# Patient Record
Sex: FEMALE | Race: BLACK OR AFRICAN AMERICAN | Employment: FULL TIME | URBAN - METROPOLITAN AREA
[De-identification: names, ages, dates, MRNs, and addresses within clinical notes are randomized per-mention and may not be internally consistent; named-entity substitution may affect disease eponyms.]

---

## 2017-01-23 PROBLEM — M47.26 OSTEOARTHRITIS OF SPINE WITH RADICULOPATHY, LUMBAR REGION: Status: ACTIVE | Noted: 2017-01-23

## 2017-01-23 PROBLEM — M51.16 INTERVERTEBRAL DISC DISORDERS WITH RADICULOPATHY, LUMBAR REGION: Status: ACTIVE | Noted: 2017-01-23

## 2017-05-10 PROBLEM — M51.26 DISPLACEMENT OF LUMBAR INTERVERTEBRAL DISC: Status: ACTIVE | Noted: 2017-05-10

## 2017-07-06 PROBLEM — M54.12 CERVICAL RADICULOPATHY: Status: ACTIVE | Noted: 2017-07-06

## 2017-07-06 PROBLEM — E11.42 DIABETIC PERIPHERAL NEUROPATHY (HCC): Status: ACTIVE | Noted: 2017-07-06

## 2017-07-06 PROBLEM — M50.20 CERVICAL DISC DISPLACEMENT: Status: ACTIVE | Noted: 2017-07-06

## 2017-07-06 PROBLEM — E66.09 NON MORBID OBESITY DUE TO EXCESS CALORIES: Status: ACTIVE | Noted: 2017-07-06

## 2017-09-28 PROBLEM — M50.20 CERVICAL DISC DISPLACEMENT: Status: ACTIVE | Noted: 2017-09-28

## 2017-09-28 PROBLEM — G62.9 PERIPHERAL NERVE DYSFUNCTION: Status: ACTIVE | Noted: 2017-09-28

## 2017-09-28 PROBLEM — R73.03 PREDIABETES: Status: ACTIVE | Noted: 2017-09-28

## 2018-03-30 ENCOUNTER — HOSPITAL ENCOUNTER (OUTPATIENT)
Age: 48
Discharge: HOME | End: 2018-04-01
Payer: COMMERCIAL

## 2018-03-30 PROCEDURE — 88305 TISSUE EXAM BY PATHOLOGIST: CPT

## 2018-03-30 PROCEDURE — 87081 CULTURE SCREEN ONLY: CPT

## 2018-03-31 LAB — CLOTEST: NORMAL

## 2018-04-10 ENCOUNTER — HOSPITAL ENCOUNTER (OUTPATIENT)
Age: 48
Discharge: HOME | End: 2018-04-12
Payer: COMMERCIAL

## 2018-04-10 LAB
AMPHETAMINE SCREEN, URINE: NOT DETECTED
BARBITURATE SCREEN URINE: NOT DETECTED
BENZODIAZEPINE SCREEN, URINE: NOT DETECTED
CANNABINOID SCREEN URINE: NOT DETECTED
COCAINE METABOLITE SCREEN URINE: NOT DETECTED
METHADONE SCREEN, URINE: NOT DETECTED
OPIATE SCREEN URINE: NOT DETECTED
PHENCYCLIDINE SCREEN URINE: NOT DETECTED
PROPOXYPHENE SCREEN: NOT DETECTED

## 2018-04-10 PROCEDURE — G0480 DRUG TEST DEF 1-7 CLASSES: HCPCS

## 2018-04-10 PROCEDURE — 80307 DRUG TEST PRSMV CHEM ANLYZR: CPT

## 2018-04-16 LAB
O-DESMETHYLTRAMADOL,UR: NEGATIVE
TRAMADOL, URINE: 100 NG/ML

## 2018-04-25 ENCOUNTER — OFFICE VISIT (OUTPATIENT)
Dept: FAMILY MEDICINE CLINIC | Age: 48
End: 2018-04-25
Payer: COMMERCIAL

## 2018-04-25 VITALS
TEMPERATURE: 98.2 F | RESPIRATION RATE: 16 BRPM | HEART RATE: 60 BPM | WEIGHT: 218.8 LBS | DIASTOLIC BLOOD PRESSURE: 74 MMHG | BODY MASS INDEX: 36.46 KG/M2 | OXYGEN SATURATION: 98 % | HEIGHT: 65 IN | SYSTOLIC BLOOD PRESSURE: 116 MMHG

## 2018-04-25 DIAGNOSIS — R10.30 LOWER ABDOMINAL PAIN: Primary | ICD-10-CM

## 2018-04-25 LAB
BILIRUBIN, POC: NORMAL
BLOOD URINE, POC: NORMAL
CLARITY, POC: CLEAR
COLOR, POC: NORMAL
CONTROL: NORMAL
GLUCOSE URINE, POC: NORMAL
KETONES, POC: NORMAL
LEUKOCYTE EST, POC: NORMAL
NITRITE, POC: NORMAL
PH, POC: 5.5
PREGNANCY TEST URINE, POC: NEGATIVE
PROTEIN, POC: NORMAL
SPECIFIC GRAVITY, POC: 1.02
UROBILINOGEN, POC: 0.2

## 2018-04-25 PROCEDURE — G8417 CALC BMI ABV UP PARAM F/U: HCPCS | Performed by: FAMILY MEDICINE

## 2018-04-25 PROCEDURE — G8427 DOCREV CUR MEDS BY ELIG CLIN: HCPCS | Performed by: FAMILY MEDICINE

## 2018-04-25 PROCEDURE — 81025 URINE PREGNANCY TEST: CPT | Performed by: FAMILY MEDICINE

## 2018-04-25 PROCEDURE — 99214 OFFICE O/P EST MOD 30 MIN: CPT | Performed by: FAMILY MEDICINE

## 2018-04-25 PROCEDURE — 1036F TOBACCO NON-USER: CPT | Performed by: FAMILY MEDICINE

## 2018-04-25 PROCEDURE — 81003 URINALYSIS AUTO W/O SCOPE: CPT | Performed by: FAMILY MEDICINE

## 2018-04-30 PROBLEM — G44.209 MUSCLE CONTRACTION HEADACHE: Status: ACTIVE | Noted: 2018-04-30

## 2018-05-08 ENCOUNTER — APPOINTMENT (OUTPATIENT)
Dept: GENERAL RADIOLOGY | Age: 48
End: 2018-05-08
Payer: COMMERCIAL

## 2018-05-08 ENCOUNTER — HOSPITAL ENCOUNTER (EMERGENCY)
Age: 48
Discharge: HOME | End: 2018-05-08
Attending: EMERGENCY MEDICINE
Payer: COMMERCIAL

## 2018-05-08 VITALS
RESPIRATION RATE: 15 BRPM | HEART RATE: 62 BPM | WEIGHT: 208 LBS | HEIGHT: 65 IN | DIASTOLIC BLOOD PRESSURE: 73 MMHG | BODY MASS INDEX: 34.66 KG/M2 | TEMPERATURE: 98.8 F | OXYGEN SATURATION: 100 % | SYSTOLIC BLOOD PRESSURE: 125 MMHG

## 2018-05-08 DIAGNOSIS — J02.9 VIRAL PHARYNGITIS: ICD-10-CM

## 2018-05-08 DIAGNOSIS — R09.82 POST-NASAL DRIP: Primary | ICD-10-CM

## 2018-05-08 LAB
ALBUMIN SERPL-MCNC: 3.9 G/DL (ref 3.5–5.2)
ALP BLD-CCNC: 105 U/L (ref 35–104)
ALT SERPL-CCNC: 14 U/L (ref 0–32)
ANION GAP SERPL CALCULATED.3IONS-SCNC: 12 MMOL/L (ref 7–16)
AST SERPL-CCNC: 18 U/L (ref 0–31)
BASOPHILS ABSOLUTE: 0.01 E9/L (ref 0–0.2)
BASOPHILS RELATIVE PERCENT: 0.2 % (ref 0–2)
BILIRUB SERPL-MCNC: <0.2 MG/DL (ref 0–1.2)
BILIRUBIN DIRECT: <0.2 MG/DL (ref 0–0.3)
BILIRUBIN, INDIRECT: ABNORMAL MG/DL (ref 0–1)
BUN BLDV-MCNC: 5 MG/DL (ref 6–20)
CALCIUM SERPL-MCNC: 8.8 MG/DL (ref 8.6–10.2)
CHLORIDE BLD-SCNC: 102 MMOL/L (ref 98–107)
CO2: 26 MMOL/L (ref 22–29)
CREAT SERPL-MCNC: 0.9 MG/DL (ref 0.5–1)
D DIMER: 263 NG/ML DDU
EOSINOPHILS ABSOLUTE: 0.15 E9/L (ref 0.05–0.5)
EOSINOPHILS RELATIVE PERCENT: 2.3 % (ref 0–6)
GFR AFRICAN AMERICAN: >60
GFR NON-AFRICAN AMERICAN: >60 ML/MIN/1.73
GLUCOSE BLD-MCNC: 88 MG/DL (ref 74–109)
HCT VFR BLD CALC: 39 % (ref 34–48)
HEMOGLOBIN: 12.6 G/DL (ref 11.5–15.5)
IMMATURE GRANULOCYTES #: 0.03 E9/L
IMMATURE GRANULOCYTES %: 0.5 % (ref 0–5)
LYMPHOCYTES ABSOLUTE: 1.93 E9/L (ref 1.5–4)
LYMPHOCYTES RELATIVE PERCENT: 29.2 % (ref 20–42)
MCH RBC QN AUTO: 28.3 PG (ref 26–35)
MCHC RBC AUTO-ENTMCNC: 32.3 % (ref 32–34.5)
MCV RBC AUTO: 87.6 FL (ref 80–99.9)
MONOCYTES ABSOLUTE: 0.56 E9/L (ref 0.1–0.95)
MONOCYTES RELATIVE PERCENT: 8.5 % (ref 2–12)
NEUTROPHILS ABSOLUTE: 3.93 E9/L (ref 1.8–7.3)
NEUTROPHILS RELATIVE PERCENT: 59.3 % (ref 43–80)
PDW BLD-RTO: 13.8 FL (ref 11.5–15)
PLATELET # BLD: 239 E9/L (ref 130–450)
PMV BLD AUTO: 10.4 FL (ref 7–12)
POTASSIUM SERPL-SCNC: 3.9 MMOL/L (ref 3.5–5)
PRO-BNP: 249 PG/ML (ref 0–125)
RBC # BLD: 4.45 E12/L (ref 3.5–5.5)
SODIUM BLD-SCNC: 140 MMOL/L (ref 132–146)
STREP GRP A PCR: NEGATIVE
TOTAL PROTEIN: 7.1 G/DL (ref 6.4–8.3)
TSH SERPL DL<=0.05 MIU/L-ACNC: 2.51 UIU/ML (ref 0.27–4.2)
WBC # BLD: 6.6 E9/L (ref 4.5–11.5)

## 2018-05-08 PROCEDURE — 87880 STREP A ASSAY W/OPTIC: CPT

## 2018-05-08 PROCEDURE — 99283 EMERGENCY DEPT VISIT LOW MDM: CPT

## 2018-05-08 PROCEDURE — 85025 COMPLETE CBC W/AUTO DIFF WBC: CPT

## 2018-05-08 PROCEDURE — 80048 BASIC METABOLIC PNL TOTAL CA: CPT

## 2018-05-08 PROCEDURE — 80076 HEPATIC FUNCTION PANEL: CPT

## 2018-05-08 PROCEDURE — 83880 ASSAY OF NATRIURETIC PEPTIDE: CPT

## 2018-05-08 PROCEDURE — 71045 X-RAY EXAM CHEST 1 VIEW: CPT

## 2018-05-08 PROCEDURE — 84443 ASSAY THYROID STIM HORMONE: CPT

## 2018-05-08 PROCEDURE — 85378 FIBRIN DEGRADE SEMIQUANT: CPT

## 2018-05-08 RX ORDER — FLUTICASONE PROPIONATE 50 MCG
1 SPRAY, SUSPENSION (ML) NASAL DAILY
Qty: 1 BOTTLE | Refills: 0 | Status: SHIPPED | OUTPATIENT
Start: 2018-05-08 | End: 2018-06-12 | Stop reason: SDUPTHER

## 2018-05-08 RX ORDER — FEXOFENADINE HYDROCHLORIDE 60 MG/1
60 TABLET, FILM COATED ORAL DAILY
Qty: 30 TABLET | Refills: 0 | Status: SHIPPED | OUTPATIENT
Start: 2018-05-08 | End: 2018-12-19

## 2018-05-08 RX ORDER — METHYLPREDNISOLONE 4 MG/1
TABLET ORAL
Qty: 1 KIT | Refills: 0 | Status: SHIPPED | OUTPATIENT
Start: 2018-05-08 | End: 2018-05-14

## 2018-05-09 ASSESSMENT — ENCOUNTER SYMPTOMS
WHEEZING: 0
BACK PAIN: 0
ABDOMINAL DISTENTION: 0
DIARRHEA: 0
VOMITING: 0
COUGH: 0
NAUSEA: 0
EYE REDNESS: 0
SHORTNESS OF BREATH: 0
EYE DISCHARGE: 0
SINUS PRESSURE: 0
EYE PAIN: 0
SORE THROAT: 0

## 2018-05-20 ENCOUNTER — HOSPITAL ENCOUNTER (OUTPATIENT)
Dept: GENERAL RADIOLOGY | Age: 48
Discharge: HOME | End: 2018-05-22
Payer: COMMERCIAL

## 2018-05-20 ENCOUNTER — HOSPITAL ENCOUNTER (OUTPATIENT)
Age: 48
Discharge: HOME | End: 2018-05-22
Payer: COMMERCIAL

## 2018-05-20 DIAGNOSIS — R10.30 LOWER ABDOMINAL PAIN: ICD-10-CM

## 2018-05-20 PROCEDURE — 74018 RADEX ABDOMEN 1 VIEW: CPT

## 2018-06-12 ENCOUNTER — OFFICE VISIT (OUTPATIENT)
Dept: FAMILY MEDICINE CLINIC | Age: 48
End: 2018-06-12
Payer: COMMERCIAL

## 2018-06-12 VITALS
RESPIRATION RATE: 16 BRPM | OXYGEN SATURATION: 98 % | HEART RATE: 52 BPM | WEIGHT: 211.8 LBS | SYSTOLIC BLOOD PRESSURE: 110 MMHG | TEMPERATURE: 98.8 F | DIASTOLIC BLOOD PRESSURE: 74 MMHG | BODY MASS INDEX: 35.29 KG/M2 | HEIGHT: 65 IN

## 2018-06-12 DIAGNOSIS — M51.16 INTERVERTEBRAL DISC DISORDERS WITH RADICULOPATHY, LUMBAR REGION: ICD-10-CM

## 2018-06-12 DIAGNOSIS — R10.30 LOWER ABDOMINAL PAIN: Primary | ICD-10-CM

## 2018-06-12 PROCEDURE — 1036F TOBACCO NON-USER: CPT | Performed by: FAMILY MEDICINE

## 2018-06-12 PROCEDURE — G8427 DOCREV CUR MEDS BY ELIG CLIN: HCPCS | Performed by: FAMILY MEDICINE

## 2018-06-12 PROCEDURE — 99213 OFFICE O/P EST LOW 20 MIN: CPT | Performed by: FAMILY MEDICINE

## 2018-06-12 PROCEDURE — G8417 CALC BMI ABV UP PARAM F/U: HCPCS | Performed by: FAMILY MEDICINE

## 2018-06-12 RX ORDER — CEFUROXIME AXETIL 250 MG/1
TABLET ORAL
COMMUNITY
Start: 2018-06-01 | End: 2019-10-03 | Stop reason: SDUPTHER

## 2018-06-12 RX ORDER — FLUTICASONE PROPIONATE 50 MCG
1 SPRAY, SUSPENSION (ML) NASAL DAILY
Qty: 1 BOTTLE | Refills: 3 | Status: SHIPPED | OUTPATIENT
Start: 2018-06-12 | End: 2019-05-01 | Stop reason: SDUPTHER

## 2018-06-12 RX ORDER — ONDANSETRON 4 MG/1
4 TABLET, FILM COATED ORAL EVERY 8 HOURS PRN
COMMUNITY
End: 2018-07-30 | Stop reason: SDUPTHER

## 2018-06-12 RX ORDER — ONDANSETRON 4 MG/1
4 TABLET, FILM COATED ORAL EVERY 8 HOURS PRN
Qty: 90 TABLET | Refills: 1 | Status: CANCELLED | OUTPATIENT
Start: 2018-06-12

## 2018-06-21 ENCOUNTER — TELEPHONE (OUTPATIENT)
Dept: ADMINISTRATIVE | Age: 48
End: 2018-06-21

## 2018-06-28 ENCOUNTER — HOSPITAL ENCOUNTER (OUTPATIENT)
Age: 48
End: 2018-06-28
Payer: COMMERCIAL

## 2018-06-28 ENCOUNTER — HOSPITAL ENCOUNTER (OUTPATIENT)
Age: 48
Discharge: HOME | End: 2018-06-28
Payer: COMMERCIAL

## 2018-06-28 LAB
ALBUMIN SERPL-MCNC: 4 G/DL (ref 3.5–5.2)
ALP BLD-CCNC: 110 U/L (ref 35–104)
ALT SERPL-CCNC: 19 U/L (ref 0–32)
AMYLASE: 56 U/L (ref 20–100)
ANION GAP SERPL CALCULATED.3IONS-SCNC: 12 MMOL/L (ref 7–16)
AST SERPL-CCNC: 20 U/L (ref 0–31)
BASOPHILS ABSOLUTE: 0.02 E9/L (ref 0–0.2)
BASOPHILS RELATIVE PERCENT: 0.3 % (ref 0–2)
BILIRUB SERPL-MCNC: <0.2 MG/DL (ref 0–1.2)
BUN BLDV-MCNC: 10 MG/DL (ref 6–20)
C-REACTIVE PROTEIN: 0.9 MG/DL (ref 0–0.4)
CALCIUM SERPL-MCNC: 9.2 MG/DL (ref 8.6–10.2)
CHLORIDE BLD-SCNC: 101 MMOL/L (ref 98–107)
CO2: 26 MMOL/L (ref 22–29)
CREAT SERPL-MCNC: 0.9 MG/DL (ref 0.5–1)
EOSINOPHILS ABSOLUTE: 0.09 E9/L (ref 0.05–0.5)
EOSINOPHILS RELATIVE PERCENT: 1.4 % (ref 0–6)
GFR AFRICAN AMERICAN: >60
GFR NON-AFRICAN AMERICAN: >60 ML/MIN/1.73
GLUCOSE BLD-MCNC: 95 MG/DL (ref 74–109)
HBA1C MFR BLD: 6.3 % (ref 4–5.6)
HCT VFR BLD CALC: 39.4 % (ref 34–48)
HEMOGLOBIN: 12.5 G/DL (ref 11.5–15.5)
IMMATURE GRANULOCYTES #: 0.06 E9/L
IMMATURE GRANULOCYTES %: 0.9 % (ref 0–5)
LIPASE: 17 U/L (ref 13–60)
LYMPHOCYTES ABSOLUTE: 1.95 E9/L (ref 1.5–4)
LYMPHOCYTES RELATIVE PERCENT: 30 % (ref 20–42)
MCH RBC QN AUTO: 27.4 PG (ref 26–35)
MCHC RBC AUTO-ENTMCNC: 31.7 % (ref 32–34.5)
MCV RBC AUTO: 86.4 FL (ref 80–99.9)
MONOCYTES ABSOLUTE: 0.51 E9/L (ref 0.1–0.95)
MONOCYTES RELATIVE PERCENT: 7.9 % (ref 2–12)
NEUTROPHILS ABSOLUTE: 3.86 E9/L (ref 1.8–7.3)
NEUTROPHILS RELATIVE PERCENT: 59.5 % (ref 43–80)
PDW BLD-RTO: 13.9 FL (ref 11.5–15)
PLATELET # BLD: 216 E9/L (ref 130–450)
PMV BLD AUTO: 10.3 FL (ref 7–12)
POTASSIUM SERPL-SCNC: 4.1 MMOL/L (ref 3.5–5)
RBC # BLD: 4.56 E12/L (ref 3.5–5.5)
SEDIMENTATION RATE, ERYTHROCYTE: 26 MM/HR (ref 0–20)
SODIUM BLD-SCNC: 139 MMOL/L (ref 132–146)
T3 FREE: 2.9 PG/ML (ref 2–4.4)
T3 UPTAKE PERCENT: 35.3 % (ref 22.5–37)
T4 FREE: 1.47 NG/DL (ref 0.93–1.7)
T4 TOTAL: 7.8 MCG/DL (ref 4.5–11.7)
TESTOSTERONE TOTAL: 12.7 NG/DL
TOTAL PROTEIN: 7 G/DL (ref 6.4–8.3)
TSH SERPL DL<=0.05 MIU/L-ACNC: 2.92 UIU/ML (ref 0.27–4.2)
VITAMIN D 25-HYDROXY: 29 NG/ML (ref 30–100)
WBC # BLD: 6.5 E9/L (ref 4.5–11.5)

## 2018-06-28 PROCEDURE — 83036 HEMOGLOBIN GLYCOSYLATED A1C: CPT

## 2018-06-28 PROCEDURE — 84481 FREE ASSAY (FT-3): CPT

## 2018-06-28 PROCEDURE — 84436 ASSAY OF TOTAL THYROXINE: CPT

## 2018-06-28 PROCEDURE — 84439 ASSAY OF FREE THYROXINE: CPT

## 2018-06-28 PROCEDURE — 84443 ASSAY THYROID STIM HORMONE: CPT

## 2018-06-28 PROCEDURE — 82150 ASSAY OF AMYLASE: CPT

## 2018-06-28 PROCEDURE — 83690 ASSAY OF LIPASE: CPT

## 2018-06-28 PROCEDURE — 82306 VITAMIN D 25 HYDROXY: CPT

## 2018-06-28 PROCEDURE — 83525 ASSAY OF INSULIN: CPT

## 2018-06-28 PROCEDURE — 86140 C-REACTIVE PROTEIN: CPT

## 2018-06-28 PROCEDURE — 36415 COLL VENOUS BLD VENIPUNCTURE: CPT

## 2018-06-28 PROCEDURE — 84403 ASSAY OF TOTAL TESTOSTERONE: CPT

## 2018-06-28 PROCEDURE — 80053 COMPREHEN METABOLIC PANEL: CPT

## 2018-06-28 PROCEDURE — 84479 ASSAY OF THYROID (T3 OR T4): CPT

## 2018-06-28 PROCEDURE — 85651 RBC SED RATE NONAUTOMATED: CPT

## 2018-06-28 PROCEDURE — 85025 COMPLETE CBC W/AUTO DIFF WBC: CPT

## 2018-06-30 LAB — INSULIN: 16 UIU/ML (ref 3–19)

## 2018-07-30 ENCOUNTER — OFFICE VISIT (OUTPATIENT)
Dept: FAMILY MEDICINE CLINIC | Age: 48
End: 2018-07-30
Payer: COMMERCIAL

## 2018-07-30 VITALS
BODY MASS INDEX: 36.37 KG/M2 | TEMPERATURE: 98.5 F | HEIGHT: 64 IN | DIASTOLIC BLOOD PRESSURE: 80 MMHG | SYSTOLIC BLOOD PRESSURE: 120 MMHG | OXYGEN SATURATION: 96 % | HEART RATE: 71 BPM | RESPIRATION RATE: 18 BRPM | WEIGHT: 213 LBS

## 2018-07-30 DIAGNOSIS — Z01.818 PREOP TESTING: ICD-10-CM

## 2018-07-30 DIAGNOSIS — J45.40 MODERATE PERSISTENT ASTHMA WITHOUT COMPLICATION: Primary | ICD-10-CM

## 2018-07-30 DIAGNOSIS — G43.101 MIGRAINE WITH AURA AND WITH STATUS MIGRAINOSUS, NOT INTRACTABLE: ICD-10-CM

## 2018-07-30 DIAGNOSIS — I10 ESSENTIAL HYPERTENSION: ICD-10-CM

## 2018-07-30 DIAGNOSIS — T75.3XXA MOTION SICKNESS, INITIAL ENCOUNTER: ICD-10-CM

## 2018-07-30 PROCEDURE — 99214 OFFICE O/P EST MOD 30 MIN: CPT | Performed by: FAMILY MEDICINE

## 2018-07-30 PROCEDURE — 1036F TOBACCO NON-USER: CPT | Performed by: FAMILY MEDICINE

## 2018-07-30 PROCEDURE — G8427 DOCREV CUR MEDS BY ELIG CLIN: HCPCS | Performed by: FAMILY MEDICINE

## 2018-07-30 PROCEDURE — 93000 ELECTROCARDIOGRAM COMPLETE: CPT | Performed by: FAMILY MEDICINE

## 2018-07-30 PROCEDURE — G8417 CALC BMI ABV UP PARAM F/U: HCPCS | Performed by: FAMILY MEDICINE

## 2018-07-30 RX ORDER — ONDANSETRON 4 MG/1
4 TABLET, FILM COATED ORAL EVERY 8 HOURS PRN
Qty: 30 TABLET | Refills: 2 | Status: SHIPPED | OUTPATIENT
Start: 2018-07-30 | End: 2019-05-01 | Stop reason: SDUPTHER

## 2018-07-30 RX ORDER — SCOLOPAMINE TRANSDERMAL SYSTEM 1 MG/1
1 PATCH, EXTENDED RELEASE TRANSDERMAL
Qty: 5 PATCH | Refills: 0 | Status: SHIPPED | OUTPATIENT
Start: 2018-07-30 | End: 2018-12-19

## 2018-07-30 RX ORDER — MECLIZINE HYDROCHLORIDE 25 MG/1
25 TABLET ORAL 3 TIMES DAILY PRN
Qty: 30 TABLET | Refills: 1 | Status: SHIPPED | OUTPATIENT
Start: 2018-07-30 | End: 2018-08-09

## 2018-07-30 RX ORDER — AZITHROMYCIN 250 MG/1
TABLET, FILM COATED ORAL
Qty: 1 PACKET | Refills: 0 | Status: SHIPPED | OUTPATIENT
Start: 2018-07-30 | End: 2018-11-19 | Stop reason: CLARIF

## 2018-07-30 RX ORDER — AMITRIPTYLINE HYDROCHLORIDE 25 MG/1
25 TABLET, FILM COATED ORAL NIGHTLY
Qty: 30 TABLET | Refills: 3 | Status: SHIPPED | OUTPATIENT
Start: 2018-07-30 | End: 2018-12-19

## 2018-07-30 NOTE — PROGRESS NOTES
Abdomen: Soft, non-tender, non-distended, positive BS in all 4 quadrants    Extremities:Dorsalis pedis pulses palpated bilaterally, no clubbing, cyanosis, edema or erythema     SKIN: no lesions, jaundice, petechiae, pallor, cyanosis, ecchymosis    NEURO: gross motor exam normal by observation, gait normal    Mental status - alert, oriented to person, place, and time, normal mood, behavior, speech, dress, motor activity, and thought processes      Assessment/Plan  Peng Go was seen today for pre-op exam and sinusitis. Diagnoses and all orders for this visit:    Moderate persistent asthma without complication  -  Stable, continue medications as prescribed. Essential hypertension  -     EKG 12 Lead    Migraine with aura and with status migrainosus, not intractable  -     amitriptyline (ELAVIL) 25 MG tablet; Take 1 tablet by mouth nightly    Preop testing  -     EKG 12 Lead- similar to prior  - NEGATIVE stress 2015. - Low risk and medically optimized for surgical procedure. Motion sickness, initial encounter  -     ondansetron (ZOFRAN) 4 MG tablet; Take 1 tablet by mouth every 8 hours as needed for Nausea or Vomiting  -     scopolamine (TRANSDERM-SCOP, 1.5 MG,) transdermal patch; Place 1 patch onto the skin every 72 hours  -     meclizine (ANTIVERT) 25 MG tablet; Take 1 tablet by mouth 3 times daily as needed for Dizziness    Other orders  -     azithromycin (ZITHROMAX Z-VIKKI) 250 MG tablet; 2 tab po day 1 then 1 tab po day 2-5        I did spend more than 25 minutes in direct patient care discussing and treating above diagnoses. Return in about 6 weeks (around 9/10/2018). Samra Mcpherson, DO    Call or go to ED immediately if symptoms worsen or persist.    Educational materials and/or home exercises printed for patient's review and were included in patient instructions on his/her After Visit Summary and given to patient at the end of visit.        Counseled regarding above diagnosis, including

## 2018-08-03 ENCOUNTER — TELEPHONE (OUTPATIENT)
Dept: FAMILY MEDICINE CLINIC | Age: 48
End: 2018-08-03

## 2018-08-03 NOTE — TELEPHONE ENCOUNTER
Pt called and is on an abx and forgot to ask you for a rx for Diflucan. Pt said she always gets vaginal yeast infections after taking the abx. Pt said she has the start of a one,  She has some vaginal discharge and itchiness. Pt uses [facility]. Is this ok ? [facility] ph# [phone].

## 2018-08-06 RX ORDER — FLUCONAZOLE 150 MG/1
150 TABLET ORAL ONCE
Qty: 1 TABLET | Refills: 0 | Status: SHIPPED | OUTPATIENT
Start: 2018-08-06 | End: 2018-08-06

## 2018-08-30 ENCOUNTER — HOSPITAL ENCOUNTER (OUTPATIENT)
Age: 48
Discharge: HOME | End: 2018-09-01
Payer: COMMERCIAL

## 2018-08-30 PROCEDURE — 88304 TISSUE EXAM BY PATHOLOGIST: CPT

## 2018-08-30 PROCEDURE — 88311 DECALCIFY TISSUE: CPT

## 2018-09-04 ENCOUNTER — HOSPITAL ENCOUNTER (OUTPATIENT)
Age: 48
Discharge: HOME | End: 2018-09-04
Payer: COMMERCIAL

## 2018-09-04 LAB
BASOPHILS ABSOLUTE: 0.02 E9/L (ref 0–0.2)
BASOPHILS RELATIVE PERCENT: 0.2 % (ref 0–2)
EOSINOPHILS ABSOLUTE: 0.04 E9/L (ref 0.05–0.5)
EOSINOPHILS RELATIVE PERCENT: 0.5 % (ref 0–6)
HCT VFR BLD CALC: 40.8 % (ref 34–48)
HEMOGLOBIN: 12.9 G/DL (ref 11.5–15.5)
IMMATURE GRANULOCYTES #: 0.09 E9/L
IMMATURE GRANULOCYTES %: 1 % (ref 0–5)
LYMPHOCYTES ABSOLUTE: 2.27 E9/L (ref 1.5–4)
LYMPHOCYTES RELATIVE PERCENT: 25.9 % (ref 20–42)
MCH RBC QN AUTO: 27.8 PG (ref 26–35)
MCHC RBC AUTO-ENTMCNC: 31.6 % (ref 32–34.5)
MCV RBC AUTO: 87.9 FL (ref 80–99.9)
MONOCYTES ABSOLUTE: 0.71 E9/L (ref 0.1–0.95)
MONOCYTES RELATIVE PERCENT: 8.1 % (ref 2–12)
NEUTROPHILS ABSOLUTE: 5.63 E9/L (ref 1.8–7.3)
NEUTROPHILS RELATIVE PERCENT: 64.3 % (ref 43–80)
PDW BLD-RTO: 14.2 FL (ref 11.5–15)
PLATELET # BLD: 254 E9/L (ref 130–450)
PMV BLD AUTO: 9.9 FL (ref 7–12)
RBC # BLD: 4.64 E12/L (ref 3.5–5.5)
WBC # BLD: 8.8 E9/L (ref 4.5–11.5)

## 2018-09-04 PROCEDURE — 85025 COMPLETE CBC W/AUTO DIFF WBC: CPT

## 2018-09-04 PROCEDURE — 36415 COLL VENOUS BLD VENIPUNCTURE: CPT

## 2018-09-24 ENCOUNTER — OFFICE VISIT (OUTPATIENT)
Dept: FAMILY MEDICINE CLINIC | Age: 48
End: 2018-09-24
Payer: COMMERCIAL

## 2018-09-24 VITALS
HEART RATE: 84 BPM | DIASTOLIC BLOOD PRESSURE: 60 MMHG | BODY MASS INDEX: 34.16 KG/M2 | RESPIRATION RATE: 18 BRPM | TEMPERATURE: 98.1 F | WEIGHT: 205 LBS | SYSTOLIC BLOOD PRESSURE: 118 MMHG | OXYGEN SATURATION: 100 % | HEIGHT: 65 IN

## 2018-09-24 DIAGNOSIS — G43.101 MIGRAINE WITH AURA AND WITH STATUS MIGRAINOSUS, NOT INTRACTABLE: ICD-10-CM

## 2018-09-24 DIAGNOSIS — Q07.00 ARNOLD-CHIARI MALFORMATION (HCC): Primary | ICD-10-CM

## 2018-09-24 DIAGNOSIS — M54.12 CERVICAL RADICULOPATHY: ICD-10-CM

## 2018-09-24 PROCEDURE — G8417 CALC BMI ABV UP PARAM F/U: HCPCS | Performed by: FAMILY MEDICINE

## 2018-09-24 PROCEDURE — 1036F TOBACCO NON-USER: CPT | Performed by: FAMILY MEDICINE

## 2018-09-24 PROCEDURE — 99213 OFFICE O/P EST LOW 20 MIN: CPT | Performed by: FAMILY MEDICINE

## 2018-09-24 PROCEDURE — G8427 DOCREV CUR MEDS BY ELIG CLIN: HCPCS | Performed by: FAMILY MEDICINE

## 2018-09-24 RX ORDER — SUMATRIPTAN 100 MG/1
100 TABLET, FILM COATED ORAL 2 TIMES DAILY
Qty: 9 TABLET | Refills: 5 | Status: SHIPPED | OUTPATIENT
Start: 2018-09-24 | End: 2019-01-29 | Stop reason: SDUPTHER

## 2018-09-24 ASSESSMENT — ENCOUNTER SYMPTOMS
TROUBLE SWALLOWING: 0
SORE THROAT: 0
ABDOMINAL PAIN: 0
SHORTNESS OF BREATH: 0
CHEST TIGHTNESS: 0
SINUS PRESSURE: 0
NAUSEA: 0
BACK PAIN: 0
COUGH: 0
WHEEZING: 0
DIARRHEA: 0
CONSTIPATION: 0
PHOTOPHOBIA: 0
VOMITING: 0
BLOOD IN STOOL: 0
VOICE CHANGE: 0

## 2018-09-24 NOTE — PROGRESS NOTES
9/26/2018    Chief Complaint   Patient presents with    Neck Pain     needs a referral for new neurologist doesnt know why she has an appointment but wants RX refills also        HPI    Holly Marino is a 50 y.o. patient that presents today for:    Migraine Headaches:  Davon Puentes is a 50 y.o. female who complains of a history of migraine headache for many year(s). This is a/an chronic problem. She has a well established history of recurrent migraines. This is  similar to headaches in the past. These typically last for hours to days. Established with Neuro, would like to see another as hers is retiring. Current Outpatient Prescriptions   Medication Sig Dispense Refill    SUMAtriptan (IMITREX) 100 MG tablet Take 1 tablet by mouth 2 times daily Two times daily prn 9 tablet 5    acetaminophen-codeine (TYLENOL #4) 300-60 MG per tablet TK 1 T PO Q 4 H PRN FOR PAIN  5    dexamethasone (DECADRON) 0.75 MG tablet Take 1 tablet by mouth continuous for 15 days TID FOR 5 DAYS, BID FOR 5 DAYS ,DAILY FOR 5 DAYS 30 tablet 0    Misc. Devices (CANE) MISC 1 Units by Does not apply route as needed (USE AS NEEDED FOR AMBULATION) PLEASE PROVIDE AMBULATORY CANE WITH QUAD [facility] [place].   DX  CDD AND CR 1 each 0    fluconazole (DIFLUCAN) 150 MG tablet Take 1 tablet by mouth daily 1 tablet 1    Hytfst-WyQqn-PyYeu-Meth-FA-DHA (PRENATE MINI) 18-0.6-0.4-350 MG CAPS Take by mouth daily      Famotidine (PEPCID PO) Take by mouth nightly      ondansetron (ZOFRAN) 4 MG tablet Take 1 tablet by mouth every 8 hours as needed for Nausea or Vomiting 30 tablet 2    amitriptyline (ELAVIL) 25 MG tablet Take 1 tablet by mouth nightly 30 tablet 3    azithromycin (ZITHROMAX Z-VIKKI) 250 MG tablet 2 tab po day 1 then 1 tab po day 2-5 1 packet 0    scopolamine (TRANSDERM-SCOP, 1.5 MG,) transdermal patch Place 1 patch onto the skin every 72 hours 5 patch 0    SUMAtriptan Succinate 6 MG/0.5ML SOAJ       fluticasone (FLONASE) 50 Report received from Jimena Duong RN. Patient care assumed-bedside reporting completed. MCG/ACT nasal spray 1 spray by Nasal route daily 1 Bottle 3    fexofenadine (ALLEGRA ALLERGY) 60 MG tablet Take 1 tablet by mouth daily 30 tablet 0    Elastic Bandages & Supports (LUMBAR BACK BRACE/SUPPORT PAD) MISC 1 each by Does not apply route daily as needed (USE) WITH METAL STAYS    USE AS NEEDED FOR BACK PAIN 1 each 0    Elastic Bandages & Supports (LUMBAR BACK BRACE/SUPPORT PAD) MISC 1 each by Does not apply route daily as needed (as directed) With metal stays   Use as needed for back pain 1 each 0    propranolol (INDERAL LA) 120 MG extended release capsule Take 1 capsule by mouth daily 30 capsule 3    tiotropium (SPIRIVA RESPIMAT) 1.25 MCG/ACT AERS inhaler Inhale 2 puffs into the lungs daily      spironolactone (ALDACTONE) 25 MG tablet Take 1 tablet by mouth daily 30 tablet 5    VENTOLIN  (90 Base) MCG/ACT inhaler Inhale 2 puffs into the lungs every 4 hours as needed       NEXIUM 24HR 20 MG delayed release capsule Take 20 mg by mouth 2 times daily       diclofenac (CATAFLAM) 50 MG tablet Take 1 tablet by mouth 3 times daily as needed for Pain (mild headache) 90 tablet 5    hydrocortisone (ANUSOL-HC) 2.5 % rectal cream Place rectally 2 times daily. 28.35 g 2    montelukast (SINGULAIR) 10 MG tablet Take 10 mg by mouth nightly       aspirin 81 MG tablet Take 81 mg by mouth daily      SUMAtriptan (IMITREX) 6 MG/0.5ML SOLN injection Inject 0.5 mLs into the skin once as needed for Migraine 2 mL 5    gabapentin (NEURONTIN) 300 MG capsule Take 1 capsule by mouth 4 times daily for 30 days. . 120 capsule 3    rizatriptan (MAXALT) 10 MG tablet Take 1 tablet by mouth once as needed for Migraine May repeat in 2 hours if needed 30 tablet 5     Current Facility-Administered Medications   Medication Dose Route Frequency Provider Last Rate Last Dose    cyanocobalamin injection 1,000 mcg  1,000 mcg Intramuscular Once Brittany Glaser MD        cyanocobalamin injection 1,000 mcg  1,000 mcg prn  -     [facility] - Luane Merlin, MD    Cervical radiculopathy  -     [facility] - Luane Merlin, MD          Return if symptoms worsen or fail to improve. Samra Mcpherson, DO    Call or go to ED immediately if symptoms worsen or persist.    Educational materials and/or home exercises printed for patient's review and were included in patient instructions on his/her After Visit Summary and given to patient at the end of visit. Counseled regarding above diagnosis, including possible risks and complications,  especially if left uncontrolled. Counseled regarding the possible side effects, risks, benefits and alternatives to treatment; patient and/or guardian verbalizes understanding, agrees, feels comfortable with and wishes to proceed with above treatment plan. Advised patient to call with any new medication issues, and read all Rx info from pharmacy to assure aware of all possible risks and side effects of medication before taking. Reviewed age and gender appropriate health screening exams and vaccinations. Advised patient regarding importance of keeping up with recommended health maintenance and to schedule as soon as possible if overdue, as this is important in assessing for undiagnosed pathology, especially cancer, as well as protecting against potentially harmful/life threatening disease. Patient and/or guardian verbalizes understanding and agrees with above counseling, assessment and plan. All questions answered.

## 2018-10-17 RX ORDER — RIZATRIPTAN BENZOATE 10 MG/1
10 TABLET ORAL
Qty: 30 TABLET | Refills: 5 | Status: SHIPPED | OUTPATIENT
Start: 2018-10-17 | End: 2019-01-29 | Stop reason: SDUPTHER

## 2018-11-09 ENCOUNTER — OFFICE VISIT (OUTPATIENT)
Dept: NEUROLOGY | Age: 48
End: 2018-11-09
Payer: COMMERCIAL

## 2018-11-09 VITALS
DIASTOLIC BLOOD PRESSURE: 80 MMHG | HEART RATE: 70 BPM | OXYGEN SATURATION: 99 % | BODY MASS INDEX: 33.49 KG/M2 | HEIGHT: 65 IN | WEIGHT: 201 LBS | RESPIRATION RATE: 12 BRPM | SYSTOLIC BLOOD PRESSURE: 120 MMHG

## 2018-11-09 DIAGNOSIS — M50.30 DDD (DEGENERATIVE DISC DISEASE), CERVICAL: Chronic | ICD-10-CM

## 2018-11-09 DIAGNOSIS — G44.229 CHRONIC TENSION-TYPE HEADACHE, NOT INTRACTABLE: Chronic | ICD-10-CM

## 2018-11-09 PROCEDURE — 99201 PR OFFICE OUTPATIENT NEW 10 MINUTES: CPT | Performed by: PSYCHIATRY & NEUROLOGY

## 2018-11-09 ASSESSMENT — ENCOUNTER SYMPTOMS
RESPIRATORY NEGATIVE: 1
GASTROINTESTINAL NEGATIVE: 1
EYES NEGATIVE: 1
BACK PAIN: 1

## 2018-11-09 NOTE — LETTER
Patient: Flor Steven    Date: 11/9/2018  Re: Neurology appointment, 8 a.m. To Whom It May Concern:    Onel Nicole presented today for a  appointment  at [facility] Please excuse her from work.     Sincerely,      Dr. Néstor Thomas

## 2018-11-19 ENCOUNTER — OFFICE VISIT (OUTPATIENT)
Dept: FAMILY MEDICINE CLINIC | Age: 48
End: 2018-11-19
Payer: COMMERCIAL

## 2018-11-19 VITALS
HEIGHT: 65 IN | SYSTOLIC BLOOD PRESSURE: 122 MMHG | HEART RATE: 70 BPM | RESPIRATION RATE: 18 BRPM | BODY MASS INDEX: 32.99 KG/M2 | OXYGEN SATURATION: 98 % | WEIGHT: 198 LBS | TEMPERATURE: 98.7 F | DIASTOLIC BLOOD PRESSURE: 70 MMHG

## 2018-11-19 DIAGNOSIS — J01.10 ACUTE FRONTAL SINUSITIS, RECURRENCE NOT SPECIFIED: Primary | ICD-10-CM

## 2018-11-19 PROCEDURE — G8417 CALC BMI ABV UP PARAM F/U: HCPCS | Performed by: FAMILY MEDICINE

## 2018-11-19 PROCEDURE — 1036F TOBACCO NON-USER: CPT | Performed by: FAMILY MEDICINE

## 2018-11-19 PROCEDURE — G8427 DOCREV CUR MEDS BY ELIG CLIN: HCPCS | Performed by: FAMILY MEDICINE

## 2018-11-19 PROCEDURE — G8484 FLU IMMUNIZE NO ADMIN: HCPCS | Performed by: FAMILY MEDICINE

## 2018-11-19 PROCEDURE — 99213 OFFICE O/P EST LOW 20 MIN: CPT | Performed by: FAMILY MEDICINE

## 2018-11-19 RX ORDER — AZITHROMYCIN 250 MG/1
TABLET, FILM COATED ORAL
Qty: 1 PACKET | Refills: 0 | Status: SHIPPED | OUTPATIENT
Start: 2018-11-19 | End: 2018-12-19

## 2018-11-19 ASSESSMENT — PATIENT HEALTH QUESTIONNAIRE - PHQ9
SUM OF ALL RESPONSES TO PHQ9 QUESTIONS 1 & 2: 0
2. FEELING DOWN, DEPRESSED OR HOPELESS: 0
SUM OF ALL RESPONSES TO PHQ QUESTIONS 1-9: 0
SUM OF ALL RESPONSES TO PHQ QUESTIONS 1-9: 0
1. LITTLE INTEREST OR PLEASURE IN DOING THINGS: 0

## 2018-11-19 NOTE — PROGRESS NOTES
Summary and given to patient at the end of visit. Counseled regarding above diagnosis, including possible risks and complications,  especially if left uncontrolled. Counseled regarding the possible side effects, risks, benefits and alternatives to treatment; patient and/or guardian verbalizes understanding, agrees, feels comfortable with and wishes to proceed with above treatment plan. Advised patient to call with any new medication issues, and read all Rx info from pharmacy to assure aware of all possible risks and side effects of medication before taking. Reviewed age and gender appropriate health screening exams and vaccinations. Advised patient regarding importance of keeping up with recommended health maintenance and to schedule as soon as possible if overdue, as this is important in assessing for undiagnosed pathology, especially cancer, as well as protecting against potentially harmful/life threatening disease. Patient and/or guardian verbalizes understanding and agrees with above counseling, assessment and plan. All questions answered.

## 2018-11-21 ENCOUNTER — TELEPHONE (OUTPATIENT)
Dept: FAMILY MEDICINE CLINIC | Age: 48
End: 2018-11-21

## 2018-11-21 RX ORDER — FLUCONAZOLE 150 MG/1
150 TABLET ORAL ONCE
Qty: 1 TABLET | Refills: 1 | Status: SHIPPED | OUTPATIENT
Start: 2018-11-21 | End: 2018-11-21

## 2018-11-22 ENCOUNTER — APPOINTMENT (OUTPATIENT)
Dept: GENERAL RADIOLOGY | Age: 48
End: 2018-11-22
Payer: COMMERCIAL

## 2018-11-22 ENCOUNTER — APPOINTMENT (OUTPATIENT)
Dept: CT IMAGING | Age: 48
End: 2018-11-22
Payer: COMMERCIAL

## 2018-11-22 ENCOUNTER — HOSPITAL ENCOUNTER (EMERGENCY)
Age: 48
Discharge: HOME | End: 2018-11-22
Attending: EMERGENCY MEDICINE
Payer: COMMERCIAL

## 2018-11-22 VITALS
OXYGEN SATURATION: 100 % | DIASTOLIC BLOOD PRESSURE: 93 MMHG | HEIGHT: 65 IN | HEART RATE: 100 BPM | SYSTOLIC BLOOD PRESSURE: 153 MMHG | WEIGHT: 194 LBS | TEMPERATURE: 98.3 F | BODY MASS INDEX: 32.32 KG/M2 | RESPIRATION RATE: 16 BRPM

## 2018-11-22 DIAGNOSIS — R07.89 ATYPICAL CHEST PAIN: Primary | ICD-10-CM

## 2018-11-22 DIAGNOSIS — K44.9 HIATAL HERNIA: ICD-10-CM

## 2018-11-22 LAB
ALBUMIN SERPL-MCNC: 4.5 G/DL (ref 3.5–5.2)
ALP BLD-CCNC: 106 U/L (ref 35–104)
ALT SERPL-CCNC: 18 U/L (ref 0–32)
ANION GAP SERPL CALCULATED.3IONS-SCNC: 12 MMOL/L (ref 7–16)
AST SERPL-CCNC: 21 U/L (ref 0–31)
BASOPHILS ABSOLUTE: 0.02 E9/L (ref 0–0.2)
BASOPHILS RELATIVE PERCENT: 0.2 % (ref 0–2)
BILIRUB SERPL-MCNC: <0.2 MG/DL (ref 0–1.2)
BUN BLDV-MCNC: 10 MG/DL (ref 6–20)
CALCIUM SERPL-MCNC: 9.3 MG/DL (ref 8.6–10.2)
CHLORIDE BLD-SCNC: 99 MMOL/L (ref 98–107)
CHP ED QC CHECK: YES
CO2: 28 MMOL/L (ref 22–29)
CREAT SERPL-MCNC: 0.9 MG/DL (ref 0.5–1)
EKG ATRIAL RATE: 64 BPM
EKG P AXIS: 55 DEGREES
EKG P-R INTERVAL: 134 MS
EKG Q-T INTERVAL: 392 MS
EKG QRS DURATION: 76 MS
EKG QTC CALCULATION (BAZETT): 404 MS
EKG R AXIS: -14 DEGREES
EKG T AXIS: -20 DEGREES
EKG VENTRICULAR RATE: 64 BPM
EOSINOPHILS ABSOLUTE: 0.06 E9/L (ref 0.05–0.5)
EOSINOPHILS RELATIVE PERCENT: 0.7 % (ref 0–6)
GFR AFRICAN AMERICAN: >60
GFR NON-AFRICAN AMERICAN: >60 ML/MIN/1.73
GLUCOSE BLD-MCNC: 103 MG/DL (ref 74–99)
HCT VFR BLD CALC: 40.5 % (ref 34–48)
HEMOGLOBIN: 12.9 G/DL (ref 11.5–15.5)
IMMATURE GRANULOCYTES #: 0.04 E9/L
IMMATURE GRANULOCYTES %: 0.5 % (ref 0–5)
LYMPHOCYTES ABSOLUTE: 2.39 E9/L (ref 1.5–4)
LYMPHOCYTES RELATIVE PERCENT: 29 % (ref 20–42)
MCH RBC QN AUTO: 28.5 PG (ref 26–35)
MCHC RBC AUTO-ENTMCNC: 31.9 % (ref 32–34.5)
MCV RBC AUTO: 89.4 FL (ref 80–99.9)
MONOCYTES ABSOLUTE: 0.55 E9/L (ref 0.1–0.95)
MONOCYTES RELATIVE PERCENT: 6.7 % (ref 2–12)
NEUTROPHILS ABSOLUTE: 5.17 E9/L (ref 1.8–7.3)
NEUTROPHILS RELATIVE PERCENT: 62.9 % (ref 43–80)
PDW BLD-RTO: 13.4 FL (ref 11.5–15)
PLATELET # BLD: 240 E9/L (ref 130–450)
PMV BLD AUTO: 10.5 FL (ref 7–12)
POTASSIUM SERPL-SCNC: 3.9 MMOL/L (ref 3.5–5)
PREGNANCY TEST URINE, POC: NEGATIVE
RBC # BLD: 4.53 E12/L (ref 3.5–5.5)
SODIUM BLD-SCNC: 139 MMOL/L (ref 132–146)
TOTAL PROTEIN: 7.7 G/DL (ref 6.4–8.3)
TROPONIN: <0.01 NG/ML (ref 0–0.03)
WBC # BLD: 8.2 E9/L (ref 4.5–11.5)

## 2018-11-22 PROCEDURE — 6370000000 HC RX 637 (ALT 250 FOR IP): Performed by: PHYSICIAN ASSISTANT

## 2018-11-22 PROCEDURE — 71046 X-RAY EXAM CHEST 2 VIEWS: CPT

## 2018-11-22 PROCEDURE — 93005 ELECTROCARDIOGRAM TRACING: CPT | Performed by: PHYSICIAN ASSISTANT

## 2018-11-22 PROCEDURE — 85025 COMPLETE CBC W/AUTO DIFF WBC: CPT

## 2018-11-22 PROCEDURE — 2580000003 HC RX 258: Performed by: PHYSICIAN ASSISTANT

## 2018-11-22 PROCEDURE — 99285 EMERGENCY DEPT VISIT HI MDM: CPT

## 2018-11-22 PROCEDURE — 80053 COMPREHEN METABOLIC PANEL: CPT

## 2018-11-22 PROCEDURE — 71275 CT ANGIOGRAPHY CHEST: CPT

## 2018-11-22 PROCEDURE — 6360000004 HC RX CONTRAST MEDICATION: Performed by: RADIOLOGY

## 2018-11-22 PROCEDURE — 84484 ASSAY OF TROPONIN QUANT: CPT

## 2018-11-22 RX ORDER — 0.9 % SODIUM CHLORIDE 0.9 %
1000 INTRAVENOUS SOLUTION INTRAVENOUS ONCE
Status: COMPLETED | OUTPATIENT
Start: 2018-11-22 | End: 2018-11-22

## 2018-11-22 RX ORDER — NAPROXEN 500 MG/1
500 TABLET ORAL 2 TIMES DAILY
Qty: 14 TABLET | Refills: 0 | Status: SHIPPED | OUTPATIENT
Start: 2018-11-22 | End: 2019-02-02

## 2018-11-22 RX ORDER — ASPIRIN 81 MG/1
243 TABLET, CHEWABLE ORAL ONCE
Status: COMPLETED | OUTPATIENT
Start: 2018-11-22 | End: 2018-11-22

## 2018-11-22 RX ADMIN — IOPAMIDOL 80 ML: 755 INJECTION, SOLUTION INTRAVENOUS at 13:58

## 2018-11-22 RX ADMIN — SODIUM CHLORIDE 1000 ML: 9 INJECTION, SOLUTION INTRAVENOUS at 12:58

## 2018-11-22 RX ADMIN — ASPIRIN 81 MG 243 MG: 81 TABLET ORAL at 12:51

## 2018-11-22 ASSESSMENT — PAIN SCALES - GENERAL
PAINLEVEL_OUTOF10: 6
PAINLEVEL_OUTOF10: 6

## 2018-11-22 ASSESSMENT — PAIN DESCRIPTION - PAIN TYPE: TYPE: ACUTE PAIN

## 2018-11-22 ASSESSMENT — PAIN DESCRIPTION - DESCRIPTORS: DESCRIPTORS: ACHING

## 2018-11-22 ASSESSMENT — PAIN DESCRIPTION - LOCATION: LOCATION: HEAD

## 2018-11-22 ASSESSMENT — PAIN SCALES - WONG BAKER: WONGBAKER_NUMERICALRESPONSE: 2

## 2018-11-22 NOTE — ED PROVIDER NOTES
American >60     Calcium 9.3 8.6 - 10.2 mg/dL    Total Protein 7.7 6.4 - 8.3 g/dL    Alb 4.5 3.5 - 5.2 g/dL    Total Bilirubin <0.2 0.0 - 1.2 mg/dL    Alkaline Phosphatase 106 (H) 35 - 104 U/L    ALT 18 0 - 32 U/L    AST 21 0 - 31 U/L   POC Pregnancy Urine Qual   Result Value Ref Range    Preg Test, Ur Negative     QC OK? Yes    EKG 12 Lead   Result Value Ref Range    Ventricular Rate 64 BPM    Atrial Rate 64 BPM    P-R Interval 134 ms    QRS Duration 76 ms    Q-T Interval 392 ms    QTc Calculation (Bazett) 404 ms    P Axis 55 degrees    R Axis -14 degrees    T Axis -20 degrees       Imaging: All Radiology results interpreted by Radiologist unless otherwise noted. CTA PULMONARY W CONTRAST   Final Result   1. No evidence to suggest pulmonary arterial emboli. 2. No acute cardiopulmonary pathology identified. 3. Small hiatal hernia. XR CHEST STANDARD (2 VW)   Final Result   No airspace opacities or pleural effusion. There is minimal   atelectasis in the left base. EKG #1:  Interpreted by emergency department physician unless otherwise noted. Time:  1248    Rate: 64  Rhythm: Sinus. Interpretation: sinus arrhythmia; non-specific T wave abnormality unchanged from previous. ED Course / Medical Decision Making     Medications   0.9 % sodium chloride bolus (0 mLs Intravenous Stopped 11/22/18 1443)   aspirin chewable tablet 243 mg (243 mg Oral Given 11/22/18 1251)   iopamidol (ISOVUE-370) 76 % injection 80 mL (80 mLs Intravenous Given 11/22/18 1358)     Consultations:             None    Procedures:   none    MDM:  Patient resents to the ER with chest pain radiating to the back. Has history of an upper extremity DVT. Had recent surgery in August. Will complete cardiac workup as well as CT with contrast due to history of an upper extremity DVT. Negative CT scan for PE. Has hiatal hernia. DDX of costochondritis vs acid reflux causing sxs. Pt on prilosec and protonix. Follows with Marylin Null. Wants Naprosyn to take as needed for costochondritis. Understands this can worsen stomach sxs but she states she will only take as needed. Will follow-up with doctors. Pt states she can have Aleve. Pt is in no acute distress, non-toxic, and appropriate for outpt management. Dr. Sharon Dover evaluated patient as well as performed their own history/physical examination and agrees with assessment and plan. Pt educated on need to return to ER if new or worsening symptoms. Pt told to follow-up with PCP. All questions answered. Pt agreeable to the plan. At this time the patient is without objective evidence of an acute process requiring hospitalization or inpatient management. They have remained hemodynamically stable throughout their entire ED visit and are stable for discharge with outpatient follow-up. The plan has been discussed in detail and they are aware of the specific conditions for emergent return, as well as the importance of follow-up. Counseling:   I have spoken with the patient and discussed todays results, in addition to providing specific details for the plan of care and counseling regarding the diagnosis and prognosis and are agreeable with the plan. Assessment      1. Atypical chest pain    2. Hiatal hernia      This patient's ED course included: a personal history and physicial examination, re-evaluation prior to disposition, multiple bedside re-evaluations, cardiac monitoring, continuous pulse oximetry and complex medical decision making and emergency management  This patient has remained hemodynamically stable and improved during their ED course. Plan   Discharge to home. Patient condition is good.     New Medications     Discharge Medication List as of 11/22/2018  2:28 PM      START taking these medications    Details   naproxen (NAPROSYN) 500 MG tablet Take 1 tablet by mouth 2 times daily for 7 days, Disp-14 tablet, R-0Print           Electronically signed

## 2018-11-22 NOTE — ED NOTES
Presents the ED with chest pain. Started two weeks ago. Now having sinus pain also.        Ethan Tariq RN  11/22/18 2228

## 2018-11-22 NOTE — ED NOTES
Maricel Arnett is a 50 y.o. female who presented to the ED on 11/22/18 complaining of   Chief Complaint   Patient presents with    Cough     Chest congestion             Adria Kline RN  11/22/18 3025

## 2018-12-19 ENCOUNTER — TELEPHONE (OUTPATIENT)
Dept: FAMILY MEDICINE CLINIC | Age: 48
End: 2018-12-19

## 2018-12-19 ENCOUNTER — OFFICE VISIT (OUTPATIENT)
Dept: FAMILY MEDICINE CLINIC | Age: 48
End: 2018-12-19
Payer: COMMERCIAL

## 2018-12-19 VITALS
WEIGHT: 190.8 LBS | OXYGEN SATURATION: 98 % | TEMPERATURE: 97.1 F | DIASTOLIC BLOOD PRESSURE: 70 MMHG | HEART RATE: 68 BPM | BODY MASS INDEX: 31.75 KG/M2 | RESPIRATION RATE: 16 BRPM | SYSTOLIC BLOOD PRESSURE: 120 MMHG

## 2018-12-19 DIAGNOSIS — Q07.00 ARNOLD-CHIARI MALFORMATION (HCC): ICD-10-CM

## 2018-12-19 DIAGNOSIS — Z01.818 PREOPERATIVE CLEARANCE: Primary | ICD-10-CM

## 2018-12-19 PROCEDURE — 1036F TOBACCO NON-USER: CPT | Performed by: PHYSICIAN ASSISTANT

## 2018-12-19 PROCEDURE — G8417 CALC BMI ABV UP PARAM F/U: HCPCS | Performed by: PHYSICIAN ASSISTANT

## 2018-12-19 PROCEDURE — G8484 FLU IMMUNIZE NO ADMIN: HCPCS | Performed by: PHYSICIAN ASSISTANT

## 2018-12-19 PROCEDURE — G8427 DOCREV CUR MEDS BY ELIG CLIN: HCPCS | Performed by: PHYSICIAN ASSISTANT

## 2018-12-19 PROCEDURE — 99213 OFFICE O/P EST LOW 20 MIN: CPT | Performed by: PHYSICIAN ASSISTANT

## 2018-12-19 ASSESSMENT — ENCOUNTER SYMPTOMS
SHORTNESS OF BREATH: 0
NAUSEA: 0
COUGH: 0
DIARRHEA: 0
VOMITING: 0
ABDOMINAL PAIN: 0

## 2018-12-19 NOTE — PROGRESS NOTES
OFFICE PROGRESS NOTE    SUBJECTIVE:        Patient ID:   Melly Dey is a 50 y.o. female who presents for pre-op exam    Chief Complaint   Patient presents with   Reji Ortiz 12/20/18-Hardware removal     HPI:   HPI   Patient presents for preop exam. She is having foot surgery tomorrow to have screws removed. She was in the ER a few weeks ago and EKG was normal at that time. She also had a stress test in 2016 which was normal. Overall, doing well. Denies fever, chest pain, SOB, N/V/D, or recent illness. Patient reports history of chiari malformation. She has seen Dr. Vipin Hernandez and Dr. Sandra Barnes in the past. Patient was not happy with appointment with Dr. Sandra Barnes and she is interested in referral to another specialist. Patient states that she needs refills for Tylenol #4 and a muscle relaxer to help with neck pain and headaches due to chiari malformation. Patient is not interested in seeing pain management. Prior to Admission medications    Medication Sig Start Date End Date Taking? Authorizing Provider   SUMAtriptan (IMITREX) 100 MG tablet Take 1 tablet by mouth 2 times daily Two times daily prn 9/24/18  Yes Samra Joseph, DO   acetaminophen-codeine (TYLENOL #4) 300-60 MG per tablet TK 1 T PO Q 4 H PRN FOR PAIN 8/3/18  Yes Historical Provider, MD   Misc. Devices (CANE) MISC 1 Units by Does not apply route as needed (USE AS NEEDED FOR AMBULATION) PLEASE PROVIDE AMBULATORY CANE WITH QUAD [facility].   DX  CDD AND CR 9/14/18  Yes MD Kavya Raviat-FeCbn-FeAsp-Meth-FA-DHA (PRENATE MINI) 18-0.6-0.4-350 MG CAPS Take by mouth daily   Yes Historical Provider, MD   Famotidine (PEPCID PO) Take by mouth nightly   Yes Historical Provider, MD   ondansetron (ZOFRAN) 4 MG tablet Take 1 tablet by mouth every 8 hours as needed for Nausea or Vomiting 7/30/18  Yes Samra Joseph, DO   SUMAtriptan Succinate 6 MG/0.5ML SOAJ  6/1/18  Yes

## 2019-01-23 ENCOUNTER — TELEPHONE (OUTPATIENT)
Dept: ADMINISTRATIVE | Age: 49
End: 2019-01-23

## 2019-01-24 ENCOUNTER — TELEPHONE (OUTPATIENT)
Dept: FAMILY MEDICINE CLINIC | Age: 49
End: 2019-01-24

## 2019-01-24 DIAGNOSIS — M50.20 CERVICAL DISC DISPLACEMENT: ICD-10-CM

## 2019-01-24 DIAGNOSIS — M51.26 DISPLACEMENT OF LUMBAR INTERVERTEBRAL DISC: ICD-10-CM

## 2019-01-24 RX ORDER — TIZANIDINE 4 MG/1
4 TABLET ORAL 3 TIMES DAILY
Qty: 90 TABLET | Refills: 2 | Status: SHIPPED | OUTPATIENT
Start: 2019-01-24 | End: 2019-08-06

## 2019-01-24 RX ORDER — ACETAMINOPHEN AND CODEINE PHOSPHATE 60; 300 MG/1; MG/1
1 TABLET ORAL DAILY PRN
Qty: 30 TABLET | Refills: 0 | Status: SHIPPED | OUTPATIENT
Start: 2019-01-24 | End: 2019-02-25 | Stop reason: SDUPTHER

## 2019-01-29 ENCOUNTER — HOSPITAL ENCOUNTER (OUTPATIENT)
Age: 49
Discharge: HOME | End: 2019-01-31
Payer: COMMERCIAL

## 2019-01-29 ENCOUNTER — OFFICE VISIT (OUTPATIENT)
Dept: FAMILY MEDICINE CLINIC | Age: 49
End: 2019-01-29
Payer: COMMERCIAL

## 2019-01-29 VITALS
HEART RATE: 66 BPM | SYSTOLIC BLOOD PRESSURE: 138 MMHG | OXYGEN SATURATION: 99 % | TEMPERATURE: 99.2 F | WEIGHT: 196.8 LBS | BODY MASS INDEX: 32.75 KG/M2 | DIASTOLIC BLOOD PRESSURE: 80 MMHG | RESPIRATION RATE: 16 BRPM

## 2019-01-29 DIAGNOSIS — Z79.891 ENCOUNTER FOR LONG-TERM OPIATE ANALGESIC USE: ICD-10-CM

## 2019-01-29 DIAGNOSIS — G43.101 MIGRAINE WITH AURA AND WITH STATUS MIGRAINOSUS, NOT INTRACTABLE: Primary | ICD-10-CM

## 2019-01-29 DIAGNOSIS — G43.101 MIGRAINE WITH AURA AND WITH STATUS MIGRAINOSUS, NOT INTRACTABLE: ICD-10-CM

## 2019-01-29 DIAGNOSIS — Z76.0 MEDICATION REFILL: ICD-10-CM

## 2019-01-29 DIAGNOSIS — R10.9 FLANK PAIN: ICD-10-CM

## 2019-01-29 DIAGNOSIS — J06.9 VIRAL URI: ICD-10-CM

## 2019-01-29 DIAGNOSIS — E04.1 THYROID NODULE: ICD-10-CM

## 2019-01-29 LAB
ALBUMIN SERPL-MCNC: 4.4 G/DL (ref 3.5–5.2)
ALP BLD-CCNC: 97 U/L (ref 35–104)
ALT SERPL-CCNC: 19 U/L (ref 0–32)
AMPHETAMINE SCREEN, URINE: NOT DETECTED
ANION GAP SERPL CALCULATED.3IONS-SCNC: 13 MMOL/L (ref 7–16)
AST SERPL-CCNC: 20 U/L (ref 0–31)
BARBITURATE SCREEN URINE: NOT DETECTED
BENZODIAZEPINE SCREEN, URINE: NOT DETECTED
BILIRUB SERPL-MCNC: <0.2 MG/DL (ref 0–1.2)
BILIRUBIN, POC: NEGATIVE
BLOOD URINE, POC: NEGATIVE
BUN BLDV-MCNC: 13 MG/DL (ref 6–20)
CALCIUM SERPL-MCNC: 9.2 MG/DL (ref 8.6–10.2)
CANNABINOID SCREEN URINE: NOT DETECTED
CHLORIDE BLD-SCNC: 102 MMOL/L (ref 98–107)
CLARITY, POC: CLEAR
CO2: 26 MMOL/L (ref 22–29)
COCAINE METABOLITE SCREEN URINE: NOT DETECTED
COLOR, POC: YELLOW
CREAT SERPL-MCNC: 0.9 MG/DL (ref 0.5–1)
GFR AFRICAN AMERICAN: >60
GFR NON-AFRICAN AMERICAN: >60 ML/MIN/1.73
GLUCOSE BLD-MCNC: 89 MG/DL (ref 74–99)
GLUCOSE URINE, POC: NEGATIVE
HCT VFR BLD CALC: 39.3 % (ref 34–48)
HEMOGLOBIN: 12.6 G/DL (ref 11.5–15.5)
KETONES, POC: NEGATIVE
LEUKOCYTE EST, POC: NEGATIVE
MCH RBC QN AUTO: 27.9 PG (ref 26–35)
MCHC RBC AUTO-ENTMCNC: 32.1 % (ref 32–34.5)
MCV RBC AUTO: 86.9 FL (ref 80–99.9)
METHADONE SCREEN, URINE: NOT DETECTED
NITRITE, POC: NEGATIVE
OPIATE SCREEN URINE: NOT DETECTED
PDW BLD-RTO: 13.7 FL (ref 11.5–15)
PH, POC: 5.5
PHENCYCLIDINE SCREEN URINE: NOT DETECTED
PLATELET # BLD: 232 E9/L (ref 130–450)
PMV BLD AUTO: 10.6 FL (ref 7–12)
POTASSIUM SERPL-SCNC: 4 MMOL/L (ref 3.5–5)
PROPOXYPHENE SCREEN: NOT DETECTED
PROTEIN, POC: NEGATIVE
RBC # BLD: 4.52 E12/L (ref 3.5–5.5)
SODIUM BLD-SCNC: 141 MMOL/L (ref 132–146)
SPECIFIC GRAVITY, POC: 1.02
TOTAL PROTEIN: 7.2 G/DL (ref 6.4–8.3)
TSH SERPL DL<=0.05 MIU/L-ACNC: 2.46 UIU/ML (ref 0.27–4.2)
UROBILINOGEN, POC: 0.2
WBC # BLD: 6 E9/L (ref 4.5–11.5)

## 2019-01-29 PROCEDURE — 99214 OFFICE O/P EST MOD 30 MIN: CPT | Performed by: FAMILY MEDICINE

## 2019-01-29 PROCEDURE — 85027 COMPLETE CBC AUTOMATED: CPT

## 2019-01-29 PROCEDURE — 36415 COLL VENOUS BLD VENIPUNCTURE: CPT | Performed by: FAMILY MEDICINE

## 2019-01-29 PROCEDURE — 80307 DRUG TEST PRSMV CHEM ANLYZR: CPT

## 2019-01-29 PROCEDURE — 81002 URINALYSIS NONAUTO W/O SCOPE: CPT | Performed by: FAMILY MEDICINE

## 2019-01-29 PROCEDURE — 80053 COMPREHEN METABOLIC PANEL: CPT

## 2019-01-29 PROCEDURE — G8484 FLU IMMUNIZE NO ADMIN: HCPCS | Performed by: FAMILY MEDICINE

## 2019-01-29 PROCEDURE — 1036F TOBACCO NON-USER: CPT | Performed by: FAMILY MEDICINE

## 2019-01-29 PROCEDURE — G8427 DOCREV CUR MEDS BY ELIG CLIN: HCPCS | Performed by: FAMILY MEDICINE

## 2019-01-29 PROCEDURE — 84443 ASSAY THYROID STIM HORMONE: CPT

## 2019-01-29 PROCEDURE — G8417 CALC BMI ABV UP PARAM F/U: HCPCS | Performed by: FAMILY MEDICINE

## 2019-01-29 RX ORDER — SPIRONOLACTONE 25 MG/1
25 TABLET ORAL DAILY
Qty: 90 TABLET | Refills: 1 | Status: SHIPPED | OUTPATIENT
Start: 2019-01-29 | End: 2019-08-06 | Stop reason: SDUPTHER

## 2019-01-29 RX ORDER — RIZATRIPTAN BENZOATE 10 MG/1
10 TABLET ORAL
Qty: 10 TABLET | Refills: 5 | Status: SHIPPED | OUTPATIENT
Start: 2019-01-29 | End: 2019-03-11 | Stop reason: CLARIF

## 2019-01-29 RX ORDER — SUMATRIPTAN 100 MG/1
100 TABLET, FILM COATED ORAL 2 TIMES DAILY
Qty: 18 TABLET | Refills: 5 | Status: SHIPPED | OUTPATIENT
Start: 2019-01-29 | End: 2019-11-04 | Stop reason: SDUPTHER

## 2019-02-02 ENCOUNTER — APPOINTMENT (OUTPATIENT)
Dept: CT IMAGING | Age: 49
End: 2019-02-02
Payer: COMMERCIAL

## 2019-02-02 ENCOUNTER — HOSPITAL ENCOUNTER (EMERGENCY)
Age: 49
Discharge: HOME | End: 2019-02-02
Payer: COMMERCIAL

## 2019-02-02 ENCOUNTER — HOSPITAL ENCOUNTER (OUTPATIENT)
Dept: ULTRASOUND IMAGING | Age: 49
Discharge: HOME | End: 2019-02-04
Payer: COMMERCIAL

## 2019-02-02 VITALS
WEIGHT: 187 LBS | BODY MASS INDEX: 31.16 KG/M2 | RESPIRATION RATE: 14 BRPM | OXYGEN SATURATION: 99 % | SYSTOLIC BLOOD PRESSURE: 140 MMHG | HEIGHT: 65 IN | TEMPERATURE: 98.9 F | DIASTOLIC BLOOD PRESSURE: 86 MMHG | HEART RATE: 74 BPM

## 2019-02-02 DIAGNOSIS — S16.1XXA STRAIN OF NECK MUSCLE, INITIAL ENCOUNTER: ICD-10-CM

## 2019-02-02 DIAGNOSIS — S20.212A CONTUSION OF RIB ON LEFT SIDE, INITIAL ENCOUNTER: Primary | ICD-10-CM

## 2019-02-02 DIAGNOSIS — S30.1XXA CONTUSION OF ABDOMINAL WALL, INITIAL ENCOUNTER: ICD-10-CM

## 2019-02-02 DIAGNOSIS — E04.1 THYROID NODULE: ICD-10-CM

## 2019-02-02 LAB
ANION GAP SERPL CALCULATED.3IONS-SCNC: 12 MMOL/L (ref 7–16)
BASOPHILS ABSOLUTE: 0.03 E9/L (ref 0–0.2)
BASOPHILS RELATIVE PERCENT: 0.4 % (ref 0–2)
BUN BLDV-MCNC: 9 MG/DL (ref 6–20)
CALCIUM SERPL-MCNC: 9.5 MG/DL (ref 8.6–10.2)
CHLORIDE BLD-SCNC: 104 MMOL/L (ref 98–107)
CO2: 25 MMOL/L (ref 22–29)
CREAT SERPL-MCNC: 0.9 MG/DL (ref 0.5–1)
EOSINOPHILS ABSOLUTE: 0.06 E9/L (ref 0.05–0.5)
EOSINOPHILS RELATIVE PERCENT: 0.9 % (ref 0–6)
GFR AFRICAN AMERICAN: >60
GFR NON-AFRICAN AMERICAN: >60 ML/MIN/1.73
GLUCOSE BLD-MCNC: 104 MG/DL (ref 74–99)
HCG QUALITATIVE: NEGATIVE
HCT VFR BLD CALC: 41.3 % (ref 34–48)
HEMOGLOBIN: 13.1 G/DL (ref 11.5–15.5)
IMMATURE GRANULOCYTES #: 0.05 E9/L
IMMATURE GRANULOCYTES %: 0.7 % (ref 0–5)
LYMPHOCYTES ABSOLUTE: 1.89 E9/L (ref 1.5–4)
LYMPHOCYTES RELATIVE PERCENT: 27.8 % (ref 20–42)
MCH RBC QN AUTO: 28.5 PG (ref 26–35)
MCHC RBC AUTO-ENTMCNC: 31.7 % (ref 32–34.5)
MCV RBC AUTO: 90 FL (ref 80–99.9)
MONOCYTES ABSOLUTE: 0.49 E9/L (ref 0.1–0.95)
MONOCYTES RELATIVE PERCENT: 7.2 % (ref 2–12)
NEUTROPHILS ABSOLUTE: 4.28 E9/L (ref 1.8–7.3)
NEUTROPHILS RELATIVE PERCENT: 63 % (ref 43–80)
PDW BLD-RTO: 14.2 FL (ref 11.5–15)
PLATELET # BLD: 252 E9/L (ref 130–450)
PMV BLD AUTO: 10.2 FL (ref 7–12)
POTASSIUM SERPL-SCNC: 4 MMOL/L (ref 3.5–5)
RBC # BLD: 4.59 E12/L (ref 3.5–5.5)
SODIUM BLD-SCNC: 141 MMOL/L (ref 132–146)
WBC # BLD: 6.8 E9/L (ref 4.5–11.5)

## 2019-02-02 PROCEDURE — 85025 COMPLETE CBC W/AUTO DIFF WBC: CPT

## 2019-02-02 PROCEDURE — 76536 US EXAM OF HEAD AND NECK: CPT

## 2019-02-02 PROCEDURE — 84703 CHORIONIC GONADOTROPIN ASSAY: CPT

## 2019-02-02 PROCEDURE — 6360000004 HC RX CONTRAST MEDICATION: Performed by: RADIOLOGY

## 2019-02-02 PROCEDURE — 71260 CT THORAX DX C+: CPT

## 2019-02-02 PROCEDURE — 99284 EMERGENCY DEPT VISIT MOD MDM: CPT

## 2019-02-02 PROCEDURE — 72125 CT NECK SPINE W/O DYE: CPT

## 2019-02-02 PROCEDURE — 74177 CT ABD & PELVIS W/CONTRAST: CPT

## 2019-02-02 PROCEDURE — 80048 BASIC METABOLIC PNL TOTAL CA: CPT

## 2019-02-02 RX ADMIN — IOPAMIDOL 110 ML: 755 INJECTION, SOLUTION INTRAVENOUS at 12:07

## 2019-02-02 ASSESSMENT — PAIN DESCRIPTION - DESCRIPTORS: DESCRIPTORS: ACHING

## 2019-02-02 ASSESSMENT — PAIN DESCRIPTION - LOCATION: LOCATION: RIB CAGE

## 2019-02-02 ASSESSMENT — PAIN DESCRIPTION - ORIENTATION: ORIENTATION: LEFT

## 2019-02-02 ASSESSMENT — PAIN DESCRIPTION - FREQUENCY: FREQUENCY: CONTINUOUS

## 2019-02-02 ASSESSMENT — PAIN SCALES - GENERAL: PAINLEVEL_OUTOF10: 8

## 2019-02-02 ASSESSMENT — PAIN DESCRIPTION - PAIN TYPE: TYPE: ACUTE PAIN

## 2019-02-05 ENCOUNTER — OFFICE VISIT (OUTPATIENT)
Dept: FAMILY MEDICINE CLINIC | Age: 49
End: 2019-02-05
Payer: COMMERCIAL

## 2019-02-05 VITALS
HEART RATE: 84 BPM | BODY MASS INDEX: 32.32 KG/M2 | WEIGHT: 194.2 LBS | OXYGEN SATURATION: 97 % | TEMPERATURE: 98.6 F | DIASTOLIC BLOOD PRESSURE: 78 MMHG | RESPIRATION RATE: 16 BRPM | SYSTOLIC BLOOD PRESSURE: 130 MMHG

## 2019-02-05 DIAGNOSIS — M50.30 DDD (DEGENERATIVE DISC DISEASE), CERVICAL: Chronic | ICD-10-CM

## 2019-02-05 DIAGNOSIS — Q07.00 ARNOLD-CHIARI MALFORMATION (HCC): ICD-10-CM

## 2019-02-05 DIAGNOSIS — M51.26 DISPLACEMENT OF LUMBAR INTERVERTEBRAL DISC: Primary | ICD-10-CM

## 2019-02-05 PROCEDURE — 1036F TOBACCO NON-USER: CPT | Performed by: FAMILY MEDICINE

## 2019-02-05 PROCEDURE — G8427 DOCREV CUR MEDS BY ELIG CLIN: HCPCS | Performed by: FAMILY MEDICINE

## 2019-02-05 PROCEDURE — 99213 OFFICE O/P EST LOW 20 MIN: CPT | Performed by: FAMILY MEDICINE

## 2019-02-05 PROCEDURE — G8484 FLU IMMUNIZE NO ADMIN: HCPCS | Performed by: FAMILY MEDICINE

## 2019-02-05 PROCEDURE — G8417 CALC BMI ABV UP PARAM F/U: HCPCS | Performed by: FAMILY MEDICINE

## 2019-02-25 ENCOUNTER — OFFICE VISIT (OUTPATIENT)
Dept: FAMILY MEDICINE CLINIC | Age: 49
End: 2019-02-25
Payer: COMMERCIAL

## 2019-02-25 VITALS
BODY MASS INDEX: 33.99 KG/M2 | WEIGHT: 204 LBS | OXYGEN SATURATION: 97 % | SYSTOLIC BLOOD PRESSURE: 130 MMHG | HEIGHT: 65 IN | HEART RATE: 90 BPM | DIASTOLIC BLOOD PRESSURE: 82 MMHG | TEMPERATURE: 98.8 F | RESPIRATION RATE: 18 BRPM

## 2019-02-25 DIAGNOSIS — R59.0 CERVICAL LYMPHADENOPATHY: Primary | ICD-10-CM

## 2019-02-25 DIAGNOSIS — M50.20 CERVICAL DISC DISPLACEMENT: ICD-10-CM

## 2019-02-25 DIAGNOSIS — M51.26 DISPLACEMENT OF LUMBAR INTERVERTEBRAL DISC: ICD-10-CM

## 2019-02-25 PROCEDURE — G8484 FLU IMMUNIZE NO ADMIN: HCPCS | Performed by: FAMILY MEDICINE

## 2019-02-25 PROCEDURE — G8427 DOCREV CUR MEDS BY ELIG CLIN: HCPCS | Performed by: FAMILY MEDICINE

## 2019-02-25 PROCEDURE — 1036F TOBACCO NON-USER: CPT | Performed by: FAMILY MEDICINE

## 2019-02-25 PROCEDURE — 99214 OFFICE O/P EST MOD 30 MIN: CPT | Performed by: FAMILY MEDICINE

## 2019-02-25 PROCEDURE — G8417 CALC BMI ABV UP PARAM F/U: HCPCS | Performed by: FAMILY MEDICINE

## 2019-02-25 RX ORDER — FAMOTIDINE 40 MG/1
40 TABLET, FILM COATED ORAL NIGHTLY
COMMUNITY
Start: 2019-02-18

## 2019-02-25 RX ORDER — ACETAMINOPHEN AND CODEINE PHOSPHATE 60; 300 MG/1; MG/1
1 TABLET ORAL EVERY 6 HOURS PRN
Qty: 12 TABLET | Refills: 0 | Status: SHIPPED | OUTPATIENT
Start: 2019-02-25 | End: 2019-03-11 | Stop reason: SDUPTHER

## 2019-02-25 RX ORDER — PANTOPRAZOLE SODIUM 40 MG/1
40 TABLET, DELAYED RELEASE ORAL DAILY
COMMUNITY
Start: 2019-02-17

## 2019-02-25 RX ORDER — RIZATRIPTAN BENZOATE 10 MG/1
TABLET ORAL
COMMUNITY
Start: 2019-02-17 | End: 2019-11-11 | Stop reason: SDUPTHER

## 2019-02-25 ASSESSMENT — ENCOUNTER SYMPTOMS
BLOOD IN STOOL: 0
VOICE CHANGE: 0
WHEEZING: 0
VOMITING: 0
SINUS PRESSURE: 0
CHEST TIGHTNESS: 0
COUGH: 0
CONSTIPATION: 0
SHORTNESS OF BREATH: 0
PHOTOPHOBIA: 0
DIARRHEA: 0
BACK PAIN: 0
TROUBLE SWALLOWING: 0
ABDOMINAL PAIN: 0
NAUSEA: 0
SORE THROAT: 0

## 2019-02-25 ASSESSMENT — PATIENT HEALTH QUESTIONNAIRE - PHQ9
2. FEELING DOWN, DEPRESSED OR HOPELESS: 0
1. LITTLE INTEREST OR PLEASURE IN DOING THINGS: 0
SUM OF ALL RESPONSES TO PHQ QUESTIONS 1-9: 0
SUM OF ALL RESPONSES TO PHQ QUESTIONS 1-9: 0
SUM OF ALL RESPONSES TO PHQ9 QUESTIONS 1 & 2: 0

## 2019-03-11 ENCOUNTER — OFFICE VISIT (OUTPATIENT)
Dept: FAMILY MEDICINE CLINIC | Age: 49
End: 2019-03-11
Payer: COMMERCIAL

## 2019-03-11 VITALS
DIASTOLIC BLOOD PRESSURE: 78 MMHG | HEIGHT: 65 IN | TEMPERATURE: 98.3 F | SYSTOLIC BLOOD PRESSURE: 130 MMHG | BODY MASS INDEX: 33.82 KG/M2 | WEIGHT: 203 LBS | HEART RATE: 73 BPM | OXYGEN SATURATION: 100 % | RESPIRATION RATE: 18 BRPM

## 2019-03-11 DIAGNOSIS — R07.9 CHEST PAIN, UNSPECIFIED TYPE: Primary | ICD-10-CM

## 2019-03-11 DIAGNOSIS — M51.26 DISPLACEMENT OF LUMBAR INTERVERTEBRAL DISC: ICD-10-CM

## 2019-03-11 DIAGNOSIS — J45.41 MODERATE PERSISTENT ASTHMA WITH EXACERBATION: ICD-10-CM

## 2019-03-11 DIAGNOSIS — M50.20 CERVICAL DISC DISPLACEMENT: ICD-10-CM

## 2019-03-11 DIAGNOSIS — J45.40 MODERATE PERSISTENT ASTHMA WITHOUT COMPLICATION: ICD-10-CM

## 2019-03-11 DIAGNOSIS — Q07.00 ARNOLD-CHIARI MALFORMATION (HCC): ICD-10-CM

## 2019-03-11 PROCEDURE — 1036F TOBACCO NON-USER: CPT | Performed by: FAMILY MEDICINE

## 2019-03-11 PROCEDURE — G8484 FLU IMMUNIZE NO ADMIN: HCPCS | Performed by: FAMILY MEDICINE

## 2019-03-11 PROCEDURE — 99214 OFFICE O/P EST MOD 30 MIN: CPT | Performed by: FAMILY MEDICINE

## 2019-03-11 PROCEDURE — G8417 CALC BMI ABV UP PARAM F/U: HCPCS | Performed by: FAMILY MEDICINE

## 2019-03-11 PROCEDURE — 93000 ELECTROCARDIOGRAM COMPLETE: CPT | Performed by: FAMILY MEDICINE

## 2019-03-11 PROCEDURE — G8427 DOCREV CUR MEDS BY ELIG CLIN: HCPCS | Performed by: FAMILY MEDICINE

## 2019-03-11 RX ORDER — ACETAMINOPHEN AND CODEINE PHOSPHATE 60; 300 MG/1; MG/1
1 TABLET ORAL EVERY 6 HOURS PRN
Qty: 12 TABLET | Refills: 0 | Status: SHIPPED | OUTPATIENT
Start: 2019-03-11 | End: 2019-03-14

## 2019-03-11 RX ORDER — PREDNISONE 20 MG/1
40 TABLET ORAL DAILY
Qty: 10 TABLET | Refills: 0 | Status: SHIPPED | OUTPATIENT
Start: 2019-03-11 | End: 2019-03-28

## 2019-03-11 RX ORDER — TRIAMCINOLONE ACETONIDE 40 MG/ML
40 INJECTION, SUSPENSION INTRA-ARTICULAR; INTRAMUSCULAR ONCE
Status: COMPLETED | OUTPATIENT
Start: 2019-03-11 | End: 2019-03-11

## 2019-03-11 RX ADMIN — TRIAMCINOLONE ACETONIDE 40 MG: 40 INJECTION, SUSPENSION INTRA-ARTICULAR; INTRAMUSCULAR at 12:05

## 2019-03-15 ENCOUNTER — HOSPITAL ENCOUNTER (OUTPATIENT)
Dept: GENERAL RADIOLOGY | Age: 49
Discharge: HOME | End: 2019-03-17
Payer: COMMERCIAL

## 2019-03-15 ENCOUNTER — HOSPITAL ENCOUNTER (OUTPATIENT)
Age: 49
Discharge: HOME | End: 2019-03-15
Payer: COMMERCIAL

## 2019-03-15 DIAGNOSIS — R05.9 COUGH: ICD-10-CM

## 2019-03-15 PROCEDURE — 70220 X-RAY EXAM OF SINUSES: CPT

## 2019-03-28 ENCOUNTER — OFFICE VISIT (OUTPATIENT)
Dept: FAMILY MEDICINE CLINIC | Age: 49
End: 2019-03-28
Payer: COMMERCIAL

## 2019-03-28 VITALS
BODY MASS INDEX: 32.76 KG/M2 | RESPIRATION RATE: 16 BRPM | TEMPERATURE: 99.7 F | OXYGEN SATURATION: 97 % | HEART RATE: 92 BPM | HEIGHT: 65 IN | WEIGHT: 196.6 LBS | SYSTOLIC BLOOD PRESSURE: 118 MMHG | DIASTOLIC BLOOD PRESSURE: 80 MMHG

## 2019-03-28 DIAGNOSIS — J45.41 MODERATE PERSISTENT ASTHMA WITH EXACERBATION: Primary | ICD-10-CM

## 2019-03-28 PROCEDURE — 1036F TOBACCO NON-USER: CPT | Performed by: FAMILY MEDICINE

## 2019-03-28 PROCEDURE — G8417 CALC BMI ABV UP PARAM F/U: HCPCS | Performed by: FAMILY MEDICINE

## 2019-03-28 PROCEDURE — G8484 FLU IMMUNIZE NO ADMIN: HCPCS | Performed by: FAMILY MEDICINE

## 2019-03-28 PROCEDURE — G8427 DOCREV CUR MEDS BY ELIG CLIN: HCPCS | Performed by: FAMILY MEDICINE

## 2019-03-28 PROCEDURE — 96372 THER/PROPH/DIAG INJ SC/IM: CPT | Performed by: FAMILY MEDICINE

## 2019-03-28 PROCEDURE — 99213 OFFICE O/P EST LOW 20 MIN: CPT | Performed by: FAMILY MEDICINE

## 2019-03-28 RX ORDER — TRIAMCINOLONE ACETONIDE 40 MG/ML
40 INJECTION, SUSPENSION INTRA-ARTICULAR; INTRAMUSCULAR ONCE
Status: COMPLETED | OUTPATIENT
Start: 2019-03-28 | End: 2019-03-28

## 2019-03-28 RX ORDER — FLUTICASONE FUROATE AND VILANTEROL 100; 25 UG/1; UG/1
1 POWDER RESPIRATORY (INHALATION) DAILY
COMMUNITY
End: 2021-08-20

## 2019-03-28 RX ORDER — PREDNISONE 10 MG/1
TABLET ORAL
Qty: 35 TABLET | Refills: 0 | Status: SHIPPED | OUTPATIENT
Start: 2019-03-28 | End: 2019-05-06

## 2019-03-28 RX ORDER — AZITHROMYCIN 250 MG/1
TABLET, FILM COATED ORAL
Qty: 1 PACKET | Refills: 0 | Status: SHIPPED | OUTPATIENT
Start: 2019-03-28 | End: 2019-05-01

## 2019-03-28 RX ORDER — ESOMEPRAZOLE MAGNESIUM 40 MG/1
40 CAPSULE, DELAYED RELEASE ORAL
COMMUNITY
End: 2022-09-06 | Stop reason: SDUPTHER

## 2019-03-28 RX ADMIN — TRIAMCINOLONE ACETONIDE 40 MG: 40 INJECTION, SUSPENSION INTRA-ARTICULAR; INTRAMUSCULAR at 14:56

## 2019-03-28 NOTE — PROGRESS NOTES
4/16/2019    Chief Complaint   Patient presents with    Cough     Pt still not feeling better, still coughing & has congestion -- ?? bronchitis? ? HPI    Nestor Lopez is a 50 y.o. patient that presents today with cold symptoms. Upper Respiratory Infection:  Patient complains of symptoms of a URI. Symptoms include congestion, cough and wheeze. Onset of symptoms was many days ago, unchanged since that time. She is drinking plenty of fluids. Treatment to date: albuterol inhaler with minimal ROS. Sick contacts include kids  . Patient's past medical, surgical, social and/or family history reviewed, updated in chart, and are non-contributory (unless otherwise stated). Medications and allergies also reviewed and updated in chart.      ROS  Review of Systems - General ROS: negative for - chills, fatigue, fever, night sweats, sleep disturbance, weight gain or weight loss  Psychological ROS: negative for - anxiety, behavioral disorder, depression, hallucinations, irritability, memory difficulties, mood swings, sleep disturbances or suicidal ideation  ENT ROS: negative for - epistaxis, headaches, hearing change, nasal congestion, nasal discharge, nasal polyps, sinus pain, tinnitus, vertigo or visual changes  Hematological and Lymphatic ROS: negative for - bleeding problems, blood clots, fatigue or swollen lymph nodes  Respiratory ROS: negative for - cough, orthopnea, shortness of breath, sputum changes, tachypnea or wheezing  Cardiovascular ROS: negative for - chest pain, dyspnea on exertion, irregular heartbeat, loss of consciousness, palpitations, paroxysmal nocturnal dyspnea or rapid heart rate  Gastrointestinal ROS: negative for - abdominal pain, blood in stools, change in bowel habits, constipation, diarrhea, gas/bloating, heartburn or nausea/vomiting  Musculoskeletal ROS: negative for - joint pain, joint stiffness, joint swelling or muscle, back pain, bowel or bladder to ED immediately if symptoms worsen or persist.    Educational materials and/or home exercises printed for patient's review and were included in patient instructions on his/her After Visit Summary and given to patient at the end of visit. Counseled regarding above diagnosis, including possible risks and complications,  especially if left uncontrolled. Counseled regarding the possible side effects, risks, benefits and alternatives to treatment; patient and/or guardian verbalizes understanding, agrees, feels comfortable with and wishes to proceed with above treatment plan. Advised patient to call with any new medication issues, and read all Rx info from pharmacy to assure aware of all possible risks and side effects of medication before taking. Reviewed age and gender appropriate health screening exams and vaccinations. Advised patient regarding importance of keeping up with recommended health maintenance and to schedule as soon as possible if overdue, as this is important in assessing for undiagnosed pathology, especially cancer, as well as protecting against potentially harmful/life threatening disease. Patient and/or guardian verbalizes understanding and agrees with above counseling, assessment and plan. All questions answered.

## 2019-03-29 ENCOUNTER — HOSPITAL ENCOUNTER (OUTPATIENT)
Dept: ULTRASOUND IMAGING | Age: 49
Discharge: HOME | End: 2019-03-31
Payer: COMMERCIAL

## 2019-03-29 DIAGNOSIS — R59.1 LYMPHADENOPATHY: ICD-10-CM

## 2019-03-29 PROCEDURE — 88184 FLOWCYTOMETRY/ TC 1 MARKER: CPT

## 2019-03-29 PROCEDURE — 88185 FLOWCYTOMETRY/TC ADD-ON: CPT

## 2019-03-29 PROCEDURE — 88173 CYTOPATH EVAL FNA REPORT: CPT

## 2019-03-29 PROCEDURE — 88305 TISSUE EXAM BY PATHOLOGIST: CPT

## 2019-03-29 PROCEDURE — 88182 CELL MARKER STUDY: CPT

## 2019-03-29 PROCEDURE — 10005 FNA BX W/US GDN 1ST LES: CPT

## 2019-03-29 NOTE — H&P
Swelling       Past Medical History:   Diagnosis Date    Asthma     DR Hirsch Curling    Back injury     Chronic back pain     PAIN RADIATES TO NECK AND LEGS    Chronic tension-type headache, not intractable 2018    DDD (degenerative disc disease), cervical 2018    DVT (deep venous thrombosis) ([facility])     Right Arm    GERD (gastroesophageal reflux disease)     Herniated disc, cervical     also lumbar spine    Hx of screening mammography 3/2015 & 10/2015    BIRADS 2 BENIGN    HX OTHER MEDICAL     PINCHED NERVE BACK OF NECK    Hypertension     Migraine     Migraine     DR Clyda Libman MVP (mitral valve prolapse)     Numbness and tingling     LEGS AND FEET    Obesity     Sinus infection     Thyroid disease     no med    Torn ACL     RT KNEE    Ulcer     Vitamin B 12 deficiency     NON ANEMIC       Past Surgical History:   Procedure Laterality Date    BREAST LUMPECTOMY Right 2013    BREAST SURGERY Right 2013    re-excision, rt lumpectomyscar evacuation of hematoma.     CARDIOVASCULAR STRESS TEST  2015    NORMAL     SECTION      COLONOSCOPY      12 UMER HEMORRHOIDS, MINIMAL DIVERTICULA    COLONOSCOPY  2012    DR PEREZ GERD & GASTRITIS    DILATION AND CURETTAGE OF UTERUS      ECHO COMPL W DOP COLOR FLOW  2015         ECHOCARDIOGRAM COMPLETE 2D W DOPPLER W COLOR  10/24/2012         HARDWARE REMOVAL Right 2018    Foot    KNEE ARTHROSCOPY      KAREEM DR KRAMER    KNEE SURGERY      MAMMO IMPLANT DIGITAL DIAG BI      3/24/15 BIRADS CAT 2 BENIGN, 10/15/15 BIRADS CAT 2    OTHER SURGICAL HISTORY      TILT TABLE TEST - DR Mccormack Appl  NO ORTHOSTASIS    TYMPANOSTOMY TUBE PLACEMENT      UPPER GASTROINTESTINAL ENDOSCOPY      WITH CLOSED BIOPSY DR OWUSU 12 GERD, GASTRITIS    UPPER GASTROINTESTINAL ENDOSCOPY  2012    DR Leo Denson GERD & GASTRITIS    WISDOM TOOTH EXTRACTION         Family History   Problem Relation Age of Onset    High Blood Pressure Mother     Heart Disease Father     Seizures Father     Coronary Art Dis Father     Cancer Father         STOMACH    Cancer Sister 43        breast    Deep Vein Thrombosis Sister         IN THE SETTING OF BREAST CA    Hypertension Maternal Grandmother        Social History     Socioeconomic History    Marital status: Single     Spouse name: Not on file    Number of children: Not on file    Years of education: Not on file    Highest education level: Not on file   Occupational History    Occupation: attendance   Social Needs    Financial resource strain: Not on file    Food insecurity:     Worry: Not on file     Inability: Not on file    Transportation needs:     Medical: Not on file     Non-medical: Not on file   Tobacco Use    Smoking status: Never Smoker    Smokeless tobacco: Never Used   Substance and Sexual Activity    Alcohol use: No    Drug use: No    Sexual activity: Not on file   Lifestyle    Physical activity:     Days per week: Not on file     Minutes per session: Not on file    Stress: Not on file   Relationships    Social connections:     Talks on phone: Not on file     Gets together: Not on file     Attends Jew service: Not on file     Active member of club or organization: Not on file     Attends meetings of clubs or organizations: Not on file     Relationship status: Not on file    Intimate partner violence:     Fear of current or ex partner: Not on file     Emotionally abused: Not on file     Physically abused: Not on file     Forced sexual activity: Not on file   Other Topics Concern    Not on file   Social History Narrative    Not on file       ROS: Non-contributory other than as noted above    PHYSICAL EXAM:      Heent: Alert and orientated.     Heart:  Rapid regular rhythm    Lungs:  demonstrate no contraindications to proceed      Abdomen:  normal      DATA:  CBC:   Lab Results   Component Value Date    WBC 6.8 02/02/2019    RBC 4.59 02/02/2019    RBC 4.43 02/02/2017    HGB 13.1 02/02/2019    HCT 41.3 02/02/2019    MCV 90.0 02/02/2019    MCH 28.5 02/02/2019    MCHC 31.7 02/02/2019    RDW 14.2 02/02/2019     02/02/2019    MPV 10.2 02/02/2019     CBC with Differential:    Lab Results   Component Value Date    WBC 6.8 02/02/2019    RBC 4.59 02/02/2019    RBC 4.43 02/02/2017    HGB 13.1 02/02/2019    HCT 41.3 02/02/2019     02/02/2019    MCV 90.0 02/02/2019    MCH 28.5 02/02/2019    MCHC 31.7 02/02/2019    RDW 14.2 02/02/2019    SEGSPCT 66 03/20/2013    LYMPHOPCT 27.8 02/02/2019    LYMPHOPCT 32.4 02/02/2017    MONOPCT 7.2 02/02/2019    EOSPCT 1.5 02/02/2017    BASOPCT 0.4 02/02/2019    MONOSABS 0.49 02/02/2019    LYMPHSABS 1.89 02/02/2019    EOSABS 0.06 02/02/2019    BASOSABS 0.03 02/02/2019     Platelets:    Lab Results   Component Value Date     02/02/2019     BUN/Creatinine:    Lab Results   Component Value Date    BUN 9 02/02/2019    CREATININE 0.9 02/02/2019       ASSESSMENT AND PLAN:  1. Left neck bx  2. Procedure options, risks and benefits reviewed with patient. Patient expresses understanding.     Electronically signed by Ana Becerra MD on 3/29/2019 at 3:31 PM

## 2019-03-29 NOTE — BRIEF OP NOTE
Brief Postoperative Note    Felipa Vargas  YOB: 1970  [id number]    Pre-operative Diagnosis: mass    Post-operative Diagnosis: Same    Procedure: biopsy    Estimated Blood Loss: < 10 cc    Surgeon: Brock MANZANARES     Complications: none    Specimens obtained: Yes, biopsy of mass    Findings: biopsy of a mass      Cuong English II   3/29/2019 3:32 PM

## 2019-04-02 LAB
Lab: NORMAL
REPORT: NORMAL
THIS TEST SENT TO: NORMAL

## 2019-04-11 ENCOUNTER — TELEPHONE (OUTPATIENT)
Dept: FAMILY MEDICINE CLINIC | Age: 49
End: 2019-04-11

## 2019-04-11 RX ORDER — FLUCONAZOLE 150 MG/1
150 TABLET ORAL ONCE
Qty: 2 TABLET | Refills: 0 | Status: SHIPPED | OUTPATIENT
Start: 2019-04-11 | End: 2019-04-11

## 2019-04-23 ENCOUNTER — HOSPITAL ENCOUNTER (OUTPATIENT)
Age: 49
Discharge: HOME | End: 2019-04-25

## 2019-04-23 PROCEDURE — 87081 CULTURE SCREEN ONLY: CPT

## 2019-04-23 PROCEDURE — 88305 TISSUE EXAM BY PATHOLOGIST: CPT

## 2019-04-24 LAB — CLOTEST: NORMAL

## 2019-05-01 ENCOUNTER — OFFICE VISIT (OUTPATIENT)
Dept: FAMILY MEDICINE CLINIC | Age: 49
End: 2019-05-01
Payer: COMMERCIAL

## 2019-05-01 VITALS
DIASTOLIC BLOOD PRESSURE: 70 MMHG | OXYGEN SATURATION: 98 % | WEIGHT: 194.8 LBS | SYSTOLIC BLOOD PRESSURE: 116 MMHG | HEIGHT: 66 IN | RESPIRATION RATE: 16 BRPM | HEART RATE: 60 BPM | TEMPERATURE: 97.6 F | BODY MASS INDEX: 31.31 KG/M2

## 2019-05-01 DIAGNOSIS — J30.89 SEASONAL ALLERGIC RHINITIS DUE TO OTHER ALLERGIC TRIGGER: Primary | ICD-10-CM

## 2019-05-01 DIAGNOSIS — T75.3XXA MOTION SICKNESS, INITIAL ENCOUNTER: ICD-10-CM

## 2019-05-01 DIAGNOSIS — E11.42 DIABETIC PERIPHERAL NEUROPATHY (HCC): ICD-10-CM

## 2019-05-01 PROCEDURE — G8417 CALC BMI ABV UP PARAM F/U: HCPCS | Performed by: FAMILY MEDICINE

## 2019-05-01 PROCEDURE — G8427 DOCREV CUR MEDS BY ELIG CLIN: HCPCS | Performed by: FAMILY MEDICINE

## 2019-05-01 PROCEDURE — 3046F HEMOGLOBIN A1C LEVEL >9.0%: CPT | Performed by: FAMILY MEDICINE

## 2019-05-01 PROCEDURE — 1036F TOBACCO NON-USER: CPT | Performed by: FAMILY MEDICINE

## 2019-05-01 PROCEDURE — 2022F DILAT RTA XM EVC RTNOPTHY: CPT | Performed by: FAMILY MEDICINE

## 2019-05-01 PROCEDURE — 99214 OFFICE O/P EST MOD 30 MIN: CPT | Performed by: FAMILY MEDICINE

## 2019-05-01 RX ORDER — FEXOFENADINE HCL 180 MG/1
180 TABLET ORAL DAILY
Qty: 30 TABLET | Refills: 5 | Status: SHIPPED | OUTPATIENT
Start: 2019-05-01 | End: 2019-05-20

## 2019-05-01 RX ORDER — ONDANSETRON 4 MG/1
4 TABLET, FILM COATED ORAL EVERY 8 HOURS PRN
Qty: 30 TABLET | Refills: 2 | Status: SHIPPED | OUTPATIENT
Start: 2019-05-01 | End: 2019-08-06 | Stop reason: SDUPTHER

## 2019-05-01 RX ORDER — FLUTICASONE PROPIONATE 50 MCG
1 SPRAY, SUSPENSION (ML) NASAL DAILY
Qty: 1 BOTTLE | Refills: 5 | Status: SHIPPED | OUTPATIENT
Start: 2019-05-01 | End: 2020-01-29 | Stop reason: SDUPTHER

## 2019-05-01 NOTE — LETTER
[place] [address]  Phone: [phone]  Fax: [phone]    Cindy Mederos, DO        May 1, 2019     Patient: Mayra Galan   YOB: 1970   Date of Visit: 5/1/2019       To Whom it May Concern:    Kaitlynn Menezes was seen in my clinic on 5/1/2019. If you have any questions or concerns, please don't hesitate to call.     Sincerely,         Samra Mcpherson, DO

## 2019-05-01 NOTE — PROGRESS NOTES
5/1/2019    Chief Complaint   Patient presents with    Sinus Problem     Pt here for medication check--sinus issues, head congestion, drainage, went to Urgent Care & prescribed antibiotic, which didn't help--- was diagnosed with tonsilitis     Asthma    Medication Refill       AJAY Bustillo is a 50 y.o. patient that presents today with cold symptoms. Allergic Rhinitis: [place] is here for evaluation of possible allergic rhinitis. Patient's symptoms include clear rhinorrhea, headaches, nasal congestion, postnasal drip, pressure sensation in ears and sinus pressure. These symptoms are perennial with seasonal exacerbation. Current triggers include exposure to weather changes. The patient has been suffering from these symptoms for approximately many years. The patient has tried over the counter medications, prescription antihistamines and prescription nasal sprays with adequate relief of symptoms. Immunotherapy has never been tried. The patient has never had nasal polyps. The patient has a history of asthma. . The patient has not had sinus surgery in the past. The patient has no history of eczema. Does see Dr. Gualberto De La Cruz as needed. Peripheral neuropathy is stable and chronic. No changes. Patient's past medical, surgical, social and/or family history reviewed, updated in chart, and are non-contributory (unless otherwise stated). Medications and allergies also reviewed and updated in chart.      ROS  Review of Systems - General ROS: negative for - chills, fatigue, fever, night sweats, sleep disturbance, weight gain or weight loss  Psychological ROS: negative for - anxiety, behavioral disorder, depression, hallucinations, irritability, memory difficulties, mood swings, sleep disturbances or suicidal ideation  ENT ROS: negative for - epistaxis, headaches, hearing change, nasal congestion, nasal discharge, nasal polyps, sinus pain, tinnitus, vertigo or visual changes  Hematological and Lymphatic ROS: negative for - bleeding problems, blood clots, fatigue or swollen lymph nodes  Respiratory ROS: negative for - cough, orthopnea, shortness of breath, sputum changes, tachypnea or wheezing  Cardiovascular ROS: negative for - chest pain, dyspnea on exertion, irregular heartbeat, loss of consciousness, palpitations, paroxysmal nocturnal dyspnea or rapid heart rate  Gastrointestinal ROS: negative for - abdominal pain, blood in stools, change in bowel habits, constipation, diarrhea, gas/bloating, heartburn or nausea/vomiting  Musculoskeletal ROS: negative for - joint pain, joint stiffness, joint swelling or muscle, back pain, bowel or bladder incontinence  Neurological ROS: negative for - behavioral changes, confusion, dizziness, headaches, memory loss, numbness/tingling, seizures or speech problems, weakness  Dermatological ROS: negative for - dry skin, mole changes, nail changes, pruritus, rash or skin lesion changes    Physical Exam  /70   Pulse 60   Temp 97.6 °F (36.4 °C)   Resp 16   Ht 5' 5.5\" (1.664 m)   Wt 194 lb 12.8 oz (88.4 kg)   SpO2 98%   BMI 31.92 kg/m²     Wt Readings from Last 3 Encounters:   05/01/19 194 lb 12.8 oz (88.4 kg)   03/28/19 196 lb 9.6 oz (89.2 kg)   03/11/19 203 lb (92.1 kg)     BP Readings from Last 3 Encounters:   05/01/19 116/70   03/28/19 118/80   03/11/19 130/78         General appearance: alert, well appearing, and in no distress, oriented to person, place, and time and normal appearing weight. HEENT:  HEAD: Atraumatic, normocephalic  EYES: PERRL  EOM intact  EARS: bilateral TM's and external ear canals normal  NOSE: nasal mucosa, septum, turbinates normal bilaterally  MOUTH: mucous membranes moist and normal tonsils  NECK: supple, full range of motion, no mass, normal lymphadenopathy, no thyromegaly    CVS exam: normal rate, regular rhythm, normal S1, S2, no murmurs, rubs, clicks or gallops. Radial pulses 2+ bilateral.  PT/DP pulse 2+ bilat.   No C/C/E    Chest: clear to auscultation, no wheezes, rales or rhonchi, symmetric air entry. SKIN: no lesions, jaundice, petechiae, pallor, cyanosis, ecchymosis      Assessment/Plan  Maci was seen today for sinus problem, asthma and medication refill. Diagnoses and all orders for this visit:    Seasonal allergic rhinitis due to other allergic trigger  -     CHANGE: fexofenadine (ALLEGRA) 180 MG tablet; Take 1 tablet by mouth daily  -     fluticasone (FLONASE) 50 MCG/ACT nasal spray; 1 spray by Nasal route daily    Motion sickness, initial encounter  -     ondansetron (ZOFRAN) 4 MG tablet; Take 1 tablet by mouth every 8 hours as needed for Nausea or Vomiting    Diabetic peripheral neuropathy (HCC)  -  Stable, continue medications as prescribed. Return if symptoms worsen or fail to improve. Samra Mcpherson, DO    Call or go to ED immediately if symptoms worsen or persist.    Educational materials and/or home exercises printed for patient's review and were included in patient instructions on his/her After Visit Summary and given to patient at the end of visit. Counseled regarding above diagnosis, including possible risks and complications,  especially if left uncontrolled. Counseled regarding the possible side effects, risks, benefits and alternatives to treatment; patient and/or guardian verbalizes understanding, agrees, feels comfortable with and wishes to proceed with above treatment plan. Advised patient to call with any new medication issues, and read all Rx info from pharmacy to assure aware of all possible risks and side effects of medication before taking. Reviewed age and gender appropriate health screening exams and vaccinations.   Advised patient regarding importance of keeping up with recommended health maintenance and to schedule as soon as possible if overdue, as this is important in assessing for undiagnosed pathology, especially cancer, as well as protecting against potentially harmful/life threatening disease. Patient and/or guardian verbalizes understanding and agrees with above counseling, assessment and plan. All questions answered.

## 2019-05-06 ENCOUNTER — OFFICE VISIT (OUTPATIENT)
Dept: FAMILY MEDICINE CLINIC | Age: 49
End: 2019-05-06
Payer: COMMERCIAL

## 2019-05-06 VITALS
OXYGEN SATURATION: 97 % | RESPIRATION RATE: 16 BRPM | HEART RATE: 85 BPM | BODY MASS INDEX: 32.55 KG/M2 | DIASTOLIC BLOOD PRESSURE: 62 MMHG | SYSTOLIC BLOOD PRESSURE: 112 MMHG | WEIGHT: 198.6 LBS | TEMPERATURE: 97.4 F

## 2019-05-06 DIAGNOSIS — J45.901 ASTHMA WITH ACUTE EXACERBATION, UNSPECIFIED ASTHMA SEVERITY, UNSPECIFIED WHETHER PERSISTENT: Primary | ICD-10-CM

## 2019-05-06 PROCEDURE — G8427 DOCREV CUR MEDS BY ELIG CLIN: HCPCS | Performed by: PHYSICIAN ASSISTANT

## 2019-05-06 PROCEDURE — 1036F TOBACCO NON-USER: CPT | Performed by: PHYSICIAN ASSISTANT

## 2019-05-06 PROCEDURE — G8417 CALC BMI ABV UP PARAM F/U: HCPCS | Performed by: PHYSICIAN ASSISTANT

## 2019-05-06 PROCEDURE — 99213 OFFICE O/P EST LOW 20 MIN: CPT | Performed by: PHYSICIAN ASSISTANT

## 2019-05-06 RX ORDER — TRIAMCINOLONE ACETONIDE 40 MG/ML
40 INJECTION, SUSPENSION INTRA-ARTICULAR; INTRAMUSCULAR ONCE
Status: COMPLETED | OUTPATIENT
Start: 2019-05-06 | End: 2019-05-06

## 2019-05-06 RX ADMIN — TRIAMCINOLONE ACETONIDE 40 MG: 40 INJECTION, SUSPENSION INTRA-ARTICULAR; INTRAMUSCULAR at 11:00

## 2019-05-06 NOTE — PROGRESS NOTES
External Canals: TM's normal bilaterally without perforation. Canals without erythema or drainage. Nose:   There is no obvious septal defect. Mouth:  Moist bucca mucosa and normal tongue. Throat: Mild posterior pharyngeal erythema without exudates or lesions. Neck:  Supple. There is no obvious adenopathy or neck tenderness. Lungs:   Breath sounds: Normal chest expansion and breath sounds noted throughout. Faint, scattered wheezes throughout. No rales or rhonchi noted. Heart:  Regular rate and rhythm, normal heart sounds, without pathological murmurs, ectopy, gallops, or rubs. Abdomen:  Soft, nontender, good bowel sounds. No firm or pulsatile mass. Skin:  Normal turgor. Warm, dry, without visible rash. Neurological:  Oriented. Motor functions intact. Lab / Imaging Results   (All laboratory and radiology results have been personally reviewed by myself)  Labs:  No results found for this visit on 05/06/19. Imaging: All Radiology results interpreted by Radiologist unless otherwise noted. No orders to display     Assessment / Plan     Impression(s):  1. Asthma with acute exacerbation, unspecified asthma severity, unspecified whether persistent      Disposition:  Disposition: Discharge to home    Kenalog injection provided today and tolerated well. Discussed the importance of picking up allegra and flonase. Advised to start nebulizer up to 4 times per day as needed for wheezing/SOB. Pt advised to f/u with PCP in 7-10 days for recheck. Discussed red flag symptoms. To call or to ER if changes or worse. Advised to take all medications as directed.      Administrations This Visit     triamcinolone acetonide (KENALOG-40) injection 40 mg     Admin Date  05/06/2019 Action  Given Dose  40 mg Route  Intramuscular Administered By  Ann Avendano MA

## 2019-05-20 ENCOUNTER — OFFICE VISIT (OUTPATIENT)
Dept: FAMILY MEDICINE CLINIC | Age: 49
End: 2019-05-20
Payer: COMMERCIAL

## 2019-05-20 VITALS
SYSTOLIC BLOOD PRESSURE: 120 MMHG | TEMPERATURE: 98.9 F | BODY MASS INDEX: 31.5 KG/M2 | RESPIRATION RATE: 16 BRPM | DIASTOLIC BLOOD PRESSURE: 80 MMHG | OXYGEN SATURATION: 99 % | HEIGHT: 66 IN | HEART RATE: 76 BPM | WEIGHT: 196 LBS

## 2019-05-20 DIAGNOSIS — R73.09 ELEVATED GLUCOSE: ICD-10-CM

## 2019-05-20 DIAGNOSIS — J45.41 MODERATE PERSISTENT ASTHMA WITH EXACERBATION: Primary | ICD-10-CM

## 2019-05-20 PROCEDURE — G8417 CALC BMI ABV UP PARAM F/U: HCPCS | Performed by: FAMILY MEDICINE

## 2019-05-20 PROCEDURE — 99213 OFFICE O/P EST LOW 20 MIN: CPT | Performed by: FAMILY MEDICINE

## 2019-05-20 PROCEDURE — G8427 DOCREV CUR MEDS BY ELIG CLIN: HCPCS | Performed by: FAMILY MEDICINE

## 2019-05-20 PROCEDURE — 1036F TOBACCO NON-USER: CPT | Performed by: FAMILY MEDICINE

## 2019-05-20 RX ORDER — PREDNISONE 10 MG/1
TABLET ORAL
Qty: 30 TABLET | Refills: 0 | Status: SHIPPED | OUTPATIENT
Start: 2019-05-20 | End: 2019-08-06

## 2019-05-20 NOTE — PROGRESS NOTES
5/20/2019    Chief Complaint   Patient presents with    Asthma     Pt here for [facility] follow-up for asthma     Cough       HPI    Vicky Simmonds is a 50 y.o. patient that presents today with cold symptoms. Upper Respiratory Infection:  Patient complains of symptoms of a URI. Symptoms include congestion and cough. Onset of symptoms was many days ago, gradually worsening since that time. She is drinking plenty of fluids. Treatment to date: oral decongestant with minimal ROS. Sick contacts include none. [facility] and got steroids and zpack. Clarence Pina gives her a headache. Loratadine is not working. Is taking Singulair qhs. Using Breo and Spiriva as indicated, also using albuterol neb q 4 hours. Patient's past medical, surgical, social and/or family history reviewed, updated in chart, and are non-contributory (unless otherwise stated). Medications and allergies also reviewed and updated in chart.      ROS Unless otherwise specified  Review of Systems - General ROS: negative for - chills, fatigue, fever, night sweats, sleep disturbance, weight gain or weight loss  Psychological ROS: negative for - anxiety, behavioral disorder, depression, hallucinations, irritability, memory difficulties, mood swings, sleep disturbances or suicidal ideation  ENT ROS: negative for - epistaxis, headaches, hearing change, nasal congestion, nasal discharge, nasal polyps, sinus pain, tinnitus, vertigo or visual changes  Hematological and Lymphatic ROS: negative for - bleeding problems, blood clots, fatigue or swollen lymph nodes  Respiratory ROS: negative for - cough, orthopnea, shortness of breath, sputum changes, tachypnea or wheezing  Cardiovascular ROS: negative for - chest pain, dyspnea on exertion, irregular heartbeat, loss of consciousness, palpitations, paroxysmal nocturnal dyspnea or rapid heart rate  Gastrointestinal ROS: negative for - abdominal pain, blood in stools, change in bowel habits, constipation, diarrhea, gas/bloating, heartburn or nausea/vomiting  Musculoskeletal ROS: negative for - joint pain, joint stiffness, joint swelling or muscle, back pain, bowel or bladder incontinence  Neurological ROS: negative for - behavioral changes, confusion, dizziness, headaches, memory loss, numbness/tingling, seizures or speech problems, weakness  Dermatological ROS: negative for - dry skin, mole changes, nail changes, pruritus, rash or skin lesion changes    Physical Exam  /80   Pulse 76   Temp 98.9 °F (37.2 °C)   Resp 16   Ht 5' 5.5\" (1.664 m)   Wt 196 lb (88.9 kg)   SpO2 99%   BMI 32.12 kg/m²     Wt Readings from Last 3 Encounters:   05/20/19 196 lb (88.9 kg)   05/06/19 198 lb 9.6 oz (90.1 kg)   05/01/19 194 lb 12.8 oz (88.4 kg)     BP Readings from Last 3 Encounters:   05/20/19 120/80   05/06/19 112/62   05/01/19 116/70         General appearance: alert, well appearing, and in no distress, oriented to person, place, and time and normal appearing weight. HEENT:  HEAD: Atraumatic, normocephalic  EYES: PERRL  EOM intact  EARS: bilateral TM's and external ear canals normal  NOSE: nasal mucosa, septum, turbinates normal bilaterally  MOUTH: mucous membranes moist and normal tonsils  NECK: supple, full range of motion, no mass, normal lymphadenopathy, no thyromegaly    CVS exam: normal rate, regular rhythm, normal S1, S2, no murmurs, rubs, clicks or gallops. Radial pulses 2+ bilateral.  PT/DP pulse 2+ bilat. No C/C/E    Chest: clear to auscultation, no wheezes, rales or rhonchi, symmetric air entry. SKIN: no lesions, jaundice, petechiae, pallor, cyanosis, ecchymosis        Assessment/Plan  Maci was seen today for asthma and cough. Diagnoses and all orders for this visit:    Moderate persistent asthma with exacerbation  -     predniSONE (DELTASONE) 10 MG tablet; Take 3 tabs po qd for 5 days, then 2 tabs po qd for 5 days then 1 tab po qd for 5 days then stop.     Elevated glucose  - POCT glycosylated hemoglobin (Hb A1C)          Return in about 1 week (around 5/27/2019), or if symptoms worsen or fail to improve. Samra Mcpherson, DO    Call or go to ED immediately if symptoms worsen or persist.    Educational materials and/or home exercises printed for patient's review and were included in patient instructions on his/her After Visit Summary and given to patient at the end of visit. Counseled regarding above diagnosis, including possible risks and complications,  especially if left uncontrolled. Counseled regarding the possible side effects, risks, benefits and alternatives to treatment; patient and/or guardian verbalizes understanding, agrees, feels comfortable with and wishes to proceed with above treatment plan. Advised patient to call with any new medication issues, and read all Rx info from pharmacy to assure aware of all possible risks and side effects of medication before taking. Reviewed age and gender appropriate health screening exams and vaccinations. Advised patient regarding importance of keeping up with recommended health maintenance and to schedule as soon as possible if overdue, as this is important in assessing for undiagnosed pathology, especially cancer, as well as protecting against potentially harmful/life threatening disease. Patient and/or guardian verbalizes understanding and agrees with above counseling, assessment and plan. All questions answered.

## 2019-05-29 ENCOUNTER — OFFICE VISIT (OUTPATIENT)
Dept: FAMILY MEDICINE CLINIC | Age: 49
End: 2019-05-29
Payer: COMMERCIAL

## 2019-05-29 VITALS
OXYGEN SATURATION: 98 % | BODY MASS INDEX: 31.69 KG/M2 | RESPIRATION RATE: 16 BRPM | DIASTOLIC BLOOD PRESSURE: 80 MMHG | HEART RATE: 70 BPM | WEIGHT: 197.2 LBS | SYSTOLIC BLOOD PRESSURE: 124 MMHG | TEMPERATURE: 98.2 F | HEIGHT: 66 IN

## 2019-05-29 DIAGNOSIS — J45.41 MODERATE PERSISTENT ASTHMA WITH EXACERBATION: Primary | ICD-10-CM

## 2019-05-29 PROCEDURE — G8427 DOCREV CUR MEDS BY ELIG CLIN: HCPCS | Performed by: PHYSICIAN ASSISTANT

## 2019-05-29 PROCEDURE — G8417 CALC BMI ABV UP PARAM F/U: HCPCS | Performed by: PHYSICIAN ASSISTANT

## 2019-05-29 PROCEDURE — 1036F TOBACCO NON-USER: CPT | Performed by: PHYSICIAN ASSISTANT

## 2019-05-29 PROCEDURE — 99213 OFFICE O/P EST LOW 20 MIN: CPT | Performed by: PHYSICIAN ASSISTANT

## 2019-05-29 ASSESSMENT — ENCOUNTER SYMPTOMS
ABDOMINAL PAIN: 0
WHEEZING: 0
CHEST TIGHTNESS: 0
SHORTNESS OF BREATH: 0
VOMITING: 0
DIARRHEA: 0
NAUSEA: 0
COUGH: 0

## 2019-05-29 NOTE — PROGRESS NOTES
OFFICE PROGRESS NOTE      SUBJECTIVE:        Patient ID:   Mayra Galan is a 50 y.o. female who presents for recheck on asthma    Chief Complaint   Patient presents with    Asthma     Pt here for follow-up for asthma -- states she is feeling much better     HPI:   HPI   Patient presents for 1 week recheck on asthma. She was given prednisone taper which she did not start. She states that SOB and cough has improved significantly. She states that she plans to hold onto the taper and if it worsens over the next few days then she will start it. She has continued with her prescribed respiratory regimen. Denies fever, chills, CP, recent SOB, cough, congestion, neck pain, abdominal pain, or N/V/D. Prior to Admission medications    Medication Sig Start Date End Date Taking? Authorizing Provider   predniSONE (DELTASONE) 10 MG tablet Take 3 tabs po qd for 5 days, then 2 tabs po qd for 5 days then 1 tab po qd for 5 days then stop.  5/20/19  Yes Samra Joseph, DO   ondansetron (ZOFRAN) 4 MG tablet Take 1 tablet by mouth every 8 hours as needed for Nausea or Vomiting 5/1/19  Yes Samra Joseph, DO   fluticasone (FLONASE) 50 MCG/ACT nasal spray 1 spray by Nasal route daily 5/1/19  Yes Samra Joseph, DO   Nuhisb-SoKym-FhDei-Meth-FA-DHA (PRENATE MINI) 18-0.6-0.4-350 MG CAPS Take 1 capsule by mouth daily 4/22/19  Yes Lupe Masters MD   esomeprazole (NEXIUM) 40 MG delayed release capsule Take 40 mg by mouth every morning (before breakfast)   Yes Historical Provider, MD   fluticasone-vilanterol (BREO ELLIPTA) 100-25 MCG/INH AEPB inhaler Inhale 1 puff into the lungs daily   Yes Historical Provider, MD   famotidine (PEPCID) 40 MG tablet Take 40 mg by mouth nightly  2/18/19  Yes Historical Provider, MD   pantoprazole (PROTONIX) 40 MG tablet Take 40 mg by mouth daily  2/17/19  Yes Historical Provider, MD   rizatriptan (MAXALT) 10 MG tablet  2/17/19  Yes Historical club or organization: None     Attends meetings of clubs or organizations: None     Relationship status: None    Intimate partner violence:     Fear of current or ex partner: None     Emotionally abused: None     Physically abused: None     Forced sexual activity: None   Other Topics Concern    None   Social History Narrative    None       I have reviewed Maci's allergies, medications, problem list, medical, social and family history and have updated as needed in the electronic medical record    Review Of Systems:    Review of Systems   Constitutional: Negative for chills and fever. HENT: Negative for ear pain. Eyes: Negative for visual disturbance. Respiratory: Negative for cough (improving), chest tightness, shortness of breath (resolved) and wheezing. Cardiovascular: Negative for chest pain, palpitations and leg swelling. Gastrointestinal: Negative for abdominal pain, diarrhea, nausea and vomiting. Genitourinary: Negative for dysuria and frequency. Skin: Negative for rash. OBJECTIVE:     VS:  Wt Readings from Last 3 Encounters:   05/29/19 197 lb 3.2 oz (89.4 kg)   05/20/19 196 lb (88.9 kg)   05/06/19 198 lb 9.6 oz (90.1 kg)                       Vitals:    05/29/19 1034   BP: 124/80   Pulse: 70   Resp: 16   Temp: 98.2 °F (36.8 °C)   SpO2: 98%      Physical Exam   Constitutional: She is oriented to person, place, and time. She appears well-developed and well-nourished. HENT:   Head: Normocephalic. Neck: Neck supple. No thyromegaly present. Cardiovascular: Normal rate, regular rhythm, normal heart sounds and intact distal pulses. Exam reveals no gallop and no friction rub. No murmur heard. Pulmonary/Chest: Effort normal and breath sounds normal. No respiratory distress. She has no wheezes. She has no rales. Musculoskeletal: She exhibits no edema. Neurological: She is alert and oriented to person, place, and time. Skin: Skin is warm and dry. No rash noted.       ASSESSMENT/PLAN Tomy Betancourt was seen today for asthma. Diagnoses and all orders for this visit:    Moderate persistent asthma with exacerbation          -    Improved without prednisone taper. To call if symptoms restart or worsen. I have reviewed my findings and recommendations with Maci Thomas.     Fredrick Renee PA-C

## 2019-06-11 ENCOUNTER — TELEPHONE (OUTPATIENT)
Dept: FAMILY MEDICINE CLINIC | Age: 49
End: 2019-06-11

## 2019-06-11 RX ORDER — MECLIZINE HYDROCHLORIDE 25 MG/1
25 TABLET ORAL 3 TIMES DAILY PRN
Qty: 30 TABLET | Refills: 1 | Status: SHIPPED | OUTPATIENT
Start: 2019-06-11 | End: 2020-01-21 | Stop reason: SDUPTHER

## 2019-08-06 ENCOUNTER — TELEPHONE (OUTPATIENT)
Dept: FAMILY MEDICINE CLINIC | Age: 49
End: 2019-08-06

## 2019-08-06 ENCOUNTER — OFFICE VISIT (OUTPATIENT)
Dept: FAMILY MEDICINE CLINIC | Age: 49
End: 2019-08-06
Payer: COMMERCIAL

## 2019-08-06 VITALS
OXYGEN SATURATION: 98 % | WEIGHT: 198.8 LBS | DIASTOLIC BLOOD PRESSURE: 78 MMHG | BODY MASS INDEX: 33.12 KG/M2 | TEMPERATURE: 98.7 F | HEART RATE: 78 BPM | SYSTOLIC BLOOD PRESSURE: 136 MMHG | HEIGHT: 65 IN

## 2019-08-06 DIAGNOSIS — M51.26 DISPLACEMENT OF LUMBAR INTERVERTEBRAL DISC: Primary | ICD-10-CM

## 2019-08-06 DIAGNOSIS — M79.601 RIGHT ARM PAIN: ICD-10-CM

## 2019-08-06 DIAGNOSIS — M50.20 CERVICAL DISC DISPLACEMENT: ICD-10-CM

## 2019-08-06 DIAGNOSIS — Z76.0 MEDICATION REFILL: ICD-10-CM

## 2019-08-06 DIAGNOSIS — M50.30 DDD (DEGENERATIVE DISC DISEASE), CERVICAL: ICD-10-CM

## 2019-08-06 DIAGNOSIS — M54.12 CERVICAL RADICULOPATHY: ICD-10-CM

## 2019-08-06 DIAGNOSIS — T75.3XXA MOTION SICKNESS, INITIAL ENCOUNTER: ICD-10-CM

## 2019-08-06 DIAGNOSIS — J30.1 NON-SEASONAL ALLERGIC RHINITIS DUE TO POLLEN: Primary | ICD-10-CM

## 2019-08-06 PROCEDURE — G8417 CALC BMI ABV UP PARAM F/U: HCPCS | Performed by: FAMILY MEDICINE

## 2019-08-06 PROCEDURE — G8427 DOCREV CUR MEDS BY ELIG CLIN: HCPCS | Performed by: FAMILY MEDICINE

## 2019-08-06 PROCEDURE — 1036F TOBACCO NON-USER: CPT | Performed by: FAMILY MEDICINE

## 2019-08-06 PROCEDURE — 99214 OFFICE O/P EST MOD 30 MIN: CPT | Performed by: FAMILY MEDICINE

## 2019-08-06 RX ORDER — ONDANSETRON 4 MG/1
4 TABLET, FILM COATED ORAL EVERY 8 HOURS PRN
Qty: 30 TABLET | Refills: 1 | Status: SHIPPED
Start: 2019-08-06 | End: 2020-07-10 | Stop reason: SDUPTHER

## 2019-08-06 RX ORDER — LORATADINE 10 MG/1
10 TABLET ORAL DAILY
Qty: 30 TABLET | Refills: 3 | Status: SHIPPED | OUTPATIENT
Start: 2019-08-06 | End: 2020-01-06 | Stop reason: ALTCHOICE

## 2019-08-06 RX ORDER — DOCUSATE SODIUM 100 MG/1
100 CAPSULE, LIQUID FILLED ORAL NIGHTLY
COMMUNITY
Start: 2019-07-12 | End: 2021-08-20

## 2019-08-07 RX ORDER — SPIRONOLACTONE 25 MG/1
25 TABLET ORAL DAILY
Qty: 30 TABLET | Refills: 5 | Status: SHIPPED | OUTPATIENT
Start: 2019-08-07 | End: 2020-01-29 | Stop reason: SDUPTHER

## 2019-08-08 ENCOUNTER — TELEPHONE (OUTPATIENT)
Dept: FAMILY MEDICINE CLINIC | Age: 49
End: 2019-08-08

## 2019-08-10 ENCOUNTER — HOSPITAL ENCOUNTER (OUTPATIENT)
Dept: ULTRASOUND IMAGING | Age: 49
Discharge: HOME | End: 2019-08-12
Payer: COMMERCIAL

## 2019-08-10 DIAGNOSIS — M79.601 RIGHT ARM PAIN: ICD-10-CM

## 2019-08-10 PROCEDURE — 93971 EXTREMITY STUDY: CPT

## 2019-08-19 ENCOUNTER — TELEPHONE (OUTPATIENT)
Dept: FAMILY MEDICINE CLINIC | Age: 49
End: 2019-08-19

## 2019-10-01 ENCOUNTER — TELEPHONE (OUTPATIENT)
Dept: FAMILY MEDICINE CLINIC | Age: 49
End: 2019-10-01

## 2019-10-02 ENCOUNTER — HOSPITAL ENCOUNTER (EMERGENCY)
Age: 49
Discharge: HOME | End: 2019-10-02
Attending: EMERGENCY MEDICINE
Payer: COMMERCIAL

## 2019-10-02 ENCOUNTER — APPOINTMENT (OUTPATIENT)
Dept: GENERAL RADIOLOGY | Age: 49
End: 2019-10-02
Payer: COMMERCIAL

## 2019-10-02 VITALS
DIASTOLIC BLOOD PRESSURE: 73 MMHG | HEART RATE: 78 BPM | BODY MASS INDEX: 32.99 KG/M2 | WEIGHT: 198 LBS | SYSTOLIC BLOOD PRESSURE: 118 MMHG | OXYGEN SATURATION: 100 % | HEIGHT: 65 IN | TEMPERATURE: 98.4 F | RESPIRATION RATE: 16 BRPM

## 2019-10-02 DIAGNOSIS — J45.901 ASTHMA WITH ACUTE EXACERBATION, UNSPECIFIED ASTHMA SEVERITY, UNSPECIFIED WHETHER PERSISTENT: ICD-10-CM

## 2019-10-02 DIAGNOSIS — R06.00 DYSPNEA, UNSPECIFIED TYPE: Primary | ICD-10-CM

## 2019-10-02 LAB
ALBUMIN SERPL-MCNC: 4 G/DL (ref 3.5–5.2)
ALP BLD-CCNC: 110 U/L (ref 35–104)
ALT SERPL-CCNC: 29 U/L (ref 0–32)
ANION GAP SERPL CALCULATED.3IONS-SCNC: 10 MMOL/L (ref 7–16)
AST SERPL-CCNC: 34 U/L (ref 0–31)
BASOPHILS ABSOLUTE: 0.04 E9/L (ref 0–0.2)
BASOPHILS RELATIVE PERCENT: 0.6 % (ref 0–2)
BILIRUB SERPL-MCNC: <0.2 MG/DL (ref 0–1.2)
BUN BLDV-MCNC: 9 MG/DL (ref 6–20)
CALCIUM SERPL-MCNC: 9 MG/DL (ref 8.6–10.2)
CHLORIDE BLD-SCNC: 106 MMOL/L (ref 98–107)
CO2: 26 MMOL/L (ref 22–29)
CREAT SERPL-MCNC: 0.9 MG/DL (ref 0.5–1)
EKG ATRIAL RATE: 72 BPM
EKG P AXIS: 41 DEGREES
EKG P-R INTERVAL: 142 MS
EKG Q-T INTERVAL: 390 MS
EKG QRS DURATION: 76 MS
EKG QTC CALCULATION (BAZETT): 427 MS
EKG R AXIS: -20 DEGREES
EKG T AXIS: -7 DEGREES
EKG VENTRICULAR RATE: 72 BPM
EOSINOPHILS ABSOLUTE: 0.16 E9/L (ref 0.05–0.5)
EOSINOPHILS RELATIVE PERCENT: 2.5 % (ref 0–6)
GFR AFRICAN AMERICAN: >60
GFR NON-AFRICAN AMERICAN: >60 ML/MIN/1.73
GLUCOSE BLD-MCNC: 97 MG/DL (ref 74–99)
HCG, URINE, POC: NEGATIVE
HCT VFR BLD CALC: 39 % (ref 34–48)
HEMOGLOBIN: 12.5 G/DL (ref 11.5–15.5)
IMMATURE GRANULOCYTES #: 0.13 E9/L
IMMATURE GRANULOCYTES %: 2.1 % (ref 0–5)
LYMPHOCYTES ABSOLUTE: 2.12 E9/L (ref 1.5–4)
LYMPHOCYTES RELATIVE PERCENT: 33.8 % (ref 20–42)
Lab: NORMAL
MCH RBC QN AUTO: 28.2 PG (ref 26–35)
MCHC RBC AUTO-ENTMCNC: 32.1 % (ref 32–34.5)
MCV RBC AUTO: 88 FL (ref 80–99.9)
MONOCYTES ABSOLUTE: 0.49 E9/L (ref 0.1–0.95)
MONOCYTES RELATIVE PERCENT: 7.8 % (ref 2–12)
NEGATIVE QC PASS/FAIL: NORMAL
NEUTROPHILS ABSOLUTE: 3.34 E9/L (ref 1.8–7.3)
NEUTROPHILS RELATIVE PERCENT: 53.2 % (ref 43–80)
PDW BLD-RTO: 13.9 FL (ref 11.5–15)
PLATELET # BLD: 213 E9/L (ref 130–450)
PMV BLD AUTO: 9.8 FL (ref 7–12)
POSITIVE QC PASS/FAIL: NORMAL
POTASSIUM SERPL-SCNC: 4.9 MMOL/L (ref 3.5–5)
PRO-BNP: 215 PG/ML (ref 0–125)
RBC # BLD: 4.43 E12/L (ref 3.5–5.5)
SODIUM BLD-SCNC: 142 MMOL/L (ref 132–146)
TOTAL PROTEIN: 7.1 G/DL (ref 6.4–8.3)
TROPONIN: <0.01 NG/ML (ref 0–0.03)
WBC # BLD: 6.3 E9/L (ref 4.5–11.5)

## 2019-10-02 PROCEDURE — 6370000000 HC RX 637 (ALT 250 FOR IP): Performed by: EMERGENCY MEDICINE

## 2019-10-02 PROCEDURE — 99285 EMERGENCY DEPT VISIT HI MDM: CPT

## 2019-10-02 PROCEDURE — 83880 ASSAY OF NATRIURETIC PEPTIDE: CPT

## 2019-10-02 PROCEDURE — 85025 COMPLETE CBC W/AUTO DIFF WBC: CPT

## 2019-10-02 PROCEDURE — 93010 ELECTROCARDIOGRAM REPORT: CPT | Performed by: INTERNAL MEDICINE

## 2019-10-02 PROCEDURE — 94664 DEMO&/EVAL PT USE INHALER: CPT

## 2019-10-02 PROCEDURE — 96374 THER/PROPH/DIAG INJ IV PUSH: CPT

## 2019-10-02 PROCEDURE — 93005 ELECTROCARDIOGRAM TRACING: CPT | Performed by: EMERGENCY MEDICINE

## 2019-10-02 PROCEDURE — 71045 X-RAY EXAM CHEST 1 VIEW: CPT

## 2019-10-02 PROCEDURE — 36415 COLL VENOUS BLD VENIPUNCTURE: CPT

## 2019-10-02 PROCEDURE — 84484 ASSAY OF TROPONIN QUANT: CPT

## 2019-10-02 PROCEDURE — 6360000002 HC RX W HCPCS: Performed by: EMERGENCY MEDICINE

## 2019-10-02 PROCEDURE — 80053 COMPREHEN METABOLIC PANEL: CPT

## 2019-10-02 RX ORDER — IPRATROPIUM BROMIDE AND ALBUTEROL SULFATE 2.5; .5 MG/3ML; MG/3ML
1 SOLUTION RESPIRATORY (INHALATION) ONCE
Status: COMPLETED | OUTPATIENT
Start: 2019-10-02 | End: 2019-10-02

## 2019-10-02 RX ORDER — ALBUTEROL SULFATE 90 UG/1
2 AEROSOL, METERED RESPIRATORY (INHALATION) EVERY 6 HOURS PRN
Qty: 1 INHALER | Refills: 0 | Status: SHIPPED | OUTPATIENT
Start: 2019-10-02 | End: 2022-09-06 | Stop reason: SDUPTHER

## 2019-10-02 RX ORDER — METHYLPREDNISOLONE SODIUM SUCCINATE 125 MG/2ML
125 INJECTION, POWDER, LYOPHILIZED, FOR SOLUTION INTRAMUSCULAR; INTRAVENOUS ONCE
Status: COMPLETED | OUTPATIENT
Start: 2019-10-02 | End: 2019-10-02

## 2019-10-02 RX ORDER — PREDNISONE 50 MG/1
TABLET ORAL
Qty: 5 TABLET | Refills: 0 | Status: SHIPPED | OUTPATIENT
Start: 2019-10-02 | End: 2019-10-03

## 2019-10-02 RX ADMIN — METHYLPREDNISOLONE SODIUM SUCCINATE 125 MG: 125 INJECTION, POWDER, FOR SOLUTION INTRAMUSCULAR; INTRAVENOUS at 11:02

## 2019-10-02 RX ADMIN — IPRATROPIUM BROMIDE AND ALBUTEROL SULFATE 1 AMPULE: .5; 3 SOLUTION RESPIRATORY (INHALATION) at 11:00

## 2019-10-02 ASSESSMENT — ENCOUNTER SYMPTOMS
BACK PAIN: 0
ABDOMINAL PAIN: 0
CONSTIPATION: 0
COUGH: 1
DIARRHEA: 0
SHORTNESS OF BREATH: 1

## 2019-10-03 ENCOUNTER — OFFICE VISIT (OUTPATIENT)
Dept: FAMILY MEDICINE CLINIC | Age: 49
End: 2019-10-03
Payer: COMMERCIAL

## 2019-10-03 VITALS
SYSTOLIC BLOOD PRESSURE: 122 MMHG | BODY MASS INDEX: 35.62 KG/M2 | TEMPERATURE: 98.5 F | OXYGEN SATURATION: 96 % | DIASTOLIC BLOOD PRESSURE: 80 MMHG | WEIGHT: 213.8 LBS | HEART RATE: 86 BPM | HEIGHT: 65 IN

## 2019-10-03 DIAGNOSIS — R53.83 FATIGUE, UNSPECIFIED TYPE: Primary | ICD-10-CM

## 2019-10-03 DIAGNOSIS — R60.1 GENERALIZED EDEMA: ICD-10-CM

## 2019-10-03 PROCEDURE — 1036F TOBACCO NON-USER: CPT | Performed by: FAMILY MEDICINE

## 2019-10-03 PROCEDURE — G8417 CALC BMI ABV UP PARAM F/U: HCPCS | Performed by: FAMILY MEDICINE

## 2019-10-03 PROCEDURE — G8484 FLU IMMUNIZE NO ADMIN: HCPCS | Performed by: FAMILY MEDICINE

## 2019-10-03 PROCEDURE — 99214 OFFICE O/P EST MOD 30 MIN: CPT | Performed by: FAMILY MEDICINE

## 2019-10-03 PROCEDURE — G8427 DOCREV CUR MEDS BY ELIG CLIN: HCPCS | Performed by: FAMILY MEDICINE

## 2019-10-03 RX ORDER — NALOXONE HYDROCHLORIDE 4 MG/.1ML
SPRAY NASAL
Refills: 1 | COMMUNITY
Start: 2019-09-19

## 2019-10-03 RX ORDER — ACETAMINOPHEN AND CODEINE PHOSPHATE 300; 60 MG/1; MG/1
TABLET ORAL
Refills: 0 | COMMUNITY
Start: 2019-09-24

## 2019-10-03 RX ORDER — FUROSEMIDE 20 MG/1
20 TABLET ORAL DAILY
Qty: 30 TABLET | Refills: 0 | Status: SHIPPED | OUTPATIENT
Start: 2019-10-03 | End: 2019-10-23 | Stop reason: SDUPTHER

## 2019-10-03 RX ORDER — ALUMINUM ZIRCONIUM OCTACHLOROHYDREX GLY 16 G/100G
GEL TOPICAL
COMMUNITY
Start: 2019-09-06 | End: 2020-02-20 | Stop reason: ALTCHOICE

## 2019-10-03 RX ORDER — BACLOFEN 10 MG/1
TABLET ORAL
Refills: 0 | COMMUNITY
Start: 2019-09-17 | End: 2020-05-27

## 2019-10-04 ENCOUNTER — HOSPITAL ENCOUNTER (OUTPATIENT)
Age: 49
Discharge: HOME | End: 2019-10-04
Payer: COMMERCIAL

## 2019-10-04 DIAGNOSIS — R53.83 FATIGUE, UNSPECIFIED TYPE: ICD-10-CM

## 2019-10-04 DIAGNOSIS — R60.1 GENERALIZED EDEMA: ICD-10-CM

## 2019-10-04 LAB
FERRITIN: 35 NG/ML
IRON SATURATION: 16 % (ref 15–50)
IRON: 54 MCG/DL (ref 37–145)
RHEUMATOID FACTOR: 13 IU/ML (ref 0–13)
SEDIMENTATION RATE, ERYTHROCYTE: 20 MM/HR (ref 0–20)
TOTAL IRON BINDING CAPACITY: 331 MCG/DL (ref 250–450)
TSH SERPL DL<=0.05 MIU/L-ACNC: 3.55 UIU/ML (ref 0.27–4.2)

## 2019-10-04 PROCEDURE — 36415 COLL VENOUS BLD VENIPUNCTURE: CPT

## 2019-10-04 PROCEDURE — 85613 RUSSELL VIPER VENOM DILUTED: CPT

## 2019-10-04 PROCEDURE — 83540 ASSAY OF IRON: CPT

## 2019-10-04 PROCEDURE — 83550 IRON BINDING TEST: CPT

## 2019-10-04 PROCEDURE — 84443 ASSAY THYROID STIM HORMONE: CPT

## 2019-10-04 PROCEDURE — 86431 RHEUMATOID FACTOR QUANT: CPT

## 2019-10-04 PROCEDURE — 86038 ANTINUCLEAR ANTIBODIES: CPT

## 2019-10-04 PROCEDURE — 82728 ASSAY OF FERRITIN: CPT

## 2019-10-04 PROCEDURE — 85651 RBC SED RATE NONAUTOMATED: CPT

## 2019-10-06 LAB — LUPUS ANTICOAG DVVT: NORMAL

## 2019-10-07 LAB — ANTI-NUCLEAR ANTIBODY (ANA): NEGATIVE

## 2019-10-09 ENCOUNTER — OFFICE VISIT (OUTPATIENT)
Dept: FAMILY MEDICINE CLINIC | Age: 49
End: 2019-10-09
Payer: COMMERCIAL

## 2019-10-09 VITALS
TEMPERATURE: 98.6 F | WEIGHT: 200.6 LBS | DIASTOLIC BLOOD PRESSURE: 62 MMHG | RESPIRATION RATE: 16 BRPM | BODY MASS INDEX: 33.38 KG/M2 | SYSTOLIC BLOOD PRESSURE: 110 MMHG | OXYGEN SATURATION: 99 % | HEART RATE: 67 BPM

## 2019-10-09 DIAGNOSIS — R53.83 FATIGUE, UNSPECIFIED TYPE: ICD-10-CM

## 2019-10-09 DIAGNOSIS — R60.1 GENERALIZED EDEMA: Primary | ICD-10-CM

## 2019-10-09 PROCEDURE — G8417 CALC BMI ABV UP PARAM F/U: HCPCS | Performed by: FAMILY MEDICINE

## 2019-10-09 PROCEDURE — 1036F TOBACCO NON-USER: CPT | Performed by: FAMILY MEDICINE

## 2019-10-09 PROCEDURE — 99213 OFFICE O/P EST LOW 20 MIN: CPT | Performed by: FAMILY MEDICINE

## 2019-10-09 PROCEDURE — G8484 FLU IMMUNIZE NO ADMIN: HCPCS | Performed by: FAMILY MEDICINE

## 2019-10-09 PROCEDURE — G8427 DOCREV CUR MEDS BY ELIG CLIN: HCPCS | Performed by: FAMILY MEDICINE

## 2019-10-22 ENCOUNTER — PATIENT MESSAGE (OUTPATIENT)
Dept: FAMILY MEDICINE CLINIC | Age: 49
End: 2019-10-22

## 2019-10-23 RX ORDER — FUROSEMIDE 20 MG/1
20 TABLET ORAL DAILY
Qty: 30 TABLET | Refills: 0 | Status: SHIPPED | OUTPATIENT
Start: 2019-10-23 | End: 2019-11-04 | Stop reason: SDUPTHER

## 2019-11-04 ENCOUNTER — OFFICE VISIT (OUTPATIENT)
Dept: FAMILY MEDICINE CLINIC | Age: 49
End: 2019-11-04
Payer: COMMERCIAL

## 2019-11-04 VITALS
DIASTOLIC BLOOD PRESSURE: 60 MMHG | TEMPERATURE: 97.5 F | HEART RATE: 76 BPM | OXYGEN SATURATION: 99 % | SYSTOLIC BLOOD PRESSURE: 100 MMHG | WEIGHT: 206 LBS | HEIGHT: 65 IN | BODY MASS INDEX: 34.32 KG/M2 | RESPIRATION RATE: 18 BRPM

## 2019-11-04 DIAGNOSIS — G43.101 MIGRAINE WITH AURA AND WITH STATUS MIGRAINOSUS, NOT INTRACTABLE: Primary | ICD-10-CM

## 2019-11-04 DIAGNOSIS — E04.1 THYROID NODULE: ICD-10-CM

## 2019-11-04 DIAGNOSIS — R60.9 PERIPHERAL EDEMA: ICD-10-CM

## 2019-11-04 DIAGNOSIS — M51.16 INTERVERTEBRAL DISC DISORDERS WITH RADICULOPATHY, LUMBAR REGION: ICD-10-CM

## 2019-11-04 PROCEDURE — 99214 OFFICE O/P EST MOD 30 MIN: CPT | Performed by: FAMILY MEDICINE

## 2019-11-04 PROCEDURE — G8427 DOCREV CUR MEDS BY ELIG CLIN: HCPCS | Performed by: FAMILY MEDICINE

## 2019-11-04 PROCEDURE — G8484 FLU IMMUNIZE NO ADMIN: HCPCS | Performed by: FAMILY MEDICINE

## 2019-11-04 PROCEDURE — G8417 CALC BMI ABV UP PARAM F/U: HCPCS | Performed by: FAMILY MEDICINE

## 2019-11-04 PROCEDURE — 1036F TOBACCO NON-USER: CPT | Performed by: FAMILY MEDICINE

## 2019-11-04 RX ORDER — SUMATRIPTAN 100 MG/1
100 TABLET, FILM COATED ORAL 2 TIMES DAILY
Qty: 12 TABLET | Refills: 5 | Status: SHIPPED | OUTPATIENT
Start: 2019-11-04 | End: 2020-01-29 | Stop reason: SDUPTHER

## 2019-11-04 RX ORDER — FUROSEMIDE 20 MG/1
20 TABLET ORAL DAILY PRN
Qty: 30 TABLET | Refills: 2 | Status: SHIPPED
Start: 2019-11-04 | End: 2021-08-20

## 2019-11-04 RX ORDER — SUMATRIPTAN 6 MG/.5ML
6 INJECTION, SOLUTION SUBCUTANEOUS
Qty: 2 ML | Refills: 5 | Status: CANCELLED | OUTPATIENT
Start: 2019-11-04 | End: 2019-11-04

## 2019-11-08 ENCOUNTER — HOSPITAL ENCOUNTER (OUTPATIENT)
Dept: ULTRASOUND IMAGING | Age: 49
Discharge: HOME | End: 2019-11-10
Payer: COMMERCIAL

## 2019-11-08 DIAGNOSIS — E04.1 THYROID NODULE: ICD-10-CM

## 2019-11-08 PROCEDURE — 76536 US EXAM OF HEAD AND NECK: CPT

## 2019-11-12 RX ORDER — RIZATRIPTAN BENZOATE 10 MG/1
10 TABLET ORAL DAILY PRN
Qty: 9 TABLET | Refills: 5 | Status: SHIPPED | OUTPATIENT
Start: 2019-11-12 | End: 2020-01-29 | Stop reason: SDUPTHER

## 2019-11-22 ENCOUNTER — HOSPITAL ENCOUNTER (OUTPATIENT)
Age: 49
Discharge: HOME | End: 2019-11-22
Payer: COMMERCIAL

## 2019-11-22 LAB
ALBUMIN SERPL-MCNC: 4.7 G/DL (ref 3.5–5.2)
ALP BLD-CCNC: 132 U/L (ref 35–104)
ALT SERPL-CCNC: 29 U/L (ref 0–32)
ANION GAP SERPL CALCULATED.3IONS-SCNC: 14 MMOL/L (ref 7–16)
AST SERPL-CCNC: 24 U/L (ref 0–31)
BILIRUB SERPL-MCNC: <0.2 MG/DL (ref 0–1.2)
BUN BLDV-MCNC: 13 MG/DL (ref 6–20)
CALCIUM SERPL-MCNC: 9.8 MG/DL (ref 8.6–10.2)
CHLORIDE BLD-SCNC: 101 MMOL/L (ref 98–107)
CHOLESTEROL, FASTING: 249 MG/DL (ref 0–199)
CO2: 27 MMOL/L (ref 22–29)
CREAT SERPL-MCNC: 1 MG/DL (ref 0.5–1)
GFR AFRICAN AMERICAN: >60
GFR NON-AFRICAN AMERICAN: >60 ML/MIN/1.73
GLUCOSE BLD-MCNC: 106 MG/DL (ref 74–99)
HDLC SERPL-MCNC: 69 MG/DL
LDL CHOLESTEROL CALCULATED: 166 MG/DL (ref 0–99)
POTASSIUM SERPL-SCNC: 4.4 MMOL/L (ref 3.5–5)
SODIUM BLD-SCNC: 142 MMOL/L (ref 132–146)
TOTAL PROTEIN: 7.7 G/DL (ref 6.4–8.3)
TRIGLYCERIDE, FASTING: 72 MG/DL (ref 0–149)
VLDLC SERPL CALC-MCNC: 14 MG/DL

## 2019-11-22 PROCEDURE — 80053 COMPREHEN METABOLIC PANEL: CPT

## 2019-11-22 PROCEDURE — 80061 LIPID PANEL: CPT

## 2019-11-22 PROCEDURE — 86255 FLUORESCENT ANTIBODY SCREEN: CPT

## 2019-11-22 PROCEDURE — 82784 ASSAY IGA/IGD/IGG/IGM EACH: CPT

## 2019-11-22 PROCEDURE — 36415 COLL VENOUS BLD VENIPUNCTURE: CPT

## 2019-11-22 PROCEDURE — 82787 IGG 1 2 3 OR 4 EACH: CPT

## 2019-11-22 PROCEDURE — 86709 HEPATITIS A IGM ANTIBODY: CPT

## 2019-11-22 PROCEDURE — 82103 ALPHA-1-ANTITRYPSIN TOTAL: CPT

## 2019-11-22 PROCEDURE — 86706 HEP B SURFACE ANTIBODY: CPT

## 2019-11-22 PROCEDURE — 86803 HEPATITIS C AB TEST: CPT

## 2019-11-22 PROCEDURE — 86038 ANTINUCLEAR ANTIBODIES: CPT

## 2019-11-23 LAB — IGM: 110 MG/DL (ref 40–230)

## 2019-11-24 LAB — ALPHA-1 ANTITRYPSIN: 140 MG/DL (ref 90–200)

## 2019-11-25 LAB
ANTI-MITOCHONDRIAL AB, IFA: NEGATIVE
ANTI-NUCLEAR ANTIBODY (ANA): NEGATIVE
HAV IGM SER IA-ACNC: NORMAL
HBV SURFACE AB TITR SER: NORMAL {TITER}
HEPATITIS C ANTIBODY INTERPRETATION: NORMAL
IGG 1: 622 MG/DL (ref 240–1118)
IGG 2: 358 MG/DL (ref 124–549)
IGG 3: 71 MG/DL (ref 21–134)
IGG 4: 55 MG/DL (ref 1–123)
SMOOTH MUSCLE ANTIBODY: NEGATIVE

## 2019-12-02 ENCOUNTER — OFFICE VISIT (OUTPATIENT)
Dept: FAMILY MEDICINE CLINIC | Age: 49
End: 2019-12-02
Payer: COMMERCIAL

## 2019-12-02 VITALS
HEART RATE: 79 BPM | BODY MASS INDEX: 34.82 KG/M2 | SYSTOLIC BLOOD PRESSURE: 136 MMHG | DIASTOLIC BLOOD PRESSURE: 80 MMHG | TEMPERATURE: 97.6 F | HEIGHT: 65 IN | OXYGEN SATURATION: 98 % | RESPIRATION RATE: 18 BRPM | WEIGHT: 209 LBS

## 2019-12-02 DIAGNOSIS — G43.101 MIGRAINE WITH AURA AND WITH STATUS MIGRAINOSUS, NOT INTRACTABLE: ICD-10-CM

## 2019-12-02 DIAGNOSIS — E04.1 THYROID NODULE: Primary | ICD-10-CM

## 2019-12-02 DIAGNOSIS — J01.10 ACUTE NON-RECURRENT FRONTAL SINUSITIS: ICD-10-CM

## 2019-12-02 PROCEDURE — G8417 CALC BMI ABV UP PARAM F/U: HCPCS | Performed by: FAMILY MEDICINE

## 2019-12-02 PROCEDURE — 1036F TOBACCO NON-USER: CPT | Performed by: FAMILY MEDICINE

## 2019-12-02 PROCEDURE — 99214 OFFICE O/P EST MOD 30 MIN: CPT | Performed by: FAMILY MEDICINE

## 2019-12-02 PROCEDURE — G8427 DOCREV CUR MEDS BY ELIG CLIN: HCPCS | Performed by: FAMILY MEDICINE

## 2019-12-02 PROCEDURE — G8484 FLU IMMUNIZE NO ADMIN: HCPCS | Performed by: FAMILY MEDICINE

## 2019-12-02 RX ORDER — AZITHROMYCIN 250 MG/1
250 TABLET, FILM COATED ORAL SEE ADMIN INSTRUCTIONS
Qty: 6 TABLET | Refills: 0 | Status: SHIPPED | OUTPATIENT
Start: 2019-12-02 | End: 2019-12-07

## 2019-12-20 ENCOUNTER — OFFICE VISIT (OUTPATIENT)
Dept: FAMILY MEDICINE CLINIC | Age: 49
End: 2019-12-20
Payer: COMMERCIAL

## 2019-12-20 VITALS
OXYGEN SATURATION: 98 % | SYSTOLIC BLOOD PRESSURE: 112 MMHG | TEMPERATURE: 97.7 F | BODY MASS INDEX: 35.51 KG/M2 | DIASTOLIC BLOOD PRESSURE: 64 MMHG | RESPIRATION RATE: 16 BRPM | WEIGHT: 213.4 LBS | HEART RATE: 98 BPM

## 2019-12-20 DIAGNOSIS — J40 SINOBRONCHITIS: Primary | ICD-10-CM

## 2019-12-20 DIAGNOSIS — J32.9 SINOBRONCHITIS: Primary | ICD-10-CM

## 2019-12-20 LAB — S PYO AG THROAT QL: NORMAL

## 2019-12-20 PROCEDURE — G8417 CALC BMI ABV UP PARAM F/U: HCPCS | Performed by: PHYSICIAN ASSISTANT

## 2019-12-20 PROCEDURE — 87880 STREP A ASSAY W/OPTIC: CPT | Performed by: PHYSICIAN ASSISTANT

## 2019-12-20 PROCEDURE — G8427 DOCREV CUR MEDS BY ELIG CLIN: HCPCS | Performed by: PHYSICIAN ASSISTANT

## 2019-12-20 PROCEDURE — G8484 FLU IMMUNIZE NO ADMIN: HCPCS | Performed by: PHYSICIAN ASSISTANT

## 2019-12-20 PROCEDURE — 1036F TOBACCO NON-USER: CPT | Performed by: PHYSICIAN ASSISTANT

## 2019-12-20 PROCEDURE — 99213 OFFICE O/P EST LOW 20 MIN: CPT | Performed by: PHYSICIAN ASSISTANT

## 2019-12-20 RX ORDER — FLUCONAZOLE 150 MG/1
150 TABLET ORAL ONCE
Qty: 2 TABLET | Refills: 0 | Status: SHIPPED | OUTPATIENT
Start: 2019-12-20 | End: 2019-12-20

## 2019-12-20 RX ORDER — BROMPHENIRAMINE MALEATE, PSEUDOEPHEDRINE HYDROCHLORIDE, AND DEXTROMETHORPHAN HYDROBROMIDE 2; 30; 10 MG/5ML; MG/5ML; MG/5ML
5 SYRUP ORAL 4 TIMES DAILY PRN
Qty: 240 ML | Refills: 0 | Status: SHIPPED | OUTPATIENT
Start: 2019-12-20 | End: 2020-01-06 | Stop reason: SINTOL

## 2020-01-06 ENCOUNTER — OFFICE VISIT (OUTPATIENT)
Dept: FAMILY MEDICINE CLINIC | Age: 50
End: 2020-01-06
Payer: COMMERCIAL

## 2020-01-06 VITALS
OXYGEN SATURATION: 98 % | DIASTOLIC BLOOD PRESSURE: 82 MMHG | HEART RATE: 79 BPM | SYSTOLIC BLOOD PRESSURE: 130 MMHG | RESPIRATION RATE: 18 BRPM | BODY MASS INDEX: 34.49 KG/M2 | HEIGHT: 65 IN | TEMPERATURE: 98.6 F | WEIGHT: 207 LBS

## 2020-01-06 PROCEDURE — 1036F TOBACCO NON-USER: CPT | Performed by: FAMILY MEDICINE

## 2020-01-06 PROCEDURE — G8427 DOCREV CUR MEDS BY ELIG CLIN: HCPCS | Performed by: FAMILY MEDICINE

## 2020-01-06 PROCEDURE — 99214 OFFICE O/P EST MOD 30 MIN: CPT | Performed by: FAMILY MEDICINE

## 2020-01-06 PROCEDURE — G8417 CALC BMI ABV UP PARAM F/U: HCPCS | Performed by: FAMILY MEDICINE

## 2020-01-06 PROCEDURE — G8484 FLU IMMUNIZE NO ADMIN: HCPCS | Performed by: FAMILY MEDICINE

## 2020-01-06 PROCEDURE — 93793 ANTICOAG MGMT PT WARFARIN: CPT | Performed by: FAMILY MEDICINE

## 2020-01-06 RX ORDER — CETIRIZINE HYDROCHLORIDE 10 MG/1
10 TABLET ORAL DAILY
Qty: 30 TABLET | Refills: 5 | Status: SHIPPED | OUTPATIENT
Start: 2020-01-06 | End: 2020-01-29

## 2020-01-06 ASSESSMENT — PATIENT HEALTH QUESTIONNAIRE - PHQ9
SUM OF ALL RESPONSES TO PHQ QUESTIONS 1-9: 0
1. LITTLE INTEREST OR PLEASURE IN DOING THINGS: 0
2. FEELING DOWN, DEPRESSED OR HOPELESS: 0
SUM OF ALL RESPONSES TO PHQ QUESTIONS 1-9: 0
SUM OF ALL RESPONSES TO PHQ9 QUESTIONS 1 & 2: 0

## 2020-01-06 NOTE — PROGRESS NOTES
1/6/2020    Chief Complaint   Patient presents with    Knee Pain     wants referred for right knee thinks its ACL     Sinusitis       AJAY Panda is a 52 y.o. patient that presents today for:    Knee Pain: Patient presents with knee pain involving the  right knee. Was to have surgery for R ACL and meniscal tear. Only had meniscus repaired. Onset of the symptoms was several weeks ago. Inciting event: none known. Current symptoms include giving out and swelling. Pain is aggravated by any weight bearing. Patient has had prior knee problems. Evaluation to date: none. Treatment to date: brace which is effective. Has not seen Orthopedics recently. Patient's past medical, surgical, social and/or family history reviewed, updated in chart, and are non-contributory (unless otherwise stated). Medications and allergies also reviewed and updated in chart.      ROS Unless otherwise specified  Review of Systems - General ROS: negative for - chills, fatigue, fever, night sweats, sleep disturbance, weight gain or weight loss  Psychological ROS: negative for - anxiety, behavioral disorder, depression, hallucinations, irritability, memory difficulties, mood swings, sleep disturbances or suicidal ideation  ENT ROS: negative for - epistaxis, headaches, hearing change, nasal congestion, nasal discharge, nasal polyps, sinus pain, tinnitus, vertigo or visual changes  Hematological and Lymphatic ROS: negative for - bleeding problems, blood clots, fatigue or swollen lymph nodes  Respiratory ROS: negative for - cough, orthopnea, shortness of breath, sputum changes, tachypnea or wheezing  Cardiovascular ROS: negative for - chest pain, dyspnea on exertion, irregular heartbeat, loss of consciousness, palpitations, paroxysmal nocturnal dyspnea or rapid heart rate  Gastrointestinal ROS: negative for - abdominal pain, blood in stools, change in bowel habits, constipation, diarrhea, gas/bloating, heartburn or nausea/vomiting  Musculoskeletal ROS: negative for - joint pain, joint stiffness, joint swelling or muscle, back pain, bowel or bladder incontinence  Neurological ROS: negative for - behavioral changes, confusion, dizziness, headaches, memory loss, numbness/tingling, seizures or speech problems, weakness  Dermatological ROS: negative for - dry skin, mole changes, nail changes, pruritus, rash or skin lesion changes    Physical Exam  Temp Readings from Last 3 Encounters:   01/06/20 98.6 °F (37 °C)   12/20/19 97.7 °F (36.5 °C)   12/02/19 97.6 °F (36.4 °C)     Wt Readings from Last 3 Encounters:   01/06/20 207 lb (93.9 kg)   12/20/19 213 lb 6.4 oz (96.8 kg)   12/02/19 209 lb (94.8 kg)     BP Readings from Last 3 Encounters:   01/06/20 130/82   12/20/19 112/64   12/02/19 136/80     Pulse Readings from Last 3 Encounters:   01/06/20 79   12/20/19 98   12/02/19 79       General appearance: alert, well appearing, and in no distress, oriented to person, place, and time and normal appearing weight. CVS exam: normal rate, regular rhythm, normal S1, S2, no murmurs, rubs, clicks or gallops. Radial pulses 2+ bilateral.  PT/DP pulse 2+ bilat. No C/C/E    Chest: clear to auscultation, no wheezes, rales or rhonchi, symmetric air entry. Abdomen: Soft, non-tender, non-distended, positive BS in all 4 quadrants    Extremities:Dorsalis pedis pulses palpated bilaterally, no clubbing, cyanosis, edema or erythema,     Right Knee: There is no obvious deformity on visual exam. There is no effusion. There is no echymosis. Passive range of motion is normal. There is no laxity with varus/valgus stress at 0 and 30 degrees of flexion. There is mild tenderness to palpation along both the medial and lateral joint line, medial > lateral.  Lachman's is negative. Nyla's is negative for pain. Anterior drawer is negative. Posterior drawer is negative.     SKIN: no lesions, jaundice, petechiae, pallor, cyanosis, ecchymosis    NEURO: gross

## 2020-01-08 ENCOUNTER — HOSPITAL ENCOUNTER (OUTPATIENT)
Age: 50
Discharge: HOME | End: 2020-01-10
Payer: COMMERCIAL

## 2020-01-08 PROCEDURE — 87081 CULTURE SCREEN ONLY: CPT

## 2020-01-08 PROCEDURE — 88305 TISSUE EXAM BY PATHOLOGIST: CPT

## 2020-01-09 ENCOUNTER — HOSPITAL ENCOUNTER (OUTPATIENT)
Age: 50
Discharge: HOME | End: 2020-01-11
Payer: COMMERCIAL

## 2020-01-09 ENCOUNTER — HOSPITAL ENCOUNTER (OUTPATIENT)
Dept: GENERAL RADIOLOGY | Age: 50
Discharge: HOME | End: 2020-01-11
Payer: COMMERCIAL

## 2020-01-09 LAB — CLOTEST: NORMAL

## 2020-01-09 PROCEDURE — 73562 X-RAY EXAM OF KNEE 3: CPT

## 2020-01-21 RX ORDER — MECLIZINE HYDROCHLORIDE 25 MG/1
25 TABLET ORAL 3 TIMES DAILY PRN
Qty: 30 TABLET | Refills: 1 | Status: SHIPPED | OUTPATIENT
Start: 2020-01-21 | End: 2020-01-31

## 2020-01-23 ENCOUNTER — OFFICE VISIT (OUTPATIENT)
Dept: FAMILY MEDICINE CLINIC | Age: 50
End: 2020-01-23
Payer: COMMERCIAL

## 2020-01-23 VITALS
HEART RATE: 88 BPM | SYSTOLIC BLOOD PRESSURE: 128 MMHG | TEMPERATURE: 98.4 F | OXYGEN SATURATION: 98 % | DIASTOLIC BLOOD PRESSURE: 80 MMHG | RESPIRATION RATE: 18 BRPM

## 2020-01-23 PROCEDURE — 99214 OFFICE O/P EST MOD 30 MIN: CPT | Performed by: FAMILY MEDICINE

## 2020-01-23 PROCEDURE — G8417 CALC BMI ABV UP PARAM F/U: HCPCS | Performed by: FAMILY MEDICINE

## 2020-01-23 PROCEDURE — G8427 DOCREV CUR MEDS BY ELIG CLIN: HCPCS | Performed by: FAMILY MEDICINE

## 2020-01-23 PROCEDURE — 1036F TOBACCO NON-USER: CPT | Performed by: FAMILY MEDICINE

## 2020-01-23 PROCEDURE — G8484 FLU IMMUNIZE NO ADMIN: HCPCS | Performed by: FAMILY MEDICINE

## 2020-01-23 RX ORDER — DOXYCYCLINE HYCLATE 100 MG
100 TABLET ORAL 2 TIMES DAILY
Qty: 20 TABLET | Refills: 0 | Status: SHIPPED
Start: 2020-01-23 | End: 2020-02-20 | Stop reason: ALTCHOICE

## 2020-01-23 NOTE — PROGRESS NOTES
1/27/2020    Chief Complaint   Patient presents with    Sinusitis     feels off like dizzy       HPI    Manjula Ponce is a 52 y.o. patient that presents today with cold symptoms. Upper Respiratory Infection:  Patient complains of possible sinusitis. Symptoms include bilateral ear pain and congestion. Onset of symptoms was many days ago, unchanged since that time. She is drinking plenty of fluids. Treatment to date: oral decongestant, antibiotic: zpack with minimal ROS. Sick contacts include none. Patient's past medical, surgical, social and/or family history reviewed, updated in chart, and are non-contributory (unless otherwise stated). Medications and allergies also reviewed and updated in chart.      ROS Unless otherwise specified  Review of Systems - General ROS: negative for - chills, fatigue, fever, night sweats, sleep disturbance, weight gain or weight loss  Psychological ROS: negative for - anxiety, behavioral disorder, depression, hallucinations, irritability, memory difficulties, mood swings, sleep disturbances or suicidal ideation  ENT ROS: negative for - epistaxis, headaches, hearing change, nasal congestion, nasal discharge, nasal polyps, sinus pain, tinnitus, vertigo or visual changes  Hematological and Lymphatic ROS: negative for - bleeding problems, blood clots, fatigue or swollen lymph nodes  Respiratory ROS: negative for - cough, orthopnea, shortness of breath, sputum changes, tachypnea or wheezing  Cardiovascular ROS: negative for - chest pain, dyspnea on exertion, irregular heartbeat, loss of consciousness, palpitations, paroxysmal nocturnal dyspnea or rapid heart rate  Gastrointestinal ROS: negative for - abdominal pain, blood in stools, change in bowel habits, constipation, diarrhea, gas/bloating, heartburn or nausea/vomiting  Musculoskeletal ROS: negative for - joint pain, joint stiffness, joint swelling or muscle, back pain, bowel or bladder incontinence  Neurological

## 2020-01-23 NOTE — PATIENT INSTRUCTIONS
Patient Education        Dizziness: Care Instructions  Your Care Instructions  Dizziness is the feeling of unsteadiness or fuzziness in your head. It is different than having vertigo, which is a feeling that the room is spinning or that you are moving or falling. It is also different from lightheadedness, which is the feeling that you are about to faint. It can be hard to know what causes dizziness. Some people feel dizzy when they have migraine headaches. Sometimes bouts of flu can make you feel dizzy. Some medical conditions, such as heart problems or high blood pressure, can make you feel dizzy. Many medicines can cause dizziness, including medicines for high blood pressure, pain, or anxiety. If a medicine causes your symptoms, your doctor may recommend that you stop or change the medicine. If it is a problem with your heart, you may need medicine to help your heart work better. If there is no clear reason for your symptoms, your doctor may suggest watching and waiting for a while to see if the dizziness goes away on its own. Follow-up care is a key part of your treatment and safety. Be sure to make and go to all appointments, and call your doctor if you are having problems. It's also a good idea to know your test results and keep a list of the medicines you take. How can you care for yourself at home? · If your doctor recommends or prescribes medicine, take it exactly as directed. Call your doctor if you think you are having a problem with your medicine. · Do not drive while you feel dizzy. · Try to prevent falls. Steps you can take include:  ? Using nonskid mats, adding grab bars near the tub, and using night-lights. ? Clearing your home so that walkways are free of anything you might trip on.  ? Letting family and friends know that you have been feeling dizzy. This will help them know how to help you. When should you call for help? Call 911 anytime you think you may need emergency care.  For example, call if:    · You passed out (lost consciousness).     · You have dizziness along with symptoms of a heart attack. These may include:  ? Chest pain or pressure, or a strange feeling in the chest.  ? Sweating. ? Shortness of breath. ? Nausea or vomiting. ? Pain, pressure, or a strange feeling in the back, neck, jaw, or upper belly or in one or both shoulders or arms. ? Lightheadedness or sudden weakness. ? A fast or irregular heartbeat.     · You have symptoms of a stroke. These may include:  ? Sudden numbness, tingling, weakness, or loss of movement in your face, arm, or leg, especially on only one side of your body. ? Sudden vision changes. ? Sudden trouble speaking. ? Sudden confusion or trouble understanding simple statements. ? Sudden problems with walking or balance. ? A sudden, severe headache that is different from past headaches.    Call your doctor now or seek immediate medical care if:    · You feel dizzy and have a fever, headache, or ringing in your ears.     · You have new or increased nausea and vomiting.     · Your dizziness does not go away or comes back.    Watch closely for changes in your health, and be sure to contact your doctor if:    · You do not get better as expected. Where can you learn more? Go to [email or web address] org and sign in to your [employer] account. Enter O061 in the [facility] box to learn more about \"Dizziness: Care Instructions. \"     If you do not have an account, please click on the \"Sign Up Now\" link. Current as of: June 26, 2019  Content Version: 12.3  © 7284-7274 [employer]wise, [employer]orporated. Care instructions adapted under license by [facility] ([facility]). If you have questions about a medical condition or this instruction, always ask your healthcare professional. [address] any warranty or liability for your use of this information.

## 2020-01-29 ENCOUNTER — OFFICE VISIT (OUTPATIENT)
Dept: ORTHOPEDIC SURGERY | Age: 50
End: 2020-01-29
Payer: COMMERCIAL

## 2020-01-29 VITALS
TEMPERATURE: 98.4 F | BODY MASS INDEX: 31.49 KG/M2 | DIASTOLIC BLOOD PRESSURE: 79 MMHG | OXYGEN SATURATION: 98 % | SYSTOLIC BLOOD PRESSURE: 118 MMHG | WEIGHT: 189 LBS | HEIGHT: 65 IN | HEART RATE: 76 BPM

## 2020-01-29 PROCEDURE — 1036F TOBACCO NON-USER: CPT | Performed by: ORTHOPAEDIC SURGERY

## 2020-01-29 PROCEDURE — G8484 FLU IMMUNIZE NO ADMIN: HCPCS | Performed by: ORTHOPAEDIC SURGERY

## 2020-01-29 PROCEDURE — 99203 OFFICE O/P NEW LOW 30 MIN: CPT | Performed by: ORTHOPAEDIC SURGERY

## 2020-01-29 PROCEDURE — G8417 CALC BMI ABV UP PARAM F/U: HCPCS | Performed by: ORTHOPAEDIC SURGERY

## 2020-01-29 PROCEDURE — G8427 DOCREV CUR MEDS BY ELIG CLIN: HCPCS | Performed by: ORTHOPAEDIC SURGERY

## 2020-01-29 RX ORDER — DEXLANSOPRAZOLE 60 MG/1
CAPSULE, DELAYED RELEASE ORAL
COMMUNITY
Start: 2020-01-10 | End: 2021-08-20 | Stop reason: ALTCHOICE

## 2020-01-29 NOTE — PROGRESS NOTES
New Knee Patient     Referring Provider:   Adelita Panda [address], [address] [place] [address]    CHIEF COMPLAINT:   Chief Complaint   Patient presents with    Knee Pain     Bilateral knee pain R > L x 4 months. Pain is medial & posterior aspect of right knee, has popping left knee. Denies any recent twisting of knee. History of right knee scope 1991 and 2008. Xrays [facility].          HPI:    Patricia Bedoya is a 52y.o. year old female seen today for evaluation of right knee pain. She states she has a long history of pain in the knee. She had a knee scope back in the 90s, unsure what was done. Then in 2008 she had a second knee scope by Dr. Jacquelyn Ortiz in [facility] and was told that she had an ACL tear as well as meniscus tear, the meniscus tear was treated with meniscectomy, ACL was left alone. She states she has had feelings of instability ever since that surgery. She has had no recent treatment. She does currently work.       PAST MEDICAL HISTORY  Past Medical History:   Diagnosis Date    Asthma     DR Sly Pike    Back injury     Chronic back pain     PAIN RADIATES TO NECK AND LEGS    Chronic tension-type headache, not intractable 11/9/2018    DDD (degenerative disc disease), cervical 11/9/2018    D[facility]deep venous thrombosis) [facility]     Right Arm    GERD (gastroesophageal reflux disease)     Herniated disc, cervical     also lumbar spine    Hx of screening mammography 3/2015 & 10/2015    BIRADS 2 BENIGN    HX OTHER MEDICAL     PINCHED NERVE BACK OF NECK    Hypertension     Migraine     Migraine     DR Luz Maria Domínguez MVP (mitral valve prolapse)     Numbness and tingling     LEGS AND FEET    Obesity     Sinus infection     Thyroid disease     no med    Torn ACL     RT KNEE    Ulcer     Vitamin B 12 deficiency     NON ANEMIC       PAST SURGICAL HISTORY  Past Surgical History:   Procedure Laterality Date    BREAST LUMPECTOMY Right 6/21/2013    BREAST SURGERY Right 7/9/2013 re-excision, rt lumpectomyscar evacuation of hematoma.  CARDIOVASCULAR STRESS TEST  2015    NORMAL     SECTION      COLONOSCOPY      12 YOSSEF HEMORRHOIDS, MINIMAL DIVERTICULA    COLONOSCOPY  2012    DR PEREZ GERD & GASTRITIS    DILATION AND CURETTAGE OF UTERUS      ECHO COMPL W DOP COLOR FLOW  2015         ECHOCARDIOGRAM COMPLETE 2D W DOPPLER W COLOR  10/24/2012         HARDWARE REMOVAL Right 2018    Foot    KNEE ARTHROSCOPY Right     Right knee arthroscopy. Dr. Rain Mason ARTHROSCOPY Right     Right knee meniscal repair. Dr. Don Grubbs.       MAMMO IMPLANT DIGITAL DIAG BI      3/24/15 BIRADS CAT 2 BENIGN, 10/15/15 BIRADS CAT 2    OTHER SURGICAL HISTORY      TILT TABLE TEST - DR Billy Amos  NO ORTHOSTASIS    TYMPANOSTOMY TUBE PLACEMENT      UPPER GASTROINTESTINAL ENDOSCOPY      WITH CLOSED BIOPSY DR Yarbrough Presume 12 GERD, GASTRITIS    UPPER GASTROINTESTINAL ENDOSCOPY  2012    DR Fred Knox GERD & GASTRITIS    WISDOM TOOTH EXTRACTION           FAMILY HISTORY   Family History   Problem Relation Age of Onset    High Blood Pressure Mother     Heart Disease Father     Seizures Father     Coronary Art Dis Father     Cancer Father         STOMACH    Cancer Sister 43        breast    Deep Vein Thrombosis Sister         IN THE SETTING OF BREAST CA    Hypertension Maternal Grandmother        SOCIAL HISTORY  Social History     Socioeconomic History    Marital status: Single     Spouse name: Not on file    Number of children: Not on file    Years of education: Not on file    Highest education level: Not on file   Occupational History    Occupation: attendance   Social Needs    Financial resource strain: Not on file    Food insecurity:     Worry: Not on file     Inability: Not on file    Transportation needs:     Medical: Not on file     Non-medical: Not on file   Tobacco Use    Smoking status: Never Smoker    Smokeless tobacco: Never Used   Substance (ALDACTONE) 25 MG tablet, Take 1 tablet by mouth daily, Disp: 30 tablet, Rfl: 5    docusate sodium (COLACE) 100 MG capsule, Take 100 mg by mouth nightly , Disp: , Rfl:     ondansetron (ZOFRAN) 4 MG tablet, Take 1 tablet by mouth every 8 hours as needed for Nausea or Vomiting, Disp: 30 tablet, Rfl: 1    fluticasone (FLONASE) 50 MCG/ACT nasal spray, 1 spray by Nasal route daily, Disp: 1 Bottle, Rfl: 5    fluticasone-vilanterol (BREO ELLIPTA) 100-25 MCG/INH AEPB inhaler, Inhale 1 puff into the lungs daily, Disp: , Rfl:     famotidine (PEPCID) 40 MG tablet, Take 40 mg by mouth nightly , Disp: , Rfl:     tiotropium (SPIRIVA RESPIMAT) 1.25 MCG/ACT AERS inhaler, Inhale 2 puffs into the lungs daily, Disp: , Rfl:     montelukast (SINGULAIR) 10 MG tablet, Take 10 mg by mouth nightly , Disp: , Rfl:     aspirin 81 MG tablet, Take 81 mg by mouth daily, Disp: , Rfl:     furosemide (LASIX) 20 MG tablet, Take 1 tablet by mouth daily as needed (edema) (Patient not taking: Reported on 1/29/2020), Disp: 30 tablet, Rfl: 2    baclofen (LIORESAL) 10 MG tablet, TK 1 T PO BID PRN, Disp: , Rfl: 0    NARCAN 4 MG/0.1ML LIQD nasal spray, , Disp: , Rfl: 1    Probiotic Product (ACIDOPHILUS PROBIOTIC) CAPS capsule, , Disp: , Rfl:     albuterol sulfate HFA (PROAIR HFA) 108 (90 Base) MCG/ACT inhaler, Inhale 2 puffs into the lungs every 6 hours as needed for Wheezing (Patient not taking: Reported on 1/29/2020), Disp: 1 Inhaler, Rfl: 0    esomeprazole (NEXIUM) 40 MG delayed release capsule, Take 40 mg by mouth every morning (before breakfast), Disp: , Rfl:     pantoprazole (PROTONIX) 40 MG tablet, Take 40 mg by mouth daily , Disp: , Rfl:     SUMAtriptan (IMITREX) 6 MG/0.5ML SOLN injection, Inject 0.5 mLs into the skin once as needed for Migraine, Disp: 2 mL, Rfl: 5    ALLERGIES  Allergies   Allergen Reactions    Ceftin [Cefuroxime Axetil] Nausea Only    Pcn [Penicillins]      ?  reaction    Vicodin [Hydrocodone-Acetaminophen] Shortness Of Breath    Ibuprofen Swelling    Other      TAPE AND BANDAIDS SKIN IRRITATION    Percocet [Oxycodone-Acetaminophen]     Sudafed [Pseudoephedrine Hcl] Swelling       Controlled Substances Monitoring:          REVIEW OF SYSTEMS:     Constitutional:  Negative for weight loss, fevers, chills, fatigue  Cardiovascular: Negative for chest pain, palpitations  Pulmonary: Negative for shortness of breath, labored breathing, cough  GI: negative for abdominal pain, nausea, vomitting   MSK: per HPI  Skin: negative for rash, open wounds    All other systems reviewed and are negative         PHYSICAL EXAM     Vitals:    01/29/20 1412   BP: 118/79   Pulse: 76   Temp: 98.4 °F (36.9 °C)   TempSrc: Oral   SpO2: 98%   Weight: 189 lb (85.7 kg)   Height: 5' 5\" (1.651 m)       Height: 5' 5\" (1.651 m)  Weight: [unfilled]  BMI:  Body mass index is 31.45 kg/m². General: The patient is alert and oriented x 3, appears to be stated age and in no distress. HEENT: head is normocephalic, atraumatic. EOMI. Neck: supple, trachea midline, no thyromegaly   Cardiovascular: peripheral pulses palpable. Normal Capillary refill   Respiratory: breathing unlabored, chest expansion symmetric   Skin: no rash, no open wounds, no erythema  Psych: normal affect; mood stable  Neurologic: gait normal, sensation grossly intact in extremities  MSK:        Lower Extremity:   Ipsilateral hip exam shows normal range of motion without pain with impingement testing. On exam of the knee, there is grade 2 a Lockman with soft endpoint. Pivot shift is equivocal.  The knee is stable to varus and valgus stress at 0 and 30 degrees. Range of motion is full. Nyla's is negative.   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 Her knee is not grossly unstable today. She has a soft endpoint with Lachman but overall laxity appears to be minimal.  She has had no recent treatment. We discussed trial of physical therapy. She is agreeable. She would like to go to [facility]. She was given a prescription. If she is not improving over the next 4 to 6 weeks she will call us and we will obtain an MRI of her right knee.   She is in agreement with this plan        Lowell Nieves MD  Orthopaedic Surgery   1/29/20  4:32 PM

## 2020-01-30 ENCOUNTER — OFFICE VISIT (OUTPATIENT)
Dept: ENDOCRINOLOGY | Age: 50
End: 2020-01-30
Payer: COMMERCIAL

## 2020-01-30 VITALS
HEART RATE: 78 BPM | BODY MASS INDEX: 34.79 KG/M2 | OXYGEN SATURATION: 98 % | SYSTOLIC BLOOD PRESSURE: 136 MMHG | WEIGHT: 208.8 LBS | HEIGHT: 65 IN | DIASTOLIC BLOOD PRESSURE: 82 MMHG

## 2020-01-30 PROCEDURE — 99205 OFFICE O/P NEW HI 60 MIN: CPT | Performed by: INTERNAL MEDICINE

## 2020-01-30 PROCEDURE — 1036F TOBACCO NON-USER: CPT | Performed by: INTERNAL MEDICINE

## 2020-01-30 PROCEDURE — G8417 CALC BMI ABV UP PARAM F/U: HCPCS | Performed by: INTERNAL MEDICINE

## 2020-01-30 PROCEDURE — G8484 FLU IMMUNIZE NO ADMIN: HCPCS | Performed by: INTERNAL MEDICINE

## 2020-01-30 PROCEDURE — G8427 DOCREV CUR MEDS BY ELIG CLIN: HCPCS | Performed by: INTERNAL MEDICINE

## 2020-01-30 RX ORDER — SPIRONOLACTONE 25 MG/1
25 TABLET ORAL DAILY
Qty: 30 TABLET | Refills: 5 | Status: SHIPPED
Start: 2020-01-30 | End: 2020-10-19 | Stop reason: SDUPTHER

## 2020-01-30 RX ORDER — SUMATRIPTAN 100 MG/1
100 TABLET, FILM COATED ORAL 2 TIMES DAILY
Qty: 12 TABLET | Refills: 5 | Status: SHIPPED
Start: 2020-01-30 | End: 2020-02-14 | Stop reason: SDUPTHER

## 2020-01-30 RX ORDER — RIZATRIPTAN BENZOATE 10 MG/1
10 TABLET ORAL DAILY PRN
Qty: 9 TABLET | Refills: 5 | Status: SHIPPED
Start: 2020-01-30 | End: 2020-02-14 | Stop reason: SDUPTHER

## 2020-01-30 RX ORDER — FLUTICASONE PROPIONATE 50 MCG
1 SPRAY, SUSPENSION (ML) NASAL DAILY
Qty: 1 BOTTLE | Refills: 5 | Status: SHIPPED
Start: 2020-01-30 | End: 2020-02-14 | Stop reason: SDUPTHER

## 2020-01-30 NOTE — LETTER
[address] [place] [address] Department of Endocrinology Diabetes and Metabolism   [address] [place] [address]   Phone: [phone]  Fax: [phone]      Provider: Saeed Johnson MD  Primary Care Physician: Vaughn García DO   Referring Provider: Mirlande Montgomery DO    Patient: Alma Rosen  YOB: 1970  Date of Visit: 1/30/2020      Dear Dr. Vaughn García DO   I had the pleasure of seeing your patient Alma Rosen today at endocrine clinic for consultation visit and I enclosed a copy of the office visit completed today. Thank you very much for asking us to participate in the care of this very pleasant patient. Please don't hesitate to call if there are any further questions or concerns. Sincerely   Saeed Johnson MD  Endocrinologist, [address] [address]   Phone: [phone]  Fax: [phone]      [address] [place] [address] Department of Endocrinology Diabetes and Metabolism   [address] [place] [address]   Phone: [phone]  Fax: [phone]    Date of Service: 1/30/2020    Primary Care Physician: Vaughn García DO  Referring physician: Mirlande Montgomery DO  Provider: Saeed Johnson MD            Reason for the visit:  Multinodular goiter     History of Present Illness: The history is provided by the patient. No  was used. Accuracy of the patient data is excellent. Alma Rosen is a very pleasant 52 y.o. female seen today for evaluation and management of multinodular goiter     The patient states that she has a history of thyroid disorder as a teenager, and was placed on medication for this by her pediatrician however, it was stopped for no known reason. Patient states that her family physicians have been uncertain as to why no medications have been given for her thyroid disorder.   She had previously seen Dr. Geo Ann for  BREAST SURGERY Right 2013    re-excision, rt lumpectomyscar evacuation of hematoma.  CARDIOVASCULAR STRESS TEST  2015    NORMAL     SECTION      COLONOSCOPY      12 YOSSEF HEMORRHOIDS, MINIMAL DIVERTICULA    COLONOSCOPY  2012    DR PEREZ GERD & GASTRITIS    DILATION AND CURETTAGE OF UTERUS      ECHO COMPL W DOP COLOR FLOW  2015         ECHOCARDIOGRAM COMPLETE 2D W DOPPLER W COLOR  10/24/2012         HARDWARE REMOVAL Right 2018    Foot    KNEE ARTHROSCOPY Right     Right knee arthroscopy. Dr. Sulma Herrmann ARTHROSCOPY Right     Right knee meniscal repair. Dr. Odin Tran.  MAMMO IMPLANT DIGITAL DIAG BI      3/24/15 BIRADS CAT 2 BENIGN, 10/15/15 BIRADS CAT 2    OTHER SURGICAL HISTORY      TILT TABLE TEST - DR Samara Gonzalez  NO ORTHOSTASIS    TYMPANOSTOMY TUBE PLACEMENT      UPPER GASTROINTESTINAL ENDOSCOPY      WITH CLOSED BIOPSY DR Jazzy Hicks 12 GERD, GASTRITIS    UPPER GASTROINTESTINAL ENDOSCOPY  2012    DR Mindy Silva GERD & GASTRITIS    WISDOM TOOTH EXTRACTION       SOCIAL HISTORY   Tobacco:   reports that she has never smoked. She has never used smokeless tobacco.  Alcohol:   reports no history of alcohol use. Drugs:   reports no history of drug use. FAMILY HISTORY   Family History   Problem Relation Age of Onset    High Blood Pressure Mother     Heart Disease Father     Seizures Father     Coronary Art Dis Father     Cancer Father         STOMACH    Cancer Sister 43        breast    Deep Vein Thrombosis Sister         IN THE SETTING OF BREAST CA    Hypertension Maternal Grandmother      ALLERGIES AND DRUG REACTIONS   Allergies   Allergen Reactions    Ceftin [Cefuroxime Axetil] Nausea Only    Pcn [Penicillins]      ?  reaction    Vicodin [Hydrocodone-Acetaminophen] Shortness Of Breath    Ibuprofen Swelling    Other      TAPE AND BANDAIDS SKIN IRRITATION    Percocet [Oxycodone-Acetaminophen]  Sudafed [Pseudoephedrine Hcl] Swelling       CURRENT MEDICATIONS   Current Outpatient Medications   Medication Sig Dispense Refill    DEXILANT 60 MG CPDR delayed release capsule TK 1 C PO 30 MINUTES BEFORE BREAKFAST      Khrqxl-LtAyf-QpPov-Meth-FA-DHA (PRENATE MINI) 18-0.6-0.4-350 MG CAPS Take 1 capsule by mouth daily 30 capsule 7    doxycycline hyclate (VIBRA-TABS) 100 MG tablet Take 1 tablet by mouth 2 times daily 20 tablet 0    meclizine (ANTIVERT) 25 MG tablet Take 1 tablet by mouth 3 times daily as needed for Dizziness 30 tablet 1    rizatriptan (MAXALT) 10 MG tablet Take 1 tablet by mouth daily as needed for Migraine 9 tablet 5    SUMAtriptan (IMITREX) 100 MG tablet Take 1 tablet by mouth 2 times daily Two times daily prn 12 tablet 5    furosemide (LASIX) 20 MG tablet Take 1 tablet by mouth daily as needed (edema) 30 tablet 2    acetaminophen-codeine (TYLENOL #4) 300-60 MG per tablet TK 1 T PO ONCE A DAY PRN FOR PAIN  0    baclofen (LIORESAL) 10 MG tablet TK 1 T PO BID PRN  0    NARCAN 4 MG/0.1ML LIQD nasal spray   1    Probiotic Product (ACIDOPHILUS PROBIOTIC) CAPS capsule       spironolactone (ALDACTONE) 25 MG tablet Take 1 tablet by mouth daily 30 tablet 5    docusate sodium (COLACE) 100 MG capsule Take 100 mg by mouth nightly       ondansetron (ZOFRAN) 4 MG tablet Take 1 tablet by mouth every 8 hours as needed for Nausea or Vomiting 30 tablet 1    fluticasone (FLONASE) 50 MCG/ACT nasal spray 1 spray by Nasal route daily 1 Bottle 5    fluticasone-vilanterol (BREO ELLIPTA) 100-25 MCG/INH AEPB inhaler Inhale 1 puff into the lungs daily      famotidine (PEPCID) 40 MG tablet Take 40 mg by mouth nightly       tiotropium (SPIRIVA RESPIMAT) 1.25 MCG/ACT AERS inhaler Inhale 2 puffs into the lungs daily      montelukast (SINGULAIR) 10 MG tablet Take 10 mg by mouth nightly       aspirin 81 MG tablet Take 81 mg by mouth daily 12/02/19 209 lb (94.8 kg)   11/04/19 206 lb (93.4 kg)       Physical examination:  General: awake alert, oriented x3, no abnormal position or movements. HEENT: normocephalic non traumatic  Neck: supple, no LN enlargement, enlarged right thyroid, no thyroid tenderness, no JVD. Pulm: Clear equal air entry no added sounds, no wheezing or rhonchi    CVS: S1 + S2, no murmur, no heave. Dorsalis pedis pulse palpable   Abd: soft lax, no tenderness, no organomegaly, audible bowel sounds. Skin: warm, no lesions, no rash.  No Palmar erythema  Musculoskeletal: No back tenderness, no kyphosis/scoliosis     Neuro: CN intact, sensation normal , muscle power normal. No tremors   Psych: normal mood, and affect    Review of Laboratory Data:  I personally reviewed the following labs:   Lab Results   Component Value Date/Time    WBC 6.3 10/02/2019 09:10 AM    RBC 4.43 10/02/2019 09:10 AM    RBC 4.43 02/02/2017 08:20 AM    HGB 12.5 10/02/2019 09:10 AM    HCT 39.0 10/02/2019 09:10 AM    MCV 88.0 10/02/2019 09:10 AM    MCH 28.2 10/02/2019 09:10 AM    MCHC 32.1 10/02/2019 09:10 AM    RDW 13.9 10/02/2019 09:10 AM     10/02/2019 09:10 AM    MPV 9.8 10/02/2019 09:10 AM      Lab Results   Component Value Date/Time     11/22/2019 09:01 AM    K 4.4 11/22/2019 09:01 AM    CO2 27 11/22/2019 09:01 AM    BUN 13 11/22/2019 09:01 AM    CREATININE 1.0 11/22/2019 09:01 AM    CALCIUM 9.8 11/22/2019 09:01 AM    LABGLOM >60 11/22/2019 09:01 AM    GFRAA >60 11/22/2019 09:01 AM      Lab Results   Component Value Date/Time    TSH 3.550 10/04/2019 04:43 PM    T4FREE 1.47 06/28/2018 12:17 PM    T1MYAAK 7.8 06/28/2018 12:17 PM    FT3 2.9 06/28/2018 12:17 PM    FT3 2.8 07/10/2016 11:22 AM    FT3 3.1 04/02/2016 09:55 AM    FT3 3.0 10/24/2015 10:26 AM     Lab Results   Component Value Date    LABA1C 6.3 06/28/2018    GLUCOSE 106 11/22/2019    GLUCOSE 96 03/25/2012    LABCREA 33 04/09/2018     Lab Results   Component Value Date -------------------------------------------------------------  Electronically signed by Marcel Rueda MD

## 2020-01-30 NOTE — PROGRESS NOTES
brittle fingernails, brittle hair, depressed mood. PAST MEDICAL HISTORY   Past Medical History:   Diagnosis Date    Asthma     DR Florence Dillon    Back injury     Chronic back pain     PAIN RADIATES TO NECK AND LEGS    Chronic tension-type headache, not intractable 2018    DDD (degenerative disc disease), cervical 2018    DVT (deep venous thrombosis) (HCC)     Right Arm    GERD (gastroesophageal reflux disease)     Herniated disc, cervical     also lumbar spine    Hx of screening mammography 3/2015 & 10/2015    BIRADS 2 BENIGN    HX OTHER MEDICAL     PINCHED NERVE BACK OF NECK    Hypertension     Migraine     Migraine     DR Elif Perry MVP (mitral valve prolapse)     Numbness and tingling     LEGS AND FEET    Obesity     Sinus infection     Thyroid disease     no med    Torn ACL     RT KNEE    Ulcer     Vitamin B 12 deficiency     NON ANEMIC     PAST SURGICAL HISTORY   Past Surgical History:   Procedure Laterality Date    BREAST LUMPECTOMY Right 2013    BREAST SURGERY Right 2013    re-excision, rt lumpectomyscar evacuation of hematoma.  CARDIOVASCULAR STRESS TEST  2015    NORMAL     SECTION      COLONOSCOPY      12 YOSSEF HEMORRHOIDS, MINIMAL DIVERTICULA    COLONOSCOPY  2012    DR PEREZ GERD & GASTRITIS    DILATION AND CURETTAGE OF UTERUS      ECHO COMPL W DOP COLOR FLOW  2015         ECHOCARDIOGRAM COMPLETE 2D W DOPPLER W COLOR  10/24/2012         HARDWARE REMOVAL Right 2018    Foot    KNEE ARTHROSCOPY Right     Right knee arthroscopy. Dr. Jacoby Borrego ARTHROSCOPY Right     Right knee meniscal repair. Dr. Suad Monsalve.       MAMMO IMPLANT DIGITAL DIAG BI      3/24/15 BIRADS CAT 2 BENIGN, 10/15/15 BIRADS CAT 2    OTHER SURGICAL HISTORY      TILT TABLE TEST - DR Louann Lombard  NO ORTHOSTASIS    TYMPANOSTOMY TUBE PLACEMENT      UPPER GASTROINTESTINAL ENDOSCOPY      WITH CLOSED BIOPSY DR Tomas Macario 12 GERD, GASTRITIS    UPPER TK 1 T PO BID PRN  0    NARCAN 4 MG/0.1ML LIQD nasal spray   1    Probiotic Product (ACIDOPHILUS PROBIOTIC) CAPS capsule       spironolactone (ALDACTONE) 25 MG tablet Take 1 tablet by mouth daily 30 tablet 5    docusate sodium (COLACE) 100 MG capsule Take 100 mg by mouth nightly       ondansetron (ZOFRAN) 4 MG tablet Take 1 tablet by mouth every 8 hours as needed for Nausea or Vomiting 30 tablet 1    fluticasone (FLONASE) 50 MCG/ACT nasal spray 1 spray by Nasal route daily 1 Bottle 5    fluticasone-vilanterol (BREO ELLIPTA) 100-25 MCG/INH AEPB inhaler Inhale 1 puff into the lungs daily      famotidine (PEPCID) 40 MG tablet Take 40 mg by mouth nightly       tiotropium (SPIRIVA RESPIMAT) 1.25 MCG/ACT AERS inhaler Inhale 2 puffs into the lungs daily      montelukast (SINGULAIR) 10 MG tablet Take 10 mg by mouth nightly       aspirin 81 MG tablet Take 81 mg by mouth daily      albuterol sulfate HFA (PROAIR HFA) 108 (90 Base) MCG/ACT inhaler Inhale 2 puffs into the lungs every 6 hours as needed for Wheezing (Patient not taking: Reported on 1/29/2020) 1 Inhaler 0    esomeprazole (NEXIUM) 40 MG delayed release capsule Take 40 mg by mouth every morning (before breakfast)      pantoprazole (PROTONIX) 40 MG tablet Take 40 mg by mouth daily       SUMAtriptan (IMITREX) 6 MG/0.5ML SOLN injection Inject 0.5 mLs into the skin once as needed for Migraine 2 mL 5     Current Facility-Administered Medications   Medication Dose Route Frequency Provider Last Rate Last Dose    cyanocobalamin injection 1,000 mcg  1,000 mcg Intramuscular Once Radha Yoo MD        cyanocobalamin injection 1,000 mcg  1,000 mcg Intramuscular Once Radha Yoo MD           Review of Systems  Constitutional: No fever, no chills, no diaphoresis, no generalized weakness complains of fatigue. Curtis VALLEJO: No blurred vision, No sore throat, no ear pain, no hair loss  Neck: denied any neck swelling, but complains of difficulty 09:10 AM    RDW 13.9 10/02/2019 09:10 AM     10/02/2019 09:10 AM    MPV 9.8 10/02/2019 09:10 AM      Lab Results   Component Value Date/Time     11/22/2019 09:01 AM    K 4.4 11/22/2019 09:01 AM    CO2 27 11/22/2019 09:01 AM    BUN 13 11/22/2019 09:01 AM    CREATININE 1.0 11/22/2019 09:01 AM    CALCIUM 9.8 11/22/2019 09:01 AM    LABGLOM >60 11/22/2019 09:01 AM    GFRAA >60 11/22/2019 09:01 AM      Lab Results   Component Value Date/Time    TSH 3.550 10/04/2019 04:43 PM    T4FREE 1.47 06/28/2018 12:17 PM    W0SGWNC 7.8 06/28/2018 12:17 PM    FT3 2.9 06/28/2018 12:17 PM    FT3 2.8 07/10/2016 11:22 AM    FT3 3.1 04/02/2016 09:55 AM    FT3 3.0 10/24/2015 10:26 AM     Lab Results   Component Value Date    LABA1C 6.3 06/28/2018    GLUCOSE 106 11/22/2019    GLUCOSE 96 03/25/2012    LABCREA 33 04/09/2018     Lab Results   Component Value Date    TRIG 119 02/22/2018    HDL 69 11/22/2019    LDLCALC 166 11/22/2019    CHOL 206 02/22/2018     Lab Results   Component Value Date    VITD25 29 06/28/2018    VITD25 32 02/22/2018       Medical Records/Labs/Images Review:   I personally reviewed and summarized previous records   All labs and imaging were reviewed independently    [address] [place] [address], a 52 y.o.-old female seen in for the following issues     Multinodular goiter   · Prevalence of thyroid nodule on thyroid ultrasound is 50% and 95 % of these nodules are benign. · Given nodule size and lack of ultrasound suspicious features which making the nodule less likely to be malignant, I will continue following with periodic US, with the next ultrasound taking place  next fall in several months. If this ultrasound remains unchanged from previous, frequency will be spaced out to at least 2 to 3 years.   · Labs will be completed, including TSH, free T4, thyroid antibody,     H/o hyperprolactinemia   · Will check prolactin level    Prediabetes   · Encourage wt loss and discussed lifestyle changes   · Recheck A1c     Chronic fatigue   · Ordered TFT, vitamin D and vitamin B12     Return in about 9 months (around 10/30/2020) for MNG, Hyperprolactinemia . The above issues were reviewed with the patient who understood and agreed with the plan. Thank you for allowing us to participate in the care of this patient. Please do not hesitate to contact us with any additional questions. Diagnosis Orders   1. Impaired fasting blood sugar  Hemoglobin A1C   2. Hyperprolactinemia (HCC)  Prolactin   3. Multinodular goiter  TSH without Reflex    T4, Free    US Thyroid   4. Thyroiditis  TSH without Reflex    T4, Free    Thyroid AB   5. Vitamin D deficiency  Vitamin D 25 Hydroxy   6.  B12 deficiency  Vitamin B12     Sari Parker MD  Endocrinologist, [address] [address]   Phone: [phone]  Fax: [phone]  -------------------------------------------------------------  Electronically signed by Marcel Rueda MD

## 2020-02-20 ENCOUNTER — OFFICE VISIT (OUTPATIENT)
Dept: NEUROLOGY | Age: 50
End: 2020-02-20
Payer: COMMERCIAL

## 2020-02-20 VITALS
RESPIRATION RATE: 16 BRPM | HEART RATE: 63 BPM | BODY MASS INDEX: 34.49 KG/M2 | OXYGEN SATURATION: 96 % | SYSTOLIC BLOOD PRESSURE: 127 MMHG | TEMPERATURE: 99.3 F | DIASTOLIC BLOOD PRESSURE: 76 MMHG | HEIGHT: 65 IN | WEIGHT: 207 LBS

## 2020-02-20 PROCEDURE — G8484 FLU IMMUNIZE NO ADMIN: HCPCS | Performed by: PSYCHIATRY & NEUROLOGY

## 2020-02-20 PROCEDURE — G8427 DOCREV CUR MEDS BY ELIG CLIN: HCPCS | Performed by: PSYCHIATRY & NEUROLOGY

## 2020-02-20 PROCEDURE — 99244 OFF/OP CNSLTJ NEW/EST MOD 40: CPT | Performed by: PSYCHIATRY & NEUROLOGY

## 2020-02-20 PROCEDURE — G8417 CALC BMI ABV UP PARAM F/U: HCPCS | Performed by: PSYCHIATRY & NEUROLOGY

## 2020-02-20 RX ORDER — RIZATRIPTAN BENZOATE 10 MG/1
10 TABLET ORAL DAILY PRN
Qty: 9 TABLET | Refills: 5 | Status: SHIPPED
Start: 2020-02-20 | End: 2021-09-22 | Stop reason: SDUPTHER

## 2020-02-20 ASSESSMENT — ENCOUNTER SYMPTOMS
PHOTOPHOBIA: 0
VOMITING: 0
SHORTNESS OF BREATH: 0
NAUSEA: 0
TROUBLE SWALLOWING: 0

## 2020-02-20 NOTE — PROGRESS NOTES
NEUROLOGY NEW PATIENT NOTE     Date: 2/20/2020  Name: Jade Cortés  MRN: [id number]  Patient's PCP: Nader Morales DO     Dear, Dr. Anna Mcpherson DO    REASON FOR VISIT/CHIEF COMPLAINT: Headaches     HISTORY OF PRESENT ILLNESS: Jade Cortés is a 52 y.o. -American female is coming in for evaluation of headaches  Headache     Onset: At the age of 10   Duration: All day long   Frequency : Daily   Time of occurrence: Any time   Severity on a scale of 1-10: She is a constant headache which is a 5/10 and intermittent worsening to 10/10. Headache Location: Bilateral forehead, behind the eyes, radiating in the back of her neck, at times starts in the bilateral occipital region and neck    Change sides: No    Character:sharp, shooting, stabbing and throbbing    Activities that worsens headache: movement    Reliving factors: nothing    Headache Triggers or Provoking Factors:   Heat    Headache disability during or after an attack:   confined to bed    Associated symptoms:  nausea, vomiting, photophobia and sonophobia    Aura (Visual/Sensory):  None    Premonitory Symptoms:  none       Prior Headache Treatments:    Preventatives:   She has tried generic topiramate: Which caused her to have, nausea, fatigue, cognitive difficulties so she stopped taking it.   Propranolol: Asthma exacerbation  Abortives:   Rizatriptan: Partial help  Imitrex: No help  Tylenol, ibuprofen, Aleve: No help    Total number of migraine headache days in a month: 27  Last MRI brain: 2016 no acute abnormality, Chiari 1  Malformation    I have personally reviewed her medical records    PAST MEDICAL HISTORY:   Past Medical History:   Diagnosis Date    Asthma     DR Meenakshi Jones    Back injury     Chronic back pain     PAIN RADIATES TO NECK AND LEGS    Chronic tension-type headache, not intractable 11/9/2018    DDD (degenerative disc disease), cervical 11/9/2018    DVT (deep venous thrombosis) (HCC)     Right Arm    GERD THE SETTING OF BREAST CA    Hypertension Maternal Grandmother      SOCIAL HISTORY:   Social History     Socioeconomic History    Marital status: Single     Spouse name: None    Number of children: None    Years of education: None    Highest education level: None   Occupational History    Occupation: attendance   Social Needs    Financial resource strain: None    Food insecurity:     Worry: None     Inability: None    Transportation needs:     Medical: None     Non-medical: None   Tobacco Use    Smoking status: Never Smoker    Smokeless tobacco: Never Used   Substance and Sexual Activity    Alcohol use: No    Drug use: No    Sexual activity: None   Lifestyle    Physical activity:     Days per week: None     Minutes per session: None    Stress: None   Relationships    Social connections:     Talks on phone: None     Gets together: None     Attends Christianity service: None     Active member of club or organization: None     Attends meetings of clubs or organizations: None     Relationship status: None    Intimate partner violence:     Fear of current or ex partner: None     Emotionally abused: None     Physically abused: None     Forced sexual activity: None   Other Topics Concern    None   Social History Narrative    None     Allergy:   Allergies   Allergen Reactions    Ceftin [Cefuroxime Axetil] Nausea Only    Pcn [Penicillins]      ?  reaction    Vicodin [Hydrocodone-Acetaminophen] Shortness Of Breath    Ibuprofen Swelling    Other      TAPE AND BANDAIDS SKIN IRRITATION    Percocet [Oxycodone-Acetaminophen]     Sudafed [Pseudoephedrine Hcl] Swelling     MEDS:   Current Outpatient Medications:     topiramate ER (TROKENDI XR) 200 MG CP24, Take 1 tablet by mouth daily, Disp: 30 capsule, Rfl: 1    rizatriptan (MAXALT) 10 MG tablet, Take 1 tablet by mouth daily as needed for Migraine, Disp: 9 tablet, Rfl: 5    topiramate ER (TROKENDI XR) 25 MG CP24, Lot number: [id number], expiration Disp: , Rfl:     DEXILANT 60 MG CPDR delayed release capsule, TK 1 C PO 30 MINUTES BEFORE BREAKFAST, Disp: , Rfl:     albuterol sulfate HFA (PROAIR HFA) 108 (90 Base) MCG/ACT inhaler, Inhale 2 puffs into the lungs every 6 hours as needed for Wheezing (Patient not taking: Reported on 1/29/2020), Disp: 1 Inhaler, Rfl: 0    REVIEW OF SYSTEMS  Review of Systems   Constitutional: Negative for appetite change, fatigue and unexpected weight change. HENT: Negative for drooling, hearing loss, tinnitus and trouble swallowing. Eyes: Negative for photophobia and visual disturbance. Respiratory: Negative for shortness of breath. Cardiovascular: Negative for palpitations. Gastrointestinal: Negative for nausea and vomiting. Endocrine: Negative for polyuria. Genitourinary: Negative for flank pain. Musculoskeletal: Negative for neck pain and neck stiffness. Skin: Negative for rash. Allergic/Immunologic: Negative for food allergies. Neurological: Positive for headaches. Negative for dizziness, tremors, seizures, syncope, speech difficulty, weakness, light-headedness and numbness. Hematological: Negative for adenopathy. Psychiatric/Behavioral: Negative for agitation, behavioral problems and sleep disturbance. PHYSICAL EXAM:   /76   Pulse 63   Temp 99.3 °F (37.4 °C) (Oral)   Resp 16   Ht 5' 5\" (1.651 m)   Wt 207 lb (93.9 kg)   LMP 10/01/2018 (Approximate)   SpO2 96%   BMI 34.45 kg/m²   GENERAL APPEARANCE: Alert, well-developed, well-nourished female in no acute distress. HEENT: Normocephalic and atraumatic. PERRL. Oropharynx unremarkable. PULM: Normal respiratory effort. No accessory muscle use. CV: RRR. ABDOMEN: Soft, nontender. EXTREMITIES: No obvious signs of vascular compromise. Pulses present. No cyanosis, clubbing or edema. SKIN: Clear; no rashes, lesions or skin breaks in exposed areas.       NEURO:     Neurological examination     MENTAL STATUS: Patient awake and BUN   Date Value Ref Range Status   11/22/2019 13 6 - 20 mg/dL Final   10/02/2019 9 6 - 20 mg/dL Final   02/02/2019 9 6 - 20 mg/dL Final     CREATININE   Date Value Ref Range Status   11/22/2019 1.0 0.5 - 1.0 mg/dL Final   10/02/2019 0.9 0.5 - 1.0 mg/dL Final   02/02/2019 0.9 0.5 - 1.0 mg/dL Final     GFR Non-   Date Value Ref Range Status   11/22/2019 >60 >=60 mL/min/1.73 Final     Comment:     Chronic Kidney Disease: less than 60 ml/min/1.73 sq.m. Kidney Failure: less than 15 ml/min/1.73 sq.m. Results valid for patients 18 years and older. 10/02/2019 >60 >=60 mL/min/1.73 Final     Comment:     Chronic Kidney Disease: less than 60 ml/min/1.73 sq.m. Kidney Failure: less than 15 ml/min/1.73 sq.m. Results valid for patients 18 years and older. 02/02/2019 >60 >=60 mL/min/1.73 Final     Comment:     Chronic Kidney Disease: less than 60 ml/min/1.73 sq.m. Kidney Failure: less than 15 ml/min/1.73 sq.m. Results valid for patients 18 years and older.        Calcium   Date Value Ref Range Status   11/22/2019 9.8 8.6 - 10.2 mg/dL Final   10/02/2019 9.0 8.6 - 10.2 mg/dL Final   02/02/2019 9.5 8.6 - 10.2 mg/dL Final     Magnesium   Date Value Ref Range Status   10/22/2017 2.0 1.6 - 2.6 mg/dL Final     WBC   Date Value Ref Range Status   10/02/2019 6.3 4.5 - 11.5 E9/L Final   02/02/2019 6.8 4.5 - 11.5 E9/L Final   01/29/2019 6.0 4.5 - 11.5 E9/L Final     Hemoglobin   Date Value Ref Range Status   10/02/2019 12.5 11.5 - 15.5 g/dL Final   02/02/2019 13.1 11.5 - 15.5 g/dL Final   01/29/2019 12.6 11.5 - 15.5 g/dL Final     Hematocrit   Date Value Ref Range Status   10/02/2019 39.0 34.0 - 48.0 % Final   02/02/2019 41.3 34.0 - 48.0 % Final   01/29/2019 39.3 34.0 - 48.0 % Final     Platelets   Date Value Ref Range Status   10/02/2019 213 130 - 450 E9/L Final   02/02/2019 252 130 - 450 E9/L Final   01/29/2019 232 130 - 450 E9/L Final     Neutrophils %   Date Value Ref Range Status 10/02/2019 53.2 43.0 - 80.0 % Final   02/02/2019 63.0 43.0 - 80.0 % Final   11/22/2018 62.9 43.0 - 80.0 % Final     Monocytes %   Date Value Ref Range Status   10/02/2019 7.8 2.0 - 12.0 % Final   02/02/2019 7.2 2.0 - 12.0 % Final   11/22/2018 6.7 2.0 - 12.0 % Final     Eosinophils %   Date Value Ref Range Status   02/02/2017 1.5 1 - 4 % Final     Total Protein   Date Value Ref Range Status   11/22/2019 7.7 6.4 - 8.3 g/dL Final   10/02/2019 7.1 6.4 - 8.3 g/dL Final   01/29/2019 7.2 6.4 - 8.3 g/dL Final     Total Bilirubin   Date Value Ref Range Status   11/22/2019 <0.2 0.0 - 1.2 mg/dL Final   10/02/2019 <0.2 0.0 - 1.2 mg/dL Final   01/29/2019 <0.2 0.0 - 1.2 mg/dL Final     Alkaline Phosphatase   Date Value Ref Range Status   11/22/2019 132 (H) 35 - 104 U/L Final   10/02/2019 110 (H) 35 - 104 U/L Final   01/29/2019 97 35 - 104 U/L Final     ALT   Date Value Ref Range Status   11/22/2019 29 0 - 32 U/L Final   10/02/2019 29 0 - 32 U/L Final   01/29/2019 19 0 - 32 U/L Final     AST   Date Value Ref Range Status   11/22/2019 24 0 - 31 U/L Final   10/02/2019 34 (H) 0 - 31 U/L Final     Comment:     Specimen is slightly Hemolyzed. Result may be artificially increased. 01/29/2019 20 0 - 31 U/L Final     Lab Results   Component Value Date    INR 1.1 10/22/2017     Lab Results   Component Value Date    TRIG 119 02/22/2018    HDL 69 11/22/2019     No components found for: HGBA1C  No results found for: PROTEINCSF, GLUCCSF, WBCCSF    Controlled Substance Monitoring:    Acute and Chronic Pain Monitoring:   RX Monitoring 9/14/2018   Attestation The Prescription Monitoring Report for this patient was reviewed today. Periodic Controlled Substance Monitoring -   Chronic Pain > 80 MEDD -       MEDICAL DECISION MAKING  ASSESSMENT/PLAN    Rosas Masters was seen today for headache and back pain.     Diagnoses and all orders for this visit:    Worsening headaches    Intractable chronic migraine without aura and with status migrainosus    Chronic daily headache    Chiari I malformation (HCC)    Cervicalgia    Myofascial pain      -     MRI Brain WO Contrast; Future  -     topiramate ER (TROKENDI XR) 200 MG CP24; Take 1 tablet by mouth daily  -     rizatriptan (MAXALT) 10 MG tablet; Take 1 tablet by mouth daily as needed for Migraine    · Check MRI brain to look for any acute abnormality as a cause of her worsening headaches. · Last MRI brain was in 2016, since then her headache has been getting worse. · Start on Trokendi brand for migraine prophylaxis. Start 25 mg daily to target dose of 200 mg daily. She will be provided with samples. · Continue on rizatriptan  · Will benefit from pericranial nerve block and trigger point injections for her head and neck pain in the future. Return in about 4 weeks (around 3/19/2020). Today, I personally spent a great amount of time directly face-to-face time with the patient, of which greater than 50% was spent in patient education, counseling,about etiology management and diagnosis of etiology management diagnosis of migraines, chronic daily headaches, worsening headaches. Side effects of medications including Topamax, rizatriptan were discussed in detail with the patient, verbalizes understanding and agrees to it. And coordination of care as described above. Patient's current medication list, allergies, problem list and results of all previously ordered testing and scans were reviewed at today's visit.       Heri Johnsno MD    [facility]  [address] [place] [address]

## 2020-02-28 ENCOUNTER — HOSPITAL ENCOUNTER (OUTPATIENT)
Dept: MRI IMAGING | Age: 50
Discharge: HOME | End: 2020-03-01
Payer: COMMERCIAL

## 2020-02-28 ENCOUNTER — HOSPITAL ENCOUNTER (OUTPATIENT)
Age: 50
Discharge: HOME | End: 2020-02-28
Payer: COMMERCIAL

## 2020-02-28 LAB
HBA1C MFR BLD: 6.1 % (ref 4–5.6)
PROLACTIN: 17.93 NG/ML
T4 FREE: 1.16 NG/DL (ref 0.93–1.7)
TSH SERPL DL<=0.05 MIU/L-ACNC: 2.12 UIU/ML (ref 0.27–4.2)
VITAMIN B-12: 573 PG/ML (ref 211–946)
VITAMIN D 25-HYDROXY: 32 NG/ML (ref 30–100)

## 2020-02-28 PROCEDURE — 86800 THYROGLOBULIN ANTIBODY: CPT

## 2020-02-28 PROCEDURE — 82607 VITAMIN B-12: CPT

## 2020-02-28 PROCEDURE — 86376 MICROSOMAL ANTIBODY EACH: CPT

## 2020-02-28 PROCEDURE — 84146 ASSAY OF PROLACTIN: CPT

## 2020-02-28 PROCEDURE — 36415 COLL VENOUS BLD VENIPUNCTURE: CPT

## 2020-02-28 PROCEDURE — 83036 HEMOGLOBIN GLYCOSYLATED A1C: CPT

## 2020-02-28 PROCEDURE — 84439 ASSAY OF FREE THYROXINE: CPT

## 2020-02-28 PROCEDURE — 70551 MRI BRAIN STEM W/O DYE: CPT

## 2020-02-28 PROCEDURE — 84443 ASSAY THYROID STIM HORMONE: CPT

## 2020-02-28 PROCEDURE — 82306 VITAMIN D 25 HYDROXY: CPT

## 2020-03-01 ENCOUNTER — TELEPHONE (OUTPATIENT)
Dept: ENDOCRINOLOGY | Age: 50
End: 2020-03-01

## 2020-03-01 LAB
THYROGLOBULIN AB: <0.9 IU/ML (ref 0–4)
THYROID PEROXIDASE (TPO) ABS: 42.7 IU/ML (ref 0–9)

## 2020-03-01 NOTE — TELEPHONE ENCOUNTER
Notify pt,  I have reviewed your recent results    · Prolactin level was very good (excellent!)   · Thyroid hormones are Normal but antibodies are slightly elevated. This mean that you are at higher risk to developed hypothyroidism in the future and we need to continue monitoring your level. Repeat level before next visit in October   · A1c level was in prediabetic range.  I recommend having diabetes class to learn more on consistent Carb diet

## 2020-03-02 ENCOUNTER — HOSPITAL ENCOUNTER (EMERGENCY)
Age: 50
Discharge: HOME | End: 2020-03-02
Payer: COMMERCIAL

## 2020-03-02 ENCOUNTER — APPOINTMENT (OUTPATIENT)
Dept: CT IMAGING | Age: 50
End: 2020-03-02
Payer: COMMERCIAL

## 2020-03-02 VITALS
DIASTOLIC BLOOD PRESSURE: 68 MMHG | SYSTOLIC BLOOD PRESSURE: 122 MMHG | BODY MASS INDEX: 30.99 KG/M2 | HEART RATE: 68 BPM | RESPIRATION RATE: 16 BRPM | OXYGEN SATURATION: 99 % | WEIGHT: 186 LBS | TEMPERATURE: 98.2 F | HEIGHT: 65 IN

## 2020-03-02 PROCEDURE — 72125 CT NECK SPINE W/O DYE: CPT

## 2020-03-02 PROCEDURE — 72131 CT LUMBAR SPINE W/O DYE: CPT

## 2020-03-02 PROCEDURE — 99284 EMERGENCY DEPT VISIT MOD MDM: CPT

## 2020-03-02 RX ORDER — CYCLOBENZAPRINE HCL 10 MG
10 TABLET ORAL NIGHTLY
Qty: 10 TABLET | Refills: 0 | Status: SHIPPED | OUTPATIENT
Start: 2020-03-02 | End: 2020-03-12

## 2020-03-02 ASSESSMENT — PAIN DESCRIPTION - DESCRIPTORS: DESCRIPTORS: ACHING

## 2020-03-02 ASSESSMENT — PAIN DESCRIPTION - PAIN TYPE: TYPE: ACUTE PAIN

## 2020-03-02 ASSESSMENT — PAIN DESCRIPTION - LOCATION: LOCATION: NECK;BACK

## 2020-03-02 ASSESSMENT — PAIN SCALES - GENERAL: PAINLEVEL_OUTOF10: 8

## 2020-03-02 ASSESSMENT — PAIN DESCRIPTION - ORIENTATION: ORIENTATION: LOWER

## 2020-03-02 ASSESSMENT — PAIN DESCRIPTION - FREQUENCY: FREQUENCY: CONTINUOUS

## 2020-03-02 ASSESSMENT — PAIN - FUNCTIONAL ASSESSMENT: PAIN_FUNCTIONAL_ASSESSMENT: ACTIVITIES ARE NOT PREVENTED
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
## 2020-03-02 NOTE — ED NOTES
Radiology Procedure Waiver   Name: Amanda Murillo  : 1970  MRN: [id number]    Date:  3/2/20    Time: 11:54 AM    Benefits of immediately proceeding with Radiology exam(s) without pre-testing outweigh the risks or are not indicated as specified below and therefore the following is/are being waived:    [x] Pregnancy test   [] Patients LMP on-time and regular.   [] Patient had Tubal Ligation or has other Contraception Device. [x] Patient  is Menopausal or Premenarcheal.    [] Patient had Full or Partial Hysterectomy. [] Protocol for Iodine allergy    [] MRI Questionnaire     [] BUN/Creatinine   [] Patient age w/no hx of renal dysfunction. [] Patient on Dialysis. [] Recent Normal Labs.   Electronically signed by Vale Bello PA-C on 3/2/20 at 11:54 AM             Vale Bello PA-C  [phone]

## 2020-03-10 ENCOUNTER — TELEPHONE (OUTPATIENT)
Dept: NEUROLOGY | Age: 50
End: 2020-03-10

## 2020-03-30 LAB

## 2020-04-02 ENCOUNTER — TELEPHONE (OUTPATIENT)
Dept: NEUROLOGY | Age: 50
End: 2020-04-02

## 2020-04-02 NOTE — TELEPHONE ENCOUNTER
Pt stated she was given samples of (Trokendi XR) which she have been out of for the past 2wks. Pt would like to know if she can be given more samples of (Trokendi XR) taking 200mg daily.       Forwarding for advisement

## 2020-04-10 ENCOUNTER — TELEPHONE (OUTPATIENT)
Dept: FAMILY MEDICINE CLINIC | Age: 50
End: 2020-04-10

## 2020-05-27 ENCOUNTER — OFFICE VISIT (OUTPATIENT)
Dept: NEUROLOGY | Age: 50
End: 2020-05-27

## 2020-05-27 VITALS
HEART RATE: 73 BPM | RESPIRATION RATE: 16 BRPM | OXYGEN SATURATION: 96 % | BODY MASS INDEX: 32.32 KG/M2 | DIASTOLIC BLOOD PRESSURE: 78 MMHG | HEIGHT: 65 IN | SYSTOLIC BLOOD PRESSURE: 108 MMHG | TEMPERATURE: 99.1 F | WEIGHT: 194 LBS

## 2020-05-27 PROCEDURE — 99215 OFFICE O/P EST HI 40 MIN: CPT | Performed by: PSYCHIATRY & NEUROLOGY

## 2020-05-27 RX ORDER — CARISOPRODOL 350 MG/1
350 TABLET ORAL 3 TIMES DAILY PRN
Qty: 30 TABLET | Refills: 0 | Status: SHIPPED | OUTPATIENT
Start: 2020-05-27 | End: 2020-06-06

## 2020-05-27 RX ORDER — AMITRIPTYLINE HYDROCHLORIDE 25 MG/1
25 TABLET, FILM COATED ORAL NIGHTLY
Qty: 30 TABLET | Refills: 0 | Status: SHIPPED
Start: 2020-05-27 | End: 2020-08-31

## 2020-05-27 RX ORDER — PREDNISONE 50 MG/1
50 TABLET ORAL DAILY
Qty: 5 TABLET | Refills: 0 | Status: SHIPPED | OUTPATIENT
Start: 2020-05-27 | End: 2020-06-01

## 2020-05-27 ASSESSMENT — ENCOUNTER SYMPTOMS
VOMITING: 0
NAUSEA: 0
PHOTOPHOBIA: 0
TROUBLE SWALLOWING: 0
SHORTNESS OF BREATH: 0
BACK PAIN: 1

## 2020-05-27 NOTE — PROGRESS NOTES
fluticasone-vilanterol (BREO ELLIPTA) 100-25 MCG/INH AEPB inhaler, Inhale 1 puff into the lungs daily, Disp: , Rfl:     tiotropium (SPIRIVA RESPIMAT) 1.25 MCG/ACT AERS inhaler, Inhale 2 puffs into the lungs daily, Disp: , Rfl:     montelukast (SINGULAIR) 10 MG tablet, Take 10 mg by mouth nightly , Disp: , Rfl:     aspirin 81 MG tablet, Take 81 mg by mouth daily, Disp: , Rfl:     topiramate ER (TROKENDI XR) 200 MG CP24, Take 1 tablet by mouth daily, Disp: 30 capsule, Rfl: 1    fluticasone (FLONASE) 50 MCG/ACT nasal spray, 1 spray by Nasal route daily (Patient not taking: Reported on 5/27/2020), Disp: 1 Bottle, Rfl: 5    spironolactone (ALDACTONE) 25 MG tablet, Take 1 tablet by mouth daily (Patient not taking: Reported on 5/27/2020), Disp: 30 tablet, Rfl: 5    DEXILANT 60 MG CPDR delayed release capsule, TK 1 C PO 30 MINUTES BEFORE BREAKFAST, Disp: , Rfl:     acetaminophen-codeine (TYLENOL #4) 300-60 MG per tablet, TK 1 T PO ONCE A DAY PRN FOR PAIN, Disp: , Rfl: 0    albuterol sulfate HFA (PROAIR HFA) 108 (90 Base) MCG/ACT inhaler, Inhale 2 puffs into the lungs every 6 hours as needed for Wheezing, Disp: 1 Inhaler, Rfl: 0    esomeprazole (NEXIUM) 40 MG delayed release capsule, Take 40 mg by mouth every morning (before breakfast), Disp: , Rfl:     famotidine (PEPCID) 40 MG tablet, Take 40 mg by mouth nightly , Disp: , Rfl:     pantoprazole (PROTONIX) 40 MG tablet, Take 40 mg by mouth daily , Disp: , Rfl:     REVIEW OF SYSTEMS  Review of Systems   Constitutional: Negative for appetite change, fatigue and unexpected weight change. HENT: Negative for drooling, hearing loss, tinnitus and trouble swallowing. Eyes: Negative for photophobia and visual disturbance. Respiratory: Negative for shortness of breath. Cardiovascular: Negative for palpitations. Gastrointestinal: Negative for nausea and vomiting. Endocrine: Negative for polyuria. Genitourinary: Negative for flank pain.    Musculoskeletal: ABNORMAL MOVEMENTS/TREMORS: No     REFLEXES: DTRs 2+; normal and symmetric throughout. Plantar response downgoing. SENSATION: Sensation grossly intact to fine touch, pain/temperature, vibration and position. COORDINATION: Finger-to-nose and heel to shin normal for age and symmetric. Finger tapping and alternating movements normal.    STATION: Negative Romberg. GAIT:  Normal heel, toe and tandem; no ataxia. DIAGNOSTIC TESTS:     I have personally reviewed the most recent lab results:    Sodium   Date Value Ref Range Status   11/22/2019 142 132 - 146 mmol/L Final   10/02/2019 142 132 - 146 mmol/L Final   02/02/2019 141 132 - 146 mmol/L Final     Potassium   Date Value Ref Range Status   11/22/2019 4.4 3.5 - 5.0 mmol/L Final   10/02/2019 4.9 3.5 - 5.0 mmol/L Final   02/02/2019 4.0 3.5 - 5.0 mmol/L Final     Chloride   Date Value Ref Range Status   11/22/2019 101 98 - 107 mmol/L Final   10/02/2019 106 98 - 107 mmol/L Final   02/02/2019 104 98 - 107 mmol/L Final     CO2   Date Value Ref Range Status   11/22/2019 27 22 - 29 mmol/L Final   10/02/2019 26 22 - 29 mmol/L Final   02/02/2019 25 22 - 29 mmol/L Final     BUN   Date Value Ref Range Status   11/22/2019 13 6 - 20 mg/dL Final   10/02/2019 9 6 - 20 mg/dL Final   02/02/2019 9 6 - 20 mg/dL Final     CREATININE   Date Value Ref Range Status   11/22/2019 1.0 0.5 - 1.0 mg/dL Final   10/02/2019 0.9 0.5 - 1.0 mg/dL Final   02/02/2019 0.9 0.5 - 1.0 mg/dL Final     GFR Non-   Date Value Ref Range Status   11/22/2019 >60 >=60 mL/min/1.73 Final     Comment:     Chronic Kidney Disease: less than 60 ml/min/1.73 sq.m. Kidney Failure: less than 15 ml/min/1.73 sq.m. Results valid for patients 18 years and older. 10/02/2019 >60 >=60 mL/min/1.73 Final     Comment:     Chronic Kidney Disease: less than 60 ml/min/1.73 sq.m. Kidney Failure: less than 15 ml/min/1.73 sq.m. Results valid for patients 18 years and older.      02/02/2019 >60 >=60 mL/min/1.73 Final     Comment:     Chronic Kidney Disease: less than 60 ml/min/1.73 sq.m. Kidney Failure: less than 15 ml/min/1.73 sq.m. Results valid for patients 18 years and older.        Calcium   Date Value Ref Range Status   11/22/2019 9.8 8.6 - 10.2 mg/dL Final   10/02/2019 9.0 8.6 - 10.2 mg/dL Final   02/02/2019 9.5 8.6 - 10.2 mg/dL Final     Magnesium   Date Value Ref Range Status   10/22/2017 2.0 1.6 - 2.6 mg/dL Final     WBC   Date Value Ref Range Status   10/02/2019 6.3 4.5 - 11.5 E9/L Final   02/02/2019 6.8 4.5 - 11.5 E9/L Final   01/29/2019 6.0 4.5 - 11.5 E9/L Final     Hemoglobin   Date Value Ref Range Status   10/02/2019 12.5 11.5 - 15.5 g/dL Final   02/02/2019 13.1 11.5 - 15.5 g/dL Final   01/29/2019 12.6 11.5 - 15.5 g/dL Final     Hematocrit   Date Value Ref Range Status   10/02/2019 39.0 34.0 - 48.0 % Final   02/02/2019 41.3 34.0 - 48.0 % Final   01/29/2019 39.3 34.0 - 48.0 % Final     Platelets   Date Value Ref Range Status   10/02/2019 213 130 - 450 E9/L Final   02/02/2019 252 130 - 450 E9/L Final   01/29/2019 232 130 - 450 E9/L Final     Neutrophils %   Date Value Ref Range Status   10/02/2019 53.2 43.0 - 80.0 % Final   02/02/2019 63.0 43.0 - 80.0 % Final   11/22/2018 62.9 43.0 - 80.0 % Final     Monocytes %   Date Value Ref Range Status   10/02/2019 7.8 2.0 - 12.0 % Final   02/02/2019 7.2 2.0 - 12.0 % Final   11/22/2018 6.7 2.0 - 12.0 % Final     Eosinophils %   Date Value Ref Range Status   02/02/2017 1.5 1 - 4 % Final     Total Protein   Date Value Ref Range Status   11/22/2019 7.7 6.4 - 8.3 g/dL Final   10/02/2019 7.1 6.4 - 8.3 g/dL Final   01/29/2019 7.2 6.4 - 8.3 g/dL Final     Total Bilirubin   Date Value Ref Range Status   11/22/2019 <0.2 0.0 - 1.2 mg/dL Final   10/02/2019 <0.2 0.0 - 1.2 mg/dL Final   01/29/2019 <0.2 0.0 - 1.2 mg/dL Final     Alkaline Phosphatase   Date Value Ref Range Status   11/22/2019 132 (H) 35 - 104 U/L Final   10/02/2019 110 (H) 35 - 104 U/L Final   01/29/2019 97 35 - 104 U/L Final     ALT   Date Value Ref Range Status   11/22/2019 29 0 - 32 U/L Final   10/02/2019 29 0 - 32 U/L Final   01/29/2019 19 0 - 32 U/L Final     AST   Date Value Ref Range Status   11/22/2019 24 0 - 31 U/L Final   10/02/2019 34 (H) 0 - 31 U/L Final     Comment:     Specimen is slightly Hemolyzed. Result may be artificially increased. 01/29/2019 20 0 - 31 U/L Final     Lab Results   Component Value Date    INR 1.1 10/22/2017     Lab Results   Component Value Date    TRIG 119 02/22/2018    HDL 69 11/22/2019     No components found for: HGBA1C  No results found for: PROTEINCSF, GLUCCSF, WBCCSF    Controlled Substance Monitoring:    Acute and Chronic Pain Monitoring:   RX Monitoring 9/14/2018   Attestation The Prescription Monitoring Report for this patient was reviewed today. Periodic Controlled Substance Monitoring -   Chronic Pain > 80 MEDD -       MEDICAL DECISION MAKING  ASSESSMENT/PLAN    Latha Cheatham was seen today for headache and back pain. Diagnoses and all orders for this visit:      Intractable chronic migraine without aura and with status migrainosus    Chiari I malformation (HCC)    Cervicalgia    Lumbar spinal stenosis    Low back pain    -     amitriptyline (ELAVIL) 25 MG tablet; Take 1 tablet by mouth nightly  -     predniSONE (DELTASONE) 50 MG tablet; Take 1 tablet by mouth daily for 5 days  -     carisoprodol (SOMA) 350 MG tablet; Take 1 tablet by mouth 3 times daily as needed for Muscle spasms for up to 10 days. -     Amb External Referral To Physical Therapy  -     MRI Lumbar Spine WO Contrast; Future  -     EMG; Future    · For migraine continue on Trokendi 200 mg daily for preventative treatment  · For abortive treatment: Continue rizatriptan daily. · Add amitriptyline 25 mg at night. · Check MRI lumbar spine to look for worsening of the lumbar spinal stenosis. · Check EMG bilateral lower extremities for active denervation.   · Start on prednisone 50 mg

## 2020-05-29 ENCOUNTER — TELEPHONE (OUTPATIENT)
Dept: NEUROLOGY | Age: 50
End: 2020-05-29

## 2020-06-09 ENCOUNTER — OFFICE VISIT (OUTPATIENT)
Dept: NEUROLOGY | Age: 50
End: 2020-06-09
Payer: COMMERCIAL

## 2020-06-09 VITALS
RESPIRATION RATE: 16 BRPM | HEIGHT: 65 IN | BODY MASS INDEX: 31.32 KG/M2 | TEMPERATURE: 99 F | WEIGHT: 188 LBS | DIASTOLIC BLOOD PRESSURE: 81 MMHG | SYSTOLIC BLOOD PRESSURE: 124 MMHG | OXYGEN SATURATION: 98 % | HEART RATE: 82 BPM

## 2020-06-09 PROCEDURE — 95911 NRV CNDJ TEST 9-10 STUDIES: CPT | Performed by: PSYCHIATRY & NEUROLOGY

## 2020-06-09 PROCEDURE — 95886 MUSC TEST DONE W/N TEST COMP: CPT | Performed by: PSYCHIATRY & NEUROLOGY

## 2020-06-09 RX ORDER — TOPIRAMATE 200 MG/1
1 CAPSULE, EXTENDED RELEASE ORAL DAILY
Qty: 30 CAPSULE | Refills: 1 | Status: SHIPPED
Start: 2020-06-09 | End: 2020-08-19 | Stop reason: SDUPTHER

## 2020-06-09 NOTE — PROGRESS NOTES
voluntary MUAPs, recruitment pattern is consistent with effort. No abnormal spontaneous activity seen. Diagnostic Interpretation: This study was normal.     Electrodiagnosis: The electrical finding of the study reveals no evidence of an underlying radiculopathy, myopathy or a large fiber peripheral neuropathy. Previous Study: NA    Follow up EMG is recommended if if symptoms continue to get worse in 3 months. Technologist: Marlen Castaneda  Physician: Minnie Bernheim, MD    Nerve conduction studies and electromyography were performed according to our laboratory policies and procedures which can be provided upon request. All abnormal values are identified in the table.  Laboratory normal values can also be provided upon request.       Cc: MD Santana Deal DO

## 2020-06-11 ENCOUNTER — TELEPHONE (OUTPATIENT)
Dept: NEUROLOGY | Age: 50
End: 2020-06-11

## 2020-06-15 ENCOUNTER — HOSPITAL ENCOUNTER (OUTPATIENT)
Dept: MRI IMAGING | Age: 50
Discharge: HOME | End: 2020-06-17
Payer: COMMERCIAL

## 2020-06-15 PROCEDURE — 72148 MRI LUMBAR SPINE W/O DYE: CPT

## 2020-06-16 ENCOUNTER — TELEPHONE (OUTPATIENT)
Dept: NEUROLOGY | Age: 50
End: 2020-06-16

## 2020-07-10 RX ORDER — ONDANSETRON 4 MG/1
4 TABLET, FILM COATED ORAL EVERY 8 HOURS PRN
Qty: 30 TABLET | Refills: 1 | Status: SHIPPED
Start: 2020-07-10 | End: 2020-10-19 | Stop reason: SDUPTHER

## 2020-07-28 ENCOUNTER — TELEPHONE (OUTPATIENT)
Dept: ADMINISTRATIVE | Age: 50
End: 2020-07-28

## 2020-08-04 ENCOUNTER — HOSPITAL ENCOUNTER (OUTPATIENT)
Age: 50
Discharge: HOME | End: 2020-08-06

## 2020-08-04 PROCEDURE — 88305 TISSUE EXAM BY PATHOLOGIST: CPT

## 2020-08-20 RX ORDER — TOPIRAMATE 200 MG/1
1 CAPSULE, EXTENDED RELEASE ORAL DAILY
Qty: 30 CAPSULE | Refills: 1 | Status: SHIPPED
Start: 2020-08-20 | End: 2020-08-21 | Stop reason: SDUPTHER

## 2020-08-21 RX ORDER — TOPIRAMATE 200 MG/1
1 CAPSULE, EXTENDED RELEASE ORAL DAILY
Qty: 90 CAPSULE | Refills: 0 | Status: SHIPPED
Start: 2020-08-21 | End: 2020-11-30 | Stop reason: SDUPTHER

## 2020-08-31 ENCOUNTER — OFFICE VISIT (OUTPATIENT)
Dept: NEUROLOGY | Age: 50
End: 2020-08-31
Payer: COMMERCIAL

## 2020-08-31 VITALS
TEMPERATURE: 97.4 F | RESPIRATION RATE: 16 BRPM | HEIGHT: 65 IN | HEART RATE: 77 BPM | DIASTOLIC BLOOD PRESSURE: 72 MMHG | WEIGHT: 191 LBS | SYSTOLIC BLOOD PRESSURE: 113 MMHG | OXYGEN SATURATION: 98 % | BODY MASS INDEX: 31.82 KG/M2

## 2020-08-31 PROCEDURE — G8427 DOCREV CUR MEDS BY ELIG CLIN: HCPCS | Performed by: PSYCHIATRY & NEUROLOGY

## 2020-08-31 PROCEDURE — 99215 OFFICE O/P EST HI 40 MIN: CPT | Performed by: PSYCHIATRY & NEUROLOGY

## 2020-08-31 PROCEDURE — 3017F COLORECTAL CA SCREEN DOC REV: CPT | Performed by: PSYCHIATRY & NEUROLOGY

## 2020-08-31 PROCEDURE — G8417 CALC BMI ABV UP PARAM F/U: HCPCS | Performed by: PSYCHIATRY & NEUROLOGY

## 2020-08-31 PROCEDURE — 1036F TOBACCO NON-USER: CPT | Performed by: PSYCHIATRY & NEUROLOGY

## 2020-08-31 RX ORDER — GALCANEZUMAB 120 MG/ML
INJECTION, SOLUTION SUBCUTANEOUS
Qty: 2 PEN | Refills: 0 | Status: SHIPPED
Start: 2020-08-31 | End: 2020-09-21

## 2020-08-31 RX ORDER — RIMEGEPANT SULFATE 75 MG/75MG
75 TABLET, ORALLY DISINTEGRATING ORAL PRN
Qty: 8 TABLET | Refills: 1 | Status: SHIPPED
Start: 2020-08-31 | End: 2020-12-04 | Stop reason: SDUPTHER

## 2020-08-31 RX ORDER — BUDESONIDE AND FORMOTEROL FUMARATE DIHYDRATE 160; 4.5 UG/1; UG/1
AEROSOL RESPIRATORY (INHALATION)
COMMUNITY
Start: 2020-08-20 | End: 2021-08-20

## 2020-08-31 ASSESSMENT — ENCOUNTER SYMPTOMS
TROUBLE SWALLOWING: 0
PHOTOPHOBIA: 0
VOMITING: 0
NAUSEA: 0
BACK PAIN: 1
SHORTNESS OF BREATH: 0

## 2020-08-31 NOTE — PROGRESS NOTES
NEUROLOGY FOLLOW UP NOTE     Date: 8/31/2020  Name: Luc Coleman  MRN: [id number]  Patient's PCP: Rose Vera DO     Dear, Dr. Raj Mcpherson DO    REASON FOR VISIT/CHIEF COMPLAINT: Headaches, low back pain     Interval history:     The patient is coming in for a follow-up visit for headaches. Reports he continues to have a headache, she has had about 30 headaches in last 1 month. Currently she has a 5/10 headache all the time with intermittent worsening to 10/10. Total number of headaches previous to 30 headaches a month. She recently had rhinitis which has worsened her headaches. She has tried amitriptyline which caused her to have jitteriness and is stopped currently. Currently she is on Trokendi 200 mg daily, no side effects  She is also taking rizatriptan for abortive treatment which is not helped her headaches. She had a MRI brain February 2020: No acute normality. Doing physical therapy, back pain is stable. She had an MRI of the lumbar spine many years ago which showed spondylosis and disc disease primarily at L4-L5 and L5-S1  Sleep is normal, appetite is good, mood is stable  No other aggravating relieving factors  No other associated symptoms    Disease course:     Luc Coleman is a 48 y.o. -American female is coming in for evaluation of headaches  Headache     Onset:  At the age of 10  Zan Olp Duration: All day long   Frequency : Daily   Time of occurrence: Any time   Severity on a scale of 1-10: She is a constant headache which is a 5/10 and intermittent worsening to 6/10    Headache Location: Bilateral forehead, behind the eyes, radiating in the back of her neck, at times starts in the bilateral occipital region and neck    Change sides: No    Character:sharp, shooting, stabbing and throbbing    Activities that worsens headache: movement    Reliving factors: nothing    Headache Triggers or Provoking Factors:   Heat    Headache disability during or after an attack: confined to bed    Associated symptoms:  nausea, vomiting, photophobia and sonophobia    Aura (Visual/Sensory):  None    Premonitory Symptoms:  none       Prior Headache Treatments:    Preventatives:   She has tried generic topiramate: Which caused her to have, nausea, fatigue, cognitive difficulties so she stopped taking it. Propranolol: Asthma exacerbation  Trokendi: Partial help  Amitriptaline: jittery    Abortives:   Rizatriptan: Partial help  Imitrex: No help  Tylenol, ibuprofen, Aleve: No help  Ibuprofen: partia helps    Total number of migraine headache days in a month: 30    Last MRI brain: 2016 no acute abnormality, Chiari 1  Malformation  MRI brain 2020: No acute abnormality  I have personally reviewed her medical records      MRI Lumbar spine: June 2020:  Degenerative spondylotic changes. L4-5 broad-based disc protrusion asymmetric to the right encroaching    on the right lateral recess and also encroaching on the right    intervertebral foramen crowding the exiting right L4 nerve root.           PAST MEDICAL HISTORY:   Past Medical History:   Diagnosis Date    Asthma     DR Greenberg Medico    Back injury     Chronic back pain     PAIN RADIATES TO NECK AND LEGS    Chronic tension-type headache, not intractable 11/9/2018    DDD (degenerative disc disease), cervical 11/9/2018    DVT (deep venous thrombosis) (HCC)     Right Arm    GERD (gastroesophageal reflux disease)     Herniated disc, cervical     also lumbar spine    Hx of screening mammography 3/2015 & 10/2015    BIRADS 2 BENIGN    HX OTHER MEDICAL     PINCHED NERVE BACK OF NECK    Hypertension     Migraine     Migraine     DR Goss Fairly MVP (mitral valve prolapse)     Numbness and tingling     LEGS AND FEET    Obesity     Sinus infection     Thyroid disease     no med    Torn ACL     RT KNEE    Ulcer     Vitamin B 12 deficiency     NON ANEMIC     PAST SURGICAL HISTORY:   Past Surgical History:   Procedure Laterality Date    BREAST LUMPECTOMY Right 2013    BREAST SURGERY Right 2013    re-excision, rt lumpectomyscar evacuation of hematoma.  CARDIOVASCULAR STRESS TEST  2015    NORMAL     SECTION      COLONOSCOPY      12 YOSSEF HEMORRHOIDS, MINIMAL DIVERTICULA    COLONOSCOPY  2012    DR PEREZ GERD & GASTRITIS    DILATION AND CURETTAGE OF UTERUS      ECHO COMPL W DOP COLOR FLOW  2015         ECHOCARDIOGRAM COMPLETE 2D W DOPPLER W COLOR  10/24/2012         HARDWARE REMOVAL Right 2018    Foot    KNEE ARTHROSCOPY Right     Right knee arthroscopy. Dr. Chloe Bojorquez ARTHROSCOPY Right     Right knee meniscal repair. Dr. Moo Bloom.       MAMMO IMPLANT DIGITAL DIAG BI      3/24/15 BIRADS CAT 2 BENIGN, 10/15/15 BIRADS CAT 2    OTHER SURGICAL HISTORY      TILT TABLE TEST - DR Meredith Wynn  NO ORTHOSTASIS    TYMPANOSTOMY TUBE PLACEMENT      UPPER GASTROINTESTINAL ENDOSCOPY      WITH CLOSED BIOPSY DR Faraz Stallworth 12 GERD, GASTRITIS    UPPER GASTROINTESTINAL ENDOSCOPY  2012    DR Wanda Massey GERD & GASTRITIS    WISDOM TOOTH EXTRACTION       FAMILY MEDICAL HISTORY:   Family History   Problem Relation Age of Onset    High Blood Pressure Mother     Heart Disease Father     Seizures Father     Coronary Art Dis Father     Cancer Father         STOMACH    Cancer Sister 43        breast    Deep Vein Thrombosis Sister         IN THE SETTING OF BREAST CA    Hypertension Maternal Grandmother      SOCIAL HISTORY:   Social History     Socioeconomic History    Marital status: Single     Spouse name: None    Number of children: None    Years of education: None    Highest education level: None   Occupational History    Occupation: attendance   Social Needs    Financial resource strain: None    Food insecurity     Worry: None     Inability: None    Transportation needs     Medical: None     Non-medical: None   Tobacco Use    Smoking status: Never Smoker    Smokeless tobacco: Never Used Substance and Sexual Activity    Alcohol use: Not Currently     Comment: rare     Drug use: No    Sexual activity: None   Lifestyle    Physical activity     Days per week: None     Minutes per session: None    Stress: None   Relationships    Social connections     Talks on phone: None     Gets together: None     Attends Mandaen service: None     Active member of club or organization: None     Attends meetings of clubs or organizations: None     Relationship status: None    Intimate partner violence     Fear of current or ex partner: None     Emotionally abused: None     Physically abused: None     Forced sexual activity: None   Other Topics Concern    None   Social History Narrative    None     Allergy:   Allergies   Allergen Reactions    Ceftin [Cefuroxime Axetil] Nausea Only    Pcn [Penicillins]      ?  reaction    Vicodin [Hydrocodone-Acetaminophen] Shortness Of Breath    Ibuprofen Swelling    Other      TAPE AND BANDAIDS SKIN IRRITATION    Percocet [Oxycodone-Acetaminophen]     Sudafed [Pseudoephedrine Hcl] Swelling     MEDS:   Current Outpatient Medications:     SYMBICORT 160-4.5 MCG/ACT AERO, , Disp: , Rfl:     Galcanezumab-gnlm (EMGALITY) 120 MG/ML SOAJ, Inject 240 mg once, Disp: 2 pen, Rfl: 0    Rimegepant Sulfate (NURTEC) 75 MG TBDP, Take 75 mg by mouth as needed (Migraine), Disp: 8 tablet, Rfl: 1    topiramate ER (TROKENDI XR) 200 MG CP24, Take 1 tablet by mouth daily, Disp: 90 capsule, Rfl: 0    ondansetron (ZOFRAN) 4 MG tablet, Take 1 tablet by mouth every 8 hours as needed for Nausea or Vomiting, Disp: 30 tablet, Rfl: 1    rizatriptan (MAXALT) 10 MG tablet, Take 1 tablet by mouth daily as needed for Migraine, Disp: 9 tablet, Rfl: 5    Tsrrim-BqTpe-YxSlt-Meth-FA-DHA (PRENATE MINI) 18-0.6-0.4-350 MG CAPS, Take 1 capsule by mouth daily, Disp: 30 capsule, Rfl: 4    furosemide (LASIX) 20 MG tablet, Take 1 tablet by mouth daily as needed (edema), Disp: 30 tablet, Rfl: 2   acetaminophen-codeine (TYLENOL #4) 300-60 MG per tablet, TK 1 T PO ONCE A DAY PRN FOR PAIN, Disp: , Rfl: 0    NARCAN 4 MG/0.1ML LIQD nasal spray, , Disp: , Rfl: 1    docusate sodium (COLACE) 100 MG capsule, Take 100 mg by mouth nightly , Disp: , Rfl:     pantoprazole (PROTONIX) 40 MG tablet, Take 40 mg by mouth daily , Disp: , Rfl:     tiotropium (SPIRIVA RESPIMAT) 1.25 MCG/ACT AERS inhaler, Inhale 2 puffs into the lungs daily, Disp: , Rfl:     montelukast (SINGULAIR) 10 MG tablet, Take 10 mg by mouth nightly , Disp: , Rfl:     aspirin 81 MG tablet, Take 81 mg by mouth daily, Disp: , Rfl:     fluticasone (FLONASE) 50 MCG/ACT nasal spray, 1 spray by Nasal route daily (Patient not taking: Reported on 5/27/2020), Disp: 1 Bottle, Rfl: 5    spironolactone (ALDACTONE) 25 MG tablet, Take 1 tablet by mouth daily (Patient not taking: Reported on 5/27/2020), Disp: 30 tablet, Rfl: 5    DEXILANT 60 MG CPDR delayed release capsule, TK 1 C PO 30 MINUTES BEFORE BREAKFAST, Disp: , Rfl:     albuterol sulfate HFA (PROAIR HFA) 108 (90 Base) MCG/ACT inhaler, Inhale 2 puffs into the lungs every 6 hours as needed for Wheezing, Disp: 1 Inhaler, Rfl: 0    esomeprazole (NEXIUM) 40 MG delayed release capsule, Take 40 mg by mouth every morning (before breakfast), Disp: , Rfl:     fluticasone-vilanterol (BREO ELLIPTA) 100-25 MCG/INH AEPB inhaler, Inhale 1 puff into the lungs daily, Disp: , Rfl:     famotidine (PEPCID) 40 MG tablet, Take 40 mg by mouth nightly , Disp: , Rfl:     REVIEW OF SYSTEMS  Review of Systems   Constitutional: Negative for appetite change, fatigue and unexpected weight change. HENT: Negative for drooling, hearing loss, tinnitus and trouble swallowing. Eyes: Negative for photophobia and visual disturbance. Respiratory: Negative for shortness of breath. Cardiovascular: Negative for palpitations. Gastrointestinal: Negative for nausea and vomiting. Endocrine: Negative for polyuria.    Genitourinary: Negative for flank pain. Musculoskeletal: Positive for back pain. Negative for neck pain and neck stiffness. Skin: Negative for rash. Allergic/Immunologic: Negative for food allergies. Neurological: Positive for headaches. Negative for dizziness, tremors, seizures, syncope, speech difficulty, weakness, light-headedness and numbness. Hematological: Negative for adenopathy. Psychiatric/Behavioral: Negative for agitation, behavioral problems and sleep disturbance. PHYSICAL EXAM:   /72   Pulse 77   Temp 97.4 °F (36.3 °C) (Temporal)   Resp 16   Ht 5' 5\" (1.651 m)   Wt 191 lb (86.6 kg)   LMP 10/01/2018 (Approximate)   SpO2 98%   BMI 31.78 kg/m²   GENERAL APPEARANCE: Alert, well-developed, well-nourished female in no acute distress. HEENT: Normocephalic and atraumatic. PERRL. Oropharynx unremarkable. PULM: Normal respiratory effort. No accessory muscle use. CV: RRR. ABDOMEN: Soft, nontender. EXTREMITIES: No obvious signs of vascular compromise. Pulses present. No cyanosis, clubbing or edema. SKIN: Clear; no rashes, lesions or skin breaks in exposed areas. NEURO:     Neurological examination     MENTAL STATUS: Patient awake and oriented to time, place, and person. Recent/remote memory normal. Attention span/concentration normal. Speech fluent. Good comprehension, naming, and repetition. Fund of knowledge appropriate for patient's level of education. Affect normal.    CRANIAL NERVES:  CN I: Not tested. CN II: Fundoscopic exam not performed. CN III, IV, VI: Pupils equal, round and reactive to light; extra ocular movements full and intact. CN V: Facial sensation normal.  CN VII: No facial asymmetry. CN VIII:  Hearing grossly normal bilaterally. No pathologic nystagmus or skew deviation. CN IX, X: Palate elevates symmetrically. CN XI: Shoulder shrug and chin rotation equal with intact strength. CN XII: Tongue protrusion midline. MOTOR: Normal bulk.  Tone normal and symmetric throughout. Strength 5/5 throughout. ABNORMAL MOVEMENTS/TREMORS: No     REFLEXES: DTRs 2+; normal and symmetric throughout. Plantar response downgoing. SENSATION: Sensation grossly intact to fine touch, pain/temperature, vibration and position. COORDINATION: Finger-to-nose and heel to shin normal for age and symmetric. Finger tapping and alternating movements normal.    STATION: Negative Romberg. GAIT:  Normal heel, toe and tandem; no ataxia. DIAGNOSTIC TESTS:     I have personally reviewed the most recent lab results:    Sodium   Date Value Ref Range Status   11/22/2019 142 132 - 146 mmol/L Final   10/02/2019 142 132 - 146 mmol/L Final   02/02/2019 141 132 - 146 mmol/L Final     Potassium   Date Value Ref Range Status   11/22/2019 4.4 3.5 - 5.0 mmol/L Final   10/02/2019 4.9 3.5 - 5.0 mmol/L Final   02/02/2019 4.0 3.5 - 5.0 mmol/L Final     Chloride   Date Value Ref Range Status   11/22/2019 101 98 - 107 mmol/L Final   10/02/2019 106 98 - 107 mmol/L Final   02/02/2019 104 98 - 107 mmol/L Final     CO2   Date Value Ref Range Status   11/22/2019 27 22 - 29 mmol/L Final   10/02/2019 26 22 - 29 mmol/L Final   02/02/2019 25 22 - 29 mmol/L Final     BUN   Date Value Ref Range Status   11/22/2019 13 6 - 20 mg/dL Final   10/02/2019 9 6 - 20 mg/dL Final   02/02/2019 9 6 - 20 mg/dL Final     CREATININE   Date Value Ref Range Status   11/22/2019 1.0 0.5 - 1.0 mg/dL Final   10/02/2019 0.9 0.5 - 1.0 mg/dL Final   02/02/2019 0.9 0.5 - 1.0 mg/dL Final     GFR Non-   Date Value Ref Range Status   11/22/2019 >60 >=60 mL/min/1.73 Final     Comment:     Chronic Kidney Disease: less than 60 ml/min/1.73 sq.m. Kidney Failure: less than 15 ml/min/1.73 sq.m. Results valid for patients 18 years and older. 10/02/2019 >60 >=60 mL/min/1.73 Final     Comment:     Chronic Kidney Disease: less than 60 ml/min/1.73 sq.m. Kidney Failure: less than 15 ml/min/1.73 sq.m.   Results valid for patients 18 years and older. 02/02/2019 >60 >=60 mL/min/1.73 Final     Comment:     Chronic Kidney Disease: less than 60 ml/min/1.73 sq.m. Kidney Failure: less than 15 ml/min/1.73 sq.m. Results valid for patients 18 years and older.        Calcium   Date Value Ref Range Status   11/22/2019 9.8 8.6 - 10.2 mg/dL Final   10/02/2019 9.0 8.6 - 10.2 mg/dL Final   02/02/2019 9.5 8.6 - 10.2 mg/dL Final     Magnesium   Date Value Ref Range Status   10/22/2017 2.0 1.6 - 2.6 mg/dL Final     WBC   Date Value Ref Range Status   10/02/2019 6.3 4.5 - 11.5 E9/L Final   02/02/2019 6.8 4.5 - 11.5 E9/L Final   01/29/2019 6.0 4.5 - 11.5 E9/L Final     Hemoglobin   Date Value Ref Range Status   10/02/2019 12.5 11.5 - 15.5 g/dL Final   02/02/2019 13.1 11.5 - 15.5 g/dL Final   01/29/2019 12.6 11.5 - 15.5 g/dL Final     Hematocrit   Date Value Ref Range Status   10/02/2019 39.0 34.0 - 48.0 % Final   02/02/2019 41.3 34.0 - 48.0 % Final   01/29/2019 39.3 34.0 - 48.0 % Final     Platelets   Date Value Ref Range Status   10/02/2019 213 130 - 450 E9/L Final   02/02/2019 252 130 - 450 E9/L Final   01/29/2019 232 130 - 450 E9/L Final     Neutrophils %   Date Value Ref Range Status   10/02/2019 53.2 43.0 - 80.0 % Final   02/02/2019 63.0 43.0 - 80.0 % Final   11/22/2018 62.9 43.0 - 80.0 % Final     Monocytes %   Date Value Ref Range Status   10/02/2019 7.8 2.0 - 12.0 % Final   02/02/2019 7.2 2.0 - 12.0 % Final   11/22/2018 6.7 2.0 - 12.0 % Final     Eosinophils %   Date Value Ref Range Status   02/02/2017 1.5 1 - 4 % Final     Total Protein   Date Value Ref Range Status   11/22/2019 7.7 6.4 - 8.3 g/dL Final   10/02/2019 7.1 6.4 - 8.3 g/dL Final   01/29/2019 7.2 6.4 - 8.3 g/dL Final     Total Bilirubin   Date Value Ref Range Status   11/22/2019 <0.2 0.0 - 1.2 mg/dL Final   10/02/2019 <0.2 0.0 - 1.2 mg/dL Final   01/29/2019 <0.2 0.0 - 1.2 mg/dL Final     Alkaline Phosphatase   Date Value Ref Range Status   11/22/2019 132 (H) 35 - 104 40 mins directly face-to-face time with the patient, of which greater than 50% was spent in patient education, counseling,about etiology management and diagnosis of etiology management diagnosis of migraines, chronic daily headaches, worsening headaches, lumbar spinal stenosis, low back pain. Side effects of medications including Trokendi, rizatriptan, Emgality, nurtec were discussed in detail with the patient, verbalizes understanding and agrees to it. And coordination of care as described above. Patient's current medication list, allergies, problem list and results of all previously ordered testing and scans were reviewed at today's visit.       MD SANTOS Chakraborty [facility] Neurology  [address] [place] [address]

## 2020-09-23 ENCOUNTER — HOSPITAL ENCOUNTER (OUTPATIENT)
Dept: GENERAL RADIOLOGY | Age: 50
Discharge: HOME | End: 2020-09-25
Payer: COMMERCIAL

## 2020-09-23 ENCOUNTER — HOSPITAL ENCOUNTER (OUTPATIENT)
Age: 50
Discharge: HOME | End: 2020-09-23
Payer: COMMERCIAL

## 2020-09-23 ENCOUNTER — HOSPITAL ENCOUNTER (OUTPATIENT)
Age: 50
Discharge: HOME | End: 2020-09-25
Payer: COMMERCIAL

## 2020-09-23 LAB
ALBUMIN SERPL-MCNC: 4.3 G/DL (ref 3.5–5.2)
ALP BLD-CCNC: 148 U/L (ref 35–104)
ALT SERPL-CCNC: 14 U/L (ref 0–32)
ANION GAP SERPL CALCULATED.3IONS-SCNC: 8 MMOL/L (ref 7–16)
AST SERPL-CCNC: 18 U/L (ref 0–31)
BASOPHILS ABSOLUTE: 0.02 E9/L (ref 0–0.2)
BASOPHILS RELATIVE PERCENT: 0.3 % (ref 0–2)
BILIRUB SERPL-MCNC: <0.2 MG/DL (ref 0–1.2)
BUN BLDV-MCNC: 11 MG/DL (ref 6–20)
C-REACTIVE PROTEIN: 0.9 MG/DL (ref 0–0.4)
CALCIUM SERPL-MCNC: 9.7 MG/DL (ref 8.6–10.2)
CHLORIDE BLD-SCNC: 106 MMOL/L (ref 98–107)
CO2: 27 MMOL/L (ref 22–29)
CREAT SERPL-MCNC: 1 MG/DL (ref 0.5–1)
EOSINOPHILS ABSOLUTE: 0.09 E9/L (ref 0.05–0.5)
EOSINOPHILS RELATIVE PERCENT: 1.5 % (ref 0–6)
GFR AFRICAN AMERICAN: >60
GFR NON-AFRICAN AMERICAN: >60 ML/MIN/1.73
GLUCOSE BLD-MCNC: 101 MG/DL (ref 74–99)
HCT VFR BLD CALC: 43.4 % (ref 34–48)
HEMOGLOBIN: 13.8 G/DL (ref 11.5–15.5)
IMMATURE GRANULOCYTES #: 0.04 E9/L
IMMATURE GRANULOCYTES %: 0.6 % (ref 0–5)
LYMPHOCYTES ABSOLUTE: 2.16 E9/L (ref 1.5–4)
LYMPHOCYTES RELATIVE PERCENT: 34.8 % (ref 20–42)
MCH RBC QN AUTO: 27.8 PG (ref 26–35)
MCHC RBC AUTO-ENTMCNC: 31.8 % (ref 32–34.5)
MCV RBC AUTO: 87.5 FL (ref 80–99.9)
MONOCYTES ABSOLUTE: 0.44 E9/L (ref 0.1–0.95)
MONOCYTES RELATIVE PERCENT: 7.1 % (ref 2–12)
NEUTROPHILS ABSOLUTE: 3.45 E9/L (ref 1.8–7.3)
NEUTROPHILS RELATIVE PERCENT: 55.7 % (ref 43–80)
PDW BLD-RTO: 13.8 FL (ref 11.5–15)
PLATELET # BLD: 235 E9/L (ref 130–450)
PMV BLD AUTO: 10.4 FL (ref 7–12)
POTASSIUM SERPL-SCNC: 4 MMOL/L (ref 3.5–5)
RBC # BLD: 4.96 E12/L (ref 3.5–5.5)
SEDIMENTATION RATE, ERYTHROCYTE: 23 MM/HR (ref 0–20)
SODIUM BLD-SCNC: 141 MMOL/L (ref 132–146)
TOTAL PROTEIN: 7.8 G/DL (ref 6.4–8.3)
WBC # BLD: 6.2 E9/L (ref 4.5–11.5)

## 2020-09-23 PROCEDURE — 85651 RBC SED RATE NONAUTOMATED: CPT

## 2020-09-23 PROCEDURE — 86140 C-REACTIVE PROTEIN: CPT

## 2020-09-23 PROCEDURE — 82105 ALPHA-FETOPROTEIN SERUM: CPT

## 2020-09-23 PROCEDURE — 80053 COMPREHEN METABOLIC PANEL: CPT

## 2020-09-23 PROCEDURE — 85025 COMPLETE CBC W/AUTO DIFF WBC: CPT

## 2020-09-23 PROCEDURE — 36415 COLL VENOUS BLD VENIPUNCTURE: CPT

## 2020-09-23 PROCEDURE — 70220 X-RAY EXAM OF SINUSES: CPT

## 2020-09-25 LAB — AFP-TUMOR MARKER: 8 NG/ML (ref 0–9)

## 2020-10-19 RX ORDER — SPIRONOLACTONE 25 MG/1
25 TABLET ORAL DAILY
Qty: 30 TABLET | Refills: 5 | Status: SHIPPED
Start: 2020-10-19 | End: 2021-08-19 | Stop reason: SDUPTHER

## 2020-10-19 RX ORDER — ONDANSETRON 4 MG/1
4 TABLET, FILM COATED ORAL EVERY 8 HOURS PRN
Qty: 30 TABLET | Refills: 1 | Status: SHIPPED | OUTPATIENT
Start: 2020-10-19 | End: 2021-05-11 | Stop reason: SDUPTHER

## 2020-10-19 NOTE — TELEPHONE ENCOUNTER
She cant come in for appt when offered tomorrow but she has  alot of mucous drainage for the past 2 weeks. She has not temp or cough, no other symptoms.

## 2020-10-29 ENCOUNTER — TELEPHONE (OUTPATIENT)
Dept: ENDOCRINOLOGY | Age: 50
End: 2020-10-29

## 2020-10-29 NOTE — TELEPHONE ENCOUNTER
Patient called regarding appointment today. She did not have her bloodwork or ultrasound done. She needs help with how to schedule these tests and if the scripts are still valid.

## 2020-11-03 NOTE — TELEPHONE ENCOUNTER
The orders were updated, and the pt was notified. I mailed her the [place] order with the number to reschedule.

## 2020-11-09 ENCOUNTER — OFFICE VISIT (OUTPATIENT)
Dept: NEUROLOGY | Age: 50
End: 2020-11-09
Payer: COMMERCIAL

## 2020-11-09 VITALS
HEART RATE: 95 BPM | BODY MASS INDEX: 30.66 KG/M2 | TEMPERATURE: 98.2 F | WEIGHT: 184 LBS | SYSTOLIC BLOOD PRESSURE: 128 MMHG | RESPIRATION RATE: 16 BRPM | HEIGHT: 65 IN | DIASTOLIC BLOOD PRESSURE: 76 MMHG | OXYGEN SATURATION: 100 %

## 2020-11-09 PROCEDURE — 1036F TOBACCO NON-USER: CPT | Performed by: PSYCHIATRY & NEUROLOGY

## 2020-11-09 PROCEDURE — G8417 CALC BMI ABV UP PARAM F/U: HCPCS | Performed by: PSYCHIATRY & NEUROLOGY

## 2020-11-09 PROCEDURE — 3017F COLORECTAL CA SCREEN DOC REV: CPT | Performed by: PSYCHIATRY & NEUROLOGY

## 2020-11-09 PROCEDURE — G8427 DOCREV CUR MEDS BY ELIG CLIN: HCPCS | Performed by: PSYCHIATRY & NEUROLOGY

## 2020-11-09 PROCEDURE — G8484 FLU IMMUNIZE NO ADMIN: HCPCS | Performed by: PSYCHIATRY & NEUROLOGY

## 2020-11-09 PROCEDURE — 99215 OFFICE O/P EST HI 40 MIN: CPT | Performed by: PSYCHIATRY & NEUROLOGY

## 2020-11-09 RX ORDER — EPTINEZUMAB-JJMR 100 MG/ML
100 INJECTION INTRAVENOUS
Qty: 1 ML | Refills: 0 | Status: SHIPPED
Start: 2020-11-09 | End: 2020-12-17

## 2020-11-09 ASSESSMENT — ENCOUNTER SYMPTOMS
BACK PAIN: 1
TROUBLE SWALLOWING: 0
NAUSEA: 0
SHORTNESS OF BREATH: 0
PHOTOPHOBIA: 0
VOMITING: 0

## 2020-11-09 NOTE — PROGRESS NOTES
NEUROLOGY FOLLOW UP NOTE     Date: 11/9/2020  Name: Nori Walker  MRN: [id number]  Patient's PCP: Amee Mcpherson DO     REASON FOR VISIT/CHIEF COMPLAINT: Headaches, low back pain     Interval history:     The patient is coming in for a follow-up visit for headaches. Reports he continues to have a headache, she has had about 20-25 migraine headaches in last 1 month. Currently she has a 5/10 headache all the time with intermittent worsening to 10/10. She is on Emgality, which has not helped her headaches. She has tried it for about 3 months. Currently she is on Trokendi 200 mg daily, no side effects however it is not helping her headaches. Treatment she is taking rizatriptan which partially helps her. She has tried [employer] which does not help her. She had a MRI brain February 2020: No acute normality. Sleep is normal, appetite is good, mood is stable  No other aggravating relieving factors  No other associated symptoms    Disease course:     Nori Walker is a 48 y.o. -American female is coming in for evaluation of headaches  Headache     Onset: At the age of 10  [facility] Duration: All day long   Frequency : Daily   Time of occurrence: Any time   Severity on a scale of 1-10: She is a constant headache which is a 5/10 and intermittent worsening to 6/10    Headache Location: Bilateral forehead, behind the eyes, radiating in the back of her neck, at times starts in the bilateral occipital region and neck    Change sides: No    Character:sharp, shooting, stabbing and throbbing    Activities that worsens headache: movement    Reliving factors: nothing    Headache Triggers or Provoking Factors:   Heat    Headache disability during or after an attack:   confined to bed    Associated symptoms:  nausea, vomiting, photophobia and sonophobia    Aura (Visual/Sensory):  None    Premonitory Symptoms:  none       Prior Headache Treatments:    Preventatives:     She has tried generic topiramate:  Which caused COLONOSCOPY      1/23/12 YOSSEF HEMORRHOIDS, MINIMAL DIVERTICULA    COLONOSCOPY  1/30/2012    DR PEREZ GERD & GASTRITIS    DILATION AND CURETTAGE OF UTERUS      ECHO COMPL W DOP COLOR FLOW  6/24/2015         ECHOCARDIOGRAM COMPLETE 2D W DOPPLER W COLOR  10/24/2012         HARDWARE REMOVAL Right 12/2018    Foot    KNEE ARTHROSCOPY Right 1991    Right knee arthroscopy. Dr. Tim Espinoza ARTHROSCOPY Right 2008    Right knee meniscal repair. Dr. Sandy Bernal.       MAMMO IMPLANT DIGITAL DIAG BI      3/24/15 BIRADS CAT 2 BENIGN, 10/15/15 BIRADS CAT 2    OTHER SURGICAL HISTORY      TILT TABLE TEST - DR Deborah Guerra 11/12 NO ORTHOSTASIS    TYMPANOSTOMY TUBE PLACEMENT      UPPER GASTROINTESTINAL ENDOSCOPY      WITH CLOSED BIOPSY DR Looney 1/30/12 GERD, GASTRITIS    UPPER GASTROINTESTINAL ENDOSCOPY  1/30/2012    DR Ivey  GERD & GASTRITIS    WISDOM TOOTH EXTRACTION       FAMILY MEDICAL HISTORY:   Family History   Problem Relation Age of Onset    High Blood Pressure Mother     Heart Disease Father     Seizures Father     Coronary Art Dis Father     Cancer Father         STOMACH    Cancer Sister 43        breast    Deep Vein Thrombosis Sister         IN THE SETTING OF BREAST CA    Hypertension Maternal Grandmother      SOCIAL HISTORY:   Social History     Socioeconomic History    Marital status: Single     Spouse name: None    Number of children: None    Years of education: None    Highest education level: None   Occupational History    Occupation: attendance   Social Needs    Financial resource strain: None    Food insecurity     Worry: None     Inability: None    Transportation needs     Medical: None     Non-medical: None   Tobacco Use    Smoking status: Never Smoker    Smokeless tobacco: Never Used   Substance and Sexual Activity    Alcohol use: Not Currently     Comment: rare     Drug use: No    Sexual activity: None   Lifestyle    Physical activity     Days per week: None     Minutes per session: None    Stress: None   Relationships    Social connections     Talks on phone: None     Gets together: None     Attends Baptism service: None     Active member of club or organization: None     Attends meetings of clubs or organizations: None     Relationship status: None    Intimate partner violence     Fear of current or ex partner: None     Emotionally abused: None     Physically abused: None     Forced sexual activity: None   Other Topics Concern    None   Social History Narrative    None     Allergy:   Allergies   Allergen Reactions    Ceftin [Cefuroxime Axetil] Nausea Only    Pcn [Penicillins]      ?  reaction    Vicodin [Hydrocodone-Acetaminophen] Shortness Of Breath    Ibuprofen Swelling    Other      TAPE AND BANDAIDS SKIN IRRITATION    Percocet [Oxycodone-Acetaminophen]     Sudafed [Pseudoephedrine Hcl] Swelling     MEDS:   Current Outpatient Medications:     spironolactone (ALDACTONE) 25 MG tablet, Take 1 tablet by mouth daily, Disp: 30 tablet, Rfl: 5    ondansetron (ZOFRAN) 4 MG tablet, Take 1 tablet by mouth every 8 hours as needed for Nausea or Vomiting, Disp: 30 tablet, Rfl: 1    Galcanezumab-gnlm (EMGALITY) 120 MG/ML SOAJ, Inject 120 mg into the skin every 30 days, Disp: 1 mL, Rfl: 3    SYMBICORT 160-4.5 MCG/ACT AERO, , Disp: , Rfl:     Rimegepant Sulfate (NURTEC) 75 MG TBDP, Take 75 mg by mouth as needed (Migraine), Disp: 8 tablet, Rfl: 1    topiramate ER (TROKENDI XR) 200 MG CP24, Take 1 tablet by mouth daily, Disp: 90 capsule, Rfl: 0    rizatriptan (MAXALT) 10 MG tablet, Take 1 tablet by mouth daily as needed for Migraine, Disp: 9 tablet, Rfl: 5    Caylkr-QpLtt-UoKia-Meth-FA-DHA (PRENATE MINI) 18-0.6-0.4-350 MG CAPS, Take 1 capsule by mouth daily, Disp: 30 capsule, Rfl: 4    DEXILANT 60 MG CPDR delayed release capsule, TK 1 C PO 30 MINUTES BEFORE BREAKFAST, Disp: , Rfl:     furosemide (LASIX) 20 MG tablet, Take 1 tablet by mouth daily as needed (edema), Disp: 30 tablet, Rfl: 2    acetaminophen-codeine (TYLENOL #4) 300-60 MG per tablet, TK 1 T PO ONCE A DAY PRN FOR PAIN, Disp: , Rfl: 0    NARCAN 4 MG/0.1ML LIQD nasal spray, , Disp: , Rfl: 1    docusate sodium (COLACE) 100 MG capsule, Take 100 mg by mouth nightly , Disp: , Rfl:     esomeprazole (NEXIUM) 40 MG delayed release capsule, Take 40 mg by mouth every morning (before breakfast), Disp: , Rfl:     fluticasone-vilanterol (BREO ELLIPTA) 100-25 MCG/INH AEPB inhaler, Inhale 1 puff into the lungs daily, Disp: , Rfl:     famotidine (PEPCID) 40 MG tablet, Take 40 mg by mouth nightly , Disp: , Rfl:     pantoprazole (PROTONIX) 40 MG tablet, Take 40 mg by mouth daily , Disp: , Rfl:     tiotropium (SPIRIVA RESPIMAT) 1.25 MCG/ACT AERS inhaler, Inhale 2 puffs into the lungs daily, Disp: , Rfl:     montelukast (SINGULAIR) 10 MG tablet, Take 10 mg by mouth nightly , Disp: , Rfl:     aspirin 81 MG tablet, Take 81 mg by mouth daily, Disp: , Rfl:     fluticasone (FLONASE) 50 MCG/ACT nasal spray, 1 spray by Nasal route daily (Patient not taking: Reported on 5/27/2020), Disp: 1 Bottle, Rfl: 5    albuterol sulfate HFA (PROAIR HFA) 108 (90 Base) MCG/ACT inhaler, Inhale 2 puffs into the lungs every 6 hours as needed for Wheezing, Disp: 1 Inhaler, Rfl: 0    REVIEW OF SYSTEMS  Review of Systems   Constitutional: Negative for appetite change, fatigue and unexpected weight change. HENT: Negative for drooling, hearing loss, tinnitus and trouble swallowing. Eyes: Negative for photophobia and visual disturbance. Respiratory: Negative for shortness of breath. Cardiovascular: Negative for palpitations. Gastrointestinal: Negative for nausea and vomiting. Endocrine: Negative for polyuria. Genitourinary: Negative for flank pain. Musculoskeletal: Positive for back pain. Negative for neck pain and neck stiffness. Skin: Negative for rash. Allergic/Immunologic: Negative for food allergies.    Neurological: Positive for headaches. Negative for dizziness, tremors, seizures, syncope, speech difficulty, weakness, light-headedness and numbness. Hematological: Negative for adenopathy. Psychiatric/Behavioral: Negative for agitation, behavioral problems and sleep disturbance. PHYSICAL EXAM:   /76   Pulse 95   Temp 98.2 °F (36.8 °C) (Temporal)   Resp 16   Ht 5' 5\" (1.651 m)   Wt 184 lb (83.5 kg)   LMP 10/01/2018 (Approximate)   SpO2 100%   BMI 30.62 kg/m²   GENERAL APPEARANCE: Alert, well-developed, well-nourished female in no acute distress. HEENT: Normocephalic and atraumatic. PERRL. Oropharynx unremarkable. PULM: Normal respiratory effort. No accessory muscle use. CV: RRR. ABDOMEN: Soft, nontender. EXTREMITIES: No obvious signs of vascular compromise. Pulses present. No cyanosis, clubbing or edema. SKIN: Clear; no rashes, lesions or skin breaks in exposed areas. NEURO:     Neurological examination     MENTAL STATUS: Patient awake and oriented to time, place, and person. Recent/remote memory normal. Attention span/concentration normal. Speech fluent. Good comprehension, naming, and repetition. Fund of knowledge appropriate for patient's level of education. Affect normal.    CRANIAL NERVES:  CN I: Not tested. CN II: Fundoscopic exam not performed. CN III, IV, VI: Pupils equal, round and reactive to light; extra ocular movements full and intact. CN V: Facial sensation normal.  CN VII: No facial asymmetry. CN VIII:  Hearing grossly normal bilaterally. No pathologic nystagmus or skew deviation. CN IX, X: Palate elevates symmetrically. CN XI: Shoulder shrug and chin rotation equal with intact strength. CN XII: Tongue protrusion midline. MOTOR: Normal bulk. Tone normal and symmetric throughout. Strength 5/5 throughout. ABNORMAL MOVEMENTS/TREMORS: No     REFLEXES: DTRs 2+; normal and symmetric throughout. Plantar response downgoing.     SENSATION: Sensation grossly intact to fine touch, pain/temperature, vibration and position. COORDINATION: Finger-to-nose and heel to shin normal for age and symmetric. Finger tapping and alternating movements normal.    STATION: Negative Romberg. GAIT:  Normal heel, toe and tandem; no ataxia. DIAGNOSTIC TESTS:     I have personally reviewed the most recent lab results:    Sodium   Date Value Ref Range Status   09/23/2020 141 132 - 146 mmol/L Final   11/22/2019 142 132 - 146 mmol/L Final   10/02/2019 142 132 - 146 mmol/L Final     Potassium   Date Value Ref Range Status   09/23/2020 4.0 3.5 - 5.0 mmol/L Final   11/22/2019 4.4 3.5 - 5.0 mmol/L Final   10/02/2019 4.9 3.5 - 5.0 mmol/L Final     Chloride   Date Value Ref Range Status   09/23/2020 106 98 - 107 mmol/L Final   11/22/2019 101 98 - 107 mmol/L Final   10/02/2019 106 98 - 107 mmol/L Final     CO2   Date Value Ref Range Status   09/23/2020 27 22 - 29 mmol/L Final   11/22/2019 27 22 - 29 mmol/L Final   10/02/2019 26 22 - 29 mmol/L Final     BUN   Date Value Ref Range Status   09/23/2020 11 6 - 20 mg/dL Final   11/22/2019 13 6 - 20 mg/dL Final   10/02/2019 9 6 - 20 mg/dL Final     CREATININE   Date Value Ref Range Status   09/23/2020 1.0 0.5 - 1.0 mg/dL Final   11/22/2019 1.0 0.5 - 1.0 mg/dL Final   10/02/2019 0.9 0.5 - 1.0 mg/dL Final     GFR Non-   Date Value Ref Range Status   09/23/2020 >60 >=60 mL/min/1.73 Final     Comment:     Chronic Kidney Disease: less than 60 ml/min/1.73 sq.m. Kidney Failure: less than 15 ml/min/1.73 sq.m. Results valid for patients 18 years and older. 11/22/2019 >60 >=60 mL/min/1.73 Final     Comment:     Chronic Kidney Disease: less than 60 ml/min/1.73 sq.m. Kidney Failure: less than 15 ml/min/1.73 sq.m. Results valid for patients 18 years and older. 10/02/2019 >60 >=60 mL/min/1.73 Final     Comment:     Chronic Kidney Disease: less than 60 ml/min/1.73 sq.m. Kidney Failure: less than 15 ml/min/1.73 sq.m.   Results valid U/L Final     AST   Date Value Ref Range Status   09/23/2020 18 0 - 31 U/L Final   11/22/2019 24 0 - 31 U/L Final   10/02/2019 34 (H) 0 - 31 U/L Final     Comment:     Specimen is slightly Hemolyzed. Result may be artificially increased. Lab Results   Component Value Date    INR 1.1 10/22/2017     Lab Results   Component Value Date    TRIG 119 02/22/2018    HDL 69 11/22/2019     No components found for: HGBA1C  No results found for: PROTEINCSF, GLUCCSF, WBCCSF    Controlled Substance Monitoring:    Acute and Chronic Pain Monitoring:   RX Monitoring 9/14/2018   Attestation The Prescription Monitoring Report for this patient was reviewed today. Periodic Controlled Substance Monitoring -   Chronic Pain > 80 MEDD -       MEDICAL DECISION MAKING  ASSESSMENT/PLAN    Ivanna Alejo was seen today for headache and back pain. Diagnoses and all orders for this visit:      Intractable chronic migraine without aura and with status migrainosus    Chronic daily headache    Chiari I malformation (HCC)    Cervicalgia    Intractable chronic migraine without aura and with status migrainosus  -     Eptinezumab-jjmr (VYEPTI) 100 MG/ML SOLN; Infuse 100 mg intravenously every 3 months Infuse  in 10 cc of normal saline over 30 mins. · No response to Emgality treatment for 3 months. Last dose of Emgality was on November 1, 2020. · Tried multiple medications without any significant relief. · Start on Vimpat infusion, 100 mg every 3 months. · Continue on Trokendi  · For abortive treatment continue on rizatriptan. · Pericranial nerve block and trigger point patient is unavailable for abortive treatment of headaches.     Follow-up in 3 months    Today, I personally spent > 40 mins directly face-to-face time with the patient, of which greater than 50% was spent in patient education, counseling,about etiology management and diagnosis of etiology management diagnosis of migraines, chronic daily headaches, worsening headaches, migraines side effects of medications including Trokendi, rizatriptan, Emgality, nurtec were discussed in detail with the patient, verbalizes understanding and agrees to it. And coordination of care as described above. Patient's current medication list, allergies, problem list and results of all previously ordered testing and scans were reviewed at today's visit.       Zachery Jo MD  Neurology & Clinical Neurophysiology    [facility] AT [facility] Neurology  [facility],  [address] [place] [address], [facility]  Office phone: [phone]

## 2020-11-12 ENCOUNTER — TELEPHONE (OUTPATIENT)
Dept: NEUROLOGY | Age: 50
End: 2020-11-12

## 2020-11-12 NOTE — TELEPHONE ENCOUNTER
Spoke with patient and informed her that her insurance will not approve Vypeti unless she has tried botox. Patient refusing botox and Ajovy at this time. She will call back if she changes her mind.

## 2020-11-12 NOTE — TELEPHONE ENCOUNTER
Spoke with patients insurance about PA for [employer]. They stated they can not approve the medication because she has not tried and failed 2 rounds of Botox. Please advise.

## 2020-11-30 ENCOUNTER — TELEPHONE (OUTPATIENT)
Dept: NEUROLOGY | Age: 50
End: 2020-11-30

## 2020-11-30 RX ORDER — TOPIRAMATE 200 MG/1
CAPSULE, EXTENDED RELEASE ORAL
Qty: 90 CAPSULE | Refills: 0 | Status: SHIPPED
Start: 2020-11-30 | End: 2021-09-22

## 2020-11-30 NOTE — TELEPHONE ENCOUNTER
Patient called in and stated she would like to try another self injection for her migraines since the [facility] is not helping. Please advise.

## 2020-12-01 RX ORDER — FREMANEZUMAB-VFRM 225 MG/1.5ML
225 INJECTION SUBCUTANEOUS
Qty: 1 PEN | Refills: 1 | Status: SHIPPED
Start: 2020-12-12 | End: 2021-02-01

## 2020-12-04 RX ORDER — RIMEGEPANT SULFATE 75 MG/75MG
75 TABLET, ORALLY DISINTEGRATING ORAL PRN
Qty: 8 TABLET | Refills: 1 | Status: SHIPPED
Start: 2020-12-04 | End: 2020-12-17

## 2020-12-17 ENCOUNTER — OFFICE VISIT (OUTPATIENT)
Dept: NEUROLOGY | Age: 50
End: 2020-12-17
Payer: COMMERCIAL

## 2020-12-17 VITALS
WEIGHT: 177 LBS | HEIGHT: 65 IN | BODY MASS INDEX: 29.49 KG/M2 | DIASTOLIC BLOOD PRESSURE: 74 MMHG | RESPIRATION RATE: 16 BRPM | OXYGEN SATURATION: 99 % | SYSTOLIC BLOOD PRESSURE: 124 MMHG | HEART RATE: 69 BPM | TEMPERATURE: 96.7 F

## 2020-12-17 PROCEDURE — 99214 OFFICE O/P EST MOD 30 MIN: CPT | Performed by: PSYCHIATRY & NEUROLOGY

## 2020-12-17 PROCEDURE — G8427 DOCREV CUR MEDS BY ELIG CLIN: HCPCS | Performed by: PSYCHIATRY & NEUROLOGY

## 2020-12-17 PROCEDURE — 1036F TOBACCO NON-USER: CPT | Performed by: PSYCHIATRY & NEUROLOGY

## 2020-12-17 PROCEDURE — 3017F COLORECTAL CA SCREEN DOC REV: CPT | Performed by: PSYCHIATRY & NEUROLOGY

## 2020-12-17 PROCEDURE — G8417 CALC BMI ABV UP PARAM F/U: HCPCS | Performed by: PSYCHIATRY & NEUROLOGY

## 2020-12-17 PROCEDURE — G8484 FLU IMMUNIZE NO ADMIN: HCPCS | Performed by: PSYCHIATRY & NEUROLOGY

## 2020-12-17 ASSESSMENT — ENCOUNTER SYMPTOMS
BACK PAIN: 0
TROUBLE SWALLOWING: 0
PHOTOPHOBIA: 0
SHORTNESS OF BREATH: 0
VOMITING: 0
NAUSEA: 0

## 2020-12-17 NOTE — PROGRESS NOTES
Tylenol, ibuprofen, Aleve: No help  Ibuprofen: partia helps  Nurtec: did not help  Ubrelvy: helps    Total number of migraine headache days in a month: 10    Last MRI brain:  no acute abnormality, Chiari 1  Malformation  MRI brain : No acute abnormality  I have personally reviewed her medical records      MRI Lumbar spine: 2020:  Degenerative spondylotic changes. L4-5 broad-based disc protrusion asymmetric to the right encroaching    on the right lateral recess and also encroaching on the right    intervertebral foramen crowding the exiting right L4 nerve root. PAST MEDICAL HISTORY:   Past Medical History:   Diagnosis Date    Asthma     DR Evi Perez    Back injury     Chronic back pain     PAIN RADIATES TO NECK AND LEGS    Chronic tension-type headache, not intractable 2018    DDD (degenerative disc disease), cervical 2018    DVT (deep venous thrombosis) (HCC)     Right Arm    GERD (gastroesophageal reflux disease)     Herniated disc, cervical     also lumbar spine    Hx of screening mammography 3/2015 & 10/2015    BIRADS 2 BENIGN    HX OTHER MEDICAL     PINCHED NERVE BACK OF NECK    Hypertension     Migraine     Migraine     DR Loera Exon MVP (mitral valve prolapse)     Numbness and tingling     LEGS AND FEET    Obesity     Sinus infection     Thyroid disease     no med    Torn ACL     RT KNEE    Ulcer     Vitamin B 12 deficiency     NON ANEMIC     PAST SURGICAL HISTORY:   Past Surgical History:   Procedure Laterality Date    BREAST LUMPECTOMY Right 2013    BREAST SURGERY Right 2013    re-excision, rt lumpectomyscar evacuation of hematoma.     CARDIOVASCULAR STRESS TEST  2015    NORMAL     SECTION      COLONOSCOPY      12 YOSSEF HEMORRHOIDS, MINIMAL DIVERTICULA    COLONOSCOPY  2012    DR PEREZ GERD & GASTRITIS    DILATION AND CURETTAGE OF UTERUS      ECHO COMPL W DOP COLOR FLOW  2015         ECHOCARDIOGRAM COMPLETE 2D W DOPPLER W COLOR  10/24/2012         HARDWARE REMOVAL Right 12/2018    Foot    KNEE ARTHROSCOPY Right 1991    Right knee arthroscopy. Dr. Flaquito Fleming ARTHROSCOPY Right 2008    Right knee meniscal repair. Dr. Chava Abdi.       MAMMO IMPLANT DIGITAL DIAG BI      3/24/15 BIRADS CAT 2 BENIGN, 10/15/15 BIRADS CAT 2    OTHER SURGICAL HISTORY      TILT TABLE TEST - DR Vicky Caputo 11/12 NO ORTHOSTASIS    TYMPANOSTOMY TUBE PLACEMENT      UPPER GASTROINTESTINAL ENDOSCOPY      WITH CLOSED BIOPSY DR López Colin 1/30/12 GERD, GASTRITIS    UPPER GASTROINTESTINAL ENDOSCOPY  1/30/2012    DR Carmela Brandt GERD & GASTRITIS    WISDOM TOOTH EXTRACTION       FAMILY MEDICAL HISTORY:   Family History   Problem Relation Age of Onset    High Blood Pressure Mother     Heart Disease Father     Seizures Father     Coronary Art Dis Father     Cancer Father         STOMACH    Cancer Sister 43        breast    Deep Vein Thrombosis Sister         IN THE SETTING OF BREAST CA    Hypertension Maternal Grandmother      SOCIAL HISTORY:   Social History     Socioeconomic History    Marital status: Single     Spouse name: None    Number of children: None    Years of education: None    Highest education level: None   Occupational History    Occupation: attendance   Social Needs    Financial resource strain: None    Food insecurity     Worry: None     Inability: None    Transportation needs     Medical: None     Non-medical: None   Tobacco Use    Smoking status: Never Smoker    Smokeless tobacco: Never Used   Substance and Sexual Activity    Alcohol use: Not Currently     Comment: rare     Drug use: No    Sexual activity: None   Lifestyle    Physical activity     Days per week: None     Minutes per session: None    Stress: None   Relationships    Social connections     Talks on phone: None     Gets together: None     Attends [employer] service: None     Active member of club or organization: None     Attends meetings of clubs or 0    NARCAN 4 MG/0.1ML LIQD nasal spray, , Disp: , Rfl: 1    docusate sodium (COLACE) 100 MG capsule, Take 100 mg by mouth nightly , Disp: , Rfl:     esomeprazole (NEXIUM) 40 MG delayed release capsule, Take 40 mg by mouth every morning (before breakfast), Disp: , Rfl:     fluticasone-vilanterol (BREO ELLIPTA) 100-25 MCG/INH AEPB inhaler, Inhale 1 puff into the lungs daily, Disp: , Rfl:     famotidine (PEPCID) 40 MG tablet, Take 40 mg by mouth nightly , Disp: , Rfl:     pantoprazole (PROTONIX) 40 MG tablet, Take 40 mg by mouth daily , Disp: , Rfl:     tiotropium (SPIRIVA RESPIMAT) 1.25 MCG/ACT AERS inhaler, Inhale 2 puffs into the lungs daily, Disp: , Rfl:     montelukast (SINGULAIR) 10 MG tablet, Take 10 mg by mouth nightly , Disp: , Rfl:     aspirin 81 MG tablet, Take 81 mg by mouth daily, Disp: , Rfl:     Eptinezumab-jjmr (VYEPTI) 100 MG/ML SOLN, Infuse 100 mg intravenously every 3 months Infuse  in 10 cc of normal saline over 30 mins. (Patient not taking: Reported on 12/17/2020), Disp: 1 mL, Rfl: 0    fluticasone (FLONASE) 50 MCG/ACT nasal spray, 1 spray by Nasal route daily (Patient not taking: Reported on 5/27/2020), Disp: 1 Bottle, Rfl: 5    albuterol sulfate HFA (PROAIR HFA) 108 (90 Base) MCG/ACT inhaler, Inhale 2 puffs into the lungs every 6 hours as needed for Wheezing, Disp: 1 Inhaler, Rfl: 0    REVIEW OF SYSTEMS  Review of Systems   Constitutional: Negative for appetite change, fatigue and unexpected weight change. HENT: Negative for drooling, hearing loss, tinnitus and trouble swallowing. Eyes: Negative for photophobia and visual disturbance. Respiratory: Negative for shortness of breath. Cardiovascular: Negative for palpitations. Gastrointestinal: Negative for nausea and vomiting. Endocrine: Negative for polyuria. Genitourinary: Negative for flank pain. Musculoskeletal: Negative for back pain, neck pain and neck stiffness. Skin: Negative for rash.    Allergic/Immunologic: Negative for food allergies. Neurological: Negative for dizziness, tremors, seizures, syncope, speech difficulty, weakness, light-headedness, numbness and headaches. Hematological: Negative for adenopathy. Psychiatric/Behavioral: Negative for agitation, behavioral problems and sleep disturbance. PHYSICAL EXAM:   /74   Pulse 69   Temp 96.7 °F (35.9 °C) (Temporal)   Resp 16   Ht 5' 5\" (1.651 m)   Wt 177 lb (80.3 kg)   LMP 10/01/2018 (Approximate)   SpO2 99%   BMI 29.45 kg/m²   GENERAL APPEARANCE: Alert, well-developed, well-nourished female in no acute distress. HEENT: Normocephalic and atraumatic. PERRL. Oropharynx unremarkable. PULM: Normal respiratory effort. No accessory muscle use. CV: RRR. ABDOMEN: Soft, nontender. EXTREMITIES: No obvious signs of vascular compromise. Pulses present. No cyanosis, clubbing or edema. SKIN: Clear; no rashes, lesions or skin breaks in exposed areas. NEURO:     Neurological examination     MENTAL STATUS: Patient awake and oriented to time, place, and person. Recent/remote memory normal. Attention span/concentration normal. Speech fluent. Good comprehension, naming, and repetition. Fund of knowledge appropriate for patient's level of education. Affect normal.    CRANIAL NERVES:  CN I: Not tested. CN II: Fundoscopic exam not performed. CN III, IV, VI: Pupils equal, round and reactive to light; extra ocular movements full and intact. CN V: Facial sensation normal.  CN VII: No facial asymmetry. CN VIII:  Hearing grossly normal bilaterally. No pathologic nystagmus or skew deviation. CN IX, X: Palate elevates symmetrically. CN XI: Shoulder shrug and chin rotation equal with intact strength. CN XII: Tongue protrusion midline. MOTOR: Normal bulk. Tone normal and symmetric throughout. Strength 5/5 throughout. ABNORMAL MOVEMENTS/TREMORS: No     REFLEXES: DTRs 2+; normal and symmetric throughout. Plantar response downgoing.     SENSATION: Sensation grossly intact to fine touch, pain/temperature, vibration and position. COORDINATION: Finger-to-nose and heel to shin normal for age and symmetric. Finger tapping and alternating movements normal.    STATION: Negative Romberg. GAIT:  Normal heel, toe and tandem; no ataxia. DIAGNOSTIC TESTS:     I have personally reviewed the most recent lab results:    Sodium   Date Value Ref Range Status   09/23/2020 141 132 - 146 mmol/L Final   11/22/2019 142 132 - 146 mmol/L Final   10/02/2019 142 132 - 146 mmol/L Final     Potassium   Date Value Ref Range Status   09/23/2020 4.0 3.5 - 5.0 mmol/L Final   11/22/2019 4.4 3.5 - 5.0 mmol/L Final   10/02/2019 4.9 3.5 - 5.0 mmol/L Final     Chloride   Date Value Ref Range Status   09/23/2020 106 98 - 107 mmol/L Final   11/22/2019 101 98 - 107 mmol/L Final   10/02/2019 106 98 - 107 mmol/L Final     CO2   Date Value Ref Range Status   09/23/2020 27 22 - 29 mmol/L Final   11/22/2019 27 22 - 29 mmol/L Final   10/02/2019 26 22 - 29 mmol/L Final     BUN   Date Value Ref Range Status   09/23/2020 11 6 - 20 mg/dL Final   11/22/2019 13 6 - 20 mg/dL Final   10/02/2019 9 6 - 20 mg/dL Final     CREATININE   Date Value Ref Range Status   09/23/2020 1.0 0.5 - 1.0 mg/dL Final   11/22/2019 1.0 0.5 - 1.0 mg/dL Final   10/02/2019 0.9 0.5 - 1.0 mg/dL Final     GFR Non-   Date Value Ref Range Status   09/23/2020 >60 >=60 mL/min/1.73 Final     Comment:     Chronic Kidney Disease: less than 60 ml/min/1.73 sq.m. Kidney Failure: less than 15 ml/min/1.73 sq.m. Results valid for patients 18 years and older. 11/22/2019 >60 >=60 mL/min/1.73 Final     Comment:     Chronic Kidney Disease: less than 60 ml/min/1.73 sq.m. Kidney Failure: less than 15 ml/min/1.73 sq.m. Results valid for patients 18 years and older. 10/02/2019 >60 >=60 mL/min/1.73 Final     Comment:     Chronic Kidney Disease: less than 60 ml/min/1.73 sq.m.           Kidney Failure: less than 15 ml/min/1.73 sq.m. Results valid for patients 18 years and older.        Calcium   Date Value Ref Range Status   09/23/2020 9.7 8.6 - 10.2 mg/dL Final   11/22/2019 9.8 8.6 - 10.2 mg/dL Final   10/02/2019 9.0 8.6 - 10.2 mg/dL Final     Magnesium   Date Value Ref Range Status   10/22/2017 2.0 1.6 - 2.6 mg/dL Final     WBC   Date Value Ref Range Status   09/23/2020 6.2 4.5 - 11.5 E9/L Final   10/02/2019 6.3 4.5 - 11.5 E9/L Final   02/02/2019 6.8 4.5 - 11.5 E9/L Final     Hemoglobin   Date Value Ref Range Status   09/23/2020 13.8 11.5 - 15.5 g/dL Final   10/02/2019 12.5 11.5 - 15.5 g/dL Final   02/02/2019 13.1 11.5 - 15.5 g/dL Final     Hematocrit   Date Value Ref Range Status   09/23/2020 43.4 34.0 - 48.0 % Final   10/02/2019 39.0 34.0 - 48.0 % Final   02/02/2019 41.3 34.0 - 48.0 % Final     Platelets   Date Value Ref Range Status   09/23/2020 235 130 - 450 E9/L Final   10/02/2019 213 130 - 450 E9/L Final   02/02/2019 252 130 - 450 E9/L Final     Neutrophils %   Date Value Ref Range Status   09/23/2020 55.7 43.0 - 80.0 % Final   10/02/2019 53.2 43.0 - 80.0 % Final   02/02/2019 63.0 43.0 - 80.0 % Final     Monocytes %   Date Value Ref Range Status   09/23/2020 7.1 2.0 - 12.0 % Final   10/02/2019 7.8 2.0 - 12.0 % Final   02/02/2019 7.2 2.0 - 12.0 % Final     Eosinophils %   Date Value Ref Range Status   02/02/2017 1.5 1 - 4 % Final     Total Protein   Date Value Ref Range Status   09/23/2020 7.8 6.4 - 8.3 g/dL Final   11/22/2019 7.7 6.4 - 8.3 g/dL Final   10/02/2019 7.1 6.4 - 8.3 g/dL Final     Total Bilirubin   Date Value Ref Range Status   09/23/2020 <0.2 0.0 - 1.2 mg/dL Final   11/22/2019 <0.2 0.0 - 1.2 mg/dL Final   10/02/2019 <0.2 0.0 - 1.2 mg/dL Final     Alkaline Phosphatase   Date Value Ref Range Status   09/23/2020 148 (H) 35 - 104 U/L Final   11/22/2019 132 (H) 35 - 104 U/L Final   10/02/2019 110 (H) 35 - 104 U/L Final     ALT   Date Value Ref Range Status   09/23/2020 14 0 - 32 U/L Final   11/22/2019 29 0 - 32 U/L Final   10/02/2019 29 0 - 32 U/L Final     AST   Date Value Ref Range Status   09/23/2020 18 0 - 31 U/L Final   11/22/2019 24 0 - 31 U/L Final   10/02/2019 34 (H) 0 - 31 U/L Final     Comment:     Specimen is slightly Hemolyzed. Result may be artificially increased. Lab Results   Component Value Date    INR 1.1 10/22/2017     Lab Results   Component Value Date    TRIG 119 02/22/2018    HDL 69 11/22/2019     No components found for: HGBA1C  No results found for: PROTEINCSF, GLUCCSF, WBCCSF    Controlled Substance Monitoring:    Acute and Chronic Pain Monitoring:   RX Monitoring 9/14/2018   Attestation The Prescription Monitoring Report for this patient was reviewed today. Periodic Controlled Substance Monitoring -   Chronic Pain > 80 MEDD -       MEDICAL DECISION MAKING  ASSESSMENT/PLAN    Deirdre Vallecillo was seen today for headache and back pain. Diagnoses and all orders for this visit:      Intractable chronic migraine without aura and with status migrainosus    Chronic daily headache    Chiari I malformation (HCC)    Cervicalgia      · Continue on Ajovy  · On Ubrelvy and rizatriptan  · Continue on Trokendi 200 mg daily  · Tried multiple medications without any significant relief. · Pericranial nerve block and trigger point patient is unavailable for abortive treatment of headaches. · Do not drive if you have taken a prescription pain medicine. · Rest in a quiet, dark room until your headache is gone. Close your eyes, and try to relax or go to sleep. Don't watch TV or read. · Put a cold, moist cloth or cold pack on the painful area for 10 to 20 minutes at a time. Put a thin cloth between the cold pack and your skin. · Use a warm, moist towel or a heating pad set on low to relax tight shoulder and neck muscles. · Have someone gently massage your neck and shoulders. · Don't take medicine for headache pain too often. Taking too much pain medicine can lead to more headaches.  These are called medicine-overuse headaches. · Keep a headache diary so you can figure out what triggers your headaches. Avoiding triggers may help you prevent headaches. Record when each headache began, how long it lasted, and what the pain was like. Write down any other symptoms you had with the headache, such as nausea, flashing lights or dark spots, or sensitivity to bright light or loud noise. Note if the headache occurred near your period. List anything that might have triggered the headache. Triggers may include certain foods (chocolate, cheese, wine) or odors, smoke, bright light, stress, or lack of sleep. · Find healthy ways to deal with stress. Migraines are most common during or right after stressful times. Try finding ways to reduce stress like practicing mindfulness or deep breathing exercises. · Get plenty of sleep and exercise. But be careful to not push yourself too hard during exercise. It may trigger a headache. · Eat meals on a regular schedule. Avoid foods and drinks that often trigger migraines. These include chocolate, alcohol (especially red wine and port), aspartame, monosodium glutamate (MSG), and some additives found in foods (such as hot dogs, kumar, cold cuts, aged cheeses, and pickled foods). · Limit caffeine. Don't drink too much coffee, tea, or soda. But don't quit caffeine suddenly. That can also give you migraines. · Do not smoke or allow others to smoke around you. Follow-up in 3 months    Today, I personally spent > 25 mins directly face-to-face time with the patient, of which greater than 50% was spent in patient education, counseling,about etiology management and diagnosis of etiology management diagnosis of migraines, chronic daily headaches, worsening headaches, migraines side effects of medications including Trokendi, rizatriptan, Laqueta Banco were discussed in detail with the patient, verbalizes understanding and agrees to it. And coordination of care as described above. Patient's current medication list, allergies, problem list and results of all previously ordered testing and scans were reviewed at today's visit.       Lieutenant Diane MD  Neurology & Clinical Neurophysiology    [facility]  [address] [facility],  [address] [place] [address], [id number]  Office phone: [phone]

## 2020-12-31 DIAGNOSIS — G43.711 INTRACTABLE CHRONIC MIGRAINE WITHOUT AURA AND WITH STATUS MIGRAINOSUS: ICD-10-CM

## 2021-01-04 RX ORDER — UBROGEPANT 100 MG/1
TABLET ORAL
Qty: 8 TABLET | Refills: 0 | Status: SHIPPED
Start: 2021-01-04 | End: 2021-05-10 | Stop reason: SDUPTHER

## 2021-01-27 ENCOUNTER — TELEPHONE (OUTPATIENT)
Dept: FAMILY MEDICINE CLINIC | Age: 51
End: 2021-01-27

## 2021-01-27 DIAGNOSIS — B96.89 ACUTE BACTERIAL SINUSITIS: Primary | ICD-10-CM

## 2021-01-27 DIAGNOSIS — J01.90 ACUTE BACTERIAL SINUSITIS: Primary | ICD-10-CM

## 2021-01-28 ENCOUNTER — TELEPHONE (OUTPATIENT)
Dept: FAMILY MEDICINE CLINIC | Age: 51
End: 2021-01-28

## 2021-01-28 RX ORDER — MECLIZINE HYDROCHLORIDE 25 MG/1
25 TABLET ORAL 3 TIMES DAILY PRN
Qty: 30 TABLET | Refills: 0 | Status: SHIPPED
Start: 2021-01-28 | End: 2021-08-12 | Stop reason: SDUPTHER

## 2021-01-28 RX ORDER — AZITHROMYCIN 250 MG/1
TABLET, FILM COATED ORAL
Qty: 1 PACKET | Refills: 0 | Status: SHIPPED
Start: 2021-01-28 | End: 2021-05-11 | Stop reason: CLARIF

## 2021-01-30 ENCOUNTER — TELEPHONE (OUTPATIENT)
Dept: NEUROLOGY | Age: 51
End: 2021-01-30

## 2021-01-30 DIAGNOSIS — G43.711 INTRACTABLE CHRONIC MIGRAINE WITHOUT AURA AND WITH STATUS MIGRAINOSUS: ICD-10-CM

## 2021-02-01 RX ORDER — FREMANEZUMAB-VFRM 225 MG/1.5ML
225 INJECTION SUBCUTANEOUS
Qty: 1 PEN | Refills: 5 | Status: SHIPPED
Start: 2021-02-01 | End: 2021-05-10 | Stop reason: SINTOL

## 2021-03-04 RX ORDER — FREMANEZUMAB-VFRM 225 MG/1.5ML
225 INJECTION SUBCUTANEOUS ONCE
Qty: 1 PEN | Refills: 0 | COMMUNITY
Start: 2021-03-04 | End: 2021-03-04

## 2021-05-10 ENCOUNTER — OFFICE VISIT (OUTPATIENT)
Dept: NEUROLOGY | Age: 51
End: 2021-05-10
Payer: COMMERCIAL

## 2021-05-10 VITALS
BODY MASS INDEX: 31.16 KG/M2 | WEIGHT: 187 LBS | TEMPERATURE: 98.4 F | HEIGHT: 65 IN | HEART RATE: 64 BPM | DIASTOLIC BLOOD PRESSURE: 85 MMHG | RESPIRATION RATE: 16 BRPM | OXYGEN SATURATION: 98 % | SYSTOLIC BLOOD PRESSURE: 130 MMHG

## 2021-05-10 DIAGNOSIS — G43.711 INTRACTABLE CHRONIC MIGRAINE WITHOUT AURA AND WITH STATUS MIGRAINOSUS: ICD-10-CM

## 2021-05-10 DIAGNOSIS — R51.9 WORSENING HEADACHES: ICD-10-CM

## 2021-05-10 DIAGNOSIS — T88.7XXA MEDICATION SIDE EFFECT: Primary | ICD-10-CM

## 2021-05-10 PROCEDURE — 3017F COLORECTAL CA SCREEN DOC REV: CPT | Performed by: PSYCHIATRY & NEUROLOGY

## 2021-05-10 PROCEDURE — 1036F TOBACCO NON-USER: CPT | Performed by: PSYCHIATRY & NEUROLOGY

## 2021-05-10 PROCEDURE — G8417 CALC BMI ABV UP PARAM F/U: HCPCS | Performed by: PSYCHIATRY & NEUROLOGY

## 2021-05-10 PROCEDURE — 99214 OFFICE O/P EST MOD 30 MIN: CPT | Performed by: PSYCHIATRY & NEUROLOGY

## 2021-05-10 PROCEDURE — G8427 DOCREV CUR MEDS BY ELIG CLIN: HCPCS | Performed by: PSYCHIATRY & NEUROLOGY

## 2021-05-10 RX ORDER — UBROGEPANT 100 MG/1
TABLET ORAL
Qty: 8 TABLET | Refills: 0 | Status: SHIPPED
Start: 2021-05-10 | End: 2021-06-28 | Stop reason: SDUPTHER

## 2021-05-10 ASSESSMENT — ENCOUNTER SYMPTOMS
PHOTOPHOBIA: 0
TROUBLE SWALLOWING: 0
NAUSEA: 0
VOMITING: 0
BACK PAIN: 0
SHORTNESS OF BREATH: 0

## 2021-05-10 NOTE — PROGRESS NOTES
NEUROLOGY FOLLOW UP NOTE     Date: 5/10/2021  Name: Sin Lehman  MRN: [id number]  Patient's PCP: [facility] JUDY Barboza DO     REASON FOR VISIT/CHIEF COMPLAINT: Headaches, low back pain     Interval history:     The patient is coming in for a follow-up visit for headaches. Reports her migraine headaches have significantly improved. She was on Ajovy however she had to stop using Ajovy because it was giving her severe itching after the injections. Currently she gets about 8-10 migraines a month, severity has decreased to a 5/10. On Trokendi  For abortive treatment she states Ubrelvy and rizatriptan  She had a MRI brain February 2020: No acute normality. Sleep is normal, appetite is good, mood is stable  No other aggravating relieving factors  No other associated symptoms    Disease course:     Sin Lehman is a 48 y.o. -American female is coming in for evaluation of headaches  Headache     Onset: At the age of 10  [facility] Duration: All day long   Frequency : Daily   Time of occurrence: Any time   Severity on a scale of 1-10: 5/10    Headache Location: Bilateral forehead, behind the eyes, radiating in the back of her neck, at times starts in the bilateral occipital region and neck    Change sides: No    Character:sharp, shooting, stabbing and throbbing    Activities that worsens headache: movement    Reliving factors: nothing    Headache Triggers or Provoking Factors:   Heat    Headache disability during or after an attack:   confined to bed    Associated symptoms:  nausea, vomiting, photophobia and sonophobia    Aura (Visual/Sensory):  None    Premonitory Symptoms:  none       Prior Headache Treatments:    Preventatives:     She has tried generic topiramate: Which caused her to have, nausea, fatigue, cognitive difficulties so she stopped taking it.   Propranolol: Asthma exacerbation  Trokendi: Partial help  Amitriptaline: jittery  Emgality: Did no help  Ajovy: helps however had to stop because of ECHO COMPL W DOP COLOR FLOW  6/24/2015         ECHOCARDIOGRAM COMPLETE 2D W DOPPLER W COLOR  10/24/2012         HARDWARE REMOVAL Right 12/2018    Foot    KNEE ARTHROSCOPY Right 1991    Right knee arthroscopy. Dr. Swathi Esquivel ARTHROSCOPY Right 2008    Right knee meniscal repair. Dr. Zi Wasserman.       MAMMO IMPLANT DIGITAL DIAG BI      3/24/15 BIRADS CAT 2 BENIGN, 10/15/15 BIRADS CAT 2    OTHER SURGICAL HISTORY      TILT TABLE TEST - DR Perez Royalty 11/12 NO ORTHOSTASIS    TYMPANOSTOMY TUBE PLACEMENT      UPPER GASTROINTESTINAL ENDOSCOPY      WITH CLOSED BIOPSY DR Rivera Button 1/30/12 GERD, GASTRITIS    UPPER GASTROINTESTINAL ENDOSCOPY  1/30/2012    DR Russell Fleeting GERD & GASTRITIS    WISDOM TOOTH EXTRACTION       FAMILY MEDICAL HISTORY:   Family History   Problem Relation Age of Onset    High Blood Pressure Mother     Heart Disease Father     Seizures Father     Coronary Art Dis Father     Cancer Father         STOMACH    Cancer Sister 43        breast    Deep Vein Thrombosis Sister         IN THE SETTING OF BREAST CA    Hypertension Maternal Grandmother      SOCIAL HISTORY:   Social History     Socioeconomic History    Marital status: Single     Spouse name: None    Number of children: None    Years of education: None    Highest education level: None   Occupational History    Occupation: attendance   Social Needs    Financial resource strain: None    Food insecurity     Worry: None     Inability: None    Transportation needs     Medical: None     Non-medical: None   Tobacco Use    Smoking status: Never Smoker    Smokeless tobacco: Never Used   Substance and Sexual Activity    Alcohol use: Not Currently     Comment: rare     Drug use: No    Sexual activity: None   Lifestyle    Physical activity     Days per week: None     Minutes per session: None    Stress: None   Relationships    Social connections     Talks on phone: None     Gets together: None     Attends Restorationist service: None     Active member of club or organization: None     Attends meetings of clubs or organizations: None     Relationship status: None    Intimate partner violence     Fear of current or ex partner: None     Emotionally abused: None     Physically abused: None     Forced sexual activity: None   Other Topics Concern    None   Social History Narrative    None     Allergy:   Allergies   Allergen Reactions    Ceftin [Cefuroxime Axetil] Nausea Only    Pcn [Penicillins]      ?  reaction    Vicodin [Hydrocodone-Acetaminophen] Shortness Of Breath    Ibuprofen Swelling    Other      TAPE AND BANDAIDS SKIN IRRITATION    Percocet [Oxycodone-Acetaminophen]     Sudafed [Pseudoephedrine Hcl] Swelling    Ajovy [Fremanezumab-Vfrm] Rash     MEDS:   Current Outpatient Medications:     azithromycin (ZITHROMAX Z-VIKKI) 250 MG tablet, 2 tab po day 1 then 1 tab po day 2-5, Disp: 1 packet, Rfl: 0    Ubrogepant (UBRELVY) 100 MG TABS, TAKE 1 TABLET IF NEEDED MAY TAKE 1 MORE DOSE AFTER 1 HOUR IF NEEDED - DO NOT TAKE MORE THAN 200 MG IN 24 HRS, Disp: 8 tablet, Rfl: 0    TROKENDI  MG CP24, TAKE 1 CAPSULE DAILY, Disp: 90 capsule, Rfl: 0    spironolactone (ALDACTONE) 25 MG tablet, Take 1 tablet by mouth daily, Disp: 30 tablet, Rfl: 5    ondansetron (ZOFRAN) 4 MG tablet, Take 1 tablet by mouth every 8 hours as needed for Nausea or Vomiting, Disp: 30 tablet, Rfl: 1    SYMBICORT 160-4.5 MCG/ACT AERO, , Disp: , Rfl:     rizatriptan (MAXALT) 10 MG tablet, Take 1 tablet by mouth daily as needed for Migraine, Disp: 9 tablet, Rfl: 5    fluticasone (FLONASE) 50 MCG/ACT nasal spray, 1 spray by Nasal route daily, Disp: 1 Bottle, Rfl: 5    Wvxwgq-ElNyg-BhPwa-Meth-FA-DHA (PRENATE MINI) 18-0.6-0.4-350 MG CAPS, Take 1 capsule by mouth daily, Disp: 30 capsule, Rfl: 4    DEXILANT 60 MG CPDR delayed release capsule, TK 1 C PO 30 MINUTES BEFORE BREAKFAST, Disp: , Rfl:     furosemide (LASIX) 20 MG tablet, Take 1 tablet by mouth daily as needed (edema), Disp: 30 tablet, Rfl: 2    acetaminophen-codeine (TYLENOL #4) 300-60 MG per tablet, TK 1 T PO ONCE A DAY PRN FOR PAIN, Disp: , Rfl: 0    NARCAN 4 MG/0.1ML LIQD nasal spray, , Disp: , Rfl: 1    docusate sodium (COLACE) 100 MG capsule, Take 100 mg by mouth nightly , Disp: , Rfl:     esomeprazole (NEXIUM) 40 MG delayed release capsule, Take 40 mg by mouth every morning (before breakfast), Disp: , Rfl:     fluticasone-vilanterol (BREO ELLIPTA) 100-25 MCG/INH AEPB inhaler, Inhale 1 puff into the lungs daily, Disp: , Rfl:     famotidine (PEPCID) 40 MG tablet, Take 40 mg by mouth nightly , Disp: , Rfl:     pantoprazole (PROTONIX) 40 MG tablet, Take 40 mg by mouth daily , Disp: , Rfl:     tiotropium (SPIRIVA RESPIMAT) 1.25 MCG/ACT AERS inhaler, Inhale 2 puffs into the lungs daily, Disp: , Rfl:     montelukast (SINGULAIR) 10 MG tablet, Take 10 mg by mouth nightly , Disp: , Rfl:     aspirin 81 MG tablet, Take 81 mg by mouth daily, Disp: , Rfl:     AJOVY 225 MG/1.5ML SOAJ, INJECT 225 MG INTO THE SKIN EVERY 30 DAYS (Patient not taking: Reported on 5/10/2021), Disp: 1 pen, Rfl: 5    albuterol sulfate HFA (PROAIR HFA) 108 (90 Base) MCG/ACT inhaler, Inhale 2 puffs into the lungs every 6 hours as needed for Wheezing, Disp: 1 Inhaler, Rfl: 0    REVIEW OF SYSTEMS  Review of Systems   Constitutional: Negative for appetite change, fatigue and unexpected weight change. HENT: Negative for drooling, hearing loss, tinnitus and trouble swallowing. Eyes: Negative for photophobia and visual disturbance. Respiratory: Negative for shortness of breath. Cardiovascular: Negative for palpitations. Gastrointestinal: Negative for nausea and vomiting. Endocrine: Negative for polyuria. Genitourinary: Negative for flank pain. Musculoskeletal: Negative for back pain, neck pain and neck stiffness. Skin: Negative for rash. Allergic/Immunologic: Negative for food allergies.    Neurological: Negative for dizziness, tremors, seizures, syncope, speech difficulty, weakness, light-headedness, numbness and headaches. Hematological: Negative for adenopathy. Psychiatric/Behavioral: Negative for agitation, behavioral problems and sleep disturbance. PHYSICAL EXAM:   /85   Pulse 64   Temp 98.4 °F (36.9 °C) (Temporal)   Resp 16   Ht 5' 5\" (1.651 m)   Wt 187 lb (84.8 kg)   LMP 10/01/2018 (Approximate)   SpO2 98%   BMI 31.12 kg/m²   GENERAL APPEARANCE: Alert, well-developed, well-nourished female in no acute distress. HEENT: Normocephalic and atraumatic. PERRL. Oropharynx unremarkable. PULM: Normal respiratory effort. No accessory muscle use. CV: RRR. ABDOMEN: Soft, nontender. EXTREMITIES: No obvious signs of vascular compromise. Pulses present. No cyanosis, clubbing or edema. SKIN: Clear; no rashes, lesions or skin breaks in exposed areas. NEURO:     Neurological examination     MENTAL STATUS: Patient awake and oriented to time, place, and person. Recent/remote memory normal. Attention span/concentration normal. Speech fluent. Good comprehension, naming, and repetition. Fund of knowledge appropriate for patient's level of education. Affect normal.    CRANIAL NERVES:  CN I: Not tested. CN II: Fundoscopic exam not performed. CN III, IV, VI: Pupils equal, round and reactive to light; extra ocular movements full and intact. CN V: Facial sensation normal.  CN VII: No facial asymmetry. CN VIII:  Hearing grossly normal bilaterally. No pathologic nystagmus or skew deviation. CN IX, X: Palate elevates symmetrically. CN XI: Shoulder shrug and chin rotation equal with intact strength. CN XII: Tongue protrusion midline. MOTOR: Normal bulk. Tone normal and symmetric throughout. Strength 5/5 throughout. ABNORMAL MOVEMENTS/TREMORS: No     REFLEXES: DTRs 2+; normal and symmetric throughout. Plantar response downgoing.     SENSATION: Sensation grossly intact to fine touch, pain/temperature, vibration and position. COORDINATION: Finger-to-nose and heel to shin normal for age and symmetric. Finger tapping and alternating movements normal.    STATION: Negative Romberg. GAIT:  Normal heel, toe and tandem; no ataxia. DIAGNOSTIC TESTS:     I have personally reviewed the most recent lab results:    Sodium   Date Value Ref Range Status   09/23/2020 141 132 - 146 mmol/L Final   11/22/2019 142 132 - 146 mmol/L Final   10/02/2019 142 132 - 146 mmol/L Final     Potassium   Date Value Ref Range Status   09/23/2020 4.0 3.5 - 5.0 mmol/L Final   11/22/2019 4.4 3.5 - 5.0 mmol/L Final   10/02/2019 4.9 3.5 - 5.0 mmol/L Final     Chloride   Date Value Ref Range Status   09/23/2020 106 98 - 107 mmol/L Final   11/22/2019 101 98 - 107 mmol/L Final   10/02/2019 106 98 - 107 mmol/L Final     CO2   Date Value Ref Range Status   09/23/2020 27 22 - 29 mmol/L Final   11/22/2019 27 22 - 29 mmol/L Final   10/02/2019 26 22 - 29 mmol/L Final     BUN   Date Value Ref Range Status   09/23/2020 11 6 - 20 mg/dL Final   11/22/2019 13 6 - 20 mg/dL Final   10/02/2019 9 6 - 20 mg/dL Final     CREATININE   Date Value Ref Range Status   09/23/2020 1.0 0.5 - 1.0 mg/dL Final   11/22/2019 1.0 0.5 - 1.0 mg/dL Final   10/02/2019 0.9 0.5 - 1.0 mg/dL Final     GFR Non-   Date Value Ref Range Status   09/23/2020 >60 >=60 mL/min/1.73 Final     Comment:     Chronic Kidney Disease: less than 60 ml/min/1.73 sq.m. Kidney Failure: less than 15 ml/min/1.73 sq.m. Results valid for patients 18 years and older. 11/22/2019 >60 >=60 mL/min/1.73 Final     Comment:     Chronic Kidney Disease: less than 60 ml/min/1.73 sq.m. Kidney Failure: less than 15 ml/min/1.73 sq.m. Results valid for patients 18 years and older. 10/02/2019 >60 >=60 mL/min/1.73 Final     Comment:     Chronic Kidney Disease: less than 60 ml/min/1.73 sq.m. Kidney Failure: less than 15 ml/min/1.73 sq.m. Results valid for patients 18 years and older. Calcium   Date Value Ref Range Status   09/23/2020 9.7 8.6 - 10.2 mg/dL Final   11/22/2019 9.8 8.6 - 10.2 mg/dL Final   10/02/2019 9.0 8.6 - 10.2 mg/dL Final     Magnesium   Date Value Ref Range Status   10/22/2017 2.0 1.6 - 2.6 mg/dL Final     WBC   Date Value Ref Range Status   09/23/2020 6.2 4.5 - 11.5 E9/L Final   10/02/2019 6.3 4.5 - 11.5 E9/L Final   02/02/2019 6.8 4.5 - 11.5 E9/L Final     Hemoglobin   Date Value Ref Range Status   09/23/2020 13.8 11.5 - 15.5 g/dL Final   10/02/2019 12.5 11.5 - 15.5 g/dL Final   02/02/2019 13.1 11.5 - 15.5 g/dL Final     Hematocrit   Date Value Ref Range Status   09/23/2020 43.4 34.0 - 48.0 % Final   10/02/2019 39.0 34.0 - 48.0 % Final   02/02/2019 41.3 34.0 - 48.0 % Final     Platelets   Date Value Ref Range Status   09/23/2020 235 130 - 450 E9/L Final   10/02/2019 213 130 - 450 E9/L Final   02/02/2019 252 130 - 450 E9/L Final     Neutrophils %   Date Value Ref Range Status   09/23/2020 55.7 43.0 - 80.0 % Final   10/02/2019 53.2 43.0 - 80.0 % Final   02/02/2019 63.0 43.0 - 80.0 % Final     Monocytes %   Date Value Ref Range Status   09/23/2020 7.1 2.0 - 12.0 % Final   10/02/2019 7.8 2.0 - 12.0 % Final   02/02/2019 7.2 2.0 - 12.0 % Final     Eosinophils %   Date Value Ref Range Status   02/02/2017 1.5 1 - 4 % Final     Total Protein   Date Value Ref Range Status   09/23/2020 7.8 6.4 - 8.3 g/dL Final   11/22/2019 7.7 6.4 - 8.3 g/dL Final   10/02/2019 7.1 6.4 - 8.3 g/dL Final     Total Bilirubin   Date Value Ref Range Status   09/23/2020 <0.2 0.0 - 1.2 mg/dL Final   11/22/2019 <0.2 0.0 - 1.2 mg/dL Final   10/02/2019 <0.2 0.0 - 1.2 mg/dL Final     Alkaline Phosphatase   Date Value Ref Range Status   09/23/2020 148 (H) 35 - 104 U/L Final   11/22/2019 132 (H) 35 - 104 U/L Final   10/02/2019 110 (H) 35 - 104 U/L Final     ALT   Date Value Ref Range Status   09/23/2020 14 0 - 32 U/L Final   11/22/2019 29 0 - 32 U/L Final   10/02/2019 29 0 - 32 U/L Final     AST   Date Value Ref Range Status   09/23/2020 18 0 - 31 U/L Final   11/22/2019 24 0 - 31 U/L Final   10/02/2019 34 (H) 0 - 31 U/L Final     Comment:     Specimen is slightly Hemolyzed. Result may be artificially increased. Lab Results   Component Value Date    INR 1.1 10/22/2017     Lab Results   Component Value Date    TRIG 119 02/22/2018    HDL 69 11/22/2019     No components found for: HGBA1C  No results found for: PROTEINCSF, GLUCCSF, WBCCSF    Controlled Substance Monitoring:    Acute and Chronic Pain Monitoring:   RX Monitoring 9/14/2018   Attestation The Prescription Monitoring Report for this patient was reviewed today. Periodic Controlled Substance Monitoring -   Chronic Pain > 80 MEDD -       MEDICAL DECISION MAKING  ASSESSMENT/PLAN    Pinky Desai was seen today for headache and back pain. Diagnoses and all orders for this visit:      Intractable chronic migraine without aura and with status migrainosus    Medication side effect    Chronic daily headache    Chiari I malformation (HCC)    Cervicalgia    -     Ubrogepant (UBRELVY) 100 MG TABS; TAKE 1 TABLET IF NEEDED MAY TAKE 1 MORE DOSE AFTER 1 HOUR IF NEEDED - DO NOT TAKE MORE THAN 200 MG IN 24 HRS  -     Erenumab-aooe 70 MG/ML SOAJ; Inject 70 mg into the skin every 30 days Lot: [id number] Exp: 11/2021    · Continue on Ajovy  · On Ubrelvy and rizatriptan  · Continue on Trokendi 200 mg daily  · She recently tried Ajovy which was significantly helping her symptoms however had to stop because of severe body itching. · Start on [address]. Risk benefits and side effects of medication were discussed in detail with the patient. · Pericranial nerve block and trigger point patient is unavailable for abortive treatment of headaches. · Do not drive if you have taken a prescription pain medicine. · Rest in a quiet, dark room until your headache is gone. Close your eyes, and try to relax or go to sleep. Don't watch TV or read.   · Put a cold, moist cloth or cold pack on the painful area for 10 to 20 minutes at a time. Put a thin cloth between the cold pack and your skin. · Use a warm, moist towel or a heating pad set on low to relax tight shoulder and neck muscles. · Have someone gently massage your neck and shoulders. · Don't take medicine for headache pain too often. Taking too much pain medicine can lead to more headaches. These are called medicine-overuse headaches. · Keep a headache diary so you can figure out what triggers your headaches. Avoiding triggers may help you prevent headaches. Record when each headache began, how long it lasted, and what the pain was like. Write down any other symptoms you had with the headache, such as nausea, flashing lights or dark spots, or sensitivity to bright light or loud noise. Note if the headache occurred near your period. List anything that might have triggered the headache. Triggers may include certain foods (chocolate, cheese, wine) or odors, smoke, bright light, stress, or lack of sleep. · Find healthy ways to deal with stress. Migraines are most common during or right after stressful times. Try finding ways to reduce stress like practicing mindfulness or deep breathing exercises. · Get plenty of sleep and exercise. But be careful to not push yourself too hard during exercise. It may trigger a headache. · Eat meals on a regular schedule. Avoid foods and drinks that often trigger migraines. These include chocolate, alcohol (especially red wine and port), aspartame, monosodium glutamate (MSG), and some additives found in foods (such as hot dogs, kumar, cold cuts, aged cheeses, and pickled foods). · Limit caffeine. Don't drink too much coffee, tea, or soda. But don't quit caffeine suddenly. That can also give you migraines. · Do not smoke or allow others to smoke around you.        Follow-up in 3 months    Patient's current medication list, allergies, problem list and results of all previously ordered testing and scans were reviewed at today's visit.       Bobby Miller MD  Neurology & Clinical Neurophysiology    [facility]  [address] [facility],  [id number] [place] [address], [facility]  Office phone: [phone]

## 2021-05-11 ENCOUNTER — OFFICE VISIT (OUTPATIENT)
Dept: FAMILY MEDICINE CLINIC | Age: 51
End: 2021-05-11
Payer: COMMERCIAL

## 2021-05-11 VITALS
BODY MASS INDEX: 32.15 KG/M2 | OXYGEN SATURATION: 97 % | WEIGHT: 193 LBS | HEIGHT: 65 IN | TEMPERATURE: 98 F | SYSTOLIC BLOOD PRESSURE: 132 MMHG | HEART RATE: 78 BPM | RESPIRATION RATE: 18 BRPM | DIASTOLIC BLOOD PRESSURE: 80 MMHG

## 2021-05-11 DIAGNOSIS — T75.3XXA MOTION SICKNESS, INITIAL ENCOUNTER: ICD-10-CM

## 2021-05-11 DIAGNOSIS — I10 ESSENTIAL HYPERTENSION: ICD-10-CM

## 2021-05-11 DIAGNOSIS — M79.641 RIGHT HAND PAIN: ICD-10-CM

## 2021-05-11 DIAGNOSIS — R60.9 PERIPHERAL EDEMA: Primary | ICD-10-CM

## 2021-05-11 PROCEDURE — 99214 OFFICE O/P EST MOD 30 MIN: CPT | Performed by: FAMILY MEDICINE

## 2021-05-11 PROCEDURE — G8417 CALC BMI ABV UP PARAM F/U: HCPCS | Performed by: FAMILY MEDICINE

## 2021-05-11 PROCEDURE — G8427 DOCREV CUR MEDS BY ELIG CLIN: HCPCS | Performed by: FAMILY MEDICINE

## 2021-05-11 PROCEDURE — 1036F TOBACCO NON-USER: CPT | Performed by: FAMILY MEDICINE

## 2021-05-11 PROCEDURE — 3017F COLORECTAL CA SCREEN DOC REV: CPT | Performed by: FAMILY MEDICINE

## 2021-05-11 RX ORDER — ONDANSETRON 4 MG/1
4 TABLET, FILM COATED ORAL EVERY 8 HOURS PRN
Qty: 30 TABLET | Refills: 1 | Status: SHIPPED
Start: 2021-05-11 | End: 2022-02-22 | Stop reason: SDUPTHER

## 2021-05-11 RX ORDER — ASCORBIC ACID, CHOLECALCIFEROL, .ALPHA.-TOCOPHEROL ACETATE, DL-, PYRIDOXINE HYDROCHLORIDE, FOLIC ACID, CYANOCOBALAMIN, BIOTIN, CALCIUM CARBONATE, FERROUS ASPARTO GLYCINATE, IRON, POTASSIUM IODIDE, MAGNESIUM OXIDE, DOCONEXENT AND LOWBUSH BLUEBERRY 60; 1000; 10; 26; 400; 13; 280; 80; 9; 9; 150; 25; 350; 25; 600 MG/1; [IU]/1; [IU]/1; MG/1; UG/1; UG/1; UG/1; MG/1; MG/1; MG/1; UG/1; MG/1; MG/1; MG/1; UG/1
1 CAPSULE, GELATIN COATED ORAL DAILY
Qty: 30 CAPSULE | Refills: 4 | Status: SHIPPED
Start: 2021-05-11 | End: 2021-08-19 | Stop reason: SDUPTHER

## 2021-05-11 RX ORDER — FUROSEMIDE 20 MG/1
20 TABLET ORAL DAILY
Qty: 60 TABLET | Refills: 3 | Status: SHIPPED
Start: 2021-05-11 | End: 2021-08-20

## 2021-05-11 SDOH — ECONOMIC STABILITY: FOOD INSECURITY: WITHIN THE PAST 12 MONTHS, THE FOOD YOU BOUGHT JUST DIDN'T LAST AND YOU DIDN'T HAVE MONEY TO GET MORE.: NEVER TRUE

## 2021-05-11 SDOH — ECONOMIC STABILITY: TRANSPORTATION INSECURITY
IN THE PAST 12 MONTHS, HAS THE LACK OF TRANSPORTATION KEPT YOU FROM MEDICAL APPOINTMENTS OR FROM GETTING MEDICATIONS?: NOT ASKED

## 2021-05-11 SDOH — ECONOMIC STABILITY: TRANSPORTATION INSECURITY
IN THE PAST 12 MONTHS, HAS LACK OF TRANSPORTATION KEPT YOU FROM MEETINGS, WORK, OR FROM GETTING THINGS NEEDED FOR DAILY LIVING?: NOT ASKED

## 2021-05-11 ASSESSMENT — PATIENT HEALTH QUESTIONNAIRE - PHQ9
SUM OF ALL RESPONSES TO PHQ QUESTIONS 1-9: 0
2. FEELING DOWN, DEPRESSED OR HOPELESS: 0
1. LITTLE INTEREST OR PLEASURE IN DOING THINGS: 0

## 2021-05-11 NOTE — PROGRESS NOTES
5/11/2021    Chief Complaint   Patient presents with    Hypertension    Leg Swelling     been haveing swelling in legs     Weight Gain     gains 2 lbs per day    Hand Problem     hands jerk     Fatigue       Vivian Mccord is a 48 y.o. patient that presents today for:    Hypertension: Here for follow up chronic hypertension. Patient is  compliant with lifestyle modifications like exercise and adherence to a low salt diet. Patient is  well controlled. Denies chest pain, diaphoresis, dyspnea, dyspnea on exertion, peripheral edema, palpitations, HA, visual issues. Med list reviewed. Taking as prescribed. No adverse effects. Edema: Patient complains of edema. The location of the edema is lower leg(s) bilateral.  The edema has been localized. Onset of symptoms was several months ago, gradually worsening since that time. The edema is present in the afternoon and in the evening. The patient states the problem is long-standing. The swelling has been aggravated by dependency of involved area, relieved by diuretics, and been associated with weight gain. Cardiac risk factors include obesity (BMI >= 30 kg/m2) and sedentary lifestyle. Normal ECHO 2016. Feeling well otherwise, no complaints. Patient's past medical, surgical, social and/or family history reviewed, updated in chart, and are non-contributory (unless otherwise stated). Medications and allergies also reviewed and updated in chart.     ROS negative unless otherwise specified    Physical Exam  Wt Readings from Last 3 Encounters:   05/11/21 193 lb (87.5 kg)   05/10/21 187 lb (84.8 kg)   12/17/20 177 lb (80.3 kg)     Temp Readings from Last 3 Encounters:   05/11/21 98 °F (36.7 °C)   05/10/21 98.4 °F (36.9 °C) (Temporal)   12/17/20 96.7 °F (35.9 °C) (Temporal)     BP Readings from Last 3 Encounters:   05/11/21 132/80   05/10/21 130/85   12/17/20 124/74     Pulse Readings from Last 3 Encounters:   05/11/21 78   05/10/21 64   12/17/20 69 General appearance: alert, well appearing, and in no distress, oriented to person, place, and time and normal appearing weight. CVS exam: normal rate, regular rhythm, normal S1, S2, no murmurs, rubs, clicks or gallops. Radial pulses 2+ bilateral.  PT/DP pulse 2+ bilat. No C/C/E    Chest: clear to auscultation, no wheezes, rales or rhonchi, symmetric air entry. Abdomen: Soft, non-tender, non-distended, positive BS in all 4 quadrants    Extremities:Dorsalis pedis pulses palpated bilaterally, no clubbing, cyanosis, edema or erythema     SKIN: warm, dry, no lesions, jaundice, petechiae, pallor, cyanosis, ecchymosis    NEURO: gross motor exam normal by observation, gait normal    Mental status - alert, oriented to person, place, and time, normal mood, behavior, speech, dress, motor activity, and thought processes      Assessment/Plan  Genoveva Aschoff was seen today for hypertension, leg swelling, weight gain, hand problem and fatigue. Diagnoses and all orders for this visit:    Peripheral edema  -     CBC; Future  -     Comprehensive Metabolic Panel; Future  -     Lipid Panel; Future  -     TSH without Reflex; Future  -     furosemide (LASIX) 20 MG tablet; Take 1 tablet by mouth daily    Essential hypertension  -     CBC; Future  -     Comprehensive Metabolic Panel; Future  -     Lipid Panel; Future  -     TSH without Reflex; Future  -     furosemide (LASIX) 20 MG tablet; Take 1 tablet by mouth daily        Return in about 12 days (around 5/23/2021) for Labs prior to next visit. Call or go to ED immediately if symptoms worsen or persist.    Educational materials and/or home exercises printed for patient's review and were included in patient instructions on his/her After Visit Summary and given to patient at the end of visit. Counseled regarding above diagnosis, including possible risks and complications,  especially if left uncontrolled.     Counseled regarding the possible side effects, risks, benefits and alternatives to treatment; patient and/or guardian verbalizes understanding, agrees, feels comfortable with and wishes to proceed with above treatment plan. Advised patient to call with any new medication issues, and read all Rx info from pharmacy to assure aware of all possible risks and side effects of medication before taking. Reviewed age and gender appropriate health screening exams and vaccinations. Advised patient regarding importance of keeping up with recommended health maintenance and to schedule as soon as possible if overdue, as this is important in assessing for undiagnosed pathology, especially cancer, as well as protecting against potentially harmful/life threatening disease. Patient and/or guardian verbalizes understanding and agrees with above counseling, assessment and plan. All questions answered.       Samra Mcpherson, DO

## 2021-05-11 NOTE — PATIENT INSTRUCTIONS
Patient Education        Low Sodium Diet (2,000 Milligram): Care Instructions  Overview     Limiting sodium can be an important part of managing some health problems. The most common source of sodium is salt. People get most of the salt in their diet from canned, prepared, and packaged foods. Fast food and restaurant meals also are very high in sodium. Your doctor will probably limit your sodium to less than 2,000 milligrams (mg) a day. This limit counts all the sodium in prepared and packaged foods and any salt you add to your food. Follow-up care is a key part of your treatment and safety. Be sure to make and go to all appointments, and call your doctor if you are having problems. It's also a good idea to know your test results and keep a list of the medicines you take. How can you care for yourself at home? Read food labels  · Read labels on cans and food packages. The labels tell you how much sodium is in each serving. Make sure that you look at the serving size. If you eat more than the serving size, you have eaten more sodium. · Food labels also tell you the Percent Daily Value for sodium. Choose products with low Percent Daily Values for sodium. · Be aware that sodium can come in forms other than salt, including monosodium glutamate (MSG), sodium citrate, and sodium bicarbonate (baking soda). MSG is often added to Asian food. When you eat out, you can sometimes ask for food without MSG or added salt. Buy low-sodium foods  · Buy foods that are labeled \"unsalted\" (no salt added), \"sodium-free\" (less than 5 mg of sodium per serving), or \"low-sodium\" (140 mg or less of sodium per serving). Foods labeled \"reduced-sodium\" and \"light sodium\" may still have too much sodium. Be sure to read the label to see how much sodium you are getting. · Buy fresh vegetables, or frozen vegetables without added sauces. Buy low-sodium versions of canned vegetables, soups, and other canned goods.   Prepare low-sodium meals · Cut back on the amount of salt you use in cooking. This will help you adjust to the taste. Do not add salt after cooking. One teaspoon of salt has about 2,300 mg of sodium. · Take the salt shaker off the table. · Flavor your food with garlic, lemon juice, onion, vinegar, herbs, and spices. Do not use soy sauce, lite soy sauce, steak sauce, onion salt, garlic salt, celery salt, or ketchup on your food. · Use low-sodium salad dressings, sauces, and ketchup. Or make your own salad dressings and sauces without adding salt. · Use less salt (or none) when recipes call for it. You can often use half the salt a recipe calls for without losing flavor. Other foods such as rice, pasta, and grains do not need added salt. · Rinse canned vegetables, and cook them in fresh water. This removes somebut not allof the salt. · Avoid water that is naturally high in sodium or that has been treated with water softeners, which add sodium. If you buy bottled water, read the label and choose a sodium-free brand. Avoid high-sodium foods  · Avoid eating:  ? Smoked, cured, salted, and canned meat, fish, and poultry. ? Ham, kumar, hot dogs, and luncheon meats. ? Regular, hard, and processed cheese and regular peanut butter. ? Crackers with salted tops, and other salted snack foods such as pretzels, chips, and salted popcorn. ? Frozen prepared meals, unless labeled low-sodium. ? Canned and dried soups, broths, and bouillon, unless labeled sodium-free or low-sodium. ? Canned vegetables, unless labeled sodium-free or low-sodium. ? [place] fries, pizza, tacos, and other fast foods. ? Pickles, olives, ketchup, and other condiments, especially soy sauce, unless labeled sodium-free or low-sodium. Where can you learn more? Go to [email or web address]://dolores.[email or web address]-vinyl. org and sign in to your [employer] account. Enter S602 in the [facility] box to learn more about \"Low Sodium Diet (2,000 Milligram): Care Instructions. \"     If you do not have an account, please click on the \"Sign Up Now\" link. Current as of: December 17, 2020               Content Version: 12.8  © 2006-2021 Healthwise[employer] Incorporated. Care instructions adapted under license by [facility] ([facility]). If you have questions about a medical condition or this instruction, always ask your healthcare professional. [address] any warranty or liability for your use of this information.

## 2021-05-19 ENCOUNTER — HOSPITAL ENCOUNTER (EMERGENCY)
Age: 51
Discharge: HOME | End: 2021-05-19
Attending: EMERGENCY MEDICINE
Payer: COMMERCIAL

## 2021-05-19 ENCOUNTER — APPOINTMENT (OUTPATIENT)
Dept: GENERAL RADIOLOGY | Age: 51
End: 2021-05-19
Payer: COMMERCIAL

## 2021-05-19 VITALS
SYSTOLIC BLOOD PRESSURE: 134 MMHG | OXYGEN SATURATION: 98 % | DIASTOLIC BLOOD PRESSURE: 73 MMHG | TEMPERATURE: 98 F | HEIGHT: 65 IN | RESPIRATION RATE: 15 BRPM | BODY MASS INDEX: 32.65 KG/M2 | HEART RATE: 77 BPM | WEIGHT: 196 LBS

## 2021-05-19 DIAGNOSIS — R07.89 CHEST WALL PAIN: Primary | ICD-10-CM

## 2021-05-19 LAB
ALBUMIN SERPL-MCNC: 4 G/DL (ref 3.5–5.2)
ALP BLD-CCNC: 127 U/L (ref 35–104)
ALT SERPL-CCNC: 17 U/L (ref 0–32)
ANION GAP SERPL CALCULATED.3IONS-SCNC: 9 MMOL/L (ref 7–16)
AST SERPL-CCNC: 23 U/L (ref 0–31)
BASOPHILS ABSOLUTE: 0.03 E9/L (ref 0–0.2)
BASOPHILS RELATIVE PERCENT: 0.5 % (ref 0–2)
BILIRUB SERPL-MCNC: 0.2 MG/DL (ref 0–1.2)
BUN BLDV-MCNC: 8 MG/DL (ref 6–20)
CALCIUM SERPL-MCNC: 9.3 MG/DL (ref 8.6–10.2)
CHLORIDE BLD-SCNC: 102 MMOL/L (ref 98–107)
CO2: 27 MMOL/L (ref 22–29)
CREAT SERPL-MCNC: 0.9 MG/DL (ref 0.5–1)
D DIMER: 226 NG/ML DDU
EKG ATRIAL RATE: 78 BPM
EKG P AXIS: 49 DEGREES
EKG P-R INTERVAL: 120 MS
EKG Q-T INTERVAL: 396 MS
EKG QRS DURATION: 78 MS
EKG QTC CALCULATION (BAZETT): 451 MS
EKG R AXIS: -25 DEGREES
EKG T AXIS: -16 DEGREES
EKG VENTRICULAR RATE: 78 BPM
EOSINOPHILS ABSOLUTE: 0.09 E9/L (ref 0.05–0.5)
EOSINOPHILS RELATIVE PERCENT: 1.5 % (ref 0–6)
GFR AFRICAN AMERICAN: >60
GFR NON-AFRICAN AMERICAN: >60 ML/MIN/1.73
GLUCOSE BLD-MCNC: 97 MG/DL (ref 74–99)
HCT VFR BLD CALC: 40.9 % (ref 34–48)
HEMOGLOBIN: 12.8 G/DL (ref 11.5–15.5)
IMMATURE GRANULOCYTES #: 0.1 E9/L
IMMATURE GRANULOCYTES %: 1.7 % (ref 0–5)
LYMPHOCYTES ABSOLUTE: 1.9 E9/L (ref 1.5–4)
LYMPHOCYTES RELATIVE PERCENT: 32.1 % (ref 20–42)
MCH RBC QN AUTO: 27.1 PG (ref 26–35)
MCHC RBC AUTO-ENTMCNC: 31.3 % (ref 32–34.5)
MCV RBC AUTO: 86.5 FL (ref 80–99.9)
MONOCYTES ABSOLUTE: 0.45 E9/L (ref 0.1–0.95)
MONOCYTES RELATIVE PERCENT: 7.6 % (ref 2–12)
NEUTROPHILS ABSOLUTE: 3.35 E9/L (ref 1.8–7.3)
NEUTROPHILS RELATIVE PERCENT: 56.6 % (ref 43–80)
PDW BLD-RTO: 13.4 FL (ref 11.5–15)
PLATELET # BLD: 209 E9/L (ref 130–450)
PMV BLD AUTO: 10.4 FL (ref 7–12)
POTASSIUM SERPL-SCNC: 4.1 MMOL/L (ref 3.5–5)
PRO-BNP: 151 PG/ML (ref 0–125)
RBC # BLD: 4.73 E12/L (ref 3.5–5.5)
SODIUM BLD-SCNC: 138 MMOL/L (ref 132–146)
TOTAL PROTEIN: 7.1 G/DL (ref 6.4–8.3)
TROPONIN: <0.01 NG/ML (ref 0–0.03)
WBC # BLD: 5.9 E9/L (ref 4.5–11.5)

## 2021-05-19 PROCEDURE — 99284 EMERGENCY DEPT VISIT MOD MDM: CPT

## 2021-05-19 PROCEDURE — 6360000002 HC RX W HCPCS: Performed by: STUDENT IN AN ORGANIZED HEALTH CARE EDUCATION/TRAINING PROGRAM

## 2021-05-19 PROCEDURE — 93005 ELECTROCARDIOGRAM TRACING: CPT | Performed by: EMERGENCY MEDICINE

## 2021-05-19 PROCEDURE — 85025 COMPLETE CBC W/AUTO DIFF WBC: CPT

## 2021-05-19 PROCEDURE — 80053 COMPREHEN METABOLIC PANEL: CPT

## 2021-05-19 PROCEDURE — 84484 ASSAY OF TROPONIN QUANT: CPT

## 2021-05-19 PROCEDURE — 83880 ASSAY OF NATRIURETIC PEPTIDE: CPT

## 2021-05-19 PROCEDURE — 93010 ELECTROCARDIOGRAM REPORT: CPT | Performed by: INTERNAL MEDICINE

## 2021-05-19 PROCEDURE — 85378 FIBRIN DEGRADE SEMIQUANT: CPT

## 2021-05-19 PROCEDURE — 96372 THER/PROPH/DIAG INJ SC/IM: CPT

## 2021-05-19 PROCEDURE — 71046 X-RAY EXAM CHEST 2 VIEWS: CPT

## 2021-05-19 RX ORDER — ORPHENADRINE CITRATE 30 MG/ML
60 INJECTION INTRAMUSCULAR; INTRAVENOUS ONCE
Status: COMPLETED | OUTPATIENT
Start: 2021-05-19 | End: 2021-05-19

## 2021-05-19 RX ORDER — CYCLOBENZAPRINE HCL 10 MG
10 TABLET ORAL 3 TIMES DAILY PRN
Qty: 10 TABLET | Refills: 0 | Status: SHIPPED | OUTPATIENT
Start: 2021-05-19 | End: 2021-05-29

## 2021-05-19 RX ADMIN — ORPHENADRINE CITRATE 60 MG: 30 INJECTION INTRAMUSCULAR; INTRAVENOUS at 13:21

## 2021-05-19 ASSESSMENT — ENCOUNTER SYMPTOMS
VOMITING: 0
SORE THROAT: 0
COUGH: 0
BACK PAIN: 0
RHINORRHEA: 1
NAUSEA: 0
DIARRHEA: 0
ABDOMINAL PAIN: 0
SHORTNESS OF BREATH: 1

## 2021-05-19 ASSESSMENT — PAIN DESCRIPTION - PAIN TYPE: TYPE: ACUTE PAIN

## 2021-05-19 ASSESSMENT — PAIN DESCRIPTION - FREQUENCY: FREQUENCY: CONTINUOUS

## 2021-05-19 ASSESSMENT — PAIN DESCRIPTION - LOCATION: LOCATION: BACK;CHEST

## 2021-05-19 NOTE — ED NOTES
Bed:  [place] [address]  Expected date:   Expected time:   Means of arrival:   Comments:  [facility]  05/19/21 [id number]

## 2021-05-19 NOTE — ED PROVIDER NOTES
Sin Lehman is a 48 y.o. female with a PMHx significant for GERD, HTN, Asthma, Chiari malformation who presents for evaluation of right sided neck and chest discomfort, beginning prior to arrival.  The complaint has been persistent, moderate in severity, and worsened by movement. The patient states that she started to have some congestion four days ago. During that time she also felt that she had a pinched nerve in her neck causing pain down her right shoulder and chest. The pain is worse with position and movement. Patient had asthma exacerbation over the weekend as well, went to her pulmonologist's office. There she saw a provider who was concerned her pain was cardiac in nature so sent her in for further evaluation and treatment. The history is provided by the patient and medical records. Review of Systems   Constitutional: Negative for chills, diaphoresis and fever. HENT: Positive for congestion and rhinorrhea. Negative for sore throat. Eyes: Negative for visual disturbance. Respiratory: Positive for shortness of breath. Negative for cough. Cardiovascular: Positive for chest pain. Gastrointestinal: Negative for abdominal pain, diarrhea, nausea and vomiting. Genitourinary: Negative for dysuria and urgency. Musculoskeletal: Positive for neck pain. Negative for back pain. Skin: Negative for rash. Neurological: Negative for dizziness, light-headedness, numbness and headaches. Physical Exam  Vitals and nursing note reviewed. Constitutional:       General: She is not in acute distress. Appearance: Normal appearance. She is well-developed. She is not ill-appearing. HENT:      Head: Normocephalic and atraumatic. Right Ear: External ear normal.      Left Ear: External ear normal.   Eyes:      General:         Right eye: No discharge. Left eye: No discharge. Extraocular Movements: Extraocular movements intact.       Conjunctiva/sclera: Conjunctivae normal.   Cardiovascular:      Rate and Rhythm: Normal rate and regular rhythm. Heart sounds: Normal heart sounds. No murmur heard. Pulmonary:      Effort: Pulmonary effort is normal. No respiratory distress. Breath sounds: Normal breath sounds. No stridor. No wheezing. Abdominal:      General: There is no distension. Palpations: Abdomen is soft. There is no mass. Tenderness: There is no abdominal tenderness. Hernia: No hernia is present. Musculoskeletal:         General: Tenderness present. Cervical back: Normal range of motion and neck supple. Comments: To palpation along the right SCM and shoulder   Skin:     General: Skin is warm and dry. Coloration: Skin is not jaundiced or pale. Neurological:      Mental Status: She is alert and oriented to person, place, and time. Cranial Nerves: No cranial nerve deficit. Coordination: Coordination normal.          Procedures     MDM         EKG: This EKG is signed and interpreted by me. Rate: 78  Rhythm: Sinus  Interpretation: non-specific EKG, no ST elevations, nonspecific T waves in leads III and aVF which were on prior  Comparison: no significant changes when compared to the prior 10/02/2019    Jaden Healy presents to the ED for evaluation of right sided chest discomfort. Pain is made worse by palpation and arm movement. Workup in the ED revealed labs within normal limits; negative Dimer and troponin. Discussed with patient likely MSK in nature. She has has allergy to ibuprofen therefore toradol was not given instead she was just given norflex. Patient continues to be non-toxic on re-evaluation. Findings were discussed with the patient and reasons to immediately return to the ED were articulated to them.  They will follow-up with their PCP.  --------------------------------------------- PAST HISTORY ---------------------------------------------  Past Medical History:  has a past medical history of Asthma, Back injury, Chronic back pain, Chronic tension-type headache, not intractable, DDD (degenerative disc disease), cervical, DVT (deep venous thrombosis) ([facility] [address] GERD (gastroesophageal reflux disease), Herniated disc, cervical, Hx of screening mammography, HX OTHER MEDICAL, Hypertension, Migraine, Migraine, MVP (mitral valve prolapse), Numbness and tingling, Obesity, Sinus infection, Thyroid disease, Torn ACL, Ulcer, and Vitamin B 12 deficiency. Past Surgical History:  has a past surgical history that includes  section; Dilation and curettage of uterus; ECHO Complete 2D W Doppler W Color (10/24/2012); [place] tooth extraction; Tympanostomy tube placement; Breast lumpectomy (Right, 2013); Breast surgery (Right, 2013); ECHO Compl W Dop Color Flow (2015); Mammography digital diagnostic bilateral; other surgical history; Upper gastrointestinal endoscopy; Colonoscopy; Knee arthroscopy (Right, ); Colonoscopy (2012); Upper gastrointestinal endoscopy (2012); cardiovascular stress test (2015); Hardware Removal (Right, 2018); and Knee arthroscopy (Right, ). Social History:  reports that she has never smoked. She has never used smokeless tobacco. She reports previous alcohol use. She reports that she does not use drugs. Family History: family history includes Cancer in her father; Cancer (age of onset: 43) in her sister; Coronary Art Dis in her father; Deep Vein Thrombosis in her sister; Heart Disease in her father; High Blood Pressure in her mother; Hypertension in her maternal grandmother; Seizures in her father. The patients home medications have been reviewed.     Allergies: Ceftin [cefuroxime axetil], Pcn [penicillins], Vicodin [hydrocodone-acetaminophen], Ibuprofen, Other, Percocet [oxycodone-acetaminophen], Sudafed [pseudoephedrine hcl], and Ajovy [fremanezumab-vfrm]    -------------------------------------------------- RESULTS -------------------------------------------------  Labs:  Results for orders placed or performed during the hospital encounter of 05/19/21   Troponin   Result Value Ref Range    Troponin <0.01 0.00 - 0.03 ng/mL   CBC Auto Differential   Result Value Ref Range    WBC 5.9 4.5 - 11.5 E9/L    RBC 4.73 3.50 - 5.50 E12/L    Hemoglobin 12.8 11.5 - 15.5 g/dL    Hematocrit 40.9 34.0 - 48.0 %    MCV 86.5 80.0 - 99.9 fL    MCH 27.1 26.0 - 35.0 pg    MCHC 31.3 (L) 32.0 - 34.5 %    RDW 13.4 11.5 - 15.0 fL    Platelets 201 455 - 202 E9/L    MPV 10.4 7.0 - 12.0 fL    Neutrophils % 56.6 43.0 - 80.0 %    Immature Granulocytes % 1.7 0.0 - 5.0 %    Lymphocytes % 32.1 20.0 - 42.0 %    Monocytes % 7.6 2.0 - 12.0 %    Eosinophils % 1.5 0.0 - 6.0 %    Basophils % 0.5 0.0 - 2.0 %    Neutrophils Absolute 3.35 1.80 - 7.30 E9/L    Immature Granulocytes # 0.10 E9/L    Lymphocytes Absolute 1.90 1.50 - 4.00 E9/L    Monocytes Absolute 0.45 0.10 - 0.95 E9/L    Eosinophils Absolute 0.09 0.05 - 0.50 E9/L    Basophils Absolute 0.03 0.00 - 0.20 E9/L   Comprehensive Metabolic Panel   Result Value Ref Range    Sodium 138 132 - 146 mmol/L    Potassium 4.1 3.5 - 5.0 mmol/L    Chloride 102 98 - 107 mmol/L    CO2 27 22 - 29 mmol/L    Anion Gap 9 7 - 16 mmol/L    Glucose 97 74 - 99 mg/dL    BUN 8 6 - 20 mg/dL    CREATININE 0.9 0.5 - 1.0 mg/dL    GFR Non-African American >60 >=60 mL/min/1.73    GFR African American >60     Calcium 9.3 8.6 - 10.2 mg/dL    Total Protein 7.1 6.4 - 8.3 g/dL    Albumin 4.0 3.5 - 5.2 g/dL    Total Bilirubin 0.2 0.0 - 1.2 mg/dL    Alkaline Phosphatase 127 (H) 35 - 104 U/L    ALT 17 0 - 32 U/L    AST 23 0 - 31 U/L   Brain Natriuretic Peptide   Result Value Ref Range    Pro- (H) 0 - 125 pg/mL   D-Dimer, Quantitative   Result Value Ref Range    D-Dimer, Quant 226 ng/mL DDU   EKG 12 Lead   Result Value Ref Range    Ventricular Rate 78 BPM    Atrial Rate 78 BPM    P-R Interval 120 ms    QRS Duration 78 ms    Q-T Interval 396 ms QTc Calculation (Bazett) 451 ms    P Axis 49 degrees    R Axis -25 degrees    T Axis -16 degrees       Radiology:  XR CHEST (2 VW)   Final Result   No radiographic evidence of acute cardiopulmonary disease process             ------------------------- NURSING NOTES AND VITALS REVIEWED ---------------------------  Date / Time Roomed:  5/19/2021 11:58 AM  ED Bed Assignment:  [id number]    The nursing notes within the ED encounter and vital signs as below have been reviewed. /73   Pulse 77   Temp 98 °F (36.7 °C) (Oral)   Resp 15   Ht 5' 5\" (1.651 m)   Wt 196 lb (88.9 kg)   LMP 10/01/2018 (Approximate)   SpO2 98%   BMI 32.62 kg/m²   Oxygen Saturation Interpretation: Normal      ------------------------------------------ PROGRESS NOTES ------------------------------------------  4:10 PM EDT  I have spoken with the patient and discussed todays results, in addition to providing specific details for the plan of care and counseling regarding the diagnosis and prognosis. Their questions are answered at this time and they are agreeable with the plan. I discussed at length with them reasons for immediate return here for re evaluation. They will followup with their primary care physician by calling their office tomorrow. --------------------------------- ADDITIONAL PROVIDER NOTES ---------------------------------  At this time the patient is without objective evidence of an acute process requiring hospitalization or inpatient management. They have remained hemodynamically stable throughout their entire ED visit and are stable for discharge with outpatient follow-up. The plan has been discussed in detail and they are aware of the specific conditions for emergent return, as well as the importance of follow-up. New Prescriptions    CYCLOBENZAPRINE (FLEXERIL) 10 MG TABLET    Take 1 tablet by mouth 3 times daily as needed for Muscle spasms       Diagnosis:  1.  Chest wall pain Disposition:  Patient's disposition: Discharge to home  Patient's condition is stable.        Sylvester Lemus DO  Resident  05/19/21 5535

## 2021-05-21 ENCOUNTER — HOSPITAL ENCOUNTER (OUTPATIENT)
Age: 51
Discharge: HOME | End: 2021-05-21
Payer: COMMERCIAL

## 2021-05-21 ENCOUNTER — HOSPITAL ENCOUNTER (OUTPATIENT)
Age: 51
Discharge: HOME | End: 2021-05-23
Payer: COMMERCIAL

## 2021-05-21 ENCOUNTER — HOSPITAL ENCOUNTER (OUTPATIENT)
Dept: GENERAL RADIOLOGY | Age: 51
Discharge: HOME | End: 2021-05-23
Payer: COMMERCIAL

## 2021-05-21 DIAGNOSIS — R60.9 PERIPHERAL EDEMA: ICD-10-CM

## 2021-05-21 DIAGNOSIS — M79.641 RIGHT HAND PAIN: ICD-10-CM

## 2021-05-21 DIAGNOSIS — I10 ESSENTIAL HYPERTENSION: ICD-10-CM

## 2021-05-21 LAB
ALBUMIN SERPL-MCNC: 3.9 G/DL (ref 3.5–5.2)
ALP BLD-CCNC: 123 U/L (ref 35–104)
ALT SERPL-CCNC: 25 U/L (ref 0–32)
ANION GAP SERPL CALCULATED.3IONS-SCNC: 12 MMOL/L (ref 7–16)
AST SERPL-CCNC: 37 U/L (ref 0–31)
BILIRUB SERPL-MCNC: 0.2 MG/DL (ref 0–1.2)
BUN BLDV-MCNC: 12 MG/DL (ref 6–20)
CALCIUM SERPL-MCNC: 9 MG/DL (ref 8.6–10.2)
CHLORIDE BLD-SCNC: 104 MMOL/L (ref 98–107)
CHOLESTEROL, TOTAL: 204 MG/DL (ref 0–199)
CO2: 24 MMOL/L (ref 22–29)
CREAT SERPL-MCNC: 1.1 MG/DL (ref 0.5–1)
GFR AFRICAN AMERICAN: >60
GFR NON-AFRICAN AMERICAN: >60 ML/MIN/1.73
GLUCOSE BLD-MCNC: 115 MG/DL (ref 74–99)
HCT VFR BLD CALC: 41.6 % (ref 34–48)
HDLC SERPL-MCNC: 70 MG/DL
HEMOGLOBIN: 12.8 G/DL (ref 11.5–15.5)
LDL CHOLESTEROL CALCULATED: 111 MG/DL (ref 0–99)
MCH RBC QN AUTO: 26.5 PG (ref 26–35)
MCHC RBC AUTO-ENTMCNC: 30.8 % (ref 32–34.5)
MCV RBC AUTO: 86.1 FL (ref 80–99.9)
PDW BLD-RTO: 13.6 FL (ref 11.5–15)
PLATELET # BLD: 209 E9/L (ref 130–450)
PMV BLD AUTO: 10.6 FL (ref 7–12)
POTASSIUM SERPL-SCNC: 3.9 MMOL/L (ref 3.5–5)
RBC # BLD: 4.83 E12/L (ref 3.5–5.5)
SODIUM BLD-SCNC: 140 MMOL/L (ref 132–146)
TOTAL PROTEIN: 7 G/DL (ref 6.4–8.3)
TRIGL SERPL-MCNC: 116 MG/DL (ref 0–149)
TSH SERPL DL<=0.05 MIU/L-ACNC: 1.42 UIU/ML (ref 0.27–4.2)
VLDLC SERPL CALC-MCNC: 23 MG/DL
WBC # BLD: 6.3 E9/L (ref 4.5–11.5)

## 2021-05-21 PROCEDURE — 73130 X-RAY EXAM OF HAND: CPT

## 2021-05-21 PROCEDURE — 36415 COLL VENOUS BLD VENIPUNCTURE: CPT

## 2021-05-21 PROCEDURE — 80053 COMPREHEN METABOLIC PANEL: CPT

## 2021-05-21 PROCEDURE — 85027 COMPLETE CBC AUTOMATED: CPT

## 2021-05-21 PROCEDURE — 84443 ASSAY THYROID STIM HORMONE: CPT

## 2021-05-21 PROCEDURE — 80061 LIPID PANEL: CPT

## 2021-05-24 ENCOUNTER — OFFICE VISIT (OUTPATIENT)
Dept: FAMILY MEDICINE CLINIC | Age: 51
End: 2021-05-24
Payer: COMMERCIAL

## 2021-05-24 VITALS
RESPIRATION RATE: 18 BRPM | DIASTOLIC BLOOD PRESSURE: 65 MMHG | TEMPERATURE: 98.1 F | HEIGHT: 65 IN | HEART RATE: 72 BPM | SYSTOLIC BLOOD PRESSURE: 127 MMHG | BODY MASS INDEX: 32.65 KG/M2 | OXYGEN SATURATION: 98 % | WEIGHT: 196 LBS

## 2021-05-24 DIAGNOSIS — J45.41 MODERATE PERSISTENT ASTHMA WITH ACUTE EXACERBATION: ICD-10-CM

## 2021-05-24 DIAGNOSIS — R73.03 PREDIABETES: ICD-10-CM

## 2021-05-24 DIAGNOSIS — Z12.31 ENCOUNTER FOR SCREENING MAMMOGRAM FOR MALIGNANT NEOPLASM OF BREAST: ICD-10-CM

## 2021-05-24 DIAGNOSIS — E78.5 ELEVATED LIPIDS: ICD-10-CM

## 2021-05-24 DIAGNOSIS — I10 ESSENTIAL HYPERTENSION: Primary | ICD-10-CM

## 2021-05-24 DIAGNOSIS — M79.641 RIGHT HAND PAIN: ICD-10-CM

## 2021-05-24 DIAGNOSIS — R60.9 PERIPHERAL EDEMA: ICD-10-CM

## 2021-05-24 PROCEDURE — 99214 OFFICE O/P EST MOD 30 MIN: CPT | Performed by: FAMILY MEDICINE

## 2021-05-24 PROCEDURE — 3017F COLORECTAL CA SCREEN DOC REV: CPT | Performed by: FAMILY MEDICINE

## 2021-05-24 PROCEDURE — G8417 CALC BMI ABV UP PARAM F/U: HCPCS | Performed by: FAMILY MEDICINE

## 2021-05-24 PROCEDURE — 1036F TOBACCO NON-USER: CPT | Performed by: FAMILY MEDICINE

## 2021-05-24 PROCEDURE — G8427 DOCREV CUR MEDS BY ELIG CLIN: HCPCS | Performed by: FAMILY MEDICINE

## 2021-05-24 NOTE — PROGRESS NOTES
5/24/2021    Chief Complaint   Patient presents with    Hypertension     1mo follow up BP    Edema     Bilateral legs    Follow-up     Asthma flare up 5-       Consuelo Garcia is a 48 y.o. patient that presents today for:    Hypertension: Here for follow up chronic hypertension. Patient is  compliant with lifestyle modifications like exercise and adherence to a low salt diet. Patient is  well controlled. Denies chest pain, diaphoresis, dyspnea, dyspnea on exertion, peripheral edema, palpitations, HA, visual issues. Med list reviewed. Taking as prescribed. No adverse effects. Legs are still edematous per patient account. Hyperlipidemia:  Patient presents with hyperlipidemia. Is asymptomatic. This is a chronic problem. Patient is  well controlled, as reviewed and seen on most recent labs. Compliance with treatment thus far has been adequate. No adverse effects. ASCVD 7.1%. Asthma:  Patient is here to address chronic asthma. This is not controlled. She uses a rescue inhaler due to uncontrolled symptoms such as wheezing and shortness of breath. She is  on a controller medication. Patient does see a Pulmonologist. Patient does not smoke. Does see Dr. Soniya Rangel. Hand pain is intermittent. Xray reviewed. Minimal OA. Feeling well otherwise, no complaints. Patient's past medical, surgical, social and/or family history reviewed, updated in chart, and are non-contributory (unless otherwise stated). Medications and allergies also reviewed and updated in chart.     ROS negative unless otherwise specified    Physical Exam  Wt Readings from Last 3 Encounters:   05/24/21 196 lb (88.9 kg)   05/19/21 196 lb (88.9 kg)   05/11/21 193 lb (87.5 kg)     Temp Readings from Last 3 Encounters:   05/24/21 98.1 °F (36.7 °C)   05/19/21 98 °F (36.7 °C) (Oral)   05/11/21 98 °F (36.7 °C)     BP Readings from Last 3 Encounters:   05/24/21 127/65   05/19/21 134/73   05/11/21 132/80     Pulse Readings from Last 3 Encounters:   05/24/21 72   05/19/21 77   05/11/21 78       General appearance: alert, well appearing, and in no distress, oriented to person, place, and time and normal appearing weight. CVS exam: normal rate, regular rhythm, normal S1, S2, no murmurs, rubs, clicks or gallops. Radial pulses 2+ bilateral.  PT/DP pulse 2+ bilat. No C/C/E    Chest: clear to auscultation, no wheezes, rales or rhonchi, symmetric air entry. Abdomen: Soft, non-tender, non-distended, positive BS in all 4 quadrants    Extremities:Dorsalis pedis pulses palpated bilaterally, no clubbing, cyanosis, edema or erythema     SKIN: warm, dry, no lesions, jaundice, petechiae, pallor, cyanosis, ecchymosis    NEURO: gross motor exam normal by observation, gait normal    Mental status - alert, oriented to person, place, and time, normal mood, behavior, speech, dress, motor activity, and thought processes      Assessment/Plan  Susu Marcano was seen today for hypertension, edema and follow-up. Diagnoses and all orders for this visit:    Essential hypertension  -     Comprehensive Metabolic Panel; Future    Right hand pain  - Advised on symptomatic relief. Peripheral edema  -  Stable, continue medications as prescribed. Moderate persistent asthma with acute exacerbation  - Per Pulm    Elevated lipids  -     Lipid Panel; Future    Prediabetes  -     Hemoglobin A1C; Future    Encounter for screening mammogram for malignant neoplasm of breast  -     [place] DIGITAL SCREEN W OR WO CAD BILATERAL; Future        Return in about 6 months (around 11/24/2021), or if symptoms worsen or fail to improve, for SCHEDULE ON [facility] in OCTOBER. Call or go to ED immediately if symptoms worsen or persist.    Educational materials and/or home exercises printed for patient's review and were included in patient instructions on his/her After Visit Summary and given to patient at the end of visit.        Counseled regarding above diagnosis, including possible risks and complications,  especially if left uncontrolled. Counseled regarding the possible side effects, risks, benefits and alternatives to treatment; patient and/or guardian verbalizes understanding, agrees, feels comfortable with and wishes to proceed with above treatment plan. Advised patient to call with any new medication issues, and read all Rx info from pharmacy to assure aware of all possible risks and side effects of medication before taking. Reviewed age and gender appropriate health screening exams and vaccinations. Advised patient regarding importance of keeping up with recommended health maintenance and to schedule as soon as possible if overdue, as this is important in assessing for undiagnosed pathology, especially cancer, as well as protecting against potentially harmful/life threatening disease. Patient and/or guardian verbalizes understanding and agrees with above counseling, assessment and plan. All questions answered.       Samra Mcpherson, DO

## 2021-06-28 DIAGNOSIS — G43.711 INTRACTABLE CHRONIC MIGRAINE WITHOUT AURA AND WITH STATUS MIGRAINOSUS: ICD-10-CM

## 2021-06-28 RX ORDER — UBROGEPANT 100 MG/1
TABLET ORAL
Qty: 8 TABLET | Refills: 0 | Status: SHIPPED
Start: 2021-06-28 | End: 2021-07-27 | Stop reason: SDUPTHER

## 2021-07-27 DIAGNOSIS — G43.711 INTRACTABLE CHRONIC MIGRAINE WITHOUT AURA AND WITH STATUS MIGRAINOSUS: ICD-10-CM

## 2021-07-27 RX ORDER — UBROGEPANT 100 MG/1
TABLET ORAL
Qty: 10 TABLET | Refills: 0 | Status: SHIPPED
Start: 2021-07-27 | End: 2021-09-22 | Stop reason: SDUPTHER

## 2021-08-12 ENCOUNTER — TELEPHONE (OUTPATIENT)
Dept: FAMILY MEDICINE CLINIC | Age: 51
End: 2021-08-12

## 2021-08-12 DIAGNOSIS — R60.9 PERIPHERAL EDEMA: Primary | ICD-10-CM

## 2021-08-12 DIAGNOSIS — I10 ESSENTIAL HYPERTENSION: ICD-10-CM

## 2021-08-12 RX ORDER — MECLIZINE HYDROCHLORIDE 25 MG/1
25 TABLET ORAL 3 TIMES DAILY PRN
Qty: 30 TABLET | Refills: 0 | Status: SHIPPED | OUTPATIENT
Start: 2021-08-12 | End: 2021-08-22

## 2021-08-17 ENCOUNTER — TELEPHONE (OUTPATIENT)
Dept: ENDOCRINOLOGY | Age: 51
End: 2021-08-17

## 2021-08-17 DIAGNOSIS — E04.2 MULTINODULAR GOITER: Primary | ICD-10-CM

## 2021-08-17 NOTE — TELEPHONE ENCOUNTER
Pt called for updated ultrasound and lab orders.     Electronically signed by Alma Barrera on 8/17/2021 at 11:28 AM

## 2021-08-18 ENCOUNTER — HOSPITAL ENCOUNTER (OUTPATIENT)
Age: 51
Discharge: HOME | End: 2021-08-18
Payer: COMMERCIAL

## 2021-08-18 ENCOUNTER — HOSPITAL ENCOUNTER (OUTPATIENT)
Dept: ULTRASOUND IMAGING | Age: 51
Discharge: HOME | End: 2021-08-20
Payer: COMMERCIAL

## 2021-08-18 DIAGNOSIS — I10 ESSENTIAL HYPERTENSION: ICD-10-CM

## 2021-08-18 DIAGNOSIS — E04.2 MULTINODULAR GOITER: ICD-10-CM

## 2021-08-18 DIAGNOSIS — R60.9 PERIPHERAL EDEMA: ICD-10-CM

## 2021-08-18 DIAGNOSIS — E78.5 ELEVATED LIPIDS: ICD-10-CM

## 2021-08-18 DIAGNOSIS — R73.03 PREDIABETES: ICD-10-CM

## 2021-08-18 LAB
ALBUMIN SERPL-MCNC: 4.2 G/DL (ref 3.5–5.2)
ALP BLD-CCNC: 127 U/L (ref 35–104)
ALT SERPL-CCNC: 17 U/L (ref 0–32)
ANION GAP SERPL CALCULATED.3IONS-SCNC: 8 MMOL/L (ref 7–16)
AST SERPL-CCNC: 23 U/L (ref 0–31)
BASOPHILS ABSOLUTE: 0.03 E9/L (ref 0–0.2)
BASOPHILS RELATIVE PERCENT: 0.4 % (ref 0–2)
BILIRUB SERPL-MCNC: <0.2 MG/DL (ref 0–1.2)
BUN BLDV-MCNC: 18 MG/DL (ref 6–20)
CALCIUM SERPL-MCNC: 9.5 MG/DL (ref 8.6–10.2)
CHLORIDE BLD-SCNC: 102 MMOL/L (ref 98–107)
CHOLESTEROL, TOTAL: 231 MG/DL (ref 0–199)
CO2: 26 MMOL/L (ref 22–29)
CREAT SERPL-MCNC: 1 MG/DL (ref 0.5–1)
EOSINOPHILS ABSOLUTE: 0.11 E9/L (ref 0.05–0.5)
EOSINOPHILS RELATIVE PERCENT: 1.5 % (ref 0–6)
GFR AFRICAN AMERICAN: >60
GFR NON-AFRICAN AMERICAN: >60 ML/MIN/1.73
GLUCOSE BLD-MCNC: 97 MG/DL (ref 74–99)
HBA1C MFR BLD: 6.1 % (ref 4–5.6)
HCT VFR BLD CALC: 43.6 % (ref 34–48)
HDLC SERPL-MCNC: 61 MG/DL
HEMOGLOBIN: 13.7 G/DL (ref 11.5–15.5)
IMMATURE GRANULOCYTES #: 0.08 E9/L
IMMATURE GRANULOCYTES %: 1.1 % (ref 0–5)
LDL CHOLESTEROL CALCULATED: 151 MG/DL (ref 0–99)
LYMPHOCYTES ABSOLUTE: 2.68 E9/L (ref 1.5–4)
LYMPHOCYTES RELATIVE PERCENT: 37.1 % (ref 20–42)
MCH RBC QN AUTO: 26.6 PG (ref 26–35)
MCHC RBC AUTO-ENTMCNC: 31.4 % (ref 32–34.5)
MCV RBC AUTO: 84.7 FL (ref 80–99.9)
MONOCYTES ABSOLUTE: 0.54 E9/L (ref 0.1–0.95)
MONOCYTES RELATIVE PERCENT: 7.5 % (ref 2–12)
NEUTROPHILS ABSOLUTE: 3.78 E9/L (ref 1.8–7.3)
NEUTROPHILS RELATIVE PERCENT: 52.4 % (ref 43–80)
PDW BLD-RTO: 14.3 FL (ref 11.5–15)
PLATELET # BLD: 235 E9/L (ref 130–450)
PMV BLD AUTO: 10.1 FL (ref 7–12)
POTASSIUM SERPL-SCNC: 4.2 MMOL/L (ref 3.5–5)
RBC # BLD: 5.15 E12/L (ref 3.5–5.5)
SODIUM BLD-SCNC: 136 MMOL/L (ref 132–146)
T4 FREE: 1.02 NG/DL (ref 0.93–1.7)
TOTAL PROTEIN: 7.6 G/DL (ref 6.4–8.3)
TRIGL SERPL-MCNC: 94 MG/DL (ref 0–149)
TSH SERPL DL<=0.05 MIU/L-ACNC: 1.66 UIU/ML (ref 0.27–4.2)
VLDLC SERPL CALC-MCNC: 19 MG/DL
WBC # BLD: 7.2 E9/L (ref 4.5–11.5)

## 2021-08-18 PROCEDURE — 83036 HEMOGLOBIN GLYCOSYLATED A1C: CPT

## 2021-08-18 PROCEDURE — 85025 COMPLETE CBC W/AUTO DIFF WBC: CPT

## 2021-08-18 PROCEDURE — 80053 COMPREHEN METABOLIC PANEL: CPT

## 2021-08-18 PROCEDURE — 84443 ASSAY THYROID STIM HORMONE: CPT

## 2021-08-18 PROCEDURE — 36415 COLL VENOUS BLD VENIPUNCTURE: CPT

## 2021-08-18 PROCEDURE — 84439 ASSAY OF FREE THYROXINE: CPT

## 2021-08-18 PROCEDURE — 76536 US EXAM OF HEAD AND NECK: CPT

## 2021-08-18 PROCEDURE — 80061 LIPID PANEL: CPT

## 2021-08-19 ENCOUNTER — OFFICE VISIT (OUTPATIENT)
Dept: FAMILY MEDICINE CLINIC | Age: 51
End: 2021-08-19
Payer: COMMERCIAL

## 2021-08-19 VITALS
OXYGEN SATURATION: 97 % | RESPIRATION RATE: 16 BRPM | WEIGHT: 197 LBS | DIASTOLIC BLOOD PRESSURE: 80 MMHG | BODY MASS INDEX: 32.82 KG/M2 | SYSTOLIC BLOOD PRESSURE: 122 MMHG | TEMPERATURE: 97.4 F | HEART RATE: 80 BPM | HEIGHT: 65 IN

## 2021-08-19 DIAGNOSIS — Z76.0 MEDICATION REFILL: ICD-10-CM

## 2021-08-19 DIAGNOSIS — I34.1 MVP (MITRAL VALVE PROLAPSE): Primary | ICD-10-CM

## 2021-08-19 DIAGNOSIS — R42 DIZZINESS: ICD-10-CM

## 2021-08-19 PROCEDURE — 99213 OFFICE O/P EST LOW 20 MIN: CPT | Performed by: FAMILY MEDICINE

## 2021-08-19 PROCEDURE — 3017F COLORECTAL CA SCREEN DOC REV: CPT | Performed by: FAMILY MEDICINE

## 2021-08-19 PROCEDURE — G8417 CALC BMI ABV UP PARAM F/U: HCPCS | Performed by: FAMILY MEDICINE

## 2021-08-19 PROCEDURE — G8427 DOCREV CUR MEDS BY ELIG CLIN: HCPCS | Performed by: FAMILY MEDICINE

## 2021-08-19 PROCEDURE — 1036F TOBACCO NON-USER: CPT | Performed by: FAMILY MEDICINE

## 2021-08-19 RX ORDER — SPIRONOLACTONE 25 MG/1
25 TABLET ORAL DAILY
Qty: 30 TABLET | Refills: 5 | Status: SHIPPED
Start: 2021-08-19 | End: 2022-05-10 | Stop reason: SDUPTHER

## 2021-08-19 RX ORDER — ASCORBIC ACID, CHOLECALCIFEROL, .ALPHA.-TOCOPHEROL ACETATE, DL-, PYRIDOXINE HYDROCHLORIDE, FOLIC ACID, CYANOCOBALAMIN, BIOTIN, CALCIUM CARBONATE, FERROUS ASPARTO GLYCINATE, IRON, POTASSIUM IODIDE, MAGNESIUM OXIDE, DOCONEXENT AND LOWBUSH BLUEBERRY 60; 1000; 10; 26; 400; 13; 280; 80; 9; 9; 150; 25; 350; 25; 600 MG/1; [IU]/1; [IU]/1; MG/1; UG/1; UG/1; UG/1; MG/1; MG/1; MG/1; UG/1; MG/1; MG/1; MG/1; UG/1
1 CAPSULE, GELATIN COATED ORAL DAILY
Qty: 30 CAPSULE | Refills: 4 | Status: SHIPPED
Start: 2021-08-19 | End: 2022-01-31 | Stop reason: SDUPTHER

## 2021-08-19 NOTE — PROGRESS NOTES
8/19/2021    Chief Complaint   Patient presents with    Nausea     lighthead x 1 month     Dizziness       HPI    Izabela Gaming is a 46 y.o. patient that presents today for:    Dizziness/lightheadedness:  Patient is here with complaints of dizziness and/or lightheadedness. This is a/an recent problem. Treatment in the past yes. She  does not have numbness, tingling, weakness, or visual disturbance. She  does not chest pain, palpitations or shortness of breath. She  does not have associated nausea and/or vomiting. She has not syncope or near syncope. Patient does not use alcohol and/or drugs. has not had recent medication changes. has had recent EKG, has had recent TSH. Meclizine and zofran help with symptoms but do not completely alleviate. Has MVP, would like to see a cardiologist.     Patient's past medical, surgical, social and/or family history reviewed, updated in chart, and are non-contributory (unless otherwise stated). Medications and allergies also reviewed and updated in chart. ROS negative unless otherwise specified    Physical Exam  Temp Readings from Last 3 Encounters:   08/19/21 97.4 °F (36.3 °C)   07/22/21 97.4 °F (36.3 °C)   05/24/21 98.1 °F (36.7 °C)     Wt Readings from Last 3 Encounters:   08/19/21 197 lb (89.4 kg)   07/22/21 195 lb (88.5 kg)   05/24/21 196 lb (88.9 kg)     BP Readings from Last 3 Encounters:   08/19/21 122/80   07/22/21 137/89   05/24/21 127/65     Pulse Readings from Last 3 Encounters:   08/19/21 80   07/22/21 66   05/24/21 72       General appearance: alert, well appearing, and in no distress, oriented to person, place, and time and normal appearing weight. CVS exam: normal rate, regular rhythm, normal S1, S2, no murmurs, rubs, clicks or gallops. Radial pulses 2+ bilateral.  PT/DP pulse 2+ bilat. No C/C/E    Chest: clear to auscultation, no wheezes, rales or rhonchi, symmetric air entry.      Abdomen: Soft, non-tender, non-distended, positive BS in all 4 quadrants    Extremities:Dorsalis pedis pulses palpated bilaterally, no clubbing, cyanosis, edema or erythema,     SKIN: no lesions, jaundice, petechiae, pallor, cyanosis, ecchymosis    NEURO: gross motor exam normal by observation, gait normal    Mental status - alert, oriented to person, place, and time, normal mood, behavior, speech, dress, motor activity, and thought processes      Assessment/Plan  Gianna Pierce was seen today for nausea and dizziness. Diagnoses and all orders for this visit:    MVP (mitral valve prolapse)  -     Demetrice Nicole DO, Cardiology, [place]    Medication refill  -     spironolactone (ALDACTONE) 25 MG tablet; Take 1 tablet by mouth daily    Dizziness  -     Demetrice Nicole DO, Cardiology, [place]    Other orders  -     Ranzbv-[id number]-[id number]-DHA (PRENATE MINI) 18-0.6-0.4-350 MG CAPS; Take 1 capsule by mouth daily          No follow-ups on file. Samra Mcpherson DO    Call or go to ED immediately if symptoms worsen or persist.    Educational materials and/or home exercises printed for patient's review and were included in patient instructions on his/her After Visit Summary and given to patient at the end of visit. Counseled regarding above diagnosis, including possible risks and complications,  especially if left uncontrolled. Counseled regarding the possible side effects, risks, benefits and alternatives to treatment; patient and/or guardian verbalizes understanding, agrees, feels comfortable with and wishes to proceed with above treatment plan. Advised patient to call with any new medication issues, and read all Rx info from pharmacy to assure aware of all possible risks and side effects of medication before taking. Reviewed age and gender appropriate health screening exams and vaccinations.   Advised patient regarding importance of keeping up with recommended health maintenance and to schedule as soon as possible if overdue, as this is important in assessing for undiagnosed pathology, especially cancer, as well as protecting against potentially harmful/life threatening disease. Patient and/or guardian verbalizes understanding and agrees with above counseling, assessment and plan. All questions answered.

## 2021-08-20 ENCOUNTER — OFFICE VISIT (OUTPATIENT)
Dept: CARDIOLOGY CLINIC | Age: 51
End: 2021-08-20
Payer: COMMERCIAL

## 2021-08-20 VITALS
HEIGHT: 65 IN | BODY MASS INDEX: 32.86 KG/M2 | HEART RATE: 62 BPM | DIASTOLIC BLOOD PRESSURE: 72 MMHG | WEIGHT: 197.2 LBS | OXYGEN SATURATION: 97 % | SYSTOLIC BLOOD PRESSURE: 98 MMHG | RESPIRATION RATE: 18 BRPM

## 2021-08-20 DIAGNOSIS — I34.1 MVP (MITRAL VALVE PROLAPSE): ICD-10-CM

## 2021-08-20 DIAGNOSIS — E78.00 HYPERCHOLESTEROLEMIA: ICD-10-CM

## 2021-08-20 DIAGNOSIS — R94.31 ABNORMAL EKG: Primary | ICD-10-CM

## 2021-08-20 PROCEDURE — 93000 ELECTROCARDIOGRAM COMPLETE: CPT | Performed by: INTERNAL MEDICINE

## 2021-08-20 PROCEDURE — G8417 CALC BMI ABV UP PARAM F/U: HCPCS | Performed by: INTERNAL MEDICINE

## 2021-08-20 PROCEDURE — 3017F COLORECTAL CA SCREEN DOC REV: CPT | Performed by: INTERNAL MEDICINE

## 2021-08-20 PROCEDURE — G8427 DOCREV CUR MEDS BY ELIG CLIN: HCPCS | Performed by: INTERNAL MEDICINE

## 2021-08-20 PROCEDURE — 1036F TOBACCO NON-USER: CPT | Performed by: INTERNAL MEDICINE

## 2021-08-20 PROCEDURE — 99203 OFFICE O/P NEW LOW 30 MIN: CPT | Performed by: INTERNAL MEDICINE

## 2021-08-20 NOTE — PROGRESS NOTES
[address] [facility]   Heart and Vascular [place]   Clinic Note     Date:8/20/21   Patient Name:Maci Vanessa  YOB: 1970  Age: 46 y.o. Primary Care Provider: DO Kinsey Stern     This is a pleasant 79-year-old -American female who is here to establish cardiac care. She is very physically active. She exercises with crunches, exercise wheel, up to 20 push-ups, bicycle. She denies chest discomfort or dyspnea with exertion. She denies orthopnea, PND, lower extremity edema, palpitations, syncope, presyncope. Over the past month she has noticed new onset of \"dizziness\". This feels more like vertigo but also lightheadedness. She derived some benefit from meclizine. The sensation is very positional especially with changes in head position. She has tinnitus in her left ear and will occasionally be nauseated. A focused history review includes:  1. Mitral valve prolapse? a. TTE 11/2016 reviewed. There was no mitral valve prolapse or MR.  2. HTN  3. Breast lumpectomy for cancer, right side, no chemo or radiation ~2013  4. Migraine; on CGRPRA  5. Asthma  6. GERD  7. Spironolactone for hirsutism  8. Lifetime non-smoker, no EtOH, no recreational drugs  9.  Father with stroke/MI in his 62s; cousin on mother's side with OHS in her 6s; sister in her late 45s with PCI      Past History    Past Medical History:         Diagnosis Date    Asthma     DR Tona Rodrigues    Back injury     Chronic back pain     PAIN RADIATES TO NECK AND LEGS    Chronic tension-type headache, not intractable 11/9/2018    DDD (degenerative disc disease), cervical 11/9/2018    DVT (deep venous thrombosis) ([facility])     Right Arm    GERD (gastroesophageal reflux disease)     Herniated disc, cervical     also lumbar spine    Hx of screening mammography 3/2015 & 10/2015    BIRADS 2 BENIGN    HX OTHER MEDICAL     PINCHED NERVE BACK OF NECK    Hypertension     Migraine     43        breast    Deep Vein Thrombosis Sister         IN THE SETTING OF BREAST CA    Hypertension Maternal Grandmother          Review of Systems   Negative except as in HPI        Medications     Current Outpatient Medications   Medication Sig Dispense Refill    Qdboww-MlNyw-OyPhq-Meth-FA-DHA (PRENATE MINI) 18-0.6-0.4-350 MG CAPS Take 1 capsule by mouth daily 30 capsule 4    spironolactone (ALDACTONE) 25 MG tablet Take 1 tablet by mouth daily 30 tablet 5    meclizine (ANTIVERT) 25 MG tablet Take 1 tablet by mouth 3 times daily as needed for Dizziness 30 tablet 0    Ubrogepant (UBRELVY) 100 MG TABS TAKE 1 TABLET IF NEEDED MAY TAKE 1 MORE DOSE AFTER 1 HOUR IF NEEDED - DO NOT TAKE MORE THAN 200 MG IN 24 HRS 10 tablet 0    Erenumab-aooe 70 MG/ML SOAJ Inject 70 mg into the skin every 30 days Lot: [id number] Exp: 11/2021 1 pen 3    ondansetron (ZOFRAN) 4 MG tablet Take 1 tablet by mouth every 8 hours as needed for Nausea or Vomiting 30 tablet 1    TROKENDI  MG CP24 TAKE 1 CAPSULE DAILY 90 capsule 0    rizatriptan (MAXALT) 10 MG tablet Take 1 tablet by mouth daily as needed for Migraine 9 tablet 5    fluticasone (FLONASE) 50 MCG/ACT nasal spray 1 spray by Nasal route daily 1 Bottle 5    acetaminophen-codeine (TYLENOL #4) 300-60 MG per tablet TK 1 T PO ONCE A DAY PRN FOR PAIN  0    NARCAN 4 MG/0.1ML LIQD nasal spray   1    albuterol sulfate HFA (PROAIR HFA) 108 (90 Base) MCG/ACT inhaler Inhale 2 puffs into the lungs every 6 hours as needed for Wheezing 1 Inhaler 0    esomeprazole (NEXIUM) 40 MG delayed release capsule Take 40 mg by mouth every morning (before breakfast)      famotidine (PEPCID) 40 MG tablet Take 40 mg by mouth nightly       pantoprazole (PROTONIX) 40 MG tablet Take 40 mg by mouth daily       montelukast (SINGULAIR) 10 MG tablet Take 10 mg by mouth nightly       aspirin 81 MG tablet Take 81 mg by mouth daily       Current Facility-Administered Medications   Medication Dose Route Frequency Provider Last Rate Last Admin    cyanocobalamin injection 1,000 mcg  1,000 mcg Intramuscular Once Mario Brown MD        cyanocobalamin injection 1,000 mcg  1,000 mcg Intramuscular Once Mario Brown MD               Physical Examination      /74 (Site: Right Upper Arm, Position: Supine, Cuff Size: Large Adult)   Pulse 61   Resp 18   Ht 5' 5\" (1.651 m)   Wt 197 lb 3.2 oz (89.4 kg)   LMP 10/01/2018 (Approximate)   SpO2 97%   BMI 32.82 kg/m²   Body surface area is 2.02 meters squared. General: No acute distress, appears as stated age, nonicteric  Head: Atraumatic, no gross abnormalities or bruises  Neck: Supple and nontender, no carotid bruits, no JVP  Lungs: Clear to auscultation bilaterally, no wheezes, rales, or rhonchi  Heart: Regular rate and rhythm, no murmurs, rubs, or gallops at rest or with Valsalva  Abdomen: Soft, nontender, nondistended, normal bowel sounds  Extremities: No obvious deformities, no cyanosis, no edema  Neurological: Alert and oriented x3, EOMI, moving all extremities x4. Nystagmus noted on lateral left gaze  Psychological: Normal mood and affect, cooperative  Skin: Color, texture, and turgor normal for age          Labs/Imaging/Diagnostics   Personally reviewed:    Lab Results   Component Value Date     08/18/2021    K 4.2 08/18/2021     08/18/2021    CO2 26 08/18/2021    BUN 18 08/18/2021    CREATININE 1.0 08/18/2021    GLUCOSE 97 08/18/2021    GLUCOSE 96 03/25/2012    CALCIUM 9.5 08/18/2021        Estimated Creatinine Clearance: 74 mL/min (based on SCr of 1 mg/dL).     Lab Results   Component Value Date    WBC 7.2 08/18/2021    HGB 13.7 08/18/2021    HCT 43.6 08/18/2021    MCV 84.7 08/18/2021     08/18/2021       Lab Results   Component Value Date    ALT 17 08/18/2021    AST 23 08/18/2021    ALKPHOS 127 (H) 08/18/2021    BILITOT <0.2 08/18/2021       Lab Results   Component Value Date    LABALBU 4.2 08/18/2021       Lab Results   Component Value Date    CHOL 231 (H) 08/18/2021    CHOL 204 (H) 05/21/2021    CHOL 206 (H) 02/22/2018     Lab Results   Component Value Date    TRIG 94 08/18/2021    TRIG 116 05/21/2021    TRIG 119 02/22/2018     Lab Results   Component Value Date    HDL 61 08/18/2021    HDL 70 05/21/2021    HDL 69 11/22/2019     Lab Results   Component Value Date    LDLCALC 151 (H) 08/18/2021    LDLCALC 111 (H) 05/21/2021    LDLCALC 166 (H) 11/22/2019     Lab Results   Component Value Date    LABVLDL 19 08/18/2021    LABVLDL 23 05/21/2021    LABVLDL 14 11/22/2019         Lab Results   Component Value Date    LABA1C 6.1 (H) 08/18/2021         Personally interpreted the following studies.:  EKG: Normal sinus rhythm with nonspecific ST-T abnormality       10-year ASCVD risk 1.8% based on most recent lipid panel     Assessment and Plan:        Pleasant 51-year-old -American female who was told previously about mitral valve prolapse. As noted above her most recent TTE does not show any evidence for mitral valve prolapse or MR. Her exam does not show any signs of heart failure or mitral valve murmur at rest or with Valsalva. She is physically active and has no angina. I suspect her \"dizziness\" is related to inner ear or neurologic etiology as evidenced by the presence of tinnitus, nystagmus, nausea, changes with head position. Diagnosis Orders   1. Abnormal EKG     2. MVP (mitral valve prolapse)  EKG 12 lead    ECHO COMPLETE   3. Hypercholesterolemia  PROTEIN / CREATININE RATIO, URINE         TTE for her abnormal EKG   Orthostatic vitals   Follow-up with neurology and/or ENT   No indication for primary prevention statin at this time.  Discussed primary prevention aspirin. I do not feel it strongly indicated either.  Assessment and plan reviewed with the patient/family and their questions and concerns answered in full.  Follow up with me in 12 months.     We appreciate the opportunity to participate in Her care!       Va Saul MD, [facility]  Interventional Cardiology/Structural Heart Disease  Office: [phone]  Fax: [phone]  Office Coordinators: Tory Shen

## 2021-09-22 ENCOUNTER — OFFICE VISIT (OUTPATIENT)
Dept: ENDOCRINOLOGY | Age: 51
End: 2021-09-22
Payer: COMMERCIAL

## 2021-09-22 ENCOUNTER — OFFICE VISIT (OUTPATIENT)
Dept: NEUROLOGY | Age: 51
End: 2021-09-22
Payer: COMMERCIAL

## 2021-09-22 VITALS
HEIGHT: 65 IN | BODY MASS INDEX: 32.82 KG/M2 | SYSTOLIC BLOOD PRESSURE: 117 MMHG | TEMPERATURE: 97.9 F | DIASTOLIC BLOOD PRESSURE: 72 MMHG | OXYGEN SATURATION: 100 % | HEART RATE: 69 BPM | RESPIRATION RATE: 16 BRPM | WEIGHT: 197 LBS

## 2021-09-22 VITALS
BODY MASS INDEX: 32.82 KG/M2 | HEART RATE: 69 BPM | SYSTOLIC BLOOD PRESSURE: 117 MMHG | HEIGHT: 65 IN | WEIGHT: 197 LBS | DIASTOLIC BLOOD PRESSURE: 72 MMHG | RESPIRATION RATE: 18 BRPM

## 2021-09-22 DIAGNOSIS — M54.2 CERVICALGIA: ICD-10-CM

## 2021-09-22 DIAGNOSIS — E28.2 PCOS (POLYCYSTIC OVARIAN SYNDROME): ICD-10-CM

## 2021-09-22 DIAGNOSIS — R73.03 PREDIABETES: ICD-10-CM

## 2021-09-22 DIAGNOSIS — Q07.00 ARNOLD-CHIARI MALFORMATION (HCC): ICD-10-CM

## 2021-09-22 DIAGNOSIS — E04.2 MULTINODULAR GOITER: Primary | ICD-10-CM

## 2021-09-22 DIAGNOSIS — E06.3 HASHIMOTO'S THYROIDITIS: ICD-10-CM

## 2021-09-22 DIAGNOSIS — G43.711 INTRACTABLE CHRONIC MIGRAINE WITHOUT AURA AND WITH STATUS MIGRAINOSUS: Primary | ICD-10-CM

## 2021-09-22 DIAGNOSIS — R51.9 CHRONIC DAILY HEADACHE: ICD-10-CM

## 2021-09-22 PROCEDURE — G8417 CALC BMI ABV UP PARAM F/U: HCPCS | Performed by: INTERNAL MEDICINE

## 2021-09-22 PROCEDURE — 3017F COLORECTAL CA SCREEN DOC REV: CPT | Performed by: PSYCHIATRY & NEUROLOGY

## 2021-09-22 PROCEDURE — 99214 OFFICE O/P EST MOD 30 MIN: CPT | Performed by: PSYCHIATRY & NEUROLOGY

## 2021-09-22 PROCEDURE — 3017F COLORECTAL CA SCREEN DOC REV: CPT | Performed by: INTERNAL MEDICINE

## 2021-09-22 PROCEDURE — 1036F TOBACCO NON-USER: CPT | Performed by: PSYCHIATRY & NEUROLOGY

## 2021-09-22 PROCEDURE — G8427 DOCREV CUR MEDS BY ELIG CLIN: HCPCS | Performed by: INTERNAL MEDICINE

## 2021-09-22 PROCEDURE — G8417 CALC BMI ABV UP PARAM F/U: HCPCS | Performed by: PSYCHIATRY & NEUROLOGY

## 2021-09-22 PROCEDURE — 1036F TOBACCO NON-USER: CPT | Performed by: INTERNAL MEDICINE

## 2021-09-22 PROCEDURE — 99214 OFFICE O/P EST MOD 30 MIN: CPT | Performed by: INTERNAL MEDICINE

## 2021-09-22 PROCEDURE — G8427 DOCREV CUR MEDS BY ELIG CLIN: HCPCS | Performed by: PSYCHIATRY & NEUROLOGY

## 2021-09-22 RX ORDER — UBROGEPANT 100 MG/1
TABLET ORAL
Qty: 12 TABLET | Refills: 2 | Status: SHIPPED | OUTPATIENT
Start: 2021-09-22 | End: 2022-03-03 | Stop reason: SDUPTHER

## 2021-09-22 RX ORDER — DEXAMETHASONE 1 MG
1 TABLET ORAL ONCE
Qty: 1 TABLET | Refills: 0 | Status: SHIPPED | OUTPATIENT
Start: 2021-09-22 | End: 2021-09-22

## 2021-09-22 RX ORDER — RIZATRIPTAN BENZOATE 10 MG/1
10 TABLET ORAL DAILY PRN
Qty: 9 TABLET | Refills: 2 | Status: SHIPPED | OUTPATIENT
Start: 2021-09-22 | End: 2022-04-28 | Stop reason: SDUPTHER

## 2021-09-22 ASSESSMENT — ENCOUNTER SYMPTOMS
SHORTNESS OF BREATH: 0
VOMITING: 0
TROUBLE SWALLOWING: 0
PHOTOPHOBIA: 0
NAUSEA: 0
BACK PAIN: 0

## 2021-09-22 NOTE — PROGRESS NOTES
[address] [place] [address] Department of Endocrinology Diabetes and Metabolism   [address] [facility] [address]   Phone: [phone]  Fax: [phone]    Date of Service: 9/22/2021  Primary Care Physician: Shari Ramirez DO  Provider: Mackenzie Summers MD            Reason for the visit:  Multinodular goiter     History of Present Illness: The history is provided by the patient. No  was used. Accuracy of the patient data is excellent. Alanna Garcia is a very pleasant 46 y.o. female seen today for evaluation and management of multinodular goiter     The patient states that she has a history of thyroid disorder as a teenager, and was placed on medication for this by her pediatrician however, it was stopped for no known reason. Patient states that her family physicians have been uncertain as to why no medications have been given for her thyroid disorder. She had previously seen Dr. Leno Isabel for thyroid nodules and hyperprolactinemia, without any treatment given, as lab levels were found to be within normal limits per Dr. Natasha Latham notes. Patient most recently had an ultrasound of head and neck in November 2019. This demonstrated 3 sub-centimeter nodules that are unchanged from previous ultrasounds. Lab Results   Component Value Date    LABA1C 6.1 08/18/2021    LABA1C 6.1 02/28/2020    LABA1C 6.3 06/28/2018    LABA1C 6.1 02/22/2018       Maci Thomas denies any new lumps, bumps in her neck, change in her voice, or shortness of breath. No family history of thyroid cancer. No prior history of radiation to head or neck region. She does complain of some dysphasia; she recently had an upper endoscopy with dilation. Patient denied any history of  swelling in the area of the thyroid gland, weight loss, change in appetite, nervousness, anxiety, irritability, tremor, sweating, heat intolerance, changes in bowel habits, muscle weakness or difficulty sleeping.   Patient complains of unexplained weight gain, new fatigue, increased sensitivity to cold, dry skin, brittle fingernails, brittle hair, depressed mood. PAST MEDICAL HISTORY   Past Medical History:   Diagnosis Date    Asthma     DR Juan David Higgins    Back injury     Chronic back pain     PAIN RADIATES TO NECK AND LEGS    Chronic tension-type headache, not intractable 2018    DDD (degenerative disc disease), cervical 2018    [facility])     Right Arm    GERD (gastroesophageal reflux disease)     Herniated disc, cervical     also lumbar spine    Hx of screening mammography 3/2015 & 10/2015    BIRADS 2 BENIGN    HX OTHER MEDICAL     PINCHED NERVE BACK OF NECK    Hypertension     Migraine     Migraine     DR Torre Orvinicio MVP (mitral valve prolapse)     Numbness and tingling     LEGS AND FEET    Obesity     Sinus infection     Thyroid disease     no med    Torn ACL     RT KNEE    Ulcer     Vitamin B 12 deficiency     NON ANEMIC     PAST SURGICAL HISTORY   Past Surgical History:   Procedure Laterality Date    BREAST LUMPECTOMY Right 2013    BREAST SURGERY Right 2013    re-excision, rt lumpectomyscar evacuation of hematoma.  CARDIOVASCULAR STRESS TEST  2015    NORMAL     SECTION      COLONOSCOPY      12 YOSSEF HEMORRHOIDS, MINIMAL DIVERTICULA    COLONOSCOPY  2012    DR PEREZ GERD & GASTRITIS    DILATION AND CURETTAGE OF UTERUS      ECHO COMPL W DOP COLOR FLOW  2015         ECHOCARDIOGRAM COMPLETE 2D W DOPPLER W COLOR  10/24/2012         HARDWARE REMOVAL Right 2018    Foot    KNEE ARTHROSCOPY Right     Right knee arthroscopy. Dr. Dodson Medici ARTHROSCOPY Right     Right knee meniscal repair. Dr. Hemant Moore.       MAMMO IMPLANT DIGITAL DIAG BI      3/24/15 BIRADS CAT 2 BENIGN, 10/15/15 BIRADS CAT 2    OTHER SURGICAL HISTORY      TILT TABLE TEST - DR Jose Fong  NO ORTHOSTASIS    TYMPANOSTOMY TUBE PLACEMENT      UPPER GASTROINTESTINAL ENDOSCOPY      WITH CLOSED BIOPSY DR Andrzej Beasley 1/30/12 GERD, GASTRITIS    UPPER GASTROINTESTINAL ENDOSCOPY  1/30/2012    DR Martine Khan GERD & GASTRITIS    WISDOM TOOTH EXTRACTION       SOCIAL HISTORY   Tobacco:   reports that she has never smoked. She has never used smokeless tobacco.  Alcohol:   reports previous alcohol use. Drugs:   reports no history of drug use. FAMILY HISTORY   Family History   Problem Relation Age of Onset    High Blood Pressure Mother     Heart Disease Father     Seizures Father     Coronary Art Dis Father     Cancer Father         STOMACH    Cancer Sister 43        breast    Deep Vein Thrombosis Sister         IN THE SETTING OF BREAST CA    Hypertension Maternal Grandmother      ALLERGIES AND DRUG REACTIONS   Allergies   Allergen Reactions    Ceftin [Cefuroxime Axetil] Nausea Only    Pcn [Penicillins]      ?  reaction    Vicodin [Hydrocodone-Acetaminophen] Shortness Of Breath    Ibuprofen Swelling    Other      TAPE AND BANDAIDS SKIN IRRITATION    Percocet [Oxycodone-Acetaminophen]     Sudafed [Pseudoephedrine Hcl] Swelling    Ajovy [Fremanezumab-Vfrm] Rash       CURRENT MEDICATIONS   Current Outpatient Medications   Medication Sig Dispense Refill    Erenumab-aooe 140 MG/ML SOAJ Inject 140 mg into the skin every 30 days 1 pen 2    rizatriptan (MAXALT) 10 MG tablet Take 1 tablet by mouth daily as needed for Migraine 9 tablet 2    Ubrogepant (UBRELVY) 100 MG TABS TAKE 1 TABLET IF NEEDED MAY TAKE 1 MORE DOSE AFTER 1 HOUR IF NEEDED - DO NOT TAKE MORE THAN 200 MG IN 24 HRS 12 tablet 2    Jkatxx-YbKrh-HuIpv-Meth-FA-DHA (PRENATE MINI) 18-0.6-0.4-350 MG CAPS Take 1 capsule by mouth daily 30 capsule 4    spironolactone (ALDACTONE) 25 MG tablet Take 1 tablet by mouth daily 30 tablet 5    ondansetron (ZOFRAN) 4 MG tablet Take 1 tablet by mouth every 8 hours as needed for Nausea or Vomiting 30 tablet 1    fluticasone (FLONASE) 50 MCG/ACT nasal spray 1 spray by Nasal Wt Readings from Last 6 Encounters:   09/22/21 197 lb (89.4 kg)   09/22/21 197 lb (89.4 kg)   08/20/21 197 lb 3.2 oz (89.4 kg)   08/19/21 197 lb (89.4 kg)   07/22/21 195 lb (88.5 kg)   05/24/21 196 lb (88.9 kg)       Physical examination:  General: awake alert, oriented x3, no abnormal position or movements. HEENT: normocephalic non traumatic  Neck: supple, no LN enlargement, enlarged right thyroid, no thyroid tenderness, no JVD. Pulm: Clear equal air entry no added sounds, no wheezing or rhonchi    CVS: S1 + S2, no murmur, no heave. Dorsalis pedis pulse palpable   Abd: soft lax, no tenderness, no organomegaly, audible bowel sounds. Skin: warm, no lesions, no rash.  No Palmar erythema  Musculoskeletal: No back tenderness, no kyphosis/scoliosis     Neuro: CN intact, sensation normal , muscle power normal. No tremors   Psych: normal mood, and affect    Review of Laboratory Data:  I personally reviewed the following labs:   Lab Results   Component Value Date/Time    WBC 7.2 08/18/2021 10:57 AM    RBC 5.15 08/18/2021 10:57 AM    RBC 4.43 02/02/2017 08:20 AM    HGB 13.7 08/18/2021 10:57 AM    HCT 43.6 08/18/2021 10:57 AM    MCV 84.7 08/18/2021 10:57 AM    MCH 26.6 08/18/2021 10:57 AM    MCHC 31.4 (L) 08/18/2021 10:57 AM    RDW 14.3 08/18/2021 10:57 AM     08/18/2021 10:57 AM    MPV 10.1 08/18/2021 10:57 AM      Lab Results   Component Value Date/Time     08/18/2021 10:57 AM    K 4.2 08/18/2021 10:57 AM    CO2 26 08/18/2021 10:57 AM    BUN 18 08/18/2021 10:57 AM    CREATININE 1.0 08/18/2021 10:57 AM    CALCIUM 9.5 08/18/2021 10:57 AM    LABGLOM >60 08/18/2021 10:57 AM    GFRAA >60 08/18/2021 10:57 AM      Lab Results   Component Value Date/Time    TSH 1.660 08/18/2021 10:57 AM    T4FREE 1.02 08/18/2021 10:57 AM    O0BCCTJ 7.8 06/28/2018 12:17 PM    FT3 2.9 06/28/2018 12:17 PM    FT3 2.8 07/10/2016 11:22 AM    FT3 3.1 04/02/2016 09:55 AM    FT3 3.0 10/24/2015 10:26 AM    TPOABS 42.7 (H) 02/28/2020 04:41 PM    THGAB <0.9 02/28/2020 04:41 PM     Lab Results   Component Value Date    LABA1C 6.1 08/18/2021    GLUCOSE 97 08/18/2021    GLUCOSE 96 03/25/2012    LABCREA 33 04/09/2018     Lab Results   Component Value Date    TRIG 94 08/18/2021    HDL 61 08/18/2021    LDLCALC 151 08/18/2021    CHOL 231 08/18/2021     Lab Results   Component Value Date    VITD25 32 02/28/2020    VITD25 29 06/28/2018       Medical Records/Labs/Images Review:   I personally reviewed and summarized previous records   All labs and imaging were reviewed independently    [address] [place] [address], a 46 y.o.-old female seen in for the following issues     Multinodular goiter   · Prevalence of thyroid nodule on thyroid ultrasound is 50% and 95 % of these nodules are benign. · Given nodule size and lack of ultrasound suspicious features which making the nodule less likely to be malignant, I will continue following with periodic US, with the next ultrasound taking place  next fall in several months. If this ultrasound remains unchanged from previous, frequency will be spaced out to at least 2 to 3 years. · Will continue monitoring     PCOs  · Ordered labs     Prediabetes   · Encourage wt loss and discussed lifestyle changes   · Recheck A1c     I personally reviewed external notes from PCP and other patient's care team providers, and personally interpreted labs associated with the above diagnosis. I also ordered labs to further assess and manage the above addressed medical conditions    Return in about 6 months (around 3/22/2022) for Hashimoto's , hair loss . The above issues were reviewed with the patient who understood and agreed with the plan. Thank you for allowing us to participate in the care of this patient. Please do not hesitate to contact us with any additional questions. Diagnosis Orders   1. Multinodular goiter     2. Hashimoto's thyroiditis     3.  PCOS (polycystic ovarian syndrome)  17-Hydroxyprogesterone DHEA-Sulfate    Prolactin    Testosterone, free, total    Cortisol Total    dexamethasone (DECADRON) 1 MG tablet   4.  Prediabetes       Prabhakar Rollins MD  Endocrinologist, [facility])   [address] [place] [address]   Phone: [phone]  Fax: [phone]  -------------------------------------------------------------  Electronically signed by Slime Fagan MD

## 2021-09-22 NOTE — PROGRESS NOTES
NEUROLOGY FOLLOW UP NOTE     Date: 9/22/2021  Name: Aguilar Lacey  MRN: [id number]  Patient's PCP: Tamar Mcpherson DO     REASON FOR VISIT/CHIEF COMPLAINT: Headaches, low back pain     Interval history:     The patient is coming in for a follow-up visit for headaches. The patient reports that the headaches have mildly improved on Aimovig. She has stopped Ajovy because of itching. Continues to have mild hair fall. She is getting about 16 migraines a month severity 8/10. The patient has been off of Trokendi. For abortive treatment she states Ubrelvy and rizatriptan  She had a MRI brain February 2020: No acute normality. Sleep is normal, appetite is good, mood is stable  No other aggravating relieving factors  No other associated symptoms    Disease course:     Aguilar Lacey is a 46 y.o. -American female is coming in for evaluation of headaches  Headache     Onset: At the age of 10  Carol Stabs Duration: All day long   Frequency : Daily   Time of occurrence: Any time   Severity on a scale of 1-10: 5/10    Headache Location: Bilateral forehead, behind the eyes, radiating in the back of her neck, at times starts in the bilateral occipital region and neck    Change sides: No    Character:sharp, shooting, stabbing and throbbing    Activities that worsens headache: movement    Reliving factors: nothing    Headache Triggers or Provoking Factors:   Heat    Headache disability during or after an attack:   confined to bed    Associated symptoms:  nausea, vomiting, photophobia and sonophobia    Aura (Visual/Sensory):  None    Premonitory Symptoms:  none       Prior Headache Treatments:    Preventatives:     She has tried generic topiramate: Which caused her to have, nausea, fatigue, cognitive difficulties so she stopped taking it. Propranolol: Asthma exacerbation  Trokendi: Does not help.   Amitriptaline: jittery  Emgality: Did no help  Ajovy: Stopped due to itching  Aimovig: Partial help    Abortives: Rizatriptan: Partial help  Imitrex: No help  Tylenol, ibuprofen, Aleve: No help  Ibuprofen: partia helps  Nurtec: did not help  Ubrelvy: helps    Total number of migraine headache days in a month: 16    Last MRI brain: 2016 no acute abnormality, Chiari 1  Malformation  MRI brain : No acute abnormality  I have personally reviewed her medical records      MRI Lumbar spine: 2020:  Degenerative spondylotic changes. L4-5 broad-based disc protrusion asymmetric to the right encroaching    on the right lateral recess and also encroaching on the right    intervertebral foramen crowding the exiting right L4 nerve root. PAST MEDICAL HISTORY:   Past Medical History:   Diagnosis Date    Asthma     DR Juan David Higgins    Back injury     Chronic back pain     PAIN RADIATES TO NECK AND LEGS    Chronic tension-type headache, not intractable 2018    DDD (degenerative disc disease), cervical 2018    DVT (deep venous thrombosis) (HCC)     Right Arm    GERD (gastroesophageal reflux disease)     Herniated disc, cervical     also lumbar spine    Hx of screening mammography 3/2015 & 10/2015    BIRADS 2 BENIGN    HX OTHER MEDICAL     PINCHED NERVE BACK OF NECK    Hypertension     Migraine     Migraine     DR Torre Ort MVP (mitral valve prolapse)     Numbness and tingling     LEGS AND FEET    Obesity     Sinus infection     Thyroid disease     no med    Torn ACL     RT KNEE    Ulcer     Vitamin B 12 deficiency     NON ANEMIC     PAST SURGICAL HISTORY:   Past Surgical History:   Procedure Laterality Date    BREAST LUMPECTOMY Right 2013    BREAST SURGERY Right 2013    re-excision, rt lumpectomyscar evacuation of hematoma.     CARDIOVASCULAR STRESS TEST  2015    NORMAL     SECTION      COLONOSCOPY      12 YOSSEF HEMORRHOIDS, MINIMAL DIVERTICULA    COLONOSCOPY  2012    DR PEREZ GERD & GASTRITIS    DILATION AND CURETTAGE OF UTERUS      ECHO COMPL W DOP COLOR Transportation (Medical):  Lack of Transportation (Non-Medical):    Physical Activity:     Days of Exercise per Week:     Minutes of Exercise per Session:    Stress:     Feeling of Stress :    Social Connections:     Frequency of Communication with Friends and Family:     Frequency of Social Gatherings with Friends and Family:     Attends Hindu Services:     Active Member of Clubs or Organizations:     Attends Club or Organization Meetings:     Marital Status:    Intimate Partner Violence:     Fear of Current or Ex-Partner:     Emotionally Abused:     Physically Abused:     Sexually Abused: Allergy:   Allergies   Allergen Reactions    Ceftin [Cefuroxime Axetil] Nausea Only    Pcn [Penicillins]      ?  reaction    Vicodin [Hydrocodone-Acetaminophen] Shortness Of Breath    Ibuprofen Swelling    Other      TAPE AND BANDAIDS SKIN IRRITATION    Percocet [Oxycodone-Acetaminophen]     Sudafed [Pseudoephedrine Hcl] Swelling    Ajovy [Fremanezumab-Vfrm] Rash     MEDS:   Current Outpatient Medications:     Rcwltr-SvCgq-YyDxj-Meth-FA-DHA (PRENATE MINI) 18-0.6-0.4-350 MG CAPS, Take 1 capsule by mouth daily, Disp: 30 capsule, Rfl: 4    spironolactone (ALDACTONE) 25 MG tablet, Take 1 tablet by mouth daily, Disp: 30 tablet, Rfl: 5    Ubrogepant (UBRELVY) 100 MG TABS, TAKE 1 TABLET IF NEEDED MAY TAKE 1 MORE DOSE AFTER 1 HOUR IF NEEDED - DO NOT TAKE MORE THAN 200 MG IN 24 HRS, Disp: 10 tablet, Rfl: 0    Erenumab-aooe 70 MG/ML SOAJ, Inject 70 mg into the skin every 30 days Lot: [id number] Exp: 11/2021, Disp: 1 pen, Rfl: 3    ondansetron (ZOFRAN) 4 MG tablet, Take 1 tablet by mouth every 8 hours as needed for Nausea or Vomiting, Disp: 30 tablet, Rfl: 1    TROKENDI  MG CP24, TAKE 1 CAPSULE DAILY, Disp: 90 capsule, Rfl: 0    rizatriptan (MAXALT) 10 MG tablet, Take 1 tablet by mouth daily as needed for Migraine, Disp: 9 tablet, Rfl: 5    fluticasone (FLONASE) 50 MCG/ACT nasal spray, 1 spray by Nasal route daily, Disp: 1 Bottle, Rfl: 5    acetaminophen-codeine (TYLENOL #4) 300-60 MG per tablet, TK 1 T PO ONCE A DAY PRN FOR PAIN, Disp: , Rfl: 0    NARCAN 4 MG/0.1ML LIQD nasal spray, , Disp: , Rfl: 1    esomeprazole (NEXIUM) 40 MG delayed release capsule, Take 40 mg by mouth every morning (before breakfast), Disp: , Rfl:     famotidine (PEPCID) 40 MG tablet, Take 40 mg by mouth nightly , Disp: , Rfl:     pantoprazole (PROTONIX) 40 MG tablet, Take 40 mg by mouth daily , Disp: , Rfl:     montelukast (SINGULAIR) 10 MG tablet, Take 10 mg by mouth nightly , Disp: , Rfl:     aspirin 81 MG tablet, Take 81 mg by mouth daily, Disp: , Rfl:     albuterol sulfate HFA (PROAIR HFA) 108 (90 Base) MCG/ACT inhaler, Inhale 2 puffs into the lungs every 6 hours as needed for Wheezing, Disp: 1 Inhaler, Rfl: 0    REVIEW OF SYSTEMS  Review of Systems   Constitutional: Negative for appetite change, fatigue and unexpected weight change. HENT: Negative for drooling, hearing loss, tinnitus and trouble swallowing. Eyes: Negative for photophobia and visual disturbance. Respiratory: Negative for shortness of breath. Cardiovascular: Negative for palpitations. Gastrointestinal: Negative for nausea and vomiting. Endocrine: Negative for polyuria. Genitourinary: Negative for flank pain. Musculoskeletal: Negative for back pain, neck pain and neck stiffness. Skin: Negative for rash. Allergic/Immunologic: Negative for food allergies. Neurological: Negative for dizziness, tremors, seizures, syncope, speech difficulty, weakness, light-headedness, numbness and headaches. Hematological: Negative for adenopathy. Psychiatric/Behavioral: Negative for agitation, behavioral problems and sleep disturbance.          PHYSICAL EXAM:   Resp 16   Ht 5' 5\" (1.651 m)   Wt 197 lb (89.4 kg)   LMP 10/01/2018 (Approximate)   BMI 32.78 kg/m²   GENERAL APPEARANCE: Alert, well-developed, well-nourished female in no acute distress. HEENT: Normocephalic and atraumatic. PERRL. Oropharynx unremarkable. PULM: Normal respiratory effort. No accessory muscle use. CV: RRR. ABDOMEN: Soft, nontender. EXTREMITIES: No obvious signs of vascular compromise. Pulses present. No cyanosis, clubbing or edema. SKIN: Clear; no rashes, lesions or skin breaks in exposed areas. NEURO:     Neurological examination     MENTAL STATUS: Patient awake and oriented to time, place, and person. Recent/remote memory normal. Attention span/concentration normal. Speech fluent. Good comprehension, naming, and repetition. Fund of knowledge appropriate for patient's level of education. Affect normal.    CRANIAL NERVES:  CN I: Not tested. CN II: Fundoscopic exam not performed. CN III, IV, VI: Pupils equal, round and reactive to light; extra ocular movements full and intact. CN V: Facial sensation normal.  CN VII: No facial asymmetry. CN VIII:  Hearing grossly normal bilaterally. No pathologic nystagmus or skew deviation. CN IX, X: Palate elevates symmetrically. CN XI: Shoulder shrug and chin rotation equal with intact strength. CN XII: Tongue protrusion midline. MOTOR: Normal bulk. Tone normal and symmetric throughout. Strength 5/5 throughout. ABNORMAL MOVEMENTS/TREMORS: No     REFLEXES: DTRs 2+; normal and symmetric throughout. Plantar response downgoing. SENSATION: Sensation grossly intact to fine touch, pain/temperature, vibration and position. COORDINATION: Finger-to-nose and heel to shin normal for age and symmetric. Finger tapping and alternating movements normal.    STATION: Negative Romberg. GAIT:  Normal heel, toe and tandem; no ataxia.      DIAGNOSTIC TESTS:     I have personally reviewed the most recent lab results:    Sodium   Date Value Ref Range Status   08/18/2021 136 132 - 146 mmol/L Final   05/21/2021 140 132 - 146 mmol/L Final   05/19/2021 138 132 - 146 mmol/L Final     Potassium   Date Value Ref Range Status 08/18/2021 4.2 3.5 - 5.0 mmol/L Final   05/21/2021 3.9 3.5 - 5.0 mmol/L Final   05/19/2021 4.1 3.5 - 5.0 mmol/L Final     Chloride   Date Value Ref Range Status   08/18/2021 102 98 - 107 mmol/L Final   05/21/2021 104 98 - 107 mmol/L Final   05/19/2021 102 98 - 107 mmol/L Final     CO2   Date Value Ref Range Status   08/18/2021 26 22 - 29 mmol/L Final   05/21/2021 24 22 - 29 mmol/L Final   05/19/2021 27 22 - 29 mmol/L Final     BUN   Date Value Ref Range Status   08/18/2021 18 6 - 20 mg/dL Final   05/21/2021 12 6 - 20 mg/dL Final   05/19/2021 8 6 - 20 mg/dL Final     CREATININE   Date Value Ref Range Status   08/18/2021 1.0 0.5 - 1.0 mg/dL Final   05/21/2021 1.1 (H) 0.5 - 1.0 mg/dL Final   05/19/2021 0.9 0.5 - 1.0 mg/dL Final     GFR Non-   Date Value Ref Range Status   08/18/2021 >60 >=60 mL/min/1.73 Final     Comment:     Chronic Kidney Disease: less than 60 ml/min/1.73 sq.m. Kidney Failure: less than 15 ml/min/1.73 sq.m. Results valid for patients 18 years and older. 05/21/2021 >60 >=60 mL/min/1.73 Final     Comment:     Chronic Kidney Disease: less than 60 ml/min/1.73 sq.m. Kidney Failure: less than 15 ml/min/1.73 sq.m. Results valid for patients 18 years and older. 05/19/2021 >60 >=60 mL/min/1.73 Final     Comment:     Chronic Kidney Disease: less than 60 ml/min/1.73 sq.m. Kidney Failure: less than 15 ml/min/1.73 sq.m. Results valid for patients 18 years and older.        Calcium   Date Value Ref Range Status   08/18/2021 9.5 8.6 - 10.2 mg/dL Final   05/21/2021 9.0 8.6 - 10.2 mg/dL Final   05/19/2021 9.3 8.6 - 10.2 mg/dL Final     Magnesium   Date Value Ref Range Status   10/22/2017 2.0 1.6 - 2.6 mg/dL Final     WBC   Date Value Ref Range Status   08/18/2021 7.2 4.5 - 11.5 E9/L Final   05/21/2021 6.3 4.5 - 11.5 E9/L Final   05/19/2021 5.9 4.5 - 11.5 E9/L Final     Hemoglobin   Date Value Ref Range Status   08/18/2021 13.7 11.5 - 15.5 g/dL Final   05/21/2021 12.8 11.5 - 15.5 g/dL Final   05/19/2021 12.8 11.5 - 15.5 g/dL Final     Hematocrit   Date Value Ref Range Status   08/18/2021 43.6 34.0 - 48.0 % Final   05/21/2021 41.6 34.0 - 48.0 % Final   05/19/2021 40.9 34.0 - 48.0 % Final     Platelets   Date Value Ref Range Status   08/18/2021 235 130 - 450 E9/L Final   05/21/2021 209 130 - 450 E9/L Final   05/19/2021 209 130 - 450 E9/L Final     Neutrophils %   Date Value Ref Range Status   08/18/2021 52.4 43.0 - 80.0 % Final   05/19/2021 56.6 43.0 - 80.0 % Final   09/23/2020 55.7 43.0 - 80.0 % Final     Monocytes %   Date Value Ref Range Status   08/18/2021 7.5 2.0 - 12.0 % Final   05/19/2021 7.6 2.0 - 12.0 % Final   09/23/2020 7.1 2.0 - 12.0 % Final     Eosinophils %   Date Value Ref Range Status   02/02/2017 1.5 1 - 4 % Final     Total Protein   Date Value Ref Range Status   08/18/2021 7.6 6.4 - 8.3 g/dL Final   05/21/2021 7.0 6.4 - 8.3 g/dL Final   05/19/2021 7.1 6.4 - 8.3 g/dL Final     Total Bilirubin   Date Value Ref Range Status   08/18/2021 <0.2 0.0 - 1.2 mg/dL Final   05/21/2021 0.2 0.0 - 1.2 mg/dL Final   05/19/2021 0.2 0.0 - 1.2 mg/dL Final     Alkaline Phosphatase   Date Value Ref Range Status   08/18/2021 127 (H) 35 - 104 U/L Final   05/21/2021 123 (H) 35 - 104 U/L Final   05/19/2021 127 (H) 35 - 104 U/L Final     ALT   Date Value Ref Range Status   08/18/2021 17 0 - 32 U/L Final   05/21/2021 25 0 - 32 U/L Final   05/19/2021 17 0 - 32 U/L Final     AST   Date Value Ref Range Status   08/18/2021 23 0 - 31 U/L Final   05/21/2021 37 (H) 0 - 31 U/L Final   05/19/2021 23 0 - 31 U/L Final     Lab Results   Component Value Date    INR 1.1 10/22/2017     Lab Results   Component Value Date    TRIG 94 08/18/2021    HDL 61 08/18/2021     No components found for: HGBA1C  No results found for: PROTEINCSF, GLUCCSF, WBCCSF    Controlled Substance Monitoring:    Acute and Chronic Pain Monitoring:   RX Monitoring 9/14/2018   Attestation The Prescription Monitoring Report for this patient was reviewed today. Periodic Controlled Substance Monitoring -   Chronic Pain > 80 MEDD -       MEDICAL DECISION MAKING  ASSESSMENT/PLAN    Elaina Lama was seen today for migraine. Diagnoses and all orders for this visit:    Intractable chronic migraine without aura and with status migrainosus    Chronic daily headache    Cervicalgia      -     Erenumab-aooe 140 MG/ML SOAJ; Inject 140 mg into the skin every 30 days  -     rizatriptan (MAXALT) 10 MG tablet; Take 1 tablet by mouth daily as needed for Migraine  -     Ubrogepant (UBRELVY) 100 MG TABS; TAKE 1 TABLET IF NEEDED MAY TAKE 1 MORE DOSE AFTER 1 HOUR IF NEEDED - DO NOT TAKE MORE THAN 200 MG IN 24 HRS    · Increase Aimovig to 140 mg every month. · Continue Ubrelvy and rizatriptan  · She has been off of Trokendi. · She recently tried Ajovy which was significantly helping her symptoms however had to stop because of severe body itching. · Pericranial nerve block and trigger point patient is unavailable for abortive treatment of headaches. · Do not drive if you have taken a prescription pain medicine. · Rest in a quiet, dark room until your headache is gone. Close your eyes, and try to relax or go to sleep. Don't watch TV or read. · Put a cold, moist cloth or cold pack on the painful area for 10 to 20 minutes at a time. Put a thin cloth between the cold pack and your skin. · Use a warm, moist towel or a heating pad set on low to relax tight shoulder and neck muscles. · Have someone gently massage your neck and shoulders. · Don't take medicine for headache pain too often. Taking too much pain medicine can lead to more headaches. These are called medicine-overuse headaches. · Keep a headache diary so you can figure out what triggers your headaches. Avoiding triggers may help you prevent headaches. Record when each headache began, how long it lasted, and what the pain was like.  Write down any other symptoms you had with the headache, such as

## 2021-10-01 ENCOUNTER — HOSPITAL ENCOUNTER (OUTPATIENT)
Dept: GENERAL RADIOLOGY | Age: 51
Discharge: HOME | End: 2021-10-03
Payer: COMMERCIAL

## 2021-10-01 DIAGNOSIS — Z12.31 SCREENING MAMMOGRAM, ENCOUNTER FOR: ICD-10-CM

## 2021-10-01 PROCEDURE — 77063 BREAST TOMOSYNTHESIS BI: CPT

## 2021-10-04 ENCOUNTER — TELEPHONE (OUTPATIENT)
Dept: FAMILY MEDICINE CLINIC | Age: 51
End: 2021-10-04

## 2021-10-04 ENCOUNTER — HOSPITAL ENCOUNTER (OUTPATIENT)
Age: 51
Discharge: HOME | End: 2021-10-04
Payer: COMMERCIAL

## 2021-10-04 ENCOUNTER — HOSPITAL ENCOUNTER (OUTPATIENT)
Dept: CARDIOLOGY | Age: 51
Discharge: HOME | End: 2021-10-04
Payer: COMMERCIAL

## 2021-10-04 DIAGNOSIS — I34.1 MVP (MITRAL VALVE PROLAPSE): ICD-10-CM

## 2021-10-04 DIAGNOSIS — E28.2 PCOS (POLYCYSTIC OVARIAN SYNDROME): ICD-10-CM

## 2021-10-04 DIAGNOSIS — E78.00 HYPERCHOLESTEROLEMIA: ICD-10-CM

## 2021-10-04 LAB
CORTISOL TOTAL: 0.79 MCG/DL (ref 2.68–18.4)
CREATININE URINE: 207 MG/DL (ref 29–226)
LV EF: 60 %
LVEF MODALITY: NORMAL
PROLACTIN: 8.88 NG/ML
PROTEIN PROTEIN: 10 MG/DL (ref 0–12)
PROTEIN/CREAT RATIO: 0
PROTEIN/CREAT RATIO: 0 (ref 0–0.2)

## 2021-10-04 PROCEDURE — 82570 ASSAY OF URINE CREATININE: CPT

## 2021-10-04 PROCEDURE — 84146 ASSAY OF PROLACTIN: CPT

## 2021-10-04 PROCEDURE — 84156 ASSAY OF PROTEIN URINE: CPT

## 2021-10-04 PROCEDURE — 82627 DEHYDROEPIANDROSTERONE: CPT

## 2021-10-04 PROCEDURE — 82533 TOTAL CORTISOL: CPT

## 2021-10-04 PROCEDURE — 84403 ASSAY OF TOTAL TESTOSTERONE: CPT

## 2021-10-04 PROCEDURE — 93306 TTE W/DOPPLER COMPLETE: CPT

## 2021-10-04 PROCEDURE — 36415 COLL VENOUS BLD VENIPUNCTURE: CPT

## 2021-10-04 PROCEDURE — 84270 ASSAY OF SEX HORMONE GLOBUL: CPT

## 2021-10-04 PROCEDURE — 83498 ASY HYDROXYPROGESTERONE 17-D: CPT

## 2021-10-04 NOTE — TELEPHONE ENCOUNTER
Patient has left shoulder,arm and elbow pain, doesn't remember doing anything, but hurts to lift anything and is a little swollen, would like a xray ordered, can have done at [facility]

## 2021-10-05 DIAGNOSIS — M54.50 LOW BACK PAIN, UNSPECIFIED BACK PAIN LATERALITY, UNSPECIFIED CHRONICITY, UNSPECIFIED WHETHER SCIATICA PRESENT: Primary | ICD-10-CM

## 2021-10-05 DIAGNOSIS — M54.50 ACUTE LEFT-SIDED LOW BACK PAIN, UNSPECIFIED WHETHER SCIATICA PRESENT: Primary | ICD-10-CM

## 2021-10-06 LAB
SEX HORMONE BINDING GLOBULIN: 30 NMOL/L (ref 30–135)
TESTOSTERONE FREE-NONMALE: ABNORMAL PG/ML (ref 1.1–5.8)
TESTOSTERONE TOTAL: <3 NG/DL (ref 20–70)

## 2021-10-07 ENCOUNTER — TELEPHONE (OUTPATIENT)
Dept: CARDIOLOGY CLINIC | Age: 51
End: 2021-10-07

## 2021-10-07 LAB — DHEAS (DHEA SULFATE): 39 UG/DL (ref 26–200)

## 2021-10-07 NOTE — TELEPHONE ENCOUNTER
Results given per MD Gayla Goldstein  TTE is completely normal there is no mitral valve prolapse.  You can make her follow-up at 2 years instead of 1 year.         PT voiced understanding all questions answered to the patients satisfaction

## 2021-10-08 LAB — 17-OH PROGESTERONE LCMS: <10 NG/DL

## 2021-10-09 ENCOUNTER — APPOINTMENT (OUTPATIENT)
Dept: ULTRASOUND IMAGING | Age: 51
End: 2021-10-09
Payer: COMMERCIAL

## 2021-10-09 ENCOUNTER — APPOINTMENT (OUTPATIENT)
Dept: GENERAL RADIOLOGY | Age: 51
End: 2021-10-09
Payer: COMMERCIAL

## 2021-10-09 ENCOUNTER — HOSPITAL ENCOUNTER (EMERGENCY)
Age: 51
Discharge: HOME | End: 2021-10-09
Payer: COMMERCIAL

## 2021-10-09 VITALS
DIASTOLIC BLOOD PRESSURE: 77 MMHG | TEMPERATURE: 97 F | RESPIRATION RATE: 14 BRPM | HEART RATE: 87 BPM | SYSTOLIC BLOOD PRESSURE: 143 MMHG | WEIGHT: 194 LBS | HEIGHT: 65 IN | OXYGEN SATURATION: 97 % | BODY MASS INDEX: 32.32 KG/M2

## 2021-10-09 DIAGNOSIS — S46.912A MUSCLE STRAIN OF LEFT UPPER ARM, INITIAL ENCOUNTER: Primary | ICD-10-CM

## 2021-10-09 PROCEDURE — 73080 X-RAY EXAM OF ELBOW: CPT

## 2021-10-09 PROCEDURE — 73110 X-RAY EXAM OF WRIST: CPT

## 2021-10-09 PROCEDURE — 73030 X-RAY EXAM OF SHOULDER: CPT

## 2021-10-09 PROCEDURE — 99283 EMERGENCY DEPT VISIT LOW MDM: CPT

## 2021-10-09 PROCEDURE — 93971 EXTREMITY STUDY: CPT

## 2021-10-09 PROCEDURE — 73060 X-RAY EXAM OF HUMERUS: CPT

## 2021-10-09 RX ORDER — ACETAMINOPHEN 500 MG
1000 TABLET ORAL ONCE
Status: DISCONTINUED | OUTPATIENT
Start: 2021-10-09 | End: 2021-10-09 | Stop reason: HOSPADM

## 2021-10-09 ASSESSMENT — PAIN SCALES - GENERAL
PAINLEVEL_OUTOF10: 10
PAINLEVEL_OUTOF10: 10

## 2021-10-09 ASSESSMENT — PAIN DESCRIPTION - LOCATION: LOCATION: ARM

## 2021-10-09 NOTE — Clinical Note
Holden Alvarez was seen and treated in our emergency department on 10/9/2021. She may return to work on 10/11/2021. If you have any questions or concerns, please don't hesitate to call.       Ryan Cruz, APRN - CNP

## 2021-10-09 NOTE — ED PROVIDER NOTES
[facility]  Department of Emergency Medicine   ED  Encounter Note  Admit Date/RoomTime: 10/9/2021 10:40 AM  ED Room:     NAME: Juan Giles  : 1970  MRN: [id number]     Chief Complaint:  Arm Injury (twisted lt arm 2 days ago while pushing cart at work. Pain from elbow to shoulder)    History of Present Illness       Juan Giles is a 46 y.o. old female who presents to the emergency department by private vehicle, for non-traumatic Left elbow, upper arm and shoulder pain which occured 3 day(s) prior to arrival.  The complaint is due to pushing a cart at work. Patient has no prior history of pain/injury with regards to today's visit. She is right handed. Since onset the symptoms have been remaining constant. Her pain is aggraveated by any movement or pressure on or palpation of painful area and relieved by nothing, as no treatment has been provided prior to this visit. She denies any head injury, headache, loss of consciousness, confusion, dizziness, neck pain, chest pain, abdominal pain, back pain, numbness, weakness, blurred vision, nausea or vomiting. Patient denies any shortness breath, chest pain, lightheaded or dizziness. She just would like to have an ultrasound of the upper arm \"blood clot    ROS   Pertinent positives and negatives are stated within HPI, all other systems reviewed and are negative. Past Medical History:  has a past medical history of Asthma, Back injury, Chronic back pain, Chronic tension-type headache, not intractable, DDD (degenerative disc disease), cervical, DVT (deep venous thrombosis) ([address] GERD (gastroesophageal reflux disease), Herniated disc, cervical, Hx of screening mammography, HX OTHER MEDICAL, Hypertension, Migraine, Migraine, MVP (mitral valve prolapse), Numbness and tingling, Obesity, Sinus infection, Thyroid disease, Torn ACL, Ulcer, and Vitamin B 12 deficiency.     Surgical History:  has a past surgical history that includes  section; Dilation and curettage of uterus; ECHO Complete 2D W Doppler W Color (10/24/2012); [place] tooth extraction; Tympanostomy tube placement; Breast lumpectomy (Right, 2013); Breast surgery (Right, 2013); ECHO Compl W Dop Color Flow (2015); Mammography digital diagnostic bilateral; other surgical history; Upper gastrointestinal endoscopy; Colonoscopy; Knee arthroscopy (Right, ); Colonoscopy (2012); Upper gastrointestinal endoscopy (2012); cardiovascular stress test (2015); Hardware Removal (Right, 2018); and Knee arthroscopy (Right, ). Social History:  reports that she has never smoked. She has never used smokeless tobacco. She reports previous alcohol use. She reports that she does not use drugs. Family History: family history includes Breast Cancer (age of onset: 43) in her sister; Cancer in her father; Coronary Art Dis in her father; Deep Vein Thrombosis in her sister; Heart Disease in her father; High Blood Pressure in her mother; Hypertension in her maternal grandmother; Seizures in her father. Allergies: Ceftin [cefuroxime axetil], Pcn [penicillins], Vicodin [hydrocodone-acetaminophen], Ibuprofen, Other, Percocet [oxycodone-acetaminophen], Sudafed [pseudoephedrine hcl], and Ajovy [fremanezumab-vfrm]    Physical Exam   Oxygen Saturation Interpretation: Normal.        ED Triage Vitals   BP Temp Temp src Pulse Resp SpO2 Height Weight   -- -- -- -- -- -- -- --         · Constitutional:  Alert, development consistent with age. · HEENT:  NC/NT. Airway patent. · Neck:  Normal ROM. Supple. · Left Upper Extremity(s): elbow, upper arm and shoulder. Tenderness:  mild. Swelling: None. Deformity: no deformity observed/palpated. ROM: full range with pain. Skin:  no wounds, erythema, or swelling. Neurovascular: Motor deficit: none. Sensory deficit: none. Pulse deficit: none. Capillary refill: normal.  · Lymphatics: No lymphangitis or adenopathy noted. · Neurological:  Oriented. Motor functions intact. Lab / Imaging Results   (All laboratory and radiology results have been personally reviewed by myself)  Labs:  No results found for this visit on 10/09/21. Imaging: All Radiology results interpreted by Radiologist unless otherwise noted. US DUP UPPER EXTREMITY LEFT VENOUS   Final Result   No evidence of DVT. XR SHOULDER LEFT (MIN 2 VIEWS)   Final Result   1. There is no fracture dislocation of the left shoulder   2. Mild degenerative changes of the [facility] joint. XR HUMERUS LEFT (MIN 2 VIEWS)   Final Result   No acute osseous abnormality. XR ELBOW LEFT (MIN 3 VIEWS)   Final Result   No acute abnormality. XR WRIST LEFT (MIN 3 VIEWS)   Final Result   1. Dorsal soft tissue swelling. There is no osseous abnormality. ED Course / Medical Decision Making     Medications   acetaminophen (TYLENOL) tablet 1,000 mg (1,000 mg Oral Not Given 10/9/21 1106)        Consult:   None    Procedure(s):  None    MDM:    Imaging was obtained based on moderate suspicion for fracture / bony abnormality, dislocation, joint effusion as per history/physical findings. X-ray of the left shoulder reveals no fracture dislocation. There is mild degenerative changes of the [facility] joint. X-ray of the humerus left revealed no acute osseous abnormalities. X-ray of the left elbow revealed no acute abnormalities. X-ray of the wrist reveals dorsal soft tissue swelling. No osseous abnormalities noted. Negative snuffbox tenderness. There is no neck neurovascular compromise neurological deficits. He denies any shortness breath or chest pain. Patient requested for ultrasound to rule out DVT. This was negative. After shared decision-making she accepts Ace wrap.   Plan is subsequently for symptom control, limited use and  immobilization with appropriate outpatient follow-up PCP and occupational health    Plan of Care/Counseling:  LYNETTE Meeks CNP reviewed today's visit with the patient in addition to providing specific details for the plan of care and counseling regarding the diagnosis and prognosis. Questions are answered at this time and are agreeable with the plan. Assessment      1. Muscle strain of left upper arm, initial encounter      Plan   Discharged home. Patient condition is stable    New Medications     Discharge Medication List as of 10/9/2021  1:35 PM        Electronically signed by LYNETTE Meeks CNP   DD: 10/9/21  **This report was transcribed using voice recognition software. Every effort was made to ensure accuracy; however, inadvertent computerized transcription errors may be present.   END OF ED PROVIDER NOTE        LYNETTE Meeks CNP  10/09/21 [id number]

## 2021-10-09 NOTE — Clinical Note
Justine Mai was seen and treated in our emergency department on 10/9/2021. She may return to work on 10/11/2021. If you have any questions or concerns, please don't hesitate to call.       Felecia Thakur, APRN - CNP

## 2021-10-09 NOTE — Clinical Note
Doris Prado was seen and treated in our emergency department on 10/9/2021. She may return to work on 10/11/2021. If you have any questions or concerns, please don't hesitate to call.       Raj Fairbanks, APRN - CNP

## 2021-10-14 ENCOUNTER — HOSPITAL ENCOUNTER (OUTPATIENT)
Dept: MRI IMAGING | Age: 51
Discharge: HOME | End: 2021-10-16
Payer: COMMERCIAL

## 2021-10-14 DIAGNOSIS — R92.2 DENSE BREAST TISSUE ON MAMMOGRAM: ICD-10-CM

## 2021-10-14 DIAGNOSIS — Z80.3 FAMILY HISTORY OF BREAST CANCER IN SISTER: ICD-10-CM

## 2021-10-14 DIAGNOSIS — Z91.89 AT HIGH RISK FOR BREAST CANCER: ICD-10-CM

## 2021-10-14 PROCEDURE — 77049 MRI BREAST C-+ W/CAD BI: CPT

## 2021-10-14 PROCEDURE — A9585 GADOBUTROL INJECTION: HCPCS | Performed by: RADIOLOGY

## 2021-10-14 PROCEDURE — 6360000004 HC RX CONTRAST MEDICATION: Performed by: RADIOLOGY

## 2021-10-14 RX ADMIN — GADOBUTROL 9 ML: 604.72 INJECTION INTRAVENOUS at 14:32

## 2021-10-15 ENCOUNTER — TELEPHONE (OUTPATIENT)
Dept: ENDOCRINOLOGY | Age: 51
End: 2021-10-15

## 2021-10-15 NOTE — TELEPHONE ENCOUNTER
Notify pt,  I have reviewed your recent results    All results are very good. AM cortisol appropriately suppressed following 1 mg dexamethasone suppression test and this essentially r/o Cushing's.  Also, testosterone wasn't elevated making PCOS less likely    Will discuss all these results in details are next OV

## 2021-11-12 ENCOUNTER — APPOINTMENT (OUTPATIENT)
Dept: CT IMAGING | Age: 51
End: 2021-11-12
Payer: COMMERCIAL

## 2021-11-12 ENCOUNTER — HOSPITAL ENCOUNTER (EMERGENCY)
Age: 51
Discharge: HOME | End: 2021-11-12
Payer: COMMERCIAL

## 2021-11-12 ENCOUNTER — APPOINTMENT (OUTPATIENT)
Dept: GENERAL RADIOLOGY | Age: 51
End: 2021-11-12
Payer: COMMERCIAL

## 2021-11-12 VITALS
OXYGEN SATURATION: 98 % | HEART RATE: 74 BPM | BODY MASS INDEX: 30.66 KG/M2 | HEIGHT: 65 IN | TEMPERATURE: 98.2 F | DIASTOLIC BLOOD PRESSURE: 89 MMHG | SYSTOLIC BLOOD PRESSURE: 136 MMHG | RESPIRATION RATE: 16 BRPM | WEIGHT: 184 LBS

## 2021-11-12 DIAGNOSIS — M54.12 CERVICAL RADICULOPATHY: ICD-10-CM

## 2021-11-12 DIAGNOSIS — M77.8 LEFT ELBOW TENDONITIS: ICD-10-CM

## 2021-11-12 DIAGNOSIS — M25.522 LEFT ELBOW PAIN: Primary | ICD-10-CM

## 2021-11-12 DIAGNOSIS — M50.20 CERVICAL HERNIATED DISC: ICD-10-CM

## 2021-11-12 PROCEDURE — 73200 CT UPPER EXTREMITY W/O DYE: CPT

## 2021-11-12 PROCEDURE — 73560 X-RAY EXAM OF KNEE 1 OR 2: CPT

## 2021-11-12 PROCEDURE — 73070 X-RAY EXAM OF ELBOW: CPT

## 2021-11-12 PROCEDURE — 73090 X-RAY EXAM OF FOREARM: CPT

## 2021-11-12 PROCEDURE — 72125 CT NECK SPINE W/O DYE: CPT

## 2021-11-12 PROCEDURE — 99284 EMERGENCY DEPT VISIT MOD MDM: CPT

## 2021-11-12 RX ORDER — TRAMADOL HYDROCHLORIDE 50 MG/1
50 TABLET ORAL EVERY 6 HOURS PRN
Qty: 18 TABLET | Refills: 0 | Status: SHIPPED | OUTPATIENT
Start: 2021-11-12 | End: 2021-11-15

## 2021-11-12 RX ORDER — METHYLPREDNISOLONE 4 MG/1
TABLET ORAL
Qty: 1 KIT | Refills: 0 | Status: SHIPPED | OUTPATIENT
Start: 2021-11-12 | End: 2021-11-18

## 2021-11-12 RX ORDER — CYCLOBENZAPRINE HCL 10 MG
10 TABLET ORAL 2 TIMES DAILY PRN
Qty: 30 TABLET | Refills: 0 | Status: SHIPPED | OUTPATIENT
Start: 2021-11-12 | End: 2022-09-06 | Stop reason: SDUPTHER

## 2021-11-12 ASSESSMENT — PAIN SCALES - GENERAL: PAINLEVEL_OUTOF10: 6

## 2021-11-12 ASSESSMENT — PAIN DESCRIPTION - PAIN TYPE: TYPE: ACUTE PAIN

## 2021-11-12 NOTE — ED NOTES
Pt name called in ED lobby, no answer. This RN checked for pt in XR dept. Pt not located at this time. Will call again.      Shelby Morgan RN  11/12/21 [id number]

## 2021-11-12 NOTE — ED PROVIDER NOTES
[facility]     Department of Emergency Medicine   ED  Provider Note  Admit Date/RoomTime: 11/12/2021 10:04 AM  ED Room: 34/34    Chief Complaint   Arm Injury (lt twisted lt arm and felt crack mid Oct ), Back Pain (started hurting when fixture fell on her at work), and Neck Pain    History of Present Illness      Adolph Mcfarlane is a 46 y.o. old female who presents to the ED for multiple complaints. Patient states she was injured at work back in October and is still having pain to her left elbow. She states she was reinjured this past Tuesday. Patient states she was trying to stop something from falling on her when she put her arms out to stop it. She reports neck pain and right knee pain as well. Patient states she is having some pain to her right forearm since the second injury. Patient is asking for MRI of her left elbow. She does have one scheduled for outpatient. She is denying any chest pain, shortness of breath, or pain with breathing. She has no abdominal pain, nausea, vomiting, vision changes, dizziness, or difficulty with ambulation or balance. Patient is alert and oriented x3 and in no apparent distress at this exam.  She is nontoxic-appearing. Patient advised that we do not do orthopedic MRIs during emergency department for neurovascularly intact limbs. She is adamantly requesting a CAT scan. I spoke with patient and advised her that I do not believe a CAT scan is medically necessary at this time, however, she does not want to leave without this time. Patient's injury to left elbow appears to be muscle strain, tendon injury/tendinitis, or neuropathic pain. Patient states she did follow-up with Worker's Comp. for the first injury but has not seen them for the second injury. She would like to follow another [employer] claim. She states she is scheduled to see an orthopedic but has not seen them yet.     ROS   Pertinent positives and negatives are stated within HPI, all other systems reviewed and are negative. Past Medical History:  has a past medical history of Asthma, Back injury, Chronic back pain, Chronic tension-type headache, not intractable, DDD (degenerative disc disease), cervical, DVT (deep venous thrombosis) ([facility] [address] GERD (gastroesophageal reflux disease), Herniated disc, cervical, Hx of screening mammography, HX OTHER MEDICAL, Hypertension, Migraine, Migraine, MVP (mitral valve prolapse), Numbness and tingling, Obesity, Sinus infection, Thyroid disease, Torn ACL, Ulcer, and Vitamin B 12 deficiency. Past Surgical History:  has a past surgical history that includes  section; Dilation and curettage of uterus; ECHO Complete 2D W Doppler W Color (10/24/2012); [place] tooth extraction; Tympanostomy tube placement; Breast lumpectomy (Right, 2013); Breast surgery (Right, 2013); ECHO Compl W Dop Color Flow (2015); Mammography digital diagnostic bilateral; other surgical history; Upper gastrointestinal endoscopy; Colonoscopy; Knee arthroscopy (Right, ); Colonoscopy (2012); Upper gastrointestinal endoscopy (2012); cardiovascular stress test (2015); Hardware Removal (Right, 2018); and Knee arthroscopy (Right, ). Social History:  reports that she has never smoked. She has never used smokeless tobacco. She reports previous alcohol use. She reports that she does not use drugs. Family History: family history includes Breast Cancer (age of onset: 43) in her sister; Cancer in her father; Coronary Art Dis in her father; Deep Vein Thrombosis in her sister; Heart Disease in her father; High Blood Pressure in her mother; Hypertension in her maternal grandmother; Seizures in her father. The patients home medications have been reviewed.     Allergies: Ceftin [cefuroxime axetil], Pcn [penicillins], Vicodin [hydrocodone-acetaminophen], Ibuprofen, Other, Percocet [oxycodone-acetaminophen], Sudafed [pseudoephedrine hcl], and Ajovy [fremanezumab-vfrm]  Allergies have been reviewed with patient. Physical Exam   VS:  /89   Pulse 74   Temp 98.2 °F (36.8 °C) (Oral)   Resp 16   Ht 5' 5\" (1.651 m)   Wt 184 lb (83.5 kg)   LMP 10/01/2018 (Approximate)   SpO2 98%   BMI 30.62 kg/m²     Oxygen Saturation Interpretation: Normal.    Constitutional:  Alert and oriented x4, development consistent with age, NAD  HEENT:  NC/NT. No bruising or lacerations, No blood noted in nares or ears, EOMI, PERRLA, Airway patent. Neck:  No crepitus, mild midline and bilateral paravertebral tenderness  Chest:  Symmetrical without visible rash or tenderness. No bruising   Respiratory:  Clear and equal bilaterally with good airflow, No respiratory distress    CV:  Regular rate and rhythm  GI:  Abdomen soft, nontender, No firm or pulsatile mass, No guarding or rigidity   Pelvis:  Stable, nontender to palpation. No pain with palpation to hips   Back: No CVA tenderness, no midline tenderness, thoracic and lumbar paravertebral tenderness  Extremities: Significant tenderness with palpation to left elbow and proximal left forearm, positive Tinel sign to left wrist, mild tenderness to left wrist and distal forearm, intact sensations distally, mild tenderness to left proximal humerus, mild tenderness to right forearm, no tenderness to left knee, mild tenderness with slight swelling to right knee, no calf tenderness or swelling, intact sensations distally, no open wounds  Integument:  Normal turgor. Warm, dry, without visible rash, unless noted elsewhere. Neurological:  GCS 15, CN II-XII intact, Motor functions intact. Lab / Imaging Results   (All laboratory and radiology results have been personally reviewed by myself)  Labs:  No results found for this visit on 11/12/21. Imaging: All Radiology results interpreted by Radiologist unless otherwise noted. CT ELBOW LEFT WO CONTRAST   Final Result   Unremarkable CT scan of the left elbow.          XR ELBOW LEFT (2 VIEWS)   Final Result   No acute fracture or dislocation. XR RADIUS ULNA LEFT (2 VIEWS)   Final Result   No acute osseous abnormality is identified. XR KNEE RIGHT (1-2 VIEWS)   Final Result   1. No acute osseous abnormality is identified. 2. Mild-moderate tricompartmental degenerative changes. CT CERVICAL SPINE WO CONTRAST   Final Result   1. No acute abnormality of the cervical spine. 2.  Central disc herniations at C4/C5 and C5/C6 result in moderate effacement   of ventral thecal sac. MRI could be helpful for assessment of cervical   spondylosis. ED Course / Medical Decision Making   Medications - No data to display    Re-examination:   Patient is politely declining any pain medication at this time. Patient is very upset that she cannot get an MRI at this ED visit. She states she does not have an appointment for a outpatient test but she wants it done now. I again advised patient that I do not think a CAT scan of her elbow is medically necessary at this time, however, she continues to adamantly ask for this test.    Patient states she would like to leave AMA before the CAT scan. During that time AMA paperwork was getting printed, patient was taken for CAT scan and now she would like to wait for results. Patient aware of all results and need to follow-up with orthopedic and Worker's Comp. She is educated on newly prescribed medication. Consult(s):  None    Procedure(s):  none    MDM:       Counseling: The emergency provider has spoken with the patient/caregiver and discussed todays results, in addition to providing specific details for the plan of care and counseling regarding the diagnosis and prognosis. Questions are answered at this time and they are agreeable with the plan. All results reviewed with pt and all questions answered. Patient understands that she must follow-up with Worker's Comp. and orthopedics.  Patient was advised to return to ED if symptoms worsen or new symptoms develop. Pt remained nontoxic, afebrile, and A&O x4 during this ED visit. They agreed with plan of care, discharge, and importance of follow-up. Pt was in no distress at discharge. Vitals stable. They were educated on newly prescribed medications. Assessment      1. Left elbow pain    2. Left elbow tendonitis    3. Cervical herniated disc    4. Cervical radiculopathy      Plan   Discharge to home  Patient condition is good    New Medications     Discharge Medication List as of 11/12/2021  1:59 PM      START taking these medications    Details   methylPREDNISolone (MEDROL, VIKKI,) 4 MG tablet Take by mouth., Disp-1 kit, R-0Print      traMADol (ULTRAM) 50 MG tablet Take 1 tablet by mouth every 6 hours as needed for Pain for up to 3 days. Intended supply: 3 days. Take lowest dose possible to manage pain, Disp-18 tablet, R-0Print      cyclobenzaprine (FLEXERIL) 10 MG tablet Take 1 tablet by mouth 2 times daily as needed for Muscle spasms, Disp-30 tablet, R-0Print           Electronically signed by Arlen Allen PA-C   DD: 11/12/21    **This report was transcribed using voice recognition software. Every effort was made to ensure accuracy; however, inadvertent computerized transcription errors may be present.     END OF ED PROVIDER NOTE       Arlen Allen PA-C  11/12/21 2032

## 2021-11-12 NOTE — Clinical Note
The patient has decided to leave against medical advice, because she no longer wanted to wait. She has normal mental status and adequate capacity to make medical decisions. The patient refuses hospital admission and wants to be discharged. T he risks have been explained to the patient, including worsening illness, chronic pain, permanent disability, and death. The benefits of admission have also been explained, including the availability and proximity of nurses, physicians, monitoring , diagnostic testing, and treatment. The patient was able to understand and state the risks and benefits of hospital admission. This was witnessed by nurse and me. She had the opportunity to ask questions about her medical condition. The pa emely was treated to the extent that she would allow, and knows that she may return for care at any time. Follow up has been discussed.  Patient has MRI scheduled

## 2021-11-15 NOTE — PROGRESS NOTES
Date: 11/3/2021 Status: Can   Time: 2:30 PM Length: 10   Visit Type: WORKERS COMP NEW PATIENT [2026] Copay: $0.00   Provider: Denice Sahu MD Department: [address] [place] [address] Dr   Referral #: [id number] Referral Status: Pending Review   Referring Provider: Vilma Jaimes Patient Type:     Notes: CX PENDING MRI [facility] New Patient CONSULT/Dr Dorantes Cl# [id number], doi: 10/6/2021, dx: S86.580X Lt Arm MCO: Ajay MRI pending bb       Patient was scheduled, awaiting MRI which is this week 11/19

## 2021-11-19 ENCOUNTER — HOSPITAL ENCOUNTER (OUTPATIENT)
Dept: MRI IMAGING | Age: 51
Discharge: HOME | End: 2021-11-21
Payer: COMMERCIAL

## 2021-11-19 DIAGNOSIS — S46.912A: ICD-10-CM

## 2021-11-19 PROCEDURE — 73221 MRI JOINT UPR EXTREM W/O DYE: CPT

## 2021-11-24 ENCOUNTER — OFFICE VISIT (OUTPATIENT)
Dept: ORTHOPEDIC SURGERY | Age: 51
End: 2021-11-24
Payer: COMMERCIAL

## 2021-11-24 VITALS — WEIGHT: 184 LBS | HEIGHT: 65 IN | BODY MASS INDEX: 30.66 KG/M2

## 2021-11-24 DIAGNOSIS — M25.522 LEFT ELBOW PAIN: Primary | ICD-10-CM

## 2021-11-24 PROCEDURE — 99203 OFFICE O/P NEW LOW 30 MIN: CPT | Performed by: ORTHOPAEDIC SURGERY

## 2021-11-24 NOTE — PROGRESS NOTES
New Elbow Patient Visit     Referring Provider:   Lalit Rosario DO  [address] [place] [address]    CHIEF COMPLAINT:   Chief Complaint   Patient presents with    Elbow Injury     10/11/2021 onset of injury workers comp case - at work pushing two carts - left elbow twisted she felt a \"pop and heard a crack\" the next day it swelled up and was painful the inner part of her elbow was the most painful it radiated into shoulder     Other     [place] Worker - request a letter for work        HPI:      Keyonna Jackson is a 46y.o. year old female who is seen today  for evaluation of left elbow pain. She reports the pain has been ongoing for the past 1 months. She   does recall a specific injury which started the pain. She was at work pushing two carts and felt a crack and pop. The following day it swelled up and was very painful. She  reports the pain is worse with movement, better with rest.  The patient {DOES/DOES NOT:94830} have mechanical symptoms. She does have night pain. She denies a feeling of instability. The prior treatments have been ***. The patient   {HAS/HAS NOT:841286934} responded to the treatment. The patient is bilateral hand dominant. The patient is working. 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   ***    PAST MEDICAL HISTORY  Past Medical History:   Diagnosis Date    Asthma     DR Marisa Giang    Back injury     Chronic back pain     PAIN RADIATES TO NECK AND LEGS    Chronic tension-type headache, not intractable 11/9/2018    DDD (degenerative disc disease), cervical 11/9/2018    [facility]     Right Arm    GERD (gastroesophageal reflux disease)     Herniated disc, cervical     also lumbar spine    Hx of screening mammography 3/2015 & 10/2015    BIRADS 2 BENIGN    HX OTHER MEDICAL     PINCHED NERVE BACK OF NECK    Hypertension     Migraine     Migraine     DR Laurita Hawthorne    MVP (mitral valve prolapse)     Numbness and tingling     LEGS AND FEET  Obesity     Sinus infection     Thyroid disease     no med    Torn ACL     RT KNEE    Ulcer     Vitamin B 12 deficiency     NON ANEMIC       PAST SURGICAL HISTORY  Past Surgical History:   Procedure Laterality Date    BREAST LUMPECTOMY Right 2013    BREAST SURGERY Right 2013    re-excision, rt lumpectomyscar evacuation of hematoma.  CARDIOVASCULAR STRESS TEST  2015    NORMAL     SECTION      COLONOSCOPY      12 YOSSEF HEMORRHOIDS, MINIMAL DIVERTICULA    COLONOSCOPY  2012    DR PEREZ GERD & GASTRITIS    DILATION AND CURETTAGE OF UTERUS      ECHO COMPL W DOP COLOR FLOW  2015         ECHOCARDIOGRAM COMPLETE 2D W DOPPLER W COLOR  10/24/2012         HARDWARE REMOVAL Right 2018    Foot    KNEE ARTHROSCOPY Right     Right knee arthroscopy. Dr. Puma Mendoza ARTHROSCOPY Right     Right knee meniscal repair. Dr. Odessa Bustamante.       MAMMO IMPLANT DIGITAL DIAG BI      3/24/15 BIRADS CAT 2 BENIGN, 10/15/15 BIRADS CAT 2    OTHER SURGICAL HISTORY      TILT TABLE TEST - DR Merrill Perry  NO ORTHOSTASIS    TYMPANOSTOMY TUBE PLACEMENT      UPPER GASTROINTESTINAL ENDOSCOPY      WITH CLOSED BIOPSY DR Bartley Lennox 12 GERD, GASTRITIS    UPPER GASTROINTESTINAL ENDOSCOPY  2012    DR Víctor Turner GERD & GASTRITIS    WISDOM TOOTH EXTRACTION         FAMILY HISTORY   Family History   Problem Relation Age of Onset    High Blood Pressure Mother     Heart Disease Father     Seizures Father     Coronary Art Dis Father     Cancer Father         STOMACH    Deep Vein Thrombosis Sister     Breast Cancer Sister 43    Hypertension Maternal Grandmother        SOCIAL HISTORY  Social History     Occupational History    Occupation: attendance   Tobacco Use    Smoking status: Never Smoker    Smokeless tobacco: Never Used   Vaping Use    Vaping Use: Never used   Substance and Sexual Activity    Alcohol use: Not Currently     Comment: rare     Drug use: No    Sexual [Penicillins]      ? reaction    Vicodin [Hydrocodone-Acetaminophen] Shortness Of Breath    Ibuprofen Swelling    Other      TAPE AND BANDAIDS SKIN IRRITATION    Percocet [Oxycodone-Acetaminophen]     Sudafed [Pseudoephedrine Hcl] Swelling    Ajovy [Fremanezumab-Vfrm] Rash       Controlled Substances Monitoring:        REVIEW OF SYSTEMS:     Constitutional:  Negative for weight loss, fevers, chills, fatigue  Cardiovascular: Negative for chest pain, palpitations  Pulmonary: Negative for shortness of breath, labored breathing, cough  GI: negative for abdominal pain, nausea, vomitting   MSK: per HPI  Skin: negative for rash, open wounds    All other systems reviewed and are negative           PHYSICAL EXAM     Vitals:    11/24/21 0901   Weight: 184 lb (83.5 kg)   Height: 5' 5\" (1.651 m)         Height: 5' 5\" (1.651 m)  Weight: [unfilled]  BMI:  Body mass index is 30.62 kg/m². General: The patient is alert and oriented x 3, appears to be stated age and in no distress. HEENT: head is normocephalic, atraumatic. EOMI. Neck: supple, trachea midline, no thyromegaly   Cardiovascular: peripheral pulses palpable. Normal Capillary refill   Respiratory: breathing unlabored, chest expansion symmetric   Skin: no rash, no open wounds, no erythema  Psych: normal affect; mood stable  Neurologic: gait normal, sensation grossly intact in extremities  MSK:    Shoulder:  Ipsilateral shoulder has normal range of motion, with no deformity. Normal musculature without atrophy    Elbow Exam:   ***      IMAGING:     XRAY:  3 views of the {Right/left:70185} elbow show no bony abnormality. Joint spaces maintained. No fracture/dislocation.     Radiographic findings reviewed with patient    ASSESSMENT  {LEFT/RIGHT:[id number]} elbow ***      PLAN  ***        Nohelia Hand MD  Orthopaedic Surgery   11/24/21  9:02 AM

## 2021-11-24 NOTE — PROGRESS NOTES
Chief Complaint   Patient presents with    Elbow Injury     10/11/2021 onset of injury workers comp case - at work pushing two carts - left elbow twisted she felt a \"pop and heard a crack\" the next day it swelled up and was painful the inner part of her elbow was the most painful it radiated into shoulder     Other     [place] Worker - request a letter for work       SUBJECTIVE: Marifer Trimble is a 46 y.o. right hand dominant female who presents to office due to the above chief complaint. She reports that approximately 2 months ago she was at work pushing 2 carts when she heard a pop/crack in her left elbow noted mild pain about the elbow. The next day she woke up with extreme left elbow pain is been experiencing L elbow pain ever since. She has tried rest and NSAIDs minimal relief. Denies numbness, tingling, paresthesias. Reports pain that radiates from the elbow proximally and distally towards the shoulder and hand respectively. No other orthopedic complaints at this time. Review of Systems   Constitutional: Negative for fever, chills, diaphoresis, appetite change and fatigue. HENT: Negative for dental issues, hearing loss and tinnitus. Negative for congestion, sinus pressure, sneezing, sore throat. Negative for headache. Eyes: Negative for visual disturbance, blurred and double vision. Negative for pain, discharge, redness and itching  Respiratory: Negative for cough, shortness of breath and wheezing. Cardiovascular: Negative for chest pain, palpitations and leg swelling. No dyspnea on exertion   Gastrointestinal:   Negative for nausea, vomiting, abdominal pain, diarrhea, constipation  or black or bloody. Hematologic\Lymphatic:  negative for bleeding, petechiae,   Genitourinary: Negative for hematuria and difficulty urinating. Musculoskeletal: Negative for neck pain and stiffness. Negative for back pain, see HPI  Skin: Negative for pallor, rash and wound.    Neurological: Negative for dizziness, tremors, seizures, weakness, light-headedness, no TIA or stroke symptoms. No numbness and headaches. Psychiatric/Behavioral: Negative. OBJECTIVE:      Physical Examination:   General appearance: alert, well appearing, and in no distress,  normal appearing weight. No visible signs of trauma   Mental status: alert, oriented to person, place, and time, normal mood, behavior, speech, dress, motor activity, and thought processes  Abdomen: soft, nondistended  Resp:   resp easy and unlabored, no audible wheezes note, normal symmetrical expansion of both hemithoraces  Cardiac: distal pulses palpable, skin and extremities well perfused  Neurological: alert, oriented X3, normal speech, no focal findings or movement disorder noted, motor and sensory grossly normal bilaterally, normal muscle tone, no tremors, strength 5/5, normal gait and station  HEENT: normochephalic atraumatic, external ears and eyes normal, sclera normal, neck supple, no nasal discharge. Extremities:   peripheral pulses normal, no edema, redness or tenderness in the calves   Skin: normal coloration, no rashes or open wounds, no suspicious skin lesions noted  Psych: Affect euthymic   Musculoskeletal:   Extremity:  Left upper extremity  · Skin intact circumferentially  · +TTP about the entire elbow  · No increase in pain with resisted wrist extension   · Compartments soft and compressible  · +AIN/PIN/Ulnar nerve function intact grossly  · +Radial pulse, Brisk Cap refill, hand warm and perfused  · Sensation intact to touch in radial/ulnar/median nerve distributions to hand  · Elbow ROM  degrees    Ht 5' 5\" (1.651 m)   Wt 184 lb (83.5 kg) Comment: per pt  LMP 10/01/2018 (Approximate)   BMI 30.62 kg/m²      XR: 11/24/21        ASSESSMENT:     Diagnosis Orders   1.  Left elbow pain       PLAN:  No current indication for surgical interventions, will treat patient nonoperatively at this point  Recommend PT/OT  Heat/ice, elevate left

## 2021-11-30 NOTE — PROGRESS NOTES
Date: 11/24/2021 Status: Comp   Time: 9:00 AM Length: 10   Visit Type: WORKERS COMP NEW PATIENT [2026] Copay: $0.00   Provider: Mariela Hart MD Department: [address] [place] [address] Dr   Referral #: [id number] Referral Status: Closed   Referring Provider: Kassie Sanderson Patient Type:     Enc Form Number: [id number] Auto Confirm Status: ANS AVINASH   Notes: MRI/DONE [facility] New Patient CONSULT/Dr Dorantes Cl# [id number], doi: 10/6/2021, dx: U97.471Q Lt Arm MCO: Ajay MRI pending bb

## 2021-12-02 ENCOUNTER — TELEPHONE (OUTPATIENT)
Dept: ORTHOPEDIC SURGERY | Age: 51
End: 2021-12-02

## 2021-12-02 NOTE — TELEPHONE ENCOUNTER
Spoke with Quirino Aguila at [facility] to extend authorization dates out for ortho consult and MRI left shoulder and elbow. MRI left shoulder/elbow extended out to11/19/21. Ortho consult extended out to11/24/21.

## 2022-01-31 ENCOUNTER — TELEPHONE (OUTPATIENT)
Dept: FAMILY MEDICINE CLINIC | Age: 52
End: 2022-01-31

## 2022-01-31 RX ORDER — ASCORBIC ACID, CHOLECALCIFEROL, .ALPHA.-TOCOPHEROL ACETATE, DL-, PYRIDOXINE HYDROCHLORIDE, FOLIC ACID, CYANOCOBALAMIN, BIOTIN, CALCIUM CARBONATE, FERROUS ASPARTO GLYCINATE, IRON, POTASSIUM IODIDE, MAGNESIUM OXIDE, DOCONEXENT AND LOWBUSH BLUEBERRY 60; 1000; 10; 26; 400; 13; 280; 80; 9; 9; 150; 25; 350; 25; 600 MG/1; [IU]/1; [IU]/1; MG/1; UG/1; UG/1; UG/1; MG/1; MG/1; MG/1; UG/1; MG/1; MG/1; MG/1; UG/1
1 CAPSULE, GELATIN COATED ORAL DAILY
Qty: 30 CAPSULE | Refills: 4 | Status: SHIPPED
Start: 2022-01-31 | End: 2022-02-02 | Stop reason: SDUPTHER

## 2022-01-31 NOTE — TELEPHONE ENCOUNTER
----- Message from Lopez Alex sent at 2022  2:43 PM EST -----  Subject: Message to Provider    QUESTIONS  Information for Provider? 1. Patient refusal for RN Triage wants to try   pre-nuria vitamins first 2. The [employer] or Script Question answered   extreme fatigue 3. Onset of symptoms, and unknown 4. Any other relevant   information pt stated that she wants a b-12 and iron blood test to rule   out an anemia issue   ---------------------------------------------------------------------------  --------------  CALL BACK INFO  What is the best way for the office to contact you? OK to leave message on   voicemail  Preferred Call Back Phone Number? [phone]  ---------------------------------------------------------------------------  --------------  SCRIPT ANSWERS  Relationship to Patient?  Self
[phone]

## 2022-02-02 RX ORDER — ASCORBIC ACID, CHOLECALCIFEROL, .ALPHA.-TOCOPHEROL ACETATE, DL-, PYRIDOXINE HYDROCHLORIDE, FOLIC ACID, CYANOCOBALAMIN, BIOTIN, CALCIUM CARBONATE, FERROUS ASPARTO GLYCINATE, IRON, POTASSIUM IODIDE, MAGNESIUM OXIDE, DOCONEXENT AND LOWBUSH BLUEBERRY 60; 1000; 10; 26; 400; 13; 280; 80; 9; 9; 150; 25; 350; 25; 600 MG/1; [IU]/1; [IU]/1; MG/1; UG/1; UG/1; UG/1; MG/1; MG/1; MG/1; UG/1; MG/1; MG/1; MG/1; UG/1
1 CAPSULE, GELATIN COATED ORAL DAILY
Qty: 30 CAPSULE | Refills: 4 | Status: SHIPPED
Start: 2022-02-02 | End: 2022-06-20 | Stop reason: SDUPTHER

## 2022-02-02 RX ORDER — FLUCONAZOLE 150 MG/1
150 TABLET ORAL ONCE
Qty: 1 TABLET | Refills: 0 | Status: SHIPPED | OUTPATIENT
Start: 2022-02-02 | End: 2022-02-02

## 2022-02-02 NOTE — TELEPHONE ENCOUNTER
Send in Diflucan 150 mg. Also, I see that there is a note from nurse triage. I did not even see the Diflucan request, but she is requesting blood work.  I will see her in the office in the next week or 2 we can order blood work and discuss what needs done

## 2022-02-02 NOTE — TELEPHONE ENCOUNTER
Pt called and very upset diflucan is not called in as of yet. Pt wants this sent in this afternoon. PT states she called Monday and spoke with someone and took the message wrong and was asking for diflucan for a yeast infection. Wants to be notified when it is sent into the pharmacy.     Ross Camarena

## 2022-02-02 NOTE — TELEPHONE ENCOUNTER
Rx sent as per Dr. Holden Valentin for diflucan. Pt notified per below note per Dr. Holden Valentin.   Pt will call back to schedule appt in 2 weeks

## 2022-02-02 NOTE — TELEPHONE ENCOUNTER
She has been on antibiotics and has yeast infection.  Ask for script to [facility] family [facility], requesting with 1 refill

## 2022-02-18 RX ORDER — MECLIZINE HCL 12.5 MG/1
12.5 TABLET ORAL SEE ADMIN INSTRUCTIONS
Qty: 30 TABLET | Refills: 0 | Status: SHIPPED
Start: 2022-02-18 | End: 2022-03-03 | Stop reason: SDUPTHER

## 2022-02-20 ENCOUNTER — HOSPITAL ENCOUNTER (EMERGENCY)
Age: 52
Discharge: HOME | End: 2022-02-20
Payer: COMMERCIAL

## 2022-02-20 VITALS
OXYGEN SATURATION: 97 % | DIASTOLIC BLOOD PRESSURE: 75 MMHG | TEMPERATURE: 99 F | SYSTOLIC BLOOD PRESSURE: 151 MMHG | BODY MASS INDEX: 30.66 KG/M2 | HEART RATE: 92 BPM | RESPIRATION RATE: 14 BRPM | HEIGHT: 65 IN | WEIGHT: 184 LBS

## 2022-02-20 DIAGNOSIS — J03.90 ACUTE TONSILLITIS, UNSPECIFIED ETIOLOGY: Primary | ICD-10-CM

## 2022-02-20 LAB
INFLUENZA A BY PCR: NOT DETECTED
INFLUENZA B BY PCR: NOT DETECTED
SARS-COV-2, NAAT: NOT DETECTED
STREP GRP A PCR: NEGATIVE

## 2022-02-20 PROCEDURE — 6370000000 HC RX 637 (ALT 250 FOR IP): Performed by: PHYSICIAN ASSISTANT

## 2022-02-20 PROCEDURE — 87635 SARS-COV-2 COVID-19 AMP PRB: CPT

## 2022-02-20 PROCEDURE — 87502 INFLUENZA DNA AMP PROBE: CPT

## 2022-02-20 PROCEDURE — 87880 STREP A ASSAY W/OPTIC: CPT

## 2022-02-20 PROCEDURE — 99284 EMERGENCY DEPT VISIT MOD MDM: CPT

## 2022-02-20 RX ORDER — ACETAMINOPHEN 500 MG
1000 TABLET ORAL ONCE
Status: COMPLETED | OUTPATIENT
Start: 2022-02-20 | End: 2022-02-20

## 2022-02-20 RX ORDER — ACETAMINOPHEN 500 MG
1000 TABLET ORAL 3 TIMES DAILY PRN
Qty: 42 TABLET | Refills: 0 | Status: SHIPPED | OUTPATIENT
Start: 2022-02-20 | End: 2022-09-12

## 2022-02-20 RX ADMIN — ACETAMINOPHEN 1000 MG: 500 TABLET ORAL at 11:46

## 2022-02-20 ASSESSMENT — PAIN SCALES - GENERAL: PAINLEVEL_OUTOF10: 7

## 2022-02-20 NOTE — Clinical Note
Rodrigo Pacheco was seen and treated in our emergency department on 2/20/2022. She may return to work on 02/21/2022. If you have any questions or concerns, please don't hesitate to call.       Katharine Mcknight PA-C

## 2022-02-20 NOTE — ED PROVIDER NOTES
[facility]     Department of Emergency Medicine   ED  Encounter Note  Admit Date/RoomTime: 2022 11:01 AM  ED Room: 37/    NAME: Analia Gaona  : 1970  MRN: [id number]     Chief Complaint:  Pharyngitis    History of Present Illness       Analia Gaona is a 46 y.o. old female who presents to the emergency department by private vehicle, for sore throat, chills, fatigue, and slight headache which began 2 day(s) prior to arrival. Her symptoms are associated with right ear pain. She also states that she is nauseous, but this is chronic and her PCP prescribes her Zofran for nausea. Since onset the symptoms have been initially worsening but have stabilized. Symptoms are moderate in severity. There has been no abdominal pain, decreased appetite, chest tightness, conjunctivitis, watery eyes, dry cough, productive cough, vomiting, diarrhea, dizziness, dysuria, hoarseness, joint swelling, muscle aches, nasal congestion, runny nose, neck stiffness, rash, swollen glands, wheezing, loss of taste or loss of smell. ROS   Pertinent positives and negatives are stated within HPI, all other systems reviewed and are negative. Past Medical History:  has a past medical history of Asthma, Back injury, Chronic back pain, Chronic tension-type headache, not intractable, DDD (degenerative disc disease), cervical, DVT (deep venous thrombosis) ([address] GERD (gastroesophageal reflux disease), Herniated disc, cervical, Hx of screening mammography, HX OTHER MEDICAL, Hypertension, Migraine, Migraine, MVP (mitral valve prolapse), Numbness and tingling, Obesity, Sinus infection, Thyroid disease, Torn ACL, Ulcer, and Vitamin B 12 deficiency. Surgical History:  has a past surgical history that includes  section; Dilation and curettage of uterus; ECHO Complete 2D W Doppler W Color (10/24/2012); [place] tooth extraction; Tympanostomy tube placement; Breast lumpectomy (Right, 2013);  Breast surgery (Right, is anterior cervical node tenderness. · Respiratory:   Breath sounds: Bilateral normal.  Lung sounds: normal.   · CV:  Regular rate and rhythm, normal heart sounds, without pathological murmurs, ectopy, gallops, or rubs. · Integument:  Normal turgor. Warm, dry, without visible rash. · Neurological:  Oriented. Motor functions intact. Lab / Imaging Results   (All laboratory and radiology results have been personally reviewed by myself)  Labs:  Results for orders placed or performed during the hospital encounter of 02/20/22   Strep Screen Group A Throat    Specimen: Throat   Result Value Ref Range    Strep Grp A PCR Negative Negative   COVID-19, Rapid    Specimen: Nasopharyngeal Swab   Result Value Ref Range    SARS-CoV-2, NAAT Not Detected Not Detected   Rapid influenza A/B antigens    Specimen: Nasopharyngeal   Result Value Ref Range    Influenza A by PCR Not Detected Not Detected    Influenza B by PCR Not Detected Not Detected       Imaging: All Radiology results interpreted by Radiologist unless otherwise noted. No orders to display     ED Course / Medical Decision Making     Medications   acetaminophen (TYLENOL) tablet 1,000 mg (1,000 mg Oral Given 2/20/22 1146)      Re-examination:  2/20/22       Time: 1230   Patients condition remains about the same. Consults:   None    Procedures:   none    Medical Decision Making: Patient presents to the emergency department for upper respiratory symptoms. Rapid strep, influenza, and Covid was checked and all negative. Prescriptions as needed for symptomatic relief. She is to increase fluids and get plenty of rest.  Follow-up with primary care provider as needed. Return for any new or worsening symptoms. Assessment     1. Acute tonsillitis, unspecified etiology       Plan   Discharged home.   Patient condition is good    New Medications     Discharge Medication List as of 2/20/2022 12:29 PM      START taking these medications    Details   acetaminophen (TYLENOL) 500 MG tablet Take 2 tablets by mouth 3 times daily as needed for Pain or Fever, Disp-42 tablet, R-0Print      Magic Mouthwash (MIRACLE MOUTHWASH) Swish and spit 5 mLs 4 times daily as needed for Irritation, Disp-240 mL, R-0Print           Electronically signed by Dilip Tim PA-C   DD: 2/20/22  **This report was transcribed using voice recognition software. Every effort was made to ensure accuracy; however, inadvertent computerized transcription errors may be present.   END OF ED PROVIDER NOTE          Dilip Tim PA-C  02/20/22 5424

## 2022-02-21 ENCOUNTER — TELEPHONE (OUTPATIENT)
Dept: FAMILY MEDICINE CLINIC | Age: 52
End: 2022-02-21

## 2022-02-22 ENCOUNTER — OFFICE VISIT (OUTPATIENT)
Dept: FAMILY MEDICINE CLINIC | Age: 52
End: 2022-02-22
Payer: COMMERCIAL

## 2022-02-22 VITALS
HEIGHT: 65 IN | BODY MASS INDEX: 32.32 KG/M2 | TEMPERATURE: 97.3 F | SYSTOLIC BLOOD PRESSURE: 134 MMHG | OXYGEN SATURATION: 98 % | DIASTOLIC BLOOD PRESSURE: 76 MMHG | HEART RATE: 68 BPM | WEIGHT: 194 LBS

## 2022-02-22 DIAGNOSIS — T75.3XXA MOTION SICKNESS, INITIAL ENCOUNTER: ICD-10-CM

## 2022-02-22 DIAGNOSIS — J03.90 TONSILLITIS: Primary | ICD-10-CM

## 2022-02-22 DIAGNOSIS — J03.90 TONSILLITIS: ICD-10-CM

## 2022-02-22 PROCEDURE — 99214 OFFICE O/P EST MOD 30 MIN: CPT | Performed by: FAMILY MEDICINE

## 2022-02-22 PROCEDURE — G8417 CALC BMI ABV UP PARAM F/U: HCPCS | Performed by: FAMILY MEDICINE

## 2022-02-22 PROCEDURE — G8484 FLU IMMUNIZE NO ADMIN: HCPCS | Performed by: FAMILY MEDICINE

## 2022-02-22 PROCEDURE — G8427 DOCREV CUR MEDS BY ELIG CLIN: HCPCS | Performed by: FAMILY MEDICINE

## 2022-02-22 PROCEDURE — 96372 THER/PROPH/DIAG INJ SC/IM: CPT | Performed by: FAMILY MEDICINE

## 2022-02-22 PROCEDURE — 1036F TOBACCO NON-USER: CPT | Performed by: FAMILY MEDICINE

## 2022-02-22 PROCEDURE — 3017F COLORECTAL CA SCREEN DOC REV: CPT | Performed by: FAMILY MEDICINE

## 2022-02-22 RX ORDER — AMOXICILLIN AND CLAVULANATE POTASSIUM 875; 125 MG/1; MG/1
1 TABLET, FILM COATED ORAL 2 TIMES DAILY
Qty: 20 TABLET | Refills: 0 | Status: SHIPPED
Start: 2022-02-22 | End: 2022-02-25 | Stop reason: ALTCHOICE

## 2022-02-22 RX ORDER — FLUCONAZOLE 150 MG/1
150 TABLET ORAL DAILY
Qty: 3 TABLET | Refills: 0 | Status: SHIPPED | OUTPATIENT
Start: 2022-02-22 | End: 2022-02-25

## 2022-02-22 RX ORDER — TRIAMCINOLONE ACETONIDE 40 MG/ML
40 INJECTION, SUSPENSION INTRA-ARTICULAR; INTRAMUSCULAR ONCE
Status: COMPLETED | OUTPATIENT
Start: 2022-02-22 | End: 2022-02-22

## 2022-02-22 RX ORDER — ONDANSETRON 4 MG/1
4 TABLET, FILM COATED ORAL EVERY 8 HOURS PRN
Qty: 30 TABLET | Refills: 1 | Status: SHIPPED
Start: 2022-02-22 | End: 2022-09-06 | Stop reason: SDUPTHER

## 2022-02-22 RX ADMIN — TRIAMCINOLONE ACETONIDE 40 MG: 40 INJECTION, SUSPENSION INTRA-ARTICULAR; INTRAMUSCULAR at 16:09

## 2022-02-22 NOTE — PROGRESS NOTES
3/3/2022    Chief Complaint   Patient presents with    Pharyngitis     diagnosed on sunday at [facility] in [address] [place] [address] ER. Started on Tylenol and Magic Mouthwash    Otalgia     right ear pain    Shortness of Breath    Fatigue    Sweats     x1 week    Medication Refill     requesting refill of diflucan       HPI    Gautam Banks is a 46 y.o. patient that presents today with:    Upper Respiratory Infection:  Patient complains of URI/sinusitis symptoms. Symptoms include:  Fever, Body aches and Congestion. Onset of symptoms was 7 days ago, gradually worsening since that time. She is drinking plenty of fluids. Treatment to date: oral decongestant with minimal ROS. Patient's past medical, surgical, social and/or family history reviewed, updated in chart, and are non-contributory (unless otherwise stated). Medications and allergies also reviewed and updated in chart.      ROS Unless otherwise specified  Review of Systems - General ROS: negative for - chills, fatigue, fever, night sweats, sleep disturbance, weight gain or weight loss  Psychological ROS: negative for - anxiety, behavioral disorder, depression, hallucinations, irritability, memory difficulties, mood swings, sleep disturbances or suicidal ideation  ENT ROS: negative for - epistaxis, headaches, hearing change, nasal congestion, nasal discharge, nasal polyps, sinus pain, tinnitus, vertigo or visual changes  Hematological and Lymphatic ROS: negative for - bleeding problems, blood clots, fatigue or swollen lymph nodes  Respiratory ROS: negative for - cough, orthopnea, shortness of breath, sputum changes, tachypnea or wheezing  Cardiovascular ROS: negative for - chest pain, dyspnea on exertion, irregular heartbeat, loss of consciousness, palpitations, paroxysmal nocturnal dyspnea or rapid heart rate  Gastrointestinal ROS: negative for - abdominal pain, blood in stools, change in bowel habits, constipation, diarrhea, gas/bloating, heartburn or nausea/vomiting  Musculoskeletal ROS: negative for - joint pain, joint stiffness, joint swelling or muscle, back pain, bowel or bladder incontinence  Neurological ROS: negative for - behavioral changes, confusion, dizziness, headaches, memory loss, numbness/tingling, seizures or speech problems, weakness  Dermatological ROS: negative for - dry skin, mole changes, nail changes, pruritus, rash or skin lesion changes    Physical Exam  /76   Pulse 68   Temp 97.3 °F (36.3 °C) (Temporal)   Ht 5' 5\" (1.651 m)   Wt 194 lb (88 kg)   LMP 10/01/2018 (Approximate)   SpO2 98%   BMI 32.28 kg/m²     Wt Readings from Last 3 Encounters:   03/03/22 204 lb (92.5 kg)   02/25/22 202 lb (91.6 kg)   02/22/22 194 lb (88 kg)     BP Readings from Last 3 Encounters:   03/03/22 124/88   02/25/22 116/64   02/22/22 134/76         General appearance: alert, well appearing, and in no distress, oriented to person, place, and time and normal appearing weight. HEENT:  HEAD: Atraumatic, normocephalic  EYES: PERRL  EOM intact  EARS: bilateral TM's and external ear canals normal  NOSE: nasal mucosa, septum, turbinates normal bilaterally  MOUTH: mucous membranes moist and normal tonsils  NECK: supple, full range of motion, no mass, normal lymphadenopathy, no thyromegaly    CVS exam: normal rate, regular rhythm, normal S1, S2, no murmurs, rubs, clicks or gallops. Radial pulses 2+ bilateral.  PT/DP pulse 2+ bilat. No C/C/E    Chest: clear to auscultation, no wheezes, rales or rhonchi, symmetric air entry. SKIN: no lesions, jaundice, petechiae, pallor, cyanosis, ecchymosis        Assessment/Plan  Maci was seen today for pharyngitis, otalgia, shortness of breath, fatigue, sweats and medication refill. Diagnoses and all orders for this visit:    Tonsillitis  -     STREP SCREEN GROUP A THROAT; Future    Motion sickness, initial encounter  -     ondansetron (ZOFRAN) 4 MG tablet;  Take 1 tablet by mouth every 8 hours as needed for Nausea or Vomiting    Other orders  -     Discontinue: amoxicillin-clavulanate (AUGMENTIN) 875-125 MG per tablet; Take 1 tablet by mouth 2 times daily for 10 days  -     triamcinolone acetonide (KENALOG-40) injection 40 mg  -     fluconazole (DIFLUCAN) 150 MG tablet; Take 1 tablet by mouth daily for 3 days        Advised on symptomatic relief. Tylenol or Advil for fever/pain/body aches. OTC meds prn cough/ congestion. Rest. Stay hydrated. Advised to call PCP in 3-5 days for recheck if symptoms persist. ED sooner if symptoms worsen or change. ED immediately with high or refractory fever, SOB, CP, shaking chills, vomiting, abdominal pain, etc. Pt is in agreement with this care plan. All questions answered. No follow-ups on file. Samra Mcpherson, DO    Call or go to ED immediately if symptoms worsen or persist.    Counseled regarding above diagnosis, including possible risks and complications,  especially if left uncontrolled. Counseled regarding the possible side effects, risks, benefits and alternatives to treatment; patient and/or guardian verbalizes understanding, agrees, feels comfortable with and wishes to proceed with above treatment plan. Advised patient to call with any new medication issues, and read all Rx info from pharmacy to assure aware of all possible risks and side effects of medication before taking. Patient and/or guardian verbalizes understanding and agrees with above counseling, assessment and plan. All questions answered.

## 2022-02-24 ENCOUNTER — TELEPHONE (OUTPATIENT)
Dept: FAMILY MEDICINE CLINIC | Age: 52
End: 2022-02-24

## 2022-02-24 NOTE — TELEPHONE ENCOUNTER
She has ent appt march 21. She doesn't think the antibiotic is  Helping, left tonsil is now as bad as the right.

## 2022-02-24 NOTE — TELEPHONE ENCOUNTER
I had advised her that I wanted to see her back in the office today. She was supposed to call to schedule. I am not in the office tomorrow.   She should go to the urgent care center

## 2022-02-25 ENCOUNTER — OFFICE VISIT (OUTPATIENT)
Dept: FAMILY MEDICINE CLINIC | Age: 52
End: 2022-02-25
Payer: COMMERCIAL

## 2022-02-25 VITALS
SYSTOLIC BLOOD PRESSURE: 116 MMHG | DIASTOLIC BLOOD PRESSURE: 64 MMHG | HEART RATE: 68 BPM | OXYGEN SATURATION: 97 % | RESPIRATION RATE: 16 BRPM | TEMPERATURE: 96.8 F | BODY MASS INDEX: 33.61 KG/M2 | WEIGHT: 202 LBS

## 2022-02-25 DIAGNOSIS — J03.90 TONSILLITIS: Primary | ICD-10-CM

## 2022-02-25 DIAGNOSIS — H65.93 BILATERAL SEROUS OTITIS MEDIA, UNSPECIFIED CHRONICITY: ICD-10-CM

## 2022-02-25 DIAGNOSIS — B37.9 ANTIBIOTIC-INDUCED YEAST INFECTION: ICD-10-CM

## 2022-02-25 DIAGNOSIS — J31.0 RHINITIS, UNSPECIFIED TYPE: ICD-10-CM

## 2022-02-25 DIAGNOSIS — T36.95XA ANTIBIOTIC-INDUCED YEAST INFECTION: ICD-10-CM

## 2022-02-25 LAB — S PYO THROAT QL CULT: NORMAL

## 2022-02-25 PROCEDURE — G8417 CALC BMI ABV UP PARAM F/U: HCPCS | Performed by: NURSE PRACTITIONER

## 2022-02-25 PROCEDURE — 3017F COLORECTAL CA SCREEN DOC REV: CPT | Performed by: NURSE PRACTITIONER

## 2022-02-25 PROCEDURE — G8427 DOCREV CUR MEDS BY ELIG CLIN: HCPCS | Performed by: NURSE PRACTITIONER

## 2022-02-25 PROCEDURE — 99213 OFFICE O/P EST LOW 20 MIN: CPT | Performed by: NURSE PRACTITIONER

## 2022-02-25 PROCEDURE — G8484 FLU IMMUNIZE NO ADMIN: HCPCS | Performed by: NURSE PRACTITIONER

## 2022-02-25 PROCEDURE — 1036F TOBACCO NON-USER: CPT | Performed by: NURSE PRACTITIONER

## 2022-02-25 RX ORDER — AMOXICILLIN 875 MG/1
875 TABLET, COATED ORAL 2 TIMES DAILY
Qty: 20 TABLET | Refills: 0 | Status: SHIPPED | OUTPATIENT
Start: 2022-02-25 | End: 2022-03-07

## 2022-02-25 RX ORDER — FLUCONAZOLE 150 MG/1
TABLET ORAL
Qty: 2 TABLET | Refills: 0 | Status: SHIPPED
Start: 2022-02-25 | End: 2022-07-20 | Stop reason: SDUPTHER

## 2022-02-25 NOTE — PROGRESS NOTES
22  Bobby King : 1970 Sex: female  Age 46 y.o. Subjective:  Chief Complaint   Patient presents with    Pharyngitis     sx x1w/was seen by pcp monday given augmentin w little result.  Otalgia     R side     Other     augmentin listed in allergies as intolerance today        HPI:   Bobby King , 46 y.o. female presents to the clinic for evaluation of sore throat x 7 days. The patient also reports right ear discomfort and sinus drainage. The patient has been seen on 22 and 22 for symptoms. The patient has taken Magic Mouth Wash / Augmentin for symptoms. The patient reports unchanged symptoms over time. The patient has stop taking Augmentin because of diarrhea. The patient denies ill exposure. The patient denies hx of COVID-19 and denies having the vaccines. The patient denies acute loss of taste and smell, headache, cough, rash, and fever. The patient also denies chest pain, abdominal pain, shortness of breath, and nausea / vomiting. ROS:   Unless otherwise stated in this report the patient's positive and negative responses for review of systems for constitutional, eyes, ENT, cardiovascular, respiratory, gastrointestinal, neurological, , musculoskeletal, and integument systems and related systems to the presenting problem are either stated in the history of present illness or were not pertinent or were negative for the symptoms and/or complaints related to the presenting medical problem. Positives and pertinent negatives as per HPI. All others reviewed and are negative.       PMH:     Past Medical History:   Diagnosis Date    Asthma     DR Lee Otto    Back injury     Chronic back pain     PAIN RADIATES TO NECK AND LEGS    Chronic tension-type headache, not intractable 2018    DDD (degenerative disc disease), cervical 2018    DVT (deep venous thrombosis) ([facility]     Right Arm    GERD (gastroesophageal reflux disease)     Herniated disc, cervical also lumbar spine    Hx of screening mammography 3/2015 & 10/2015    BIRADS 2 BENIGN    HX OTHER MEDICAL     PINCHED NERVE BACK OF NECK    Hypertension     Migraine     Migraine     DR Kellie Carvalho MVP (mitral valve prolapse)     Numbness and tingling     LEGS AND FEET    Obesity     Sinus infection     Thyroid disease     no med    Torn ACL     RT KNEE    Ulcer     Vitamin B 12 deficiency     NON ANEMIC       Past Surgical History:   Procedure Laterality Date    BREAST LUMPECTOMY Right 2013    BREAST SURGERY Right 2013    re-excision, rt lumpectomyscar evacuation of hematoma.  CARDIOVASCULAR STRESS TEST  2015    NORMAL     SECTION      COLONOSCOPY      12 YOSSEF HEMORRHOIDS, MINIMAL DIVERTICULA    COLONOSCOPY  2012    DR PEREZ GERD & GASTRITIS    DILATION AND CURETTAGE OF UTERUS      ECHO COMPL W DOP COLOR FLOW  2015         ECHOCARDIOGRAM COMPLETE 2D W DOPPLER W COLOR  10/24/2012         HARDWARE REMOVAL Right 2018    Foot    KNEE ARTHROSCOPY Right     Right knee arthroscopy. Dr. Lake Kussmaul ARTHROSCOPY Right     Right knee meniscal repair. Dr. Amol Brown.       MAMMO IMPLANT DIGITAL DIAG BI      3/24/15 BIRADS CAT 2 BENIGN, 10/15/15 BIRADS CAT 2    OTHER SURGICAL HISTORY      TILT TABLE TEST - DR Gloria Gray  NO ORTHOSTASIS    TYMPANOSTOMY TUBE PLACEMENT      UPPER GASTROINTESTINAL ENDOSCOPY      WITH CLOSED BIOPSY DR Julia Andrea 12 GERD, GASTRITIS    UPPER GASTROINTESTINAL ENDOSCOPY  2012    DR Antonio Cary GERD & GASTRITIS    WISDOM TOOTH EXTRACTION         Family History   Problem Relation Age of Onset    High Blood Pressure Mother     Heart Disease Father     Seizures Father     Coronary Art Dis Father     Cancer Father         STOMACH    Deep Vein Thrombosis Sister     Breast Cancer Sister 43    Hypertension Maternal Grandmother        Medications:     Current Outpatient Medications:     amoxicillin (AMOXIL) 875 MG tablet, Take 1 tablet by mouth 2 times daily for 10 days, Disp: 20 tablet, Rfl: 0    fluconazole (DIFLUCAN) 150 MG tablet, 1 tab po x 1 dose, may repeat in 72 hrs if still symptomatic., Disp: 2 tablet, Rfl: 0    ondansetron (ZOFRAN) 4 MG tablet, Take 1 tablet by mouth every 8 hours as needed for Nausea or Vomiting, Disp: 30 tablet, Rfl: 1    fluconazole (DIFLUCAN) 150 MG tablet, Take 1 tablet by mouth daily for 3 days, Disp: 3 tablet, Rfl: 0    acetaminophen (TYLENOL) 500 MG tablet, Take 2 tablets by mouth 3 times daily as needed for Pain or Fever, Disp: 42 tablet, Rfl: 0    Magic Mouthwash (MIRACLE MOUTHWASH), Swish and spit 5 mLs 4 times daily as needed for Irritation, Disp: 240 mL, Rfl: 0    meclizine (ANTIVERT) 12.5 MG tablet, Take 1 tablet by mouth See Admin Instructions, Disp: 30 tablet, Rfl: 0    Jdcmgp-CtHyh-BpKrk-Meth-FA-DHA (PRENATE MINI) 18-0.6-0.4-350 MG CAPS, Take 1 capsule by mouth daily, Disp: 30 capsule, Rfl: 4    cyclobenzaprine (FLEXERIL) 10 MG tablet, Take 1 tablet by mouth 2 times daily as needed for Muscle spasms, Disp: 30 tablet, Rfl: 0    Erenumab-aooe 140 MG/ML SOAJ, Inject 140 mg into the skin every 30 days, Disp: 1 pen, Rfl: 2    rizatriptan (MAXALT) 10 MG tablet, Take 1 tablet by mouth daily as needed for Migraine, Disp: 9 tablet, Rfl: 2    Ubrogepant (UBRELVY) 100 MG TABS, TAKE 1 TABLET IF NEEDED MAY TAKE 1 MORE DOSE AFTER 1 HOUR IF NEEDED - DO NOT TAKE MORE THAN 200 MG IN 24 HRS, Disp: 12 tablet, Rfl: 2    spironolactone (ALDACTONE) 25 MG tablet, Take 1 tablet by mouth daily, Disp: 30 tablet, Rfl: 5    fluticasone (FLONASE) 50 MCG/ACT nasal spray, 1 spray by Nasal route daily, Disp: 1 Bottle, Rfl: 5    acetaminophen-codeine (TYLENOL #4) 300-60 MG per tablet, TK 1 T PO ONCE A DAY PRN FOR PAIN (Patient not taking: Reported on 2/22/2022), Disp: , Rfl: 0    NARCAN 4 MG/0.1ML LIQD nasal spray, , Disp: , Rfl: 1    albuterol sulfate HFA (PROAIR HFA) 108 (90 Base) MCG/ACT inhaler, Inhale 2 puffs into the lungs every 6 hours as needed for Wheezing, Disp: 1 Inhaler, Rfl: 0    esomeprazole (NEXIUM) 40 MG delayed release capsule, Take 40 mg by mouth every morning (before breakfast), Disp: , Rfl:     famotidine (PEPCID) 40 MG tablet, Take 40 mg by mouth nightly , Disp: , Rfl:     pantoprazole (PROTONIX) 40 MG tablet, Take 40 mg by mouth daily , Disp: , Rfl:     montelukast (SINGULAIR) 10 MG tablet, Take 10 mg by mouth nightly , Disp: , Rfl:     aspirin 81 MG tablet, Take 81 mg by mouth daily, Disp: , Rfl:     Allergies: Allergies   Allergen Reactions    Ceftin [Cefuroxime Axetil] Nausea Only    Pcn [Penicillins]      ? reaction    Vicodin [Hydrocodone-Acetaminophen] Shortness Of Breath    Hydrocodone Other (See Comments)    Ibuprofen Swelling    Other      TAPE AND BANDAIDS SKIN IRRITATION    Percocet [Oxycodone-Acetaminophen]     Sudafed [Pseudoephedrine Hcl] Swelling    Ajovy [Fremanezumab-Vfrm] Rash    Augmentin [Amoxicillin-Pot Clavulanate] Diarrhea       Social History:     Social History     Tobacco Use    Smoking status: Never Smoker    Smokeless tobacco: Never Used   Vaping Use    Vaping Use: Never used   Substance Use Topics    Alcohol use: Not Currently     Comment: rare     Drug use: No       Patient lives at home. Physical Exam:     Vitals:    02/25/22 1342   BP: 116/64   Pulse: 68   Resp: 16   Temp: 96.8 °F (36 °C)   SpO2: 97%   Weight: 202 lb (91.6 kg)       Physical Exam (PE)    Physical Exam  Constitutional:       Appearance: Normal appearance. HENT:      Head: Normocephalic. Right Ear: Ear canal and external ear normal. A middle ear effusion is present. Left Ear: Ear canal and external ear normal. A middle ear effusion is present. Nose: Rhinorrhea present. Mouth/Throat:      Mouth: Mucous membranes are moist.      Pharynx: Oropharyngeal exudate and posterior oropharyngeal erythema present.    Eyes:      Pupils: Pupils are equal, round, and reactive to light. Cardiovascular:      Rate and Rhythm: Normal rate and regular rhythm. Pulses: Normal pulses. Heart sounds: Normal heart sounds. Pulmonary:      Effort: Pulmonary effort is normal.      Breath sounds: Normal breath sounds. No wheezing, rhonchi or rales. Abdominal:      General: Bowel sounds are normal.      Palpations: Abdomen is soft. Musculoskeletal:         General: Normal range of motion. Cervical back: Normal range of motion and neck supple. Lymphadenopathy:      Cervical: No cervical adenopathy. Skin:     General: Skin is warm and dry. Capillary Refill: Capillary refill takes less than 2 seconds. Neurological:      General: No focal deficit present. Mental Status: She is alert and oriented to person, place, and time. Cranial Nerves: No cranial nerve deficit. Sensory: No sensory deficit. Motor: No weakness. Gait: Gait normal.   Psychiatric:         Mood and Affect: Mood normal.         Behavior: Behavior normal.          Testing:   (All laboratory and radiology results have been personally reviewed by myself)  Labs:  No results found for this visit on 02/25/22. Imaging: All Radiology results interpreted by Radiologist unless otherwise noted. No orders to display       Assessment / Plan:   The patient's vitals, allergies, medications, and past medical history have been reviewed. Meghann Dwyer was seen today for pharyngitis, otalgia and other. Diagnoses and all orders for this visit:    Tonsillitis  -     amoxicillin (AMOXIL) 875 MG tablet;  Take 1 tablet by mouth 2 times daily for 10 days    Bilateral serous otitis media, unspecified chronicity    Rhinitis, unspecified type    Antibiotic-induced yeast infection  -     fluconazole (DIFLUCAN) 150 MG tablet; 1 tab po x 1 dose, may repeat in 72 hrs if still symptomatic.        - Disposition: Home    - Educational material printed for patient's review and were included in patient instructions. After Visit Summary was given to patient at the end of visit. - Patient prescribed amoxicillin per her request.  Patient states she has taken it in the past without any issues. - Encouraged oral fluids and rest. Discussed symptomatic treatments with patient today including Tylenol prn for fever / pain. Schedule a follow-up with PCP in 2-3 days. Red flag symptoms were discussed with the patient today. The patient is directed to go the ED if symptoms change or worsen. Pt verbalizes understanding and is in agreement with plan of care. All questions answered. SIGNATURE: LYNETTE Glasgow-CNP    *NOTE: This report was transcribed using voice recognition software. Every effort was made to ensure accuracy; however, inadvertent computerized transcription errors may be present.

## 2022-02-25 NOTE — PATIENT INSTRUCTIONS
Patient Education        Tonsillitis: Care Instructions  Overview     Tonsillitis is an infection of the tonsils that is caused by bacteria or a virus. The tonsils are in the back of the throat and are part of the immune system. Tonsillitis typically lasts from a few days up to a couple of weeks. Tonsillitis caused by a virus goes away on its own. Tonsillitis caused by the bacteria that causes strep throat is treated with antibiotics. You and your doctor may consider surgery to remove the tonsils (tonsillectomy) if you have serious complications or repeat infections. Follow-up care is a key part of your treatment and safety. Be sure to make and go to all appointments, and call your doctor if you are having problems. It's also a good idea to know your test results and keep a list of the medicines you take. How can you care for yourself at home? Home care can help with a sore throat and other symptoms. Here are some things you can do to help yourself feel better. · If your doctor prescribed antibiotics, take them as directed. Do not stop taking them just because you feel better. You need to take the full course of antibiotics. · Gargle with warm salt water. This helps reduce swelling and relieve discomfort. Gargle once an hour with 1 teaspoon of salt mixed in 8 fluid ounces of warm water. · Take an over-the-counter pain medicine, such as acetaminophen (Tylenol), ibuprofen (Advil, Motrin), or naproxen (Aleve). Be safe with medicines. Read and follow all instructions on the label. No one younger than 20 should take aspirin. It has been linked to Reye syndrome, a serious illness. · Be careful when taking over-the-counter cold or flu medicines and Tylenol at the same time. Many of these medicines have acetaminophen, which is Tylenol. Read the labels to make sure that you are not taking more than the recommended dose. Too much acetaminophen (Tylenol) can be harmful.   · Try lozenges or an over-the-counter throat

## 2022-02-28 ENCOUNTER — TELEPHONE (OUTPATIENT)
Dept: NEUROLOGY | Age: 52
End: 2022-02-28

## 2022-02-28 NOTE — TELEPHONE ENCOUNTER
Spoke w/patient to check to see if she received notice that Dr. Bisi Chacon was leaving the area - she stated she did not receive a letter. I shared with her 2 names of the nurse practitioners who work with headache patients. Will have office staff make appointment for her.

## 2022-03-03 ENCOUNTER — OFFICE VISIT (OUTPATIENT)
Dept: FAMILY MEDICINE CLINIC | Age: 52
End: 2022-03-03
Payer: COMMERCIAL

## 2022-03-03 VITALS
SYSTOLIC BLOOD PRESSURE: 124 MMHG | HEIGHT: 65 IN | DIASTOLIC BLOOD PRESSURE: 88 MMHG | OXYGEN SATURATION: 97 % | TEMPERATURE: 97.7 F | WEIGHT: 204 LBS | RESPIRATION RATE: 18 BRPM | BODY MASS INDEX: 33.99 KG/M2 | HEART RATE: 78 BPM

## 2022-03-03 DIAGNOSIS — I10 PRIMARY HYPERTENSION: Primary | ICD-10-CM

## 2022-03-03 DIAGNOSIS — G43.711 INTRACTABLE CHRONIC MIGRAINE WITHOUT AURA AND WITH STATUS MIGRAINOSUS: ICD-10-CM

## 2022-03-03 DIAGNOSIS — Z01.818 PREOPERATIVE CLEARANCE: ICD-10-CM

## 2022-03-03 PROCEDURE — 3017F COLORECTAL CA SCREEN DOC REV: CPT | Performed by: FAMILY MEDICINE

## 2022-03-03 PROCEDURE — 1036F TOBACCO NON-USER: CPT | Performed by: FAMILY MEDICINE

## 2022-03-03 PROCEDURE — G8427 DOCREV CUR MEDS BY ELIG CLIN: HCPCS | Performed by: FAMILY MEDICINE

## 2022-03-03 PROCEDURE — G8484 FLU IMMUNIZE NO ADMIN: HCPCS | Performed by: FAMILY MEDICINE

## 2022-03-03 PROCEDURE — G8417 CALC BMI ABV UP PARAM F/U: HCPCS | Performed by: FAMILY MEDICINE

## 2022-03-03 PROCEDURE — 99214 OFFICE O/P EST MOD 30 MIN: CPT | Performed by: FAMILY MEDICINE

## 2022-03-03 RX ORDER — MECLIZINE HCL 12.5 MG/1
12.5 TABLET ORAL SEE ADMIN INSTRUCTIONS
Qty: 30 TABLET | Refills: 0 | Status: SHIPPED
Start: 2022-03-03 | End: 2022-09-06 | Stop reason: SDUPTHER

## 2022-03-03 RX ORDER — UBROGEPANT 100 MG/1
TABLET ORAL
Qty: 12 TABLET | Refills: 2 | Status: SHIPPED
Start: 2022-03-03 | End: 2022-09-06 | Stop reason: SDUPTHER

## 2022-03-03 NOTE — PROGRESS NOTES
exam.    Diagnoses and all orders for this visit:    Primary hypertension  -     CBC; Future  -     Comprehensive Metabolic Panel; Future  -     Hemoglobin A1C; Future  -     Lipid Panel; Future  -     TSH; Future  -     Vitamin D 25 Hydroxy; Future    Intractable chronic migraine without aura and with status migrainosus  -     Ubrogepant (UBRELVY) 100 MG TABS; TAKE 1 TABLET IF NEEDED MAY TAKE 1 MORE DOSE AFTER 1 HOUR IF NEEDED - DO NOT TAKE MORE THAN 200 MG IN 24 HRS    Preoperative clearance  - Low risk and medically optimized for surgical procedure. Return if symptoms worsen or fail to improve. Samra Mcpherson, DO    Call or go to ED immediately if symptoms worsen or persist.    Educational materials and/or home exercises printed for patient's review and were included in patient instructions on his/her After Visit Summary and given to patient at the end of visit. Counseled regarding above diagnosis, including possible risks and complications,  especially if left uncontrolled. Counseled regarding the possible side effects, risks, benefits and alternatives to treatment; patient and/or guardian verbalizes understanding, agrees, feels comfortable with and wishes to proceed with above treatment plan. Advised patient to call with any new medication issues, and read all Rx info from pharmacy to assure aware of all possible risks and side effects of medication before taking. Reviewed age and gender appropriate health screening exams and vaccinations. Advised patient regarding importance of keeping up with recommended health maintenance and to schedule as soon as possible if overdue, as this is important in assessing for undiagnosed pathology, especially cancer, as well as protecting against potentially harmful/life threatening disease. Patient and/or guardian verbalizes understanding and agrees with above counseling, assessment and plan. All questions answered.

## 2022-03-18 ENCOUNTER — HOSPITAL ENCOUNTER (OUTPATIENT)
Age: 52
Discharge: HOME | End: 2022-03-18
Payer: COMMERCIAL

## 2022-03-18 DIAGNOSIS — I10 PRIMARY HYPERTENSION: ICD-10-CM

## 2022-03-18 LAB
ALBUMIN SERPL-MCNC: 4.4 G/DL (ref 3.5–5.2)
ALP BLD-CCNC: 123 U/L (ref 35–104)
ALT SERPL-CCNC: 13 U/L (ref 0–32)
ANION GAP SERPL CALCULATED.3IONS-SCNC: 13 MMOL/L (ref 7–16)
AST SERPL-CCNC: 13 U/L (ref 0–31)
BASOPHILS ABSOLUTE: 0.03 E9/L (ref 0–0.2)
BASOPHILS RELATIVE PERCENT: 0.4 % (ref 0–2)
BILIRUB SERPL-MCNC: 0.3 MG/DL (ref 0–1.2)
BUN BLDV-MCNC: 9 MG/DL (ref 6–20)
CALCIUM SERPL-MCNC: 9.4 MG/DL (ref 8.6–10.2)
CHLORIDE BLD-SCNC: 102 MMOL/L (ref 98–107)
CHOLESTEROL, TOTAL: 223 MG/DL (ref 0–199)
CO2: 23 MMOL/L (ref 22–29)
CREAT SERPL-MCNC: 0.9 MG/DL (ref 0.5–1)
EOSINOPHILS ABSOLUTE: 0.06 E9/L (ref 0.05–0.5)
EOSINOPHILS RELATIVE PERCENT: 0.8 % (ref 0–6)
GFR AFRICAN AMERICAN: >60
GFR NON-AFRICAN AMERICAN: >60 ML/MIN/1.73
GLUCOSE BLD-MCNC: 107 MG/DL (ref 74–99)
HCT VFR BLD CALC: 41.4 % (ref 34–48)
HDLC SERPL-MCNC: 60 MG/DL
HEMOGLOBIN: 13.2 G/DL (ref 11.5–15.5)
IMMATURE GRANULOCYTES #: 0.04 E9/L
IMMATURE GRANULOCYTES %: 0.5 % (ref 0–5)
INR BLD: 1.1
LDL CHOLESTEROL CALCULATED: 147 MG/DL (ref 0–99)
LYMPHOCYTES ABSOLUTE: 2.29 E9/L (ref 1.5–4)
LYMPHOCYTES RELATIVE PERCENT: 29.3 % (ref 20–42)
MCH RBC QN AUTO: 26.8 PG (ref 26–35)
MCHC RBC AUTO-ENTMCNC: 31.9 % (ref 32–34.5)
MCV RBC AUTO: 84.1 FL (ref 80–99.9)
MONOCYTES ABSOLUTE: 0.63 E9/L (ref 0.1–0.95)
MONOCYTES RELATIVE PERCENT: 8.1 % (ref 2–12)
NEUTROPHILS ABSOLUTE: 4.76 E9/L (ref 1.8–7.3)
NEUTROPHILS RELATIVE PERCENT: 60.9 % (ref 43–80)
PDW BLD-RTO: 14.6 FL (ref 11.5–15)
PLATELET # BLD: 290 E9/L (ref 130–450)
PMV BLD AUTO: 9.8 FL (ref 7–12)
POTASSIUM SERPL-SCNC: 4 MMOL/L (ref 3.5–5)
PROTHROMBIN TIME: 12.7 SEC (ref 9.3–12.4)
RBC # BLD: 4.92 E12/L (ref 3.5–5.5)
SODIUM BLD-SCNC: 138 MMOL/L (ref 132–146)
T3 FREE: 3.3 PG/ML (ref 2–4.4)
T4 FREE: 1.37 NG/DL (ref 0.93–1.7)
TOTAL PROTEIN: 7.9 G/DL (ref 6.4–8.3)
TRIGL SERPL-MCNC: 80 MG/DL (ref 0–149)
TSH SERPL DL<=0.05 MIU/L-ACNC: 1.45 UIU/ML (ref 0.27–4.2)
VITAMIN D 25-HYDROXY: 50 NG/ML (ref 30–100)
VLDLC SERPL CALC-MCNC: 16 MG/DL
WBC # BLD: 7.8 E9/L (ref 4.5–11.5)

## 2022-03-18 PROCEDURE — 82306 VITAMIN D 25 HYDROXY: CPT

## 2022-03-18 PROCEDURE — 83036 HEMOGLOBIN GLYCOSYLATED A1C: CPT

## 2022-03-18 PROCEDURE — 84439 ASSAY OF FREE THYROXINE: CPT

## 2022-03-18 PROCEDURE — 84443 ASSAY THYROID STIM HORMONE: CPT

## 2022-03-18 PROCEDURE — 36415 COLL VENOUS BLD VENIPUNCTURE: CPT

## 2022-03-18 PROCEDURE — 80061 LIPID PANEL: CPT

## 2022-03-18 PROCEDURE — 85025 COMPLETE CBC W/AUTO DIFF WBC: CPT

## 2022-03-18 PROCEDURE — 87522 HEPATITIS C REVRS TRNSCRPJ: CPT

## 2022-03-18 PROCEDURE — 80053 COMPREHEN METABOLIC PANEL: CPT

## 2022-03-18 PROCEDURE — 86706 HEP B SURFACE ANTIBODY: CPT

## 2022-03-18 PROCEDURE — 84481 FREE ASSAY (FT-3): CPT

## 2022-03-18 PROCEDURE — 86803 HEPATITIS C AB TEST: CPT

## 2022-03-18 PROCEDURE — 85610 PROTHROMBIN TIME: CPT

## 2022-03-21 LAB
HBA1C MFR BLD: 6.1 % (ref 4–5.6)
HBV SURFACE AB TITR SER: NORMAL {TITER}
HEPATITIS C ANTIBODY INTERPRETATION: NORMAL

## 2022-03-21 RX ORDER — SIMVASTATIN 40 MG
40 TABLET ORAL NIGHTLY
Qty: 90 TABLET | Refills: 1 | Status: SHIPPED
Start: 2022-03-21 | End: 2022-07-25

## 2022-03-23 ENCOUNTER — OFFICE VISIT (OUTPATIENT)
Dept: ENDOCRINOLOGY | Age: 52
End: 2022-03-23
Payer: COMMERCIAL

## 2022-03-23 VITALS
SYSTOLIC BLOOD PRESSURE: 132 MMHG | HEIGHT: 65 IN | WEIGHT: 201 LBS | RESPIRATION RATE: 18 BRPM | HEART RATE: 96 BPM | BODY MASS INDEX: 33.49 KG/M2 | DIASTOLIC BLOOD PRESSURE: 89 MMHG | OXYGEN SATURATION: 98 %

## 2022-03-23 DIAGNOSIS — R73.03 PREDIABETES: ICD-10-CM

## 2022-03-23 DIAGNOSIS — E06.3 HASHIMOTO'S THYROIDITIS: ICD-10-CM

## 2022-03-23 DIAGNOSIS — E04.2 MULTINODULAR GOITER: Primary | ICD-10-CM

## 2022-03-23 PROCEDURE — 3017F COLORECTAL CA SCREEN DOC REV: CPT | Performed by: INTERNAL MEDICINE

## 2022-03-23 PROCEDURE — G8484 FLU IMMUNIZE NO ADMIN: HCPCS | Performed by: INTERNAL MEDICINE

## 2022-03-23 PROCEDURE — 99214 OFFICE O/P EST MOD 30 MIN: CPT | Performed by: INTERNAL MEDICINE

## 2022-03-23 PROCEDURE — 1036F TOBACCO NON-USER: CPT | Performed by: INTERNAL MEDICINE

## 2022-03-23 PROCEDURE — G8427 DOCREV CUR MEDS BY ELIG CLIN: HCPCS | Performed by: INTERNAL MEDICINE

## 2022-03-23 PROCEDURE — G8417 CALC BMI ABV UP PARAM F/U: HCPCS | Performed by: INTERNAL MEDICINE

## 2022-03-23 NOTE — PROGRESS NOTES
[address] [place] [address] Department of Endocrinology Diabetes and Metabolism   [address] [facility] [address]   Phone: [phone]  Fax: [phone]    Date of Service: 3/23/2022  Primary Care Physician: Reuben Cates DO  Provider: Mayra Ridley MD            Reason for the visit:  Multinodular goiter     History of Present Illness: The history is provided by the patient. No  was used. Accuracy of the patient data is excellent. Aleja Summers is a very pleasant 46 y.o. female seen today for evaluation and management of multinodular goiter     The patient states that she has a history of thyroid disorder as a teenager, and was placed on medication for this by her pediatrician however, it was stopped for no known reason. Patient states that her family physicians have been uncertain as to why no medications have been given for her thyroid disorder. She had previously seen Dr. Jim Stroud for thyroid nodules and hyperprolactinemia, without any treatment given, as lab levels were found to be within normal limits per Dr. Eddie Soto notes. Patient most recently had an ultrasound of head and neck in November 2019. This demonstrated 3 subcentimeter nodules that are unchanged from previous ultrasounds. Aleja Summers denies any new lumps, bumps in her neck, change in her voice, or shortness of breath. No family history of thyroid cancer. No prior history of radiation to head or neck region. She does complain of some dysphasia; she recently had an upper endoscopy with dilation. Patient denied any history of  swelling in the area of the thyroid gland, weight loss, change in appetite, nervousness, anxiety, irritability, tremor, sweating, heat intolerance, changes in bowel habits, muscle weakness or difficulty sleeping.   Patient complains of unexplained weight gain, new fatigue, increased sensitivity to cold, dry skin, brittle fingernails, brittle hair, depressed mood.    Prediabetes   Controlled with diet  Lab Results   Component Value Date    LABA1C 6.1 2022    LABA1C 6.1 2021    LABA1C 6.1 2020    LABA1C 6.3 2018       PAST MEDICAL HISTORY   Past Medical History:   Diagnosis Date    Asthma     DR Pippa Rojas    Back injury     Chronic back pain     PAIN RADIATES TO NECK AND LEGS    Chronic tension-type headache, not intractable 2018    DDD (degenerative disc disease), cervical 2018    DVT (deep venous thrombosis) (HCC)     Right Arm    GERD (gastroesophageal reflux disease)     Herniated disc, cervical     also lumbar spine    Hx of screening mammography 3/2015 & 10/2015    BIRADS 2 BENIGN    HX OTHER MEDICAL     PINCHED NERVE BACK OF NECK    Hypertension     Migraine     Migraine     DR Lukas Garcia MVP (mitral valve prolapse)     Numbness and tingling     LEGS AND FEET    Obesity     Sinus infection     Thyroid disease     no med    Torn ACL     RT KNEE    Ulcer     Vitamin B 12 deficiency     NON ANEMIC     PAST SURGICAL HISTORY   Past Surgical History:   Procedure Laterality Date    BREAST LUMPECTOMY Right 2013    BREAST SURGERY Right 2013    re-excision, rt lumpectomyscar evacuation of hematoma.  CARDIOVASCULAR STRESS TEST  2015    NORMAL     SECTION      COLONOSCOPY      12 YOSSEF HEMORRHOIDS, MINIMAL DIVERTICULA    COLONOSCOPY  2012    DR PEREZ GERD & GASTRITIS    DILATION AND CURETTAGE OF UTERUS      ECHO COMPL W DOP COLOR FLOW  2015         ECHOCARDIOGRAM COMPLETE 2D W DOPPLER W COLOR  10/24/2012         HARDWARE REMOVAL Right 2018    Foot    KNEE ARTHROSCOPY Right     Right knee arthroscopy. Dr. Nolasco Channel ARTHROSCOPY Right     Right knee meniscal repair. Dr. Noa Jasso.       MAMMO IMPLANT DIGITAL DIAG BI      3/24/15 BIRADS CAT 2 BENIGN, 10/15/15 BIRADS CAT 2    OTHER SURGICAL HISTORY      TILT TABLE TEST - DR Veronica Melgoza  NO ORTHOSTASIS    TYMPANOSTOMY TUBE PLACEMENT      UPPER GASTROINTESTINAL ENDOSCOPY      WITH CLOSED BIOPSY DR Martin Postal 1/30/12 GERD, GASTRITIS    UPPER GASTROINTESTINAL ENDOSCOPY  1/30/2012    DR Komal Hyde GERD & GASTRITIS    WISDOM TOOTH EXTRACTION       SOCIAL HISTORY   Tobacco:   reports that she has never smoked. She has never used smokeless tobacco.  Alcohol:   reports previous alcohol use. Drugs:   reports no history of drug use. FAMILY HISTORY   Family History   Problem Relation Age of Onset    High Blood Pressure Mother     Heart Disease Father     Seizures Father     Coronary Art Dis Father     Cancer Father         STOMACH    Deep Vein Thrombosis Sister     Breast Cancer Sister 43    Hypertension Maternal Grandmother      ALLERGIES AND DRUG REACTIONS   Allergies   Allergen Reactions    Ceftin [Cefuroxime Axetil] Nausea Only    Pcn [Penicillins]      ?  reaction    Vicodin [Hydrocodone-Acetaminophen] Shortness Of Breath    Hydrocodone Other (See Comments)    Ibuprofen Swelling    Other      TAPE AND BANDAIDS SKIN IRRITATION    Percocet [Oxycodone-Acetaminophen]     Sudafed [Pseudoephedrine Hcl] Swelling    Ajovy [Fremanezumab-Vfrm] Rash    Augmentin [Amoxicillin-Pot Clavulanate] Diarrhea       CURRENT MEDICATIONS   Current Outpatient Medications   Medication Sig Dispense Refill    simvastatin (ZOCOR) 40 MG tablet Take 1 tablet by mouth nightly 90 tablet 1    meclizine (ANTIVERT) 12.5 MG tablet Take 1 tablet by mouth See Admin Instructions 30 tablet 0    Ubrogepant (UBRELVY) 100 MG TABS TAKE 1 TABLET IF NEEDED MAY TAKE 1 MORE DOSE AFTER 1 HOUR IF NEEDED - DO NOT TAKE MORE THAN 200 MG IN 24 HRS 12 tablet 2    fluconazole (DIFLUCAN) 150 MG tablet 1 tab po x 1 dose, may repeat in 72 hrs if still symptomatic. 2 tablet 0    ondansetron (ZOFRAN) 4 MG tablet Take 1 tablet by mouth every 8 hours as needed for Nausea or Vomiting 30 tablet 1    Magic Mouthwash (MIRACLE MOUTHWASH) Swish and spit 5 mLs 4 times daily as needed for Irritation 240 mL 0    Gtopiu-DjCrl-IiFty-Meth-FA-DHA (PRENATE MINI) 18-0.6-0.4-350 MG CAPS Take 1 capsule by mouth daily 30 capsule 4    cyclobenzaprine (FLEXERIL) 10 MG tablet Take 1 tablet by mouth 2 times daily as needed for Muscle spasms 30 tablet 0    Erenumab-aooe 140 MG/ML SOAJ Inject 140 mg into the skin every 30 days 1 pen 2    rizatriptan (MAXALT) 10 MG tablet Take 1 tablet by mouth daily as needed for Migraine 9 tablet 2    spironolactone (ALDACTONE) 25 MG tablet Take 1 tablet by mouth daily 30 tablet 5    fluticasone (FLONASE) 50 MCG/ACT nasal spray 1 spray by Nasal route daily 1 Bottle 5    acetaminophen-codeine (TYLENOL #4) 300-60 MG per tablet TK 1 T PO ONCE A DAY PRN FOR PAIN  0    NARCAN 4 MG/0.1ML LIQD nasal spray   1    esomeprazole (NEXIUM) 40 MG delayed release capsule Take 40 mg by mouth every morning (before breakfast)      famotidine (PEPCID) 40 MG tablet Take 40 mg by mouth nightly       pantoprazole (PROTONIX) 40 MG tablet Take 40 mg by mouth daily       montelukast (SINGULAIR) 10 MG tablet Take 10 mg by mouth nightly       aspirin 81 MG tablet Take 81 mg by mouth daily      acetaminophen (TYLENOL) 500 MG tablet Take 2 tablets by mouth 3 times daily as needed for Pain or Fever 42 tablet 0    albuterol sulfate HFA (PROAIR HFA) 108 (90 Base) MCG/ACT inhaler Inhale 2 puffs into the lungs every 6 hours as needed for Wheezing 1 Inhaler 0     Current Facility-Administered Medications   Medication Dose Route Frequency Provider Last Rate Last Admin    cyanocobalamin injection 1,000 mcg  1,000 mcg IntraMUSCular Once Low [place] MD        cyanocobalamin injection 1,000 mcg  1,000 mcg IntraMUSCular Once Low [place] MD           Review of Systems  Constitutional: No fever, no chills, no diaphoresis, no generalized weakness complains of fatigue. Dariel Fan   HEENT: No blurred vision, No sore throat, no ear pain, no hair loss  Neck: denied any neck swelling, but complains of difficulty swallowing,   Cadrdiopulomary: No CP, SOB or palpitation, No orthopnea or PND. No cough or wheezing. GI: No N/V/D, no constipation, No abdominal pain, no melena or hematochezia   : Denied any dysuria, hematuria, flank pain, discharge, or incontinence. Skin: denied any rash, ulcer, Hirsute, or hyperpigmentation. MSK: denied any joint deformity, joint pain/swelling, muscle pain, or back pain. Neuro: no numbess, no tingling, no weakness,     OBJECTIVE    /89   Pulse 96   Resp 18   Ht 5' 5\" (1.651 m)   Wt 201 lb (91.2 kg)   LMP 10/01/2018 (Approximate)   SpO2 98%   BMI 33.45 kg/m²   BP Readings from Last 4 Encounters:   03/23/22 132/89   03/03/22 124/88   02/25/22 116/64   02/22/22 134/76     Wt Readings from Last 6 Encounters:   03/23/22 201 lb (91.2 kg)   03/03/22 204 lb (92.5 kg)   02/25/22 202 lb (91.6 kg)   02/22/22 194 lb (88 kg)   02/20/22 184 lb (83.5 kg)   11/24/21 184 lb (83.5 kg)       Physical examination:  General: awake alert, oriented x3, no abnormal position or movements. HEENT: normocephalic non traumatic  Neck: supple, no LN enlargement, enlarged right thyroid, no thyroid tenderness, no JVD. Pulm: Clear equal air entry no added sounds, no wheezing or rhonchi    CVS: S1 + S2, no murmur, no heave. Dorsalis pedis pulse palpable   Abd: soft lax, no tenderness, no organomegaly, audible bowel sounds. Skin: warm, no lesions, no rash.  No Palmar erythema  Musculoskeletal: No back tenderness, no kyphosis/scoliosis     Neuro: CN intact, sensation normal , muscle power normal. No tremors   Psych: normal mood, and affect    Review of Laboratory Data:  I personally reviewed the following labs:   Lab Results   Component Value Date/Time    WBC 7.8 03/18/2022 08:43 AM    RBC 4.92 03/18/2022 08:43 AM    RBC 4.43 02/02/2017 08:20 AM    HGB 13.2 03/18/2022 08:43 AM    HCT 41.4 03/18/2022 08:43 AM    MCV 84.1 03/18/2022 08:43 AM    MCH 26.8 03/18/2022 08:43 AM    MCHC 31.9 (L) 03/18/2022 08:43 AM    RDW 14.6 03/18/2022 08:43 AM     03/18/2022 08:43 AM    MPV 9.8 03/18/2022 08:43 AM      Lab Results   Component Value Date/Time     03/18/2022 08:43 AM    K 4.0 03/18/2022 08:43 AM    CO2 23 03/18/2022 08:43 AM    BUN 9 03/18/2022 08:43 AM    CREATININE 0.9 03/18/2022 08:43 AM    CALCIUM 9.4 03/18/2022 08:43 AM    LABGLOM >60 03/18/2022 08:43 AM    GFRAA >60 03/18/2022 08:43 AM      Lab Results   Component Value Date/Time    TSH 1.450 03/18/2022 08:43 AM    T4FREE 1.37 03/18/2022 08:43 AM    V2OMFOY 7.8 06/28/2018 12:17 PM    FT3 3.3 03/18/2022 08:43 AM    FT3 2.9 06/28/2018 12:17 PM    FT3 2.8 07/10/2016 11:22 AM    FT3 3.1 04/02/2016 09:55 AM    TPOABS 42.7 (H) 02/28/2020 04:41 PM    THGAB <0.9 02/28/2020 04:41 PM     Lab Results   Component Value Date    LABA1C 6.1 03/18/2022    GLUCOSE 107 03/18/2022    GLUCOSE 96 03/25/2012    LABCREA 207 10/04/2021     Lab Results   Component Value Date    TRIG 80 03/18/2022    HDL 60 03/18/2022    LDLCALC 147 03/18/2022    CHOL 223 03/18/2022     Lab Results   Component Value Date    VITD25 50 03/18/2022    VITD25 32 02/28/2020       Medical Records/Labs/Images Review:   I personally reviewed and summarized previous records   All labs and imaging were reviewed independently    [address] [place] [address], a 46 y.o.-old female seen in for the following issues     Multinodular goiter   · Prevalence of thyroid nodule on thyroid ultrasound is 50% and 95 % of these nodules are benign. · Given nodule size and lack of ultrasound suspicious features which making the nodule less likely to be malignant, I will continue following with periodic US, with the next ultrasound taking place  next fall in several months. If this ultrasound remains unchanged from previous, frequency will be spaced out to at least 2 to 3 years.   · Will continue monitoring     Prediabetes   · Controlled with diet   · Recheck A1c     I personally reviewed external notes from PCP and other patient's care team providers, and personally interpreted labs associated with the above diagnosis. I also ordered labs to further assess and manage the above addressed medical conditions    Return in about 6 months (around 9/23/2022) for Multinodular goiter, VitD deficiency. The above issues were reviewed with the patient who understood and agreed with the plan. Thank you for allowing us to participate in the care of this patient. Please do not hesitate to contact us with any additional questions. Diagnosis Orders   1. Multinodular goiter  US THYROID   2. Hashimoto's thyroiditis  TSH    T4, Free   3. Prediabetes  Hemoglobin A1C   4.  BMI 33.0-33.9,adult  Pj Valerio MD, Edouard Fay, Surgical Weight Loss [address] [facility] MD  Endocrinologist, [facility])   [address] [place] [id number]   Phone: [phone]  Fax: [phone]  -------------------------------------------------------------  Electronically signed by Ricardo Ziegler MD

## 2022-03-24 LAB
HCV QNT BY NAAT IU/ML: NOT DETECTED IU/ML
HCV QNT BY NAAT LOG IU/ML: NOT DETECTED LOG IU/ML
INTERPRETATION: NOT DETECTED

## 2022-03-31 ENCOUNTER — TELEPHONE (OUTPATIENT)
Dept: BARIATRICS/WEIGHT MGMT | Age: 52
End: 2022-03-31

## 2022-04-28 DIAGNOSIS — G43.711 INTRACTABLE CHRONIC MIGRAINE WITHOUT AURA AND WITH STATUS MIGRAINOSUS: ICD-10-CM

## 2022-04-28 RX ORDER — RIZATRIPTAN BENZOATE 10 MG/1
10 TABLET ORAL DAILY PRN
Qty: 9 TABLET | Refills: 2 | Status: SHIPPED
Start: 2022-04-28 | End: 2022-09-06 | Stop reason: SDUPTHER

## 2022-05-04 ENCOUNTER — TELEPHONE (OUTPATIENT)
Dept: CARDIOLOGY CLINIC | Age: 52
End: 2022-05-04

## 2022-05-04 NOTE — TELEPHONE ENCOUNTER
Patient called for the last month she has been experiencing  atypical chest pain possibly related to GERD nervous wants to rule out any heart issues     Weight 194    Reported this week    126/80  monday  140/80  tuesday  114/78  Wednesday    Denies , Edema,  SOB     Last seen 8-20-21     Please advise         Assessment and Plan:         Pleasant 22-year-old -American female who was told previously about mitral valve prolapse. As noted above her most recent TTE does not show any evidence for mitral valve prolapse or MR. Her exam does not show any signs of heart failure or mitral valve murmur at rest or with Valsalva. She is physically active and has no angina.     I suspect her \"dizziness\" is related to inner ear or neurologic etiology as evidenced by the presence of tinnitus, nystagmus, nausea, changes with head position.       Diagnosis Orders   1. Abnormal EKG      2. MVP (mitral valve prolapse)  EKG 12 lead     ECHO COMPLETE   3. Hypercholesterolemia  PROTEIN / CREATININE RATIO, URINE         · TTE for her abnormal EKG  · Orthostatic vitals  · Follow-up with neurology and/or ENT  · No indication for primary prevention statin at this time. · Discussed primary prevention aspirin. I do not feel it strongly indicated either.

## 2022-05-10 DIAGNOSIS — Z76.0 MEDICATION REFILL: ICD-10-CM

## 2022-05-10 RX ORDER — SPIRONOLACTONE 25 MG/1
25 TABLET ORAL DAILY
Qty: 30 TABLET | Refills: 5 | Status: SHIPPED
Start: 2022-05-10 | End: 2022-07-25 | Stop reason: SDUPTHER

## 2022-05-17 ENCOUNTER — OFFICE VISIT (OUTPATIENT)
Dept: FAMILY MEDICINE CLINIC | Age: 52
End: 2022-05-17
Payer: COMMERCIAL

## 2022-05-17 VITALS
DIASTOLIC BLOOD PRESSURE: 78 MMHG | OXYGEN SATURATION: 100 % | WEIGHT: 211.9 LBS | RESPIRATION RATE: 17 BRPM | TEMPERATURE: 97.3 F | HEIGHT: 66 IN | BODY MASS INDEX: 34.06 KG/M2 | SYSTOLIC BLOOD PRESSURE: 128 MMHG | HEART RATE: 84 BPM

## 2022-05-17 DIAGNOSIS — R07.9 CHEST PAIN, UNSPECIFIED TYPE: ICD-10-CM

## 2022-05-17 DIAGNOSIS — Z00.00 ANNUAL PHYSICAL EXAM: Primary | ICD-10-CM

## 2022-05-17 PROCEDURE — 93000 ELECTROCARDIOGRAM COMPLETE: CPT | Performed by: FAMILY MEDICINE

## 2022-05-17 PROCEDURE — 99396 PREV VISIT EST AGE 40-64: CPT | Performed by: FAMILY MEDICINE

## 2022-05-17 NOTE — PROGRESS NOTES
5/31/2022    Chief Complaint   Patient presents with    Annual Exam       Screening Questions:    Exercise 30 minutes daily 5 times weekly   Mammogram every 1-2 years age 36, then annually after age 48: Yes   Pap smear UTD:  Yes    Fecal occult blood yearly after age 50/Colonoscopy:  Yes   Eye exam every 2-4 years, yearly if diabetic:  Yes    Dental exam:  No    BMI: Body mass index is 34.73 kg/m². Hiram La Counseled on weight loss      COVID:  Recent positive. Cough and wheeze. Feeling improved. Having some chest discomfort'     Labs:  No results found for: EAG  Lab Results   Component Value Date    LDLCALC 147 (H) 03/18/2022      CBC:   Lab Results   Component Value Date    WBC 9.0 05/20/2022    RBC 4.93 05/20/2022    RBC 4.43 02/02/2017    HGB 13.6 05/20/2022    HCT 43.5 05/20/2022    MCV 88.2 05/20/2022    MCH 27.6 05/20/2022    MCHC 31.3 05/20/2022    RDW 15.8 05/20/2022     05/20/2022    MPV 10.3 05/20/2022         Patient's past medical, surgical, social and/or family history reviewed, updated in chart, and are non-contributory (unless otherwise stated). Medications and allergies also reviewed and updated in chart.     ROS  Review of Systems - General ROS: negative for - chills, fatigue, fever, night sweats, sleep disturbance, weight gain or weight loss  Psychological ROS: negative for - anxiety, behavioral disorder, depression, hallucinations, irritability, memory difficulties, mood swings, sleep disturbances or suicidal ideation  ENT ROS: negative for - epistaxis, headaches, hearing change, nasal congestion, nasal discharge, nasal polyps, sinus pain, tinnitus, vertigo or visual changes  Hematological and Lymphatic ROS: negative for - bleeding problems, blood clots, fatigue or swollen lymph nodes  Respiratory ROS: negative for - cough, orthopnea, shortness of breath, sputum changes, tachypnea or wheezing  Cardiovascular ROS: negative for - chest pain, dyspnea on exertion, irregular heartbeat, loss of consciousness, palpitations, paroxysmal nocturnal dyspnea or rapid heart rate  Gastrointestinal ROS: negative for - abdominal pain, blood in stools, change in bowel habits, constipation, diarrhea, gas/bloating, heartburn or nausea/vomiting  Musculoskeletal ROS: negative for - joint pain, joint stiffness, joint swelling or muscle, back pain, bowel or bladder incontinence  Neurological ROS: negative for - behavioral changes, confusion, dizziness, headaches, memory loss, numbness/tingling, seizures or speech problems, weakness  Dermatological ROS: negative for - dry skin, mole changes, nail changes, pruritus, rash or skin lesion changes    Physical Exam  /78   Pulse 84   Temp 97.3 °F (36.3 °C)   Resp 17   Ht 5' 5.5\" (1.664 m)   Wt 211 lb 14.4 oz (96.1 kg)   LMP 10/01/2018 (Approximate)   SpO2 100%   BMI 34.73 kg/m²   Wt Readings from Last 3 Encounters:   05/20/22 208 lb (94.3 kg)   05/17/22 211 lb 14.4 oz (96.1 kg)   03/23/22 201 lb (91.2 kg)     Temp Readings from Last 3 Encounters:   05/20/22 97.9 °F (36.6 °C)   05/17/22 97.3 °F (36.3 °C)   03/03/22 97.7 °F (36.5 °C)     BP Readings from Last 3 Encounters:   05/20/22 (!) 150/86   05/17/22 128/78   03/23/22 132/89     Pulse Readings from Last 3 Encounters:   05/20/22 76   05/17/22 84   03/23/22 96       General appearance: alert, well appearing, and in no distress, oriented to person, place, and time and normal appearing weight. CVS exam: normal rate, regular rhythm, normal S1, S2, no murmurs, rubs, clicks or gallops. Radial pulses 2+ bilateral.  PT/DP pulse 2+ bilat. No C/C/E    Chest: clear to auscultation, no wheezes, rales or rhonchi, symmetric air entry. Abdomen: Soft, non-tender, non-distended, positive BS in all 4 quadrants    Extremities:Dorsalis pedis pulses palpated bilaterally, no clubbing, cyanosis, edema or erythema, Sensory exam of the foot is normal, tested with the monofilament.  Good pulses, no lesions or ulcers, good peripheral pulses. SKIN: no lesions, jaundice, petechiae, pallor, cyanosis, ecchymosis    NEURO: gross motor exam normal by observation, gait normal    Mental status - alert, oriented to person, place, and time, normal mood, behavior, speech, dress, motor activity, and thought processes      Assessment/Plan  Doron Anthony was seen today for annual exam.    Diagnoses and all orders for this visit:    Annual physical exam    Chest pain, unspecified type  -     EKG 12 Lead        Discussed preventive care and screenings, lab results and the importance of diet and exercise. Advised to return to the office for annual exam or sooner if needed. Return in about 6 months (around 11/17/2022), or if symptoms worsen or fail to improve. Call or go to ED immediately if symptoms worsen or persist.    Educational materials and/or home exercises printed for patient's review and were included in patient instructions on his/her After Visit Summary and given to patient at the end of visit. Counseled regarding above diagnosis, including possible risks and complications,  especially if left uncontrolled. Counseled regarding the possible side effects, risks, benefits and alternatives to treatment; patient and/or guardian verbalizes understanding, agrees, feels comfortable with and wishes to proceed with above treatment plan. Advised patient to call with any new medication issues, and read all Rx info from pharmacy to assure aware of all possible risks and side effects of medication before taking. Reviewed age and gender appropriate health screening exams and vaccinations. Advised patient regarding importance of keeping up with recommended health maintenance and to schedule as soon as possible if overdue, as this is important in assessing for undiagnosed pathology, especially cancer, as well as protecting against potentially harmful/life threatening disease.       Patient and/or guardian verbalizes understanding and agrees with above counseling, assessment and plan. All questions answered.

## 2022-05-19 ENCOUNTER — TELEPHONE (OUTPATIENT)
Dept: FAMILY MEDICINE CLINIC | Age: 52
End: 2022-05-19

## 2022-05-19 NOTE — TELEPHONE ENCOUNTER
Her bp last night 162/104, today highest 145/132 and then it went down to 126/75.  She said it all over the place

## 2022-05-20 ENCOUNTER — OFFICE VISIT (OUTPATIENT)
Dept: FAMILY MEDICINE CLINIC | Age: 52
End: 2022-05-20
Payer: COMMERCIAL

## 2022-05-20 VITALS
WEIGHT: 208 LBS | SYSTOLIC BLOOD PRESSURE: 150 MMHG | DIASTOLIC BLOOD PRESSURE: 86 MMHG | HEART RATE: 76 BPM | OXYGEN SATURATION: 96 % | TEMPERATURE: 97.9 F | BODY MASS INDEX: 33.43 KG/M2 | HEIGHT: 66 IN

## 2022-05-20 DIAGNOSIS — Z86.16 HISTORY OF COVID-19: ICD-10-CM

## 2022-05-20 DIAGNOSIS — R03.0 ELEVATED BP WITHOUT DIAGNOSIS OF HYPERTENSION: ICD-10-CM

## 2022-05-20 DIAGNOSIS — R03.0 ELEVATED BP WITHOUT DIAGNOSIS OF HYPERTENSION: Primary | ICD-10-CM

## 2022-05-20 LAB
ALBUMIN SERPL-MCNC: 4.3 G/DL (ref 3.5–5.2)
ALP BLD-CCNC: 104 U/L (ref 35–104)
ALT SERPL-CCNC: 19 U/L (ref 0–32)
ANION GAP SERPL CALCULATED.3IONS-SCNC: 15 MMOL/L (ref 7–16)
AST SERPL-CCNC: 17 U/L (ref 0–31)
BASOPHILS ABSOLUTE: 0.04 E9/L (ref 0–0.2)
BASOPHILS RELATIVE PERCENT: 0.4 % (ref 0–2)
BILIRUB SERPL-MCNC: <0.2 MG/DL (ref 0–1.2)
BILIRUBIN DIRECT: <0.2 MG/DL (ref 0–0.3)
BILIRUBIN, INDIRECT: NORMAL MG/DL (ref 0–1)
BUN BLDV-MCNC: 11 MG/DL (ref 6–20)
C-REACTIVE PROTEIN: 0.9 MG/DL (ref 0–0.4)
CALCIUM SERPL-MCNC: 9.3 MG/DL (ref 8.6–10.2)
CHLORIDE BLD-SCNC: 102 MMOL/L (ref 98–107)
CO2: 22 MMOL/L (ref 22–29)
CREAT SERPL-MCNC: 1 MG/DL (ref 0.5–1)
D DIMER: <200 NG/ML DDU
EOSINOPHILS ABSOLUTE: 0.15 E9/L (ref 0.05–0.5)
EOSINOPHILS RELATIVE PERCENT: 1.7 % (ref 0–6)
FIBRINOGEN: 483 MG/DL (ref 200–400)
GFR AFRICAN AMERICAN: >60
GFR NON-AFRICAN AMERICAN: >60 ML/MIN/1.73
GLUCOSE BLD-MCNC: 99 MG/DL (ref 74–99)
HCT VFR BLD CALC: 43.5 % (ref 34–48)
HEMOGLOBIN: 13.6 G/DL (ref 11.5–15.5)
IMMATURE GRANULOCYTES #: 0.17 E9/L
IMMATURE GRANULOCYTES %: 1.9 % (ref 0–5)
LYMPHOCYTES ABSOLUTE: 2.62 E9/L (ref 1.5–4)
LYMPHOCYTES RELATIVE PERCENT: 29.1 % (ref 20–42)
MCH RBC QN AUTO: 27.6 PG (ref 26–35)
MCHC RBC AUTO-ENTMCNC: 31.3 % (ref 32–34.5)
MCV RBC AUTO: 88.2 FL (ref 80–99.9)
MONOCYTES ABSOLUTE: 0.7 E9/L (ref 0.1–0.95)
MONOCYTES RELATIVE PERCENT: 7.8 % (ref 2–12)
NEUTROPHILS ABSOLUTE: 5.32 E9/L (ref 1.8–7.3)
NEUTROPHILS RELATIVE PERCENT: 59.1 % (ref 43–80)
PDW BLD-RTO: 15.8 FL (ref 11.5–15)
PLATELET # BLD: 217 E9/L (ref 130–450)
PMV BLD AUTO: 10.3 FL (ref 7–12)
POTASSIUM SERPL-SCNC: 4.5 MMOL/L (ref 3.5–5)
PRO-BNP: 25 PG/ML (ref 0–125)
RBC # BLD: 4.93 E12/L (ref 3.5–5.5)
SODIUM BLD-SCNC: 139 MMOL/L (ref 132–146)
TOTAL PROTEIN: 7.5 G/DL (ref 6.4–8.3)
WBC # BLD: 9 E9/L (ref 4.5–11.5)

## 2022-05-20 PROCEDURE — 1036F TOBACCO NON-USER: CPT | Performed by: FAMILY MEDICINE

## 2022-05-20 PROCEDURE — 99213 OFFICE O/P EST LOW 20 MIN: CPT | Performed by: FAMILY MEDICINE

## 2022-05-20 PROCEDURE — G8427 DOCREV CUR MEDS BY ELIG CLIN: HCPCS | Performed by: FAMILY MEDICINE

## 2022-05-20 PROCEDURE — G8417 CALC BMI ABV UP PARAM F/U: HCPCS | Performed by: FAMILY MEDICINE

## 2022-05-20 PROCEDURE — 3017F COLORECTAL CA SCREEN DOC REV: CPT | Performed by: FAMILY MEDICINE

## 2022-05-20 RX ORDER — AMLODIPINE BESYLATE 5 MG/1
5 TABLET ORAL EVERY EVENING
Qty: 30 TABLET | Refills: 3 | Status: SHIPPED
Start: 2022-05-20 | End: 2022-07-25

## 2022-05-20 RX ORDER — HYDRALAZINE HYDROCHLORIDE 10 MG/1
10 TABLET, FILM COATED ORAL 3 TIMES DAILY PRN
Qty: 90 TABLET | Refills: 3 | Status: SHIPPED
Start: 2022-05-20 | End: 2022-09-06 | Stop reason: SDUPTHER

## 2022-05-20 ASSESSMENT — ENCOUNTER SYMPTOMS
NAUSEA: 0
BLOOD IN STOOL: 0
CONSTIPATION: 0
COUGH: 0
BACK PAIN: 0
PHOTOPHOBIA: 0
ABDOMINAL PAIN: 0
DIARRHEA: 0
SHORTNESS OF BREATH: 1
VOMITING: 0
SORE THROAT: 0

## 2022-05-20 NOTE — PROGRESS NOTES
Reji Soto (:  1970) is a 46 y.o. female,Established patient, here for evaluation of the following chief complaint(s):  Hypertension (162/104. Lastnight. 156/88 today.)         ASSESSMENT/PLAN:  1. Elevated BP without diagnosis of hypertension  -     amLODIPine (NORVASC) 5 MG tablet; Take 1 tablet by mouth every evening, Disp-30 tablet, R-3Normal  -     hydrALAZINE (APRESOLINE) 10 MG tablet; Take 1 tablet by mouth 3 times daily as needed (systolic bp over 784), OWHE-38 tablet, R-3Normal  -     CBC with Auto Differential; Future  -     Brain Natriuretic Peptide; Future  -     D-Dimer, Quantitative; Future  -     Hepatic Function Panel; Future  -     Basic Metabolic Panel; Future  -     C-Reactive Protein; Future  -     Fibrinogen; Future  2. History of COVID-19  -     amLODIPine (NORVASC) 5 MG tablet; Take 1 tablet by mouth every evening, Disp-30 tablet, R-3Normal  -     hydrALAZINE (APRESOLINE) 10 MG tablet; Take 1 tablet by mouth 3 times daily as needed (systolic bp over 195), PLGT-91 tablet, R-3Normal  -     CBC with Auto Differential; Future  -     Brain Natriuretic Peptide; Future  -     D-Dimer, Quantitative; Future  -     Hepatic Function Panel; Future  -     Basic Metabolic Panel; Future  -     C-Reactive Protein; Future  -     Fibrinogen; Future  At this time we will start her on low-dose Norvasc with hydralazine as needed for elevated blood pressures. Advised follow-up with PCP in the next 1 to 2 weeks. Baseline labs ordered as well given patient's recent history with COVID. Red flags discussed with patient. These occur she is to go directly to the nearest emergency department for further evaluation and treatment. Patient voiced understanding. No follow-ups on file. Subjective   SUBJECTIVE/OBJECTIVE:  HPI  Patient presents today for evaluation of worsening hypertension. Patient states that she recently saw her PCP 2 days ago and her blood pressure was mildly elevated. Since that time she has had worsening blood pressures. Has had mild chest pain and shortness of breath but does related to underlying issues. Of note patient recently had COVID and has not been feeling better since. Denies any swelling or pain to the extremities. Denies any aphasia. Denies any visual change. Has had intermittent headache. Review of Systems   Constitutional: Negative for chills and fever. HENT: Negative for congestion, hearing loss, nosebleeds and sore throat. Eyes: Negative for photophobia. Respiratory: Positive for shortness of breath. Negative for cough. Cardiovascular: Positive for chest pain. Negative for palpitations and leg swelling. Gastrointestinal: Negative for abdominal pain, blood in stool, constipation, diarrhea, nausea and vomiting. Endocrine: Negative for polydipsia. Genitourinary: Negative for dysuria, frequency, hematuria and urgency. Musculoskeletal: Negative for back pain and myalgias. Skin: Negative. Neurological: Positive for headaches. Negative for dizziness, tremors and weakness. Hematological: Does not bruise/bleed easily. Psychiatric/Behavioral: Negative for hallucinations and suicidal ideas. All other systems reviewed and are negative.          Current Outpatient Medications:     amLODIPine (NORVASC) 5 MG tablet, Take 1 tablet by mouth every evening, Disp: 30 tablet, Rfl: 3    hydrALAZINE (APRESOLINE) 10 MG tablet, Take 1 tablet by mouth 3 times daily as needed (systolic bp over 699), Disp: 90 tablet, Rfl: 3    spironolactone (ALDACTONE) 25 MG tablet, Take 1 tablet by mouth daily, Disp: 30 tablet, Rfl: 5    rizatriptan (MAXALT) 10 MG tablet, Take 1 tablet by mouth daily as needed for Migraine, Disp: 9 tablet, Rfl: 2    simvastatin (ZOCOR) 40 MG tablet, Take 1 tablet by mouth nightly, Disp: 90 tablet, Rfl: 1    meclizine (ANTIVERT) 12.5 MG tablet, Take 1 tablet by mouth See Admin Instructions, Disp: 30 tablet, Rfl: 0    Ubrogepant Diagnosis    Osteoarthritis of right knee    Old anterior cruciate ligament disruption    Breast calcification, right    Atypical ductal hyperplasia of breast    Abnormal EKG    Hypertension    GERD (gastroesophageal reflux disease)    Migraine    MVP (mitral valve prolapse)    Vitamin B 12 deficiency    Obesity    Chronic daily headache    Migraine with aura and with status migrainosus, not intractable    Muscle contraction headache    Arnold-Chiari malformation (HCC)    Chronic migraine    Intervertebral disc disorders with radiculopathy, lumbar region    Osteoarthritis of spine with radiculopathy, lumbar region    Cervical radiculopathy    Diabetic peripheral neuropathy (HCC)    Non morbid obesity due to excess calories    Prediabetes    Peripheral nerve dysfunction    Chronic tension-type headache, not intractable    DDD (degenerative disc disease), cervical    Asthma    Hypercholesterolemia     Past Medical History:   Diagnosis Date    Asthma     DR Oliver Borne    Back injury     Chronic back pain     PAIN RADIATES TO NECK AND LEGS    Chronic tension-type headache, not intractable 11/9/2018    DDD (degenerative disc disease), cervical 11/9/2018    DVT (deep venous thrombosis) (HCC)     Right Arm    GERD (gastroesophageal reflux disease)     Herniated disc, cervical     also lumbar spine    Hx of screening mammography 3/2015 & 10/2015    BIRADS 2 BENIGN    HX OTHER MEDICAL     PINCHED NERVE BACK OF NECK    Hypertension     Migraine     Migraine     DR Garret Mcfarlane    MVP (mitral valve prolapse)     Numbness and tingling     LEGS AND FEET    Obesity     Sinus infection     Thyroid disease     no med    Torn ACL     RT KNEE    Ulcer     Vitamin B 12 deficiency     NON ANEMIC     Past Surgical History:   Procedure Laterality Date    BREAST LUMPECTOMY Right 6/21/2013    BREAST SURGERY Right 7/9/2013    re-excision, rt lumpectomyscar evacuation of hematoma.     CARDIOVASCULAR STRESS TEST  2015    NORMAL     SECTION      COLONOSCOPY      12 YOSSEF HEMORRHOIDS, MINIMAL DIVERTICULA    COLONOSCOPY  2012    DR PEREZ GERD & GASTRITIS    DILATION AND CURETTAGE OF UTERUS      ECHO COMPL W DOP COLOR FLOW  2015         ECHOCARDIOGRAM COMPLETE 2D W DOPPLER W COLOR  10/24/2012         HARDWARE REMOVAL Right 2018    Foot    KNEE ARTHROSCOPY Right     Right knee arthroscopy. Dr. Yasmine العلي ARTHROSCOPY Right     Right knee meniscal repair. Dr. Rica Rockwell.  MAMMO IMPLANT DIGITAL DIAG BI      3/24/15 BIRADS CAT 2 BENIGN, 10/15/15 BIRADS CAT 2    OTHER SURGICAL HISTORY      TILT TABLE TEST - DR Mary Ellen Fontenot  NO ORTHOSTASIS    TYMPANOSTOMY TUBE PLACEMENT      UPPER GASTROINTESTINAL ENDOSCOPY      WITH CLOSED BIOPSY DR Lory Jackson 12 GERD, GASTRITIS    UPPER GASTROINTESTINAL ENDOSCOPY  2012    DR Thomas White GERD & GASTRITIS    WISDOM TOOTH EXTRACTION       Social History     Socioeconomic History    Marital status: Single     Spouse name: Not on file    Number of children: Not on file    Years of education: Not on file    Highest education level: Not on file   Occupational History    Occupation: attendance   Tobacco Use    Smoking status: Never Smoker    Smokeless tobacco: Never Used   Vaping Use    Vaping Use: Never used   Substance and Sexual Activity    Alcohol use: Not Currently     Comment: rare     Drug use: No    Sexual activity: Not on file   Other Topics Concern    Not on file   Social History Narrative    Not on file     Social Determinants of Health     Financial Resource Strain:     Difficulty of Paying Living Expenses: Not on file   Food Insecurity:     Worried About Running Out of Food in the Last Year: Not on file    Tanner of Food in the Last Year: Not on file   Transportation Needs:     Lack of Transportation (Medical): Not on file    Lack of Transportation (Non-Medical):  Not on file   Physical Activity:     Days of Exercise per Week: Not on file    Minutes of Exercise per Session: Not on file   Stress:     Feeling of Stress : Not on file   Social Connections:     Frequency of Communication with Friends and Family: Not on file    Frequency of Social Gatherings with Friends and Family: Not on file    Attends Anabaptism Services: Not on file    Active Member of [address] [place] [address] H[employer] or Organizations: Not on file    Attends Club or Organization Meetings: Not on file    Marital Status: Not on file   Intimate Partner Violence:     Fear of Current or Ex-Partner: Not on file    Emotionally Abused: Not on file    Physically Abused: Not on file    Sexually Abused: Not on file   Housing Stability:     Unable to Pay for Housing in the Last Year: Not on file    Number of [place] in the Last Year: Not on file    Unstable Housing in the Last Year: Not on file     Family History   Problem Relation Age of Onset    High Blood Pressure Mother     Heart Disease Father     Seizures Father     Coronary Art Dis Father     Cancer Father         STOMACH    Deep Vein Thrombosis Sister     Breast Cancer Sister 43    Hypertension Maternal Grandmother       There are no preventive care reminders to display for this patient. There are no preventive care reminders to display for this patient.    Diabetes Management   Topic Date Due    Diabetic foot exam  Never done    Diabetic microalbuminuria test  Never done    Diabetic retinal exam  Never done      Health Maintenance Due   Topic    DTaP/Tdap/Td vaccine (1 - Tdap)    Shingles vaccine (1 of 2)      Health Maintenance   Topic Date Due    COVID-19 Vaccine (1) Never done    Pneumococcal 0-64 years Vaccine (1 - PCV) Never done    Diabetic foot exam  Never done    HIV screen  Never done    Diabetic microalbuminuria test  Never done    Diabetic retinal exam  Never done    Hepatitis B vaccine (1 of 3 - Risk 3-dose series) Never done    DTaP/Tdap/Td vaccine (1 - Tdap) Never done    Shingles vaccine (1 of 2) Never done    Depression Screen  05/11/2022    Flu vaccine (Season Ended) 09/01/2022    Breast cancer screen  10/01/2022    A1C test (Diabetic or Prediabetic)  03/18/2023    Lipids  03/18/2023    Cervical cancer screen  07/29/2024    Colorectal Cancer Screen  08/04/2030    Hepatitis C screen  Completed    Hepatitis A vaccine  Aged Out    Hib vaccine  Aged Out    Meningococcal (ACWY) vaccine  Aged Out      There are no preventive care reminders to display for this patient. There are no preventive care reminders to display for this patient. BP (!) 150/86   Pulse 76   Temp 97.9 °F (36.6 °C)   Ht 5' 5.5\" (1.664 m)   Wt 208 lb (94.3 kg)   LMP 10/01/2018 (Approximate)   SpO2 96%   BMI 34.09 kg/m²     Objective   Physical Exam  Vitals reviewed. Constitutional:       Appearance: She is obese. HENT:      Head: Normocephalic and atraumatic. Eyes:      General: No scleral icterus. Conjunctiva/sclera: Conjunctivae normal.      Pupils: Pupils are equal, round, and reactive to light. Neck:      Thyroid: No thyromegaly. Cardiovascular:      Rate and Rhythm: Normal rate and regular rhythm. Heart sounds: Normal heart sounds. No murmur heard. Pulmonary:      Effort: Pulmonary effort is normal.      Breath sounds: Decreased breath sounds present. No rales. Abdominal:      General: Bowel sounds are normal. There is no distension. Palpations: Abdomen is soft. Tenderness: There is no abdominal tenderness. Musculoskeletal:         General: Normal range of motion. Cervical back: Neck supple. Lymphadenopathy:      Cervical: No cervical adenopathy. Skin:     General: Skin is warm and dry. Findings: No erythema or rash. Neurological:      Mental Status: She is alert and oriented to person, place, and time. Cranial Nerves: No cranial nerve deficit.    Psychiatric:         Judgment: Judgment normal.                  An electronic signature was used to authenticate this note.     --Libby Michel, DO

## 2022-06-20 ENCOUNTER — OFFICE VISIT (OUTPATIENT)
Dept: FAMILY MEDICINE CLINIC | Age: 52
End: 2022-06-20
Payer: COMMERCIAL

## 2022-06-20 VITALS
DIASTOLIC BLOOD PRESSURE: 81 MMHG | SYSTOLIC BLOOD PRESSURE: 121 MMHG | OXYGEN SATURATION: 97 % | BODY MASS INDEX: 41.23 KG/M2 | HEART RATE: 69 BPM | HEIGHT: 60 IN | WEIGHT: 210 LBS | TEMPERATURE: 97.6 F

## 2022-06-20 DIAGNOSIS — R73.03 PREDIABETES: Primary | ICD-10-CM

## 2022-06-20 DIAGNOSIS — I10 PRIMARY HYPERTENSION: ICD-10-CM

## 2022-06-20 LAB
CHP ED QC CHECK: NORMAL
GLUCOSE BLD-MCNC: 105 MG/DL
HBA1C MFR BLD: 6.1 %

## 2022-06-20 PROCEDURE — 83036 HEMOGLOBIN GLYCOSYLATED A1C: CPT | Performed by: FAMILY MEDICINE

## 2022-06-20 PROCEDURE — 99214 OFFICE O/P EST MOD 30 MIN: CPT | Performed by: FAMILY MEDICINE

## 2022-06-20 PROCEDURE — 82962 GLUCOSE BLOOD TEST: CPT | Performed by: FAMILY MEDICINE

## 2022-06-20 PROCEDURE — 1036F TOBACCO NON-USER: CPT | Performed by: FAMILY MEDICINE

## 2022-06-20 PROCEDURE — 3017F COLORECTAL CA SCREEN DOC REV: CPT | Performed by: FAMILY MEDICINE

## 2022-06-20 PROCEDURE — G8417 CALC BMI ABV UP PARAM F/U: HCPCS | Performed by: FAMILY MEDICINE

## 2022-06-20 PROCEDURE — G8427 DOCREV CUR MEDS BY ELIG CLIN: HCPCS | Performed by: FAMILY MEDICINE

## 2022-06-20 RX ORDER — ASCORBIC ACID, CHOLECALCIFEROL, .ALPHA.-TOCOPHEROL ACETATE, DL-, PYRIDOXINE HYDROCHLORIDE, FOLIC ACID, CYANOCOBALAMIN, BIOTIN, CALCIUM CARBONATE, FERROUS ASPARTO GLYCINATE, IRON, POTASSIUM IODIDE, MAGNESIUM OXIDE, DOCONEXENT AND LOWBUSH BLUEBERRY 60; 1000; 10; 26; 400; 13; 280; 80; 9; 9; 150; 25; 350; 25; 600 MG/1; [IU]/1; [IU]/1; MG/1; UG/1; UG/1; UG/1; MG/1; MG/1; MG/1; UG/1; MG/1; MG/1; MG/1; UG/1
1 CAPSULE, GELATIN COATED ORAL DAILY
Qty: 30 CAPSULE | Refills: 4 | Status: SHIPPED | OUTPATIENT
Start: 2022-06-20

## 2022-06-20 SDOH — ECONOMIC STABILITY: FOOD INSECURITY: WITHIN THE PAST 12 MONTHS, THE FOOD YOU BOUGHT JUST DIDN'T LAST AND YOU DIDN'T HAVE MONEY TO GET MORE.: NEVER TRUE

## 2022-06-20 SDOH — ECONOMIC STABILITY: FOOD INSECURITY: WITHIN THE PAST 12 MONTHS, YOU WORRIED THAT YOUR FOOD WOULD RUN OUT BEFORE YOU GOT MONEY TO BUY MORE.: NEVER TRUE

## 2022-06-20 ASSESSMENT — PATIENT HEALTH QUESTIONNAIRE - PHQ9
2. FEELING DOWN, DEPRESSED OR HOPELESS: 0
SUM OF ALL RESPONSES TO PHQ QUESTIONS 1-9: 0
SUM OF ALL RESPONSES TO PHQ QUESTIONS 1-9: 0
SUM OF ALL RESPONSES TO PHQ9 QUESTIONS 1 & 2: 0
SUM OF ALL RESPONSES TO PHQ QUESTIONS 1-9: 0
SUM OF ALL RESPONSES TO PHQ QUESTIONS 1-9: 0
1. LITTLE INTEREST OR PLEASURE IN DOING THINGS: 0

## 2022-06-20 ASSESSMENT — SOCIAL DETERMINANTS OF HEALTH (SDOH): HOW HARD IS IT FOR YOU TO PAY FOR THE VERY BASICS LIKE FOOD, HOUSING, MEDICAL CARE, AND HEATING?: NOT VERY HARD

## 2022-06-20 NOTE — PROGRESS NOTES
6/20/2022    Chief Complaint   Patient presents with    Hypertension    Dizziness     x2 days ago       Young Dozier is a 46 y.o. patient that presents today for:    Hypertension: Here for follow up chronic hypertension. Patient is  compliant with lifestyle modifications like exercise and adherence to a low salt diet. Patient is  well controlled. Denies chest pain, diaphoresis, dyspnea, dyspnea on exertion, peripheral edema, palpitations, HA, visual issues. Med list reviewed. Taking as prescribed. No adverse effects. Feeling well otherwise, no complaints. Patient's past medical, surgical, social and/or family history reviewed, updated in chart, and are non-contributory (unless otherwise stated). Medications and allergies also reviewed and updated in chart. ROS negative unless otherwise specified    Physical Exam  Wt Readings from Last 3 Encounters:   06/20/22 210 lb (95.3 kg)   05/20/22 208 lb (94.3 kg)   05/17/22 211 lb 14.4 oz (96.1 kg)     Temp Readings from Last 3 Encounters:   06/20/22 97.6 °F (36.4 °C) (Temporal)   05/20/22 97.9 °F (36.6 °C)   05/17/22 97.3 °F (36.3 °C)     BP Readings from Last 3 Encounters:   06/20/22 121/81   05/20/22 (!) 150/86   05/17/22 128/78     Pulse Readings from Last 3 Encounters:   06/20/22 69   05/20/22 76   05/17/22 84       General appearance: alert, well appearing, and in no distress, oriented to person, place, and time and normal appearing weight. CVS exam: normal rate, regular rhythm, normal S1, S2, no murmurs, rubs, clicks or gallops. Radial pulses 2+ bilateral.  PT/DP pulse 2+ bilat. No C/C/E    Chest: clear to auscultation, no wheezes, rales or rhonchi, symmetric air entry.      Abdomen: Soft, non-tender, non-distended, positive BS in all 4 quadrants    Extremities:Dorsalis pedis pulses palpated bilaterally, no clubbing, cyanosis, edema or erythema     SKIN: warm, dry, no lesions, jaundice, petechiae, pallor, cyanosis, ecchymosis    NEURO: gross motor exam normal by observation, gait normal    Mental status - alert, oriented to person, place, and time, normal mood, behavior, speech, dress, motor activity, and thought processes      Assessment/Plan  Kortney Whiting was seen today for hypertension and dizziness. Diagnoses and all orders for this visit:    Prediabetes  -     POCT Glucose  -     POCT glycosylated hemoglobin (Hb A1C)  -     Hemoglobin A1C; Future    Primary hypertension  -     Lipid Panel; Future  -     Comprehensive Metabolic Panel; Future  -     CBC; Future    Other orders  -     [id number] (PRENATE MINI) 18-0.6-0.4-350 MG CAPS; Take 1 capsule by mouth daily        Return in about 6 months (around 12/20/2022), or if symptoms worsen or fail to improve, for Labs prior to next visit. Call or go to ED immediately if symptoms worsen or persist.    Educational materials and/or home exercises printed for patient's review and were included in patient instructions on his/her After Visit Summary and given to patient at the end of visit. Counseled regarding above diagnosis, including possible risks and complications,  especially if left uncontrolled. Counseled regarding the possible side effects, risks, benefits and alternatives to treatment; patient and/or guardian verbalizes understanding, agrees, feels comfortable with and wishes to proceed with above treatment plan. Advised patient to call with any new medication issues, and read all Rx info from pharmacy to assure aware of all possible risks and side effects of medication before taking. Reviewed age and gender appropriate health screening exams and vaccinations. Advised patient regarding importance of keeping up with recommended health maintenance and to schedule as soon as possible if overdue, as this is important in assessing for undiagnosed pathology, especially cancer, as well as protecting against potentially harmful/life threatening disease.       Patient and/or guardian verbalizes understanding and agrees with above counseling, assessment and plan. All questions answered.       Samra Mcpherson, DO

## 2022-07-20 ENCOUNTER — TELEPHONE (OUTPATIENT)
Dept: FAMILY MEDICINE CLINIC | Age: 52
End: 2022-07-20

## 2022-07-20 DIAGNOSIS — J31.0 RHINITIS, UNSPECIFIED TYPE: Primary | ICD-10-CM

## 2022-07-20 DIAGNOSIS — T36.95XA ANTIBIOTIC-INDUCED YEAST INFECTION: ICD-10-CM

## 2022-07-20 DIAGNOSIS — B37.9 ANTIBIOTIC-INDUCED YEAST INFECTION: ICD-10-CM

## 2022-07-20 RX ORDER — AZITHROMYCIN 250 MG/1
250 TABLET, FILM COATED ORAL SEE ADMIN INSTRUCTIONS
Qty: 6 TABLET | Refills: 0 | Status: SHIPPED
Start: 2022-07-20 | End: 2022-07-25

## 2022-07-20 RX ORDER — FLUCONAZOLE 150 MG/1
150 TABLET ORAL ONCE
Qty: 1 TABLET | Refills: 1 | Status: SHIPPED | OUTPATIENT
Start: 2022-07-20 | End: 2022-07-20

## 2022-07-20 NOTE — TELEPHONE ENCOUNTER
Pt called today, said her allergies are bad, could you pls call some thing in. Said she always has a reaction , so could you also send for that.    Asked for    Jason Hobson Dr

## 2022-07-20 NOTE — TELEPHONE ENCOUNTER
Please send in requested meds.  Advise patient to treat symptomatically and can start abx if no improvement by weekend

## 2022-08-16 ENCOUNTER — OFFICE VISIT (OUTPATIENT)
Dept: ENDOCRINOLOGY | Age: 52
End: 2022-08-16
Payer: COMMERCIAL

## 2022-08-16 VITALS
WEIGHT: 203 LBS | HEIGHT: 65 IN | BODY MASS INDEX: 33.82 KG/M2 | SYSTOLIC BLOOD PRESSURE: 137 MMHG | HEART RATE: 64 BPM | RESPIRATION RATE: 16 BRPM | OXYGEN SATURATION: 97 % | DIASTOLIC BLOOD PRESSURE: 88 MMHG

## 2022-08-16 DIAGNOSIS — E66.01 MORBID OBESITY (HCC): ICD-10-CM

## 2022-08-16 DIAGNOSIS — E28.2 PCOS (POLYCYSTIC OVARIAN SYNDROME): ICD-10-CM

## 2022-08-16 DIAGNOSIS — Z76.0 MEDICATION REFILL: ICD-10-CM

## 2022-08-16 DIAGNOSIS — E04.2 MULTINODULAR GOITER: ICD-10-CM

## 2022-08-16 DIAGNOSIS — E22.1 HYPERPROLACTINEMIA (HCC): Primary | ICD-10-CM

## 2022-08-16 PROCEDURE — G8417 CALC BMI ABV UP PARAM F/U: HCPCS | Performed by: INTERNAL MEDICINE

## 2022-08-16 PROCEDURE — G8427 DOCREV CUR MEDS BY ELIG CLIN: HCPCS | Performed by: INTERNAL MEDICINE

## 2022-08-16 PROCEDURE — 99214 OFFICE O/P EST MOD 30 MIN: CPT | Performed by: INTERNAL MEDICINE

## 2022-08-16 PROCEDURE — 3017F COLORECTAL CA SCREEN DOC REV: CPT | Performed by: INTERNAL MEDICINE

## 2022-08-16 PROCEDURE — 1036F TOBACCO NON-USER: CPT | Performed by: INTERNAL MEDICINE

## 2022-08-16 RX ORDER — SPIRONOLACTONE 50 MG/1
50 TABLET, FILM COATED ORAL 2 TIMES DAILY
Qty: 180 TABLET | Refills: 3 | Status: SHIPPED | OUTPATIENT
Start: 2022-08-16

## 2022-08-16 NOTE — PROGRESS NOTES
[address] [place] [address] Department of Endocrinology Diabetes and Metabolism   [address] [facility] [address]   Phone: [phone]  Fax: [phone]    Date of Service: 8/16/2022  Primary Care Physician: Deloris Cook DO  Provider: Sima Hernandez MD            Reason for the visit:  Multinodular goiter     History of Present Illness: The history is provided by the patient. No  was used. Accuracy of the patient data is excellent. Cecilia Dugan is a very pleasant 46 y.o. female seen today for evaluation and management of multinodular goiter     The patient states that she has a history of thyroid disorder as a teenager, and was placed on medication for this by her pediatrician however, it was stopped for no known reason. Patient states that her family physicians have been uncertain as to why no medications have been given for her thyroid disorder. She had previously seen Dr. Johnny Agarwal for thyroid nodules and hyperprolactinemia, without any treatment given, as lab levels were found to be within normal limits per Dr. Sarah Lopez notes. Patient most recently had an ultrasound of head and neck in November 2019. This demonstrated 3 subcentimeter nodules that are unchanged from previous ultrasounds. Cecilia Dugan denies any new lumps, bumps in her neck, change in her voice, or shortness of breath. No family history of thyroid cancer. No prior history of radiation to head or neck region. She does complain of some dysphasia; she recently had an upper endoscopy with dilation. Patient denied any history of  swelling in the area of the thyroid gland, weight loss, change in appetite, nervousness, anxiety, irritability, tremor, sweating, heat intolerance, changes in bowel habits, muscle weakness or difficulty sleeping.   Patient complains of unexplained weight gain, new fatigue, increased sensitivity to cold, dry skin, brittle fingernails, brittle hair, depressed mood.    Prediabetes   Controlled with diet  Lab Results   Component Value Date/Time    LABA1C 6.1 2022 04:01 PM    LABA1C 6.1 2022 08:43 AM    LABA1C 6.1 2021 10:57 AM    LABA1C 6.1 2020 04:41 PM       PAST MEDICAL HISTORY   Past Medical History:   Diagnosis Date    Asthma     DR PARK    Back injury     Chronic back pain     PAIN RADIATES TO NECK AND LEGS    Chronic tension-type headache, not intractable 2018    DDD (degenerative disc disease), cervical 2018    DVT (deep venous thrombosis) (HCC)     Right Arm    GERD (gastroesophageal reflux disease)     Herniated disc, cervical     also lumbar spine    Hx of screening mammography 3/2015 & 10/2015    BIRADS 2 BENIGN    HX OTHER MEDICAL     PINCHED NERVE BACK OF NECK    Hypertension     Migraine     Migraine     DR Len Salguero    MVP (mitral valve prolapse)     Numbness and tingling     LEGS AND FEET    Obesity     Sinus infection     Thyroid disease     no med    Torn ACL     RT KNEE    Ulcer     Vitamin B 12 deficiency     NON ANEMIC     PAST SURGICAL HISTORY   Past Surgical History:   Procedure Laterality Date    BREAST LUMPECTOMY Right 2013    BREAST SURGERY Right 2013    re-excision, rt lumpectomyscar evacuation of hematoma. CARDIOVASCULAR STRESS TEST  2015    NORMAL     SECTION      COLONOSCOPY      12 YOSSEF HEMORRHOIDS, MINIMAL DIVERTICULA    COLONOSCOPY  2012    DR PEREZ GERD & GASTRITIS    DILATION AND CURETTAGE OF UTERUS      ECHO COMPL W DOP COLOR FLOW  2015         ECHOCARDIOGRAM COMPLETE 2D W DOPPLER W COLOR  10/24/2012         HARDWARE REMOVAL Right 2018    Foot    KNEE ARTHROSCOPY Right     Right knee arthroscopy. Dr. Diego Ka ARTHROSCOPY Right     Right knee meniscal repair. Dr. Dilip Santiago.       MAMMO IMPLANT DIGITAL DIAG BI      3/24/15 BIRADS CAT 2 BENIGN, 10/15/15 BIRADS CAT 2    OTHER SURGICAL HISTORY      TILT TABLE TEST - DR Paula Salinas  NO ORTHOSTASIS No abdominal pain, no melena or hematochezia   : Denied any dysuria, hematuria, flank pain, discharge, or incontinence. Skin: denied any rash, ulcer, Hirsute, or hyperpigmentation. MSK: denied any joint deformity, joint pain/swelling, muscle pain, or back pain. Neuro: no numbess, no tingling, no weakness,     OBJECTIVE    /88   Pulse 64   Resp 16   Ht 5' 5\" (1.651 m)   Wt 203 lb (92.1 kg)   LMP 10/01/2018 (Approximate)   SpO2 97%   BMI 33.78 kg/m²   BP Readings from Last 4 Encounters:   08/16/22 137/88   08/05/22 138/76   07/25/22 131/74   06/20/22 121/81     Wt Readings from Last 6 Encounters:   08/16/22 203 lb (92.1 kg)   08/05/22 208 lb (94.3 kg)   07/25/22 208 lb (94.3 kg)   06/20/22 210 lb (95.3 kg)   05/20/22 208 lb (94.3 kg)   05/17/22 211 lb 14.4 oz (96.1 kg)       Physical examination:  General: awake alert, oriented x3, no abnormal position or movements. HEENT: normocephalic non traumatic  Neck: supple, no LN enlargement, enlarged right thyroid, no thyroid tenderness, no JVD. Pulm: Clear equal air entry no added sounds, no wheezing or rhonchi    CVS: S1 + S2, no murmur, no heave. Dorsalis pedis pulse palpable   Abd: soft lax, no tenderness, no organomegaly, audible bowel sounds. Skin: warm, no lesions, no rash.  No Palmar erythema  Musculoskeletal: No back tenderness, no kyphosis/scoliosis     Neuro: CN intact, sensation normal , muscle power normal. No tremors   Psych: normal mood, and affect    Review of Laboratory Data:  I personally reviewed the following labs:   Lab Results   Component Value Date/Time    WBC 9.0 05/20/2022 03:35 PM    RBC 4.93 05/20/2022 03:35 PM    RBC 4.43 02/02/2017 08:20 AM    HGB 13.6 05/20/2022 03:35 PM    HCT 43.5 05/20/2022 03:35 PM    MCV 88.2 05/20/2022 03:35 PM    MCH 27.6 05/20/2022 03:35 PM    MCHC 31.3 (L) 05/20/2022 03:35 PM    RDW 15.8 (H) 05/20/2022 03:35 PM     05/20/2022 03:35 PM    MPV 10.3 05/20/2022 03:35 PM      Lab Results Component Value Date/Time     05/20/2022 03:35 PM    K 4.5 05/20/2022 03:35 PM    CO2 22 05/20/2022 03:35 PM    BUN 11 05/20/2022 03:35 PM    CREATININE 1.0 05/20/2022 03:35 PM    CALCIUM 9.3 05/20/2022 03:35 PM    LABGLOM >60 05/20/2022 03:35 PM    GFRAA >60 05/20/2022 03:35 PM      Lab Results   Component Value Date/Time    TSH 1.450 03/18/2022 08:43 AM    T4FREE 1.37 03/18/2022 08:43 AM    X2AVVLS 7.8 06/28/2018 12:17 PM    FT3 3.3 03/18/2022 08:43 AM    FT3 2.9 06/28/2018 12:17 PM    FT3 2.8 07/10/2016 11:22 AM    FT3 3.1 04/02/2016 09:55 AM    TPOABS 42.7 (H) 02/28/2020 04:41 PM    THGAB <0.9 02/28/2020 04:41 PM     Lab Results   Component Value Date/Time    LABA1C 6.1 06/20/2022 04:01 PM    GLUCOSE 105 06/20/2022 04:01 PM    GLUCOSE 96 03/25/2012 08:54 PM    LABCREA 207 10/04/2021 08:30 AM     Lab Results   Component Value Date/Time    TRIG 80 03/18/2022 08:43 AM    HDL 60 03/18/2022 08:43 AM    LDLCALC 147 03/18/2022 08:43 AM    CHOL 223 03/18/2022 08:43 AM     Lab Results   Component Value Date/Time    VITD25 50 03/18/2022 08:43 AM    VITD25 32 02/28/2020 04:41 PM     ASSESSMENT & RECOMMENDATIONS   Maci Thomas, a 46 y.o.-old female seen in for the following issues     H/o Hyperprolactinemia   Check Prolactin and TFT     Multinodular goiter   Prevalence of thyroid nodule on thyroid ultrasound is 50% and 95 % of these nodules are benign. Given nodule size and lack of ultrasound suspicious features which making the nodule less likely to be malignant, I will continue following with periodic US  Will continue monitoring     vitD deficiency  At goal, continue vitD supplement     Prediabetes   Controlled with diet     I personally reviewed external notes from PCP and other patient's care team providers, and personally interpreted labs associated with the above diagnosis.  I also ordered labs to further assess and manage the above addressed medical conditions    Return in about 1 year (around 8/16/2023) for PCOS . The above issues were reviewed with the patient who understood and agreed with the plan. Thank you for allowing us to participate in the care of this patient. Please do not hesitate to contact us with any additional questions. Diagnosis Orders   1. Hyperprolactinemia (HCC)  TSH    T4, Free    Prolactin      2. Multinodular goiter  TSH    T4, Free      3. PCOS (polycystic ovarian syndrome)  Basic Metabolic Panel      4. Medication refill  spironolactone (ALDACTONE) 50 MG tablet      5.  Morbid obesity [facility])  Jame Hardy MD, [facility], Surgical Weight Loss [address] Coto MD  Endocrinologist, [facility])   [address] [place], [place] [address]   Phone: [phone]  Fax: [phone]  -------------------------------------------------------------  Electronically signed by Aminah Us MD

## 2022-08-23 ENCOUNTER — TELEPHONE (OUTPATIENT)
Dept: BARIATRICS/WEIGHT MGMT | Age: 52
End: 2022-08-23

## 2022-08-23 NOTE — TELEPHONE ENCOUNTER
I called pt to get initial medical weight loss appt.  Pt needs a billable dx, referral given to  to review

## 2022-08-29 ENCOUNTER — TELEPHONE (OUTPATIENT)
Dept: FAMILY MEDICINE CLINIC | Age: 52
End: 2022-08-29

## 2022-08-29 NOTE — TELEPHONE ENCOUNTER
Pt called today, has been suffering vertigo type symptoms for a week and a half. Pt thinks it is med related, could you pls call her to go over her meds with her.

## 2022-08-29 NOTE — TELEPHONE ENCOUNTER
Pt called in very upset that no one has called her back yet in regards to her questions regarding her medications. I told pt that note was put back in and Dr Johnna Vyas is still seeing pts in office. Asked pt what her concerns were and she stated that she has been taking her Hydralazine by accident every day and is supposed to only take it as needed. Pt stated that she just wants to make sure she is okay and that her body has been off. I went to schedule pt appointment and she once again got upset and I once again stated that Dr Johnna Vyas is seeing pts still and will get to the note that was put back after seeing pts. Pt said never mind and not to worry about it and hung up.

## 2022-08-29 NOTE — TELEPHONE ENCOUNTER
It might of contributed to her dizziness. Have her stop taking it.   It would have hurt her but likely was a culprit to her symptoms

## 2022-08-30 ENCOUNTER — HOSPITAL ENCOUNTER (OUTPATIENT)
Age: 52
Discharge: HOME | End: 2022-08-30
Payer: COMMERCIAL

## 2022-08-30 ENCOUNTER — OFFICE VISIT (OUTPATIENT)
Dept: FAMILY MEDICINE CLINIC | Age: 52
End: 2022-08-30
Payer: COMMERCIAL

## 2022-08-30 VITALS
OXYGEN SATURATION: 96 % | HEIGHT: 65 IN | RESPIRATION RATE: 18 BRPM | DIASTOLIC BLOOD PRESSURE: 76 MMHG | SYSTOLIC BLOOD PRESSURE: 122 MMHG | WEIGHT: 205 LBS | HEART RATE: 70 BPM | BODY MASS INDEX: 34.16 KG/M2 | TEMPERATURE: 97.8 F

## 2022-08-30 DIAGNOSIS — B96.89 ACUTE BACTERIAL SINUSITIS: Primary | ICD-10-CM

## 2022-08-30 DIAGNOSIS — E66.01 MORBID OBESITY (HCC): ICD-10-CM

## 2022-08-30 DIAGNOSIS — N64.3 GALACTORRHEA: ICD-10-CM

## 2022-08-30 DIAGNOSIS — R42 DIZZY: ICD-10-CM

## 2022-08-30 DIAGNOSIS — E22.1 HYPERPROLACTINEMIA (HCC): ICD-10-CM

## 2022-08-30 DIAGNOSIS — J01.90 ACUTE BACTERIAL SINUSITIS: Primary | ICD-10-CM

## 2022-08-30 DIAGNOSIS — E28.2 PCOS (POLYCYSTIC OVARIAN SYNDROME): ICD-10-CM

## 2022-08-30 DIAGNOSIS — E04.2 MULTINODULAR GOITER: ICD-10-CM

## 2022-08-30 DIAGNOSIS — H69.83 EUSTACHIAN TUBE DYSFUNCTION, BILATERAL: ICD-10-CM

## 2022-08-30 LAB
ALBUMIN SERPL-MCNC: 4.4 G/DL (ref 3.5–5.2)
ALP BLD-CCNC: 118 U/L (ref 35–104)
ALT SERPL-CCNC: 20 U/L (ref 0–32)
ANION GAP SERPL CALCULATED.3IONS-SCNC: 11 MMOL/L (ref 7–16)
ANION GAP SERPL CALCULATED.3IONS-SCNC: 12 MMOL/L (ref 7–16)
AST SERPL-CCNC: 22 U/L (ref 0–31)
BASOPHILS ABSOLUTE: 0.02 E9/L (ref 0–0.2)
BASOPHILS RELATIVE PERCENT: 0.3 % (ref 0–2)
BILIRUB SERPL-MCNC: 0.3 MG/DL (ref 0–1.2)
BUN BLDV-MCNC: 10 MG/DL (ref 6–20)
BUN BLDV-MCNC: 10 MG/DL (ref 6–20)
CALCIUM SERPL-MCNC: 9.4 MG/DL (ref 8.6–10.2)
CALCIUM SERPL-MCNC: 9.5 MG/DL (ref 8.6–10.2)
CHLORIDE BLD-SCNC: 103 MMOL/L (ref 98–107)
CHLORIDE BLD-SCNC: 103 MMOL/L (ref 98–107)
CHOLESTEROL, TOTAL: 237 MG/DL (ref 0–199)
CO2: 23 MMOL/L (ref 22–29)
CO2: 24 MMOL/L (ref 22–29)
CREAT SERPL-MCNC: 0.8 MG/DL (ref 0.5–1)
CREAT SERPL-MCNC: 0.9 MG/DL (ref 0.5–1)
EOSINOPHILS ABSOLUTE: 0.19 E9/L (ref 0.05–0.5)
EOSINOPHILS RELATIVE PERCENT: 3.1 % (ref 0–6)
GFR AFRICAN AMERICAN: >60
GFR AFRICAN AMERICAN: >60
GFR NON-AFRICAN AMERICAN: >60 ML/MIN/1.73
GFR NON-AFRICAN AMERICAN: >60 ML/MIN/1.73
GLUCOSE BLD-MCNC: 106 MG/DL (ref 74–99)
GLUCOSE BLD-MCNC: 99 MG/DL (ref 74–99)
HCT VFR BLD CALC: 42.2 % (ref 34–48)
HDLC SERPL-MCNC: 59 MG/DL
HEMOGLOBIN: 13.5 G/DL (ref 11.5–15.5)
IMMATURE GRANULOCYTES #: 0.02 E9/L
IMMATURE GRANULOCYTES %: 0.3 % (ref 0–5)
INR BLD: 1.1
LDL CHOLESTEROL CALCULATED: 160 MG/DL (ref 0–99)
LYMPHOCYTES ABSOLUTE: 2.24 E9/L (ref 1.5–4)
LYMPHOCYTES RELATIVE PERCENT: 36.1 % (ref 20–42)
MCH RBC QN AUTO: 27.4 PG (ref 26–35)
MCHC RBC AUTO-ENTMCNC: 32 % (ref 32–34.5)
MCV RBC AUTO: 85.6 FL (ref 80–99.9)
MONOCYTES ABSOLUTE: 0.47 E9/L (ref 0.1–0.95)
MONOCYTES RELATIVE PERCENT: 7.6 % (ref 2–12)
NEUTROPHILS ABSOLUTE: 3.27 E9/L (ref 1.8–7.3)
NEUTROPHILS RELATIVE PERCENT: 52.6 % (ref 43–80)
PDW BLD-RTO: 13.9 FL (ref 11.5–15)
PLATELET # BLD: 224 E9/L (ref 130–450)
PMV BLD AUTO: 9.8 FL (ref 7–12)
POTASSIUM SERPL-SCNC: 4.4 MMOL/L (ref 3.5–5)
POTASSIUM SERPL-SCNC: 4.4 MMOL/L (ref 3.5–5)
PROLACTIN: 11.87 NG/ML
PROTHROMBIN TIME: 11.3 SEC (ref 9.3–12.4)
RBC # BLD: 4.93 E12/L (ref 3.5–5.5)
SODIUM BLD-SCNC: 138 MMOL/L (ref 132–146)
SODIUM BLD-SCNC: 138 MMOL/L (ref 132–146)
T4 FREE: 1.25 NG/DL (ref 0.93–1.7)
TOTAL PROTEIN: 7.9 G/DL (ref 6.4–8.3)
TRIGL SERPL-MCNC: 90 MG/DL (ref 0–149)
TSH SERPL DL<=0.05 MIU/L-ACNC: 2.14 UIU/ML (ref 0.27–4.2)
VLDLC SERPL CALC-MCNC: 18 MG/DL
WBC # BLD: 6.2 E9/L (ref 4.5–11.5)

## 2022-08-30 PROCEDURE — 36415 COLL VENOUS BLD VENIPUNCTURE: CPT

## 2022-08-30 PROCEDURE — G8417 CALC BMI ABV UP PARAM F/U: HCPCS | Performed by: EMERGENCY MEDICINE

## 2022-08-30 PROCEDURE — 84443 ASSAY THYROID STIM HORMONE: CPT

## 2022-08-30 PROCEDURE — G8427 DOCREV CUR MEDS BY ELIG CLIN: HCPCS | Performed by: EMERGENCY MEDICINE

## 2022-08-30 PROCEDURE — 85610 PROTHROMBIN TIME: CPT

## 2022-08-30 PROCEDURE — 80053 COMPREHEN METABOLIC PANEL: CPT

## 2022-08-30 PROCEDURE — 80048 BASIC METABOLIC PNL TOTAL CA: CPT

## 2022-08-30 PROCEDURE — 84146 ASSAY OF PROLACTIN: CPT

## 2022-08-30 PROCEDURE — 99213 OFFICE O/P EST LOW 20 MIN: CPT | Performed by: EMERGENCY MEDICINE

## 2022-08-30 PROCEDURE — 84439 ASSAY OF FREE THYROXINE: CPT

## 2022-08-30 PROCEDURE — 85025 COMPLETE CBC W/AUTO DIFF WBC: CPT

## 2022-08-30 PROCEDURE — 80061 LIPID PANEL: CPT

## 2022-08-30 PROCEDURE — 3017F COLORECTAL CA SCREEN DOC REV: CPT | Performed by: EMERGENCY MEDICINE

## 2022-08-30 PROCEDURE — 1036F TOBACCO NON-USER: CPT | Performed by: EMERGENCY MEDICINE

## 2022-08-30 RX ORDER — METHYLPREDNISOLONE 4 MG/1
TABLET ORAL
Qty: 1 KIT | Refills: 0 | Status: SHIPPED
Start: 2022-08-30 | End: 2022-08-31 | Stop reason: SINTOL

## 2022-08-30 RX ORDER — GUAIFENESIN 600 MG/1
600 TABLET, EXTENDED RELEASE ORAL 2 TIMES DAILY
Qty: 30 TABLET | Refills: 0 | Status: SHIPPED | OUTPATIENT
Start: 2022-08-30 | End: 2022-09-14

## 2022-08-30 RX ORDER — SULFAMETHOXAZOLE AND TRIMETHOPRIM 800; 160 MG/1; MG/1
1 TABLET ORAL 2 TIMES DAILY
Qty: 20 TABLET | Refills: 0 | Status: SHIPPED
Start: 2022-08-30 | End: 2022-08-31 | Stop reason: SINTOL

## 2022-08-30 ASSESSMENT — ENCOUNTER SYMPTOMS
SHORTNESS OF BREATH: 0
SINUS PRESSURE: 1
SINUS PAIN: 1
VOMITING: 0
SORE THROAT: 0
DIARRHEA: 0
EYE REDNESS: 0
COUGH: 0
ABDOMINAL DISTENTION: 0
WHEEZING: 0
EYE DISCHARGE: 0
BACK PAIN: 0
EYE PAIN: 0
NAUSEA: 0

## 2022-08-30 NOTE — PROGRESS NOTES
Chief Complaint:   Dizziness, Sinus Problem, and Congestion (States she has checked herself 12 times for COVID)      History of Present Illness   HPI:  Tanja Lux is a 46 y.o. female who presents to [facility] today for dizzy, sinus pressure. Prior to Visit Medications    Medication Sig Taking? Authorizing Provider   sulfamethoxazole-trimethoprim (BACTRIM DS) 800-160 MG per tablet Take 1 tablet by mouth 2 times daily for 10 days Yes Tom Green, DO   guaiFENesin (MUCINEX) 600 MG extended release tablet Take 1 tablet by mouth 2 times daily for 15 days Yes Tom Green DO   methylPREDNISolone (MEDROL, VIKKI,) 4 MG tablet Follow package instructions Yes Samantha Green, DO   spironolactone (ALDACTONE) 50 MG tablet Take 1 tablet by mouth in the morning and 1 tablet before bedtime.   Margaret Fong MD   Oqfhzo-QaYoa-VgNpe-Meth-FA-DHA (PRENATE MINI) 18-0.6-0.4-350 MG CAPS Take 1 capsule by mouth daily  Samra Joseph DO   hydrALAZINE (APRESOLINE) 10 MG tablet Take 1 tablet by mouth 3 times daily as needed (systolic bp over 095)  Jam Michel,    rizatriptan (MAXALT) 10 MG tablet Take 1 tablet by mouth daily as needed for Migraine  Samra Joseph DO   meclizine (ANTIVERT) 12.5 MG tablet Take 1 tablet by mouth See Admin Instructions  Samra Joseph DO   Ubrogepant (UBRELVY) 100 MG TABS TAKE 1 TABLET IF NEEDED MAY TAKE 1 MORE DOSE AFTER 1 HOUR IF NEEDED - DO NOT TAKE MORE THAN 200 MG IN 24 HRS  Samra Joseph DO   ondansetron (ZOFRAN) 4 MG tablet Take 1 tablet by mouth every 8 hours as needed for Nausea or Vomiting  Samra Joseph DO   acetaminophen (TYLENOL) 500 MG tablet Take 2 tablets by mouth 3 times daily as needed for Pain or Fever  Coy Dominguez PA-C   cyclobenzaprine (FLEXERIL) 10 MG tablet Take 1 tablet by mouth 2 times daily as needed for Muscle spasms  Buster Sever, PA-C   Erenumab-aooe 140 MG/ML SOAJ Inject 140 mg into the skin every 30 days  [employer] Sim Ferguson MD   fluticasone [facility]) 50 MCG/ACT nasal spray 1 spray by Nasal route daily  Samra Joseph,    acetaminophen-codeine (TYLENOL #4) 300-60 MG per tablet TK 1 T PO ONCE A DAY PRN FOR PAIN  Historical Provider, MD   NARCAN 4 MG/0.1ML LIQD nasal spray   Historical Provider, MD   albuterol sulfate HFA (PROAIR HFA) 108 (90 Base) MCG/ACT inhaler Inhale 2 puffs into the lungs every 6 hours as needed for Wheezing  Tara Arambula DO   esomeprazole (NEXIUM) 40 MG delayed release capsule Take 40 mg by mouth every morning (before breakfast)  Historical Provider, MD   famotidine (PEPCID) 40 MG tablet Take 40 mg by mouth nightly   Historical Provider, MD   pantoprazole (PROTONIX) 40 MG tablet Take 40 mg by mouth daily   Historical Provider, MD   montelukast (SINGULAIR) 10 MG tablet Take 10 mg by mouth nightly   Historical Provider, MD   aspirin 81 MG tablet Take 81 mg by mouth daily  Historical Provider, MD       Review of Systems   Review of Systems   Constitutional:  Positive for activity change. Negative for chills and fever. HENT:  Positive for congestion, sinus pressure and sinus pain. Negative for ear pain and sore throat. Eyes:  Negative for pain, discharge and redness. Respiratory:  Negative for cough, shortness of breath and wheezing. Cardiovascular:  Negative for chest pain. Gastrointestinal:  Negative for abdominal distention, diarrhea, nausea and vomiting. Genitourinary:  Negative for dysuria and frequency. Musculoskeletal:  Negative for arthralgias and back pain. Skin:  Negative for rash and wound. Neurological:  Positive for dizziness. Negative for weakness and headaches. Hematological:  Negative for adenopathy. Psychiatric/Behavioral: Negative. All other systems reviewed and are negative.     Patient's medical, social, and family history reviewed    Past Medical History:  has a past medical history of Asthma, Back injury, Chronic back pain, Chronic tension-type headache, not intractable, DDD (degenerative disc disease), cervical, DVT (deep venous thrombosis) ([facility] [address] GERD (gastroesophageal reflux disease), Herniated disc, cervical, Hx of screening mammography, HX OTHER MEDICAL, Hypertension, Migraine, Migraine, MVP (mitral valve prolapse), Numbness and tingling, Obesity, Sinus infection, Thyroid disease, Torn ACL, Ulcer, and Vitamin B 12 deficiency. Past Surgical History:  has a past surgical history that includes  section; Dilation and curettage of uterus; ECHO Complete 2D W Doppler W Color (10/24/2012); [place] tooth extraction; Tympanostomy tube placement; Breast lumpectomy (Right, 2013); Breast surgery (Right, 2013); ECHO Compl W Dop Color Flow (2015); Mammography digital diagnostic bilateral; other surgical history; Upper gastrointestinal endoscopy; Colonoscopy; Knee arthroscopy (Right, ); Colonoscopy (2012); Upper gastrointestinal endoscopy (2012); cardiovascular stress test (2015); Hardware Removal (Right, 2018); and Knee arthroscopy (Right, ). Social History:  reports that she has never smoked. She has never used smokeless tobacco. She reports that she does not currently use alcohol. She reports that she does not use drugs. Family History: family history includes Breast Cancer (age of onset: 43) in her sister; Cancer in her father; Coronary Art Dis in her father; Deep Vein Thrombosis in her sister; Heart Disease in her father; High Blood Pressure in her mother; Hypertension in her maternal grandmother; Seizures in her father.   Allergies: Ceftin [cefuroxime axetil], Pcn [penicillins], Vicodin [hydrocodone-acetaminophen], Hydrocodone, Ibuprofen, Other, Percocet [oxycodone-acetaminophen], Sudafed [pseudoephedrine hcl], Ajovy [fremanezumab-vfrm], and Augmentin [amoxicillin-pot clavulanate]    Physical Exam   Vital Signs:  /76 (Site: Right Upper Arm, Position: Sitting, Cuff Size: Large Adult)   Pulse 70   Temp 97.8 °F (36.6 °C) (Temporal)   Resp 18   Ht 5' 5\" (1.651 m)   Wt 205 lb (93 kg)   LMP 10/01/2018 (Approximate)   SpO2 96%   BMI 34.11 kg/m²    Oxygen Saturation Interpretation: Normal.    Physical Exam  Vitals and nursing note reviewed. Constitutional:       Appearance: She is well-developed. HENT:      Head: Normocephalic and atraumatic. Right Ear: Hearing and external ear normal. A middle ear effusion is present. Left Ear: Hearing and external ear normal. A middle ear effusion is present. Nose: Congestion and rhinorrhea present. Mouth/Throat:      Pharynx: Uvula midline. Eyes:      General: Lids are normal.      Conjunctiva/sclera: Conjunctivae normal.      Pupils: Pupils are equal, round, and reactive to light. Cardiovascular:      Rate and Rhythm: Normal rate and regular rhythm. Heart sounds: Normal heart sounds. No murmur heard. Pulmonary:      Effort: Pulmonary effort is normal.      Breath sounds: Normal breath sounds. Abdominal:      General: Bowel sounds are normal.      Palpations: Abdomen is soft. Abdomen is not rigid. Tenderness: There is no abdominal tenderness. There is no guarding or rebound. Musculoskeletal:      Cervical back: Normal range of motion and neck supple. Skin:     General: Skin is warm and dry. Findings: No abrasion or rash. Neurological:      Mental Status: She is alert and oriented to person, place, and time. GCS: GCS eye subscore is 4. GCS verbal subscore is 5. GCS motor subscore is 6. Cranial Nerves: No cranial nerve deficit. Sensory: No sensory deficit. Coordination: Coordination normal.      Gait: Gait normal.       Test Results Section   (All laboratory and radiology results have been personally reviewed by myself)  Labs:  No results found for this visit on 08/30/22. Imaging: All Radiology results interpreted by Radiologist unless otherwise noted. No results found.       Assessment / Plan   Impression(s):  Elif Toney was seen today for dizziness, sinus problem and congestion. Diagnoses and all orders for this visit:    Acute bacterial sinusitis  -     sulfamethoxazole-trimethoprim (BACTRIM DS) 800-160 MG per tablet; Take 1 tablet by mouth 2 times daily for 10 days  -     guaiFENesin (MUCINEX) 600 MG extended release tablet; Take 1 tablet by mouth 2 times daily for 15 days  -     methylPREDNISolone (MEDROL, VIKKI,) 4 MG tablet; Follow package instructions    Eustachian tube dysfunction, bilateral  -     sulfamethoxazole-trimethoprim (BACTRIM DS) 800-160 MG per tablet; Take 1 tablet by mouth 2 times daily for 10 days  -     guaiFENesin (MUCINEX) 600 MG extended release tablet; Take 1 tablet by mouth 2 times daily for 15 days  -     methylPREDNISolone (MEDROL, VIKKI,) 4 MG tablet; Follow package instructions    Dizzy        Discharged home. Patient condition is good    No follow-ups on file.      New Medications     New Prescriptions    GUAIFENESIN (MUCINEX) 600 MG EXTENDED RELEASE TABLET    Take 1 tablet by mouth 2 times daily for 15 days    METHYLPREDNISOLONE (MEDROL, VIKKI,) 4 MG TABLET    Follow package instructions    SULFAMETHOXAZOLE-TRIMETHOPRIM (BACTRIM DS) 800-160 MG PER TABLET    Take 1 tablet by mouth 2 times daily for 10 days       Electronically signed by Renato Mar DO   DD: 8/30/22

## 2022-08-31 ENCOUNTER — TELEPHONE (OUTPATIENT)
Dept: PRIMARY CARE CLINIC | Age: 52
End: 2022-08-31

## 2022-08-31 DIAGNOSIS — J01.90 ACUTE BACTERIAL SINUSITIS: ICD-10-CM

## 2022-08-31 DIAGNOSIS — B96.89 ACUTE BACTERIAL SINUSITIS: Primary | ICD-10-CM

## 2022-08-31 DIAGNOSIS — J01.00 ACUTE MAXILLARY SINUSITIS, RECURRENCE NOT SPECIFIED: ICD-10-CM

## 2022-08-31 DIAGNOSIS — H69.83 EUSTACHIAN TUBE DYSFUNCTION, BILATERAL: ICD-10-CM

## 2022-08-31 DIAGNOSIS — J01.90 ACUTE BACTERIAL SINUSITIS: Primary | ICD-10-CM

## 2022-08-31 DIAGNOSIS — B96.89 ACUTE BACTERIAL SINUSITIS: ICD-10-CM

## 2022-08-31 RX ORDER — AZITHROMYCIN 250 MG/1
250 TABLET, FILM COATED ORAL SEE ADMIN INSTRUCTIONS
Qty: 6 TABLET | Refills: 0 | Status: SHIPPED | OUTPATIENT
Start: 2022-08-31 | End: 2022-09-05

## 2022-08-31 NOTE — TELEPHONE ENCOUNTER
Pharmacy calling per patient she cannot take Bactrim she get severe vomiting. She also cannot take a medrol pack. Wanting to know your recommendations.

## 2022-08-31 NOTE — PROGRESS NOTES
Rx z-pack called in to [facility];  [place] Rx Medrol and Bactrim DS

## 2022-09-01 ENCOUNTER — TELEPHONE (OUTPATIENT)
Dept: ENDOCRINOLOGY | Age: 52
End: 2022-09-01

## 2022-09-01 NOTE — TELEPHONE ENCOUNTER
Notify pt,  I have reviewed your recent results    Excellent!  Thyroid and prolactin level are very good

## 2022-09-06 DIAGNOSIS — R03.0 ELEVATED BP WITHOUT DIAGNOSIS OF HYPERTENSION: ICD-10-CM

## 2022-09-06 DIAGNOSIS — Z86.16 HISTORY OF COVID-19: ICD-10-CM

## 2022-09-06 DIAGNOSIS — J30.89 SEASONAL ALLERGIC RHINITIS DUE TO OTHER ALLERGIC TRIGGER: ICD-10-CM

## 2022-09-06 DIAGNOSIS — G43.711 INTRACTABLE CHRONIC MIGRAINE WITHOUT AURA AND WITH STATUS MIGRAINOSUS: ICD-10-CM

## 2022-09-06 DIAGNOSIS — T75.3XXA MOTION SICKNESS, INITIAL ENCOUNTER: ICD-10-CM

## 2022-09-06 RX ORDER — UBROGEPANT 100 MG/1
TABLET ORAL
Qty: 12 TABLET | Refills: 2 | Status: SHIPPED | OUTPATIENT
Start: 2022-09-06

## 2022-09-06 RX ORDER — ESOMEPRAZOLE MAGNESIUM 40 MG/1
40 CAPSULE, DELAYED RELEASE ORAL
Qty: 90 CAPSULE | Refills: 3 | Status: SHIPPED | OUTPATIENT
Start: 2022-09-06

## 2022-09-06 RX ORDER — RIZATRIPTAN BENZOATE 10 MG/1
10 TABLET ORAL DAILY PRN
Qty: 9 TABLET | Refills: 2 | Status: SHIPPED | OUTPATIENT
Start: 2022-09-06

## 2022-09-06 RX ORDER — ONDANSETRON 4 MG/1
4 TABLET, FILM COATED ORAL EVERY 8 HOURS PRN
Qty: 30 TABLET | Refills: 1 | Status: SHIPPED
Start: 2022-09-06 | End: 2022-10-31

## 2022-09-06 RX ORDER — CYCLOBENZAPRINE HCL 10 MG
10 TABLET ORAL 2 TIMES DAILY PRN
Qty: 30 TABLET | Refills: 0 | Status: SHIPPED
Start: 2022-09-06 | End: 2022-09-27 | Stop reason: SINTOL

## 2022-09-06 RX ORDER — FLUTICASONE PROPIONATE 50 MCG
1 SPRAY, SUSPENSION (ML) NASAL DAILY
Qty: 1 EACH | Refills: 5 | Status: SHIPPED | OUTPATIENT
Start: 2022-09-06

## 2022-09-06 RX ORDER — MECLIZINE HCL 12.5 MG/1
12.5 TABLET ORAL SEE ADMIN INSTRUCTIONS
Qty: 30 TABLET | Refills: 0 | Status: SHIPPED
Start: 2022-09-06 | End: 2022-10-10

## 2022-09-06 RX ORDER — ALBUTEROL SULFATE 90 UG/1
2 AEROSOL, METERED RESPIRATORY (INHALATION) EVERY 6 HOURS PRN
Qty: 1 EACH | Refills: 0 | Status: SHIPPED
Start: 2022-09-06 | End: 2022-10-10

## 2022-09-06 RX ORDER — PANTOPRAZOLE SODIUM 40 MG/1
40 TABLET, DELAYED RELEASE ORAL DAILY
Qty: 90 TABLET | Refills: 3 | OUTPATIENT
Start: 2022-09-06

## 2022-09-06 RX ORDER — ACETAMINOPHEN 500 MG
1000 TABLET ORAL 3 TIMES DAILY PRN
Qty: 42 TABLET | Refills: 0 | OUTPATIENT
Start: 2022-09-06 | End: 2022-09-13

## 2022-09-06 RX ORDER — MONTELUKAST SODIUM 10 MG/1
10 TABLET ORAL NIGHTLY
Qty: 90 TABLET | Refills: 3 | Status: SHIPPED | OUTPATIENT
Start: 2022-09-06

## 2022-09-06 RX ORDER — HYDRALAZINE HYDROCHLORIDE 10 MG/1
10 TABLET, FILM COATED ORAL 3 TIMES DAILY PRN
Qty: 90 TABLET | Refills: 3 | Status: SHIPPED
Start: 2022-09-06 | End: 2022-09-07 | Stop reason: SDUPTHER

## 2022-09-06 RX ORDER — FAMOTIDINE 40 MG/1
40 TABLET, FILM COATED ORAL NIGHTLY
Qty: 90 TABLET | Refills: 3 | OUTPATIENT
Start: 2022-09-06

## 2022-09-07 DIAGNOSIS — R03.0 ELEVATED BP WITHOUT DIAGNOSIS OF HYPERTENSION: ICD-10-CM

## 2022-09-07 DIAGNOSIS — Z86.16 HISTORY OF COVID-19: ICD-10-CM

## 2022-09-07 RX ORDER — HYDRALAZINE HYDROCHLORIDE 10 MG/1
10 TABLET, FILM COATED ORAL 3 TIMES DAILY PRN
Qty: 90 TABLET | Refills: 3 | Status: SHIPPED | OUTPATIENT
Start: 2022-09-07

## 2022-09-12 ENCOUNTER — OFFICE VISIT (OUTPATIENT)
Dept: FAMILY MEDICINE CLINIC | Age: 52
End: 2022-09-12
Payer: COMMERCIAL

## 2022-09-12 ENCOUNTER — TELEPHONE (OUTPATIENT)
Dept: BARIATRICS/WEIGHT MGMT | Age: 52
End: 2022-09-12

## 2022-09-12 VITALS
OXYGEN SATURATION: 94 % | SYSTOLIC BLOOD PRESSURE: 110 MMHG | BODY MASS INDEX: 34.49 KG/M2 | HEART RATE: 73 BPM | DIASTOLIC BLOOD PRESSURE: 70 MMHG | WEIGHT: 207 LBS | HEIGHT: 65 IN | TEMPERATURE: 96.8 F

## 2022-09-12 DIAGNOSIS — H69.83 EUSTACHIAN TUBE DYSFUNCTION, BILATERAL: Primary | ICD-10-CM

## 2022-09-12 PROCEDURE — G8427 DOCREV CUR MEDS BY ELIG CLIN: HCPCS | Performed by: FAMILY MEDICINE

## 2022-09-12 PROCEDURE — 99213 OFFICE O/P EST LOW 20 MIN: CPT | Performed by: FAMILY MEDICINE

## 2022-09-12 PROCEDURE — G8417 CALC BMI ABV UP PARAM F/U: HCPCS | Performed by: FAMILY MEDICINE

## 2022-09-12 PROCEDURE — 3017F COLORECTAL CA SCREEN DOC REV: CPT | Performed by: FAMILY MEDICINE

## 2022-09-12 PROCEDURE — 1036F TOBACCO NON-USER: CPT | Performed by: FAMILY MEDICINE

## 2022-09-12 RX ORDER — TIOTROPIUM BROMIDE INHALATION SPRAY 1.56 UG/1
SPRAY, METERED RESPIRATORY (INHALATION)
COMMUNITY
Start: 2022-07-23

## 2022-09-12 RX ORDER — BUDESONIDE AND FORMOTEROL FUMARATE DIHYDRATE 160; 4.5 UG/1; UG/1
AEROSOL RESPIRATORY (INHALATION)
COMMUNITY
Start: 2022-07-23

## 2022-09-12 RX ORDER — LORATADINE 10 MG/1
TABLET ORAL
COMMUNITY
Start: 2022-06-07

## 2022-09-12 NOTE — PROGRESS NOTES
9/22/2022    Chief Complaint   Patient presents with    Follow-up    Fatigue    Congestion       HPI    Sharon Massey is a 46 y.o. patient that presents today for:    Is taking allegra daily. Still with sinus congestion and drainage. Finished abx. Patient's past medical, surgical, social and/or family history reviewed, updated in chart, and are non-contributory (unless otherwise stated). Medications and allergies also reviewed and updated in chart. ROS negative unless otherwise specified    Physical Exam  Temp Readings from Last 3 Encounters:   09/12/22 96.8 °F (36 °C)   08/30/22 97.8 °F (36.6 °C) (Temporal)   06/20/22 97.6 °F (36.4 °C) (Temporal)     Wt Readings from Last 3 Encounters:   09/12/22 207 lb (93.9 kg)   08/30/22 205 lb (93 kg)   08/16/22 203 lb (92.1 kg)     BP Readings from Last 3 Encounters:   09/12/22 110/70   08/30/22 122/76   08/16/22 137/88     Pulse Readings from Last 3 Encounters:   09/12/22 73   08/30/22 70   08/16/22 64       General appearance: alert, well appearing, and in no distress, oriented to person, place, and time and normal appearing weight. CVS exam: normal rate, regular rhythm, normal S1, S2, no murmurs, rubs, clicks or gallops. Radial pulses 2+ bilateral.  PT/DP pulse 2+ bilat. No C/C/E    Chest: clear to auscultation, no wheezes, rales or rhonchi, symmetric air entry. Abdomen: Soft, non-tender, non-distended, positive BS in all 4 quadrants    Extremities:Dorsalis pedis pulses palpated bilaterally, no clubbing, cyanosis, edema or erythema,     SKIN: no lesions, jaundice, petechiae, pallor, cyanosis, ecchymosis    NEURO: gross motor exam normal by observation, gait normal    Mental status - alert, oriented to person, place, and time, normal mood, behavior, speech, dress, motor activity, and thought processes      Assessment/Plan  Hailee Matias was seen today for follow-up, fatigue and congestion.     Diagnoses and all orders for this visit:    Eustachian tube dysfunction, bilateral    Improved with symptomatic relief    No follow-ups on file. Samra Mcpherson, DO    Call or go to ED immediately if symptoms worsen or persist.    Educational materials and/or home exercises printed for patient's review and were included in patient instructions on his/her After Visit Summary and given to patient at the end of visit. Counseled regarding above diagnosis, including possible risks and complications,  especially if left uncontrolled. Counseled regarding the possible side effects, risks, benefits and alternatives to treatment; patient and/or guardian verbalizes understanding, agrees, feels comfortable with and wishes to proceed with above treatment plan. Advised patient to call with any new medication issues, and read all Rx info from pharmacy to assure aware of all possible risks and side effects of medication before taking. Reviewed age and gender appropriate health screening exams and vaccinations. Advised patient regarding importance of keeping up with recommended health maintenance and to schedule as soon as possible if overdue, as this is important in assessing for undiagnosed pathology, especially cancer, as well as protecting against potentially harmful/life threatening disease. Patient and/or guardian verbalizes understanding and agrees with above counseling, assessment and plan. All questions answered.

## 2022-09-15 NOTE — TELEPHONE ENCOUNTER
Val needs 90 day supply e scripted to [phone] or if call in [phone], Itraconazole Counseling:  I discussed with the patient the risks of itraconazole including but not limited to liver damage, nausea/vomiting, neuropathy, and severe allergy.  The patient understands that this medication is best absorbed when taken with acidic beverages such as non-diet cola or ginger ale.  The patient understands that monitoring is required including baseline LFTs and repeat LFTs at intervals.  The patient understands that they are to contact us or the primary physician if concerning signs are noted.

## 2022-09-21 ENCOUNTER — TELEPHONE (OUTPATIENT)
Dept: BARIATRICS/WEIGHT MGMT | Age: 52
End: 2022-09-21

## 2022-09-24 ENCOUNTER — HOSPITAL ENCOUNTER (OUTPATIENT)
Dept: ULTRASOUND IMAGING | Age: 52
Discharge: HOME | End: 2022-09-26
Payer: COMMERCIAL

## 2022-09-24 DIAGNOSIS — E04.2 MULTINODULAR GOITER: ICD-10-CM

## 2022-09-24 PROCEDURE — 76536 US EXAM OF HEAD AND NECK: CPT

## 2022-09-26 ENCOUNTER — TELEPHONE (OUTPATIENT)
Dept: ENDOCRINOLOGY | Age: 52
End: 2022-09-26

## 2022-09-26 NOTE — TELEPHONE ENCOUNTER
Notify pt,  I have reviewed your recent results    Great!  Thyroid nodules still very small ad loking benign on this US

## 2022-09-27 ENCOUNTER — OFFICE VISIT (OUTPATIENT)
Dept: FAMILY MEDICINE CLINIC | Age: 52
End: 2022-09-27
Payer: COMMERCIAL

## 2022-09-27 VITALS
SYSTOLIC BLOOD PRESSURE: 122 MMHG | HEART RATE: 83 BPM | BODY MASS INDEX: 32.95 KG/M2 | OXYGEN SATURATION: 100 % | TEMPERATURE: 97.8 F | WEIGHT: 198 LBS | DIASTOLIC BLOOD PRESSURE: 76 MMHG

## 2022-09-27 DIAGNOSIS — M25.552 LEFT HIP PAIN: Primary | ICD-10-CM

## 2022-09-27 PROCEDURE — G8417 CALC BMI ABV UP PARAM F/U: HCPCS | Performed by: PHYSICIAN ASSISTANT

## 2022-09-27 PROCEDURE — 1036F TOBACCO NON-USER: CPT | Performed by: PHYSICIAN ASSISTANT

## 2022-09-27 PROCEDURE — 3017F COLORECTAL CA SCREEN DOC REV: CPT | Performed by: PHYSICIAN ASSISTANT

## 2022-09-27 PROCEDURE — 96372 THER/PROPH/DIAG INJ SC/IM: CPT | Performed by: PHYSICIAN ASSISTANT

## 2022-09-27 PROCEDURE — G8427 DOCREV CUR MEDS BY ELIG CLIN: HCPCS | Performed by: PHYSICIAN ASSISTANT

## 2022-09-27 PROCEDURE — 99214 OFFICE O/P EST MOD 30 MIN: CPT | Performed by: PHYSICIAN ASSISTANT

## 2022-09-27 RX ORDER — BACLOFEN 10 MG/1
10 TABLET ORAL 3 TIMES DAILY PRN
Qty: 20 TABLET | Refills: 0 | Status: SHIPPED | OUTPATIENT
Start: 2022-09-27

## 2022-09-27 RX ORDER — PREDNISONE 10 MG/1
TABLET ORAL
Qty: 18 TABLET | Refills: 0 | Status: SHIPPED | OUTPATIENT
Start: 2022-09-27

## 2022-09-27 RX ORDER — DEXAMETHASONE SODIUM PHOSPHATE 10 MG/ML
10 INJECTION INTRAMUSCULAR; INTRAVENOUS ONCE
Status: COMPLETED | OUTPATIENT
Start: 2022-09-27 | End: 2022-09-27

## 2022-09-27 RX ORDER — TIZANIDINE 4 MG/1
4 TABLET ORAL 4 TIMES DAILY PRN
Qty: 20 TABLET | Refills: 0 | Status: SHIPPED
Start: 2022-09-27 | End: 2022-09-27

## 2022-09-27 RX ADMIN — DEXAMETHASONE SODIUM PHOSPHATE 10 MG: 10 INJECTION INTRAMUSCULAR; INTRAVENOUS at 10:16

## 2022-09-27 NOTE — PROGRESS NOTES
22  Nu Ordoñez : 1970 Sex: female  Age 46 y.o. Subjective:  Chief Complaint   Patient presents with    Hip Pain     Left hip          HPI:   Nu Ordoñez , 46 y.o. female presents to express care for evaluation of left hip pain    HPI  51-year-old female presents to express care for evaluation of left hip pain. The patient has had this left hip pain ongoing for the last 2 days. Started last night minimally. Seem to be worse today. No falls no injury. The patient states that she was leaving job #1 going to job #2 started having increased mental pain discomfort to the left hip. No falls. The patient is not any bladder or bowel incontinence, urinary tension or saddle anesthesia. Patient states that it is mostly over the greater trochanteric area that she is having pain. The patient denies any knee pain but the pain does radiate down the left leg. ROS:   Unless otherwise stated in this report the patient's positive and negative responses for review of systems for constitutional, eyes, ENT, cardiovascular, respiratory, gastrointestinal, neurological, , musculoskeletal, and integument systems and related systems to the presenting problem are either stated in the history of present illness or were not pertinent or were negative for the symptoms and/or complaints related to the presenting medical problem. Positives and pertinent negatives as per HPI. All others reviewed and are negative.       PMH:     Past Medical History:   Diagnosis Date    Asthma     DR PARK    Back injury     Chronic back pain     PAIN RADIATES TO NECK AND LEGS    Chronic tension-type headache, not intractable 2018    DDD (degenerative disc disease), cervical 2018    DVT (deep venous thrombosis) ([facility])     Right Arm    GERD (gastroesophageal reflux disease)     Herniated disc, cervical     also lumbar spine    Hx of screening mammography 3/2015 & 10/2015    BIRADS 2 BENIGN    HX OTHER MEDICAL     PINCHED NERVE BACK OF NECK    Hypertension     Migraine     Migraine     DR Ivonne Schneider    MVP (mitral valve prolapse)     Numbness and tingling     LEGS AND FEET    Obesity     Sinus infection     Thyroid disease     no med    Torn ACL     RT KNEE    Ulcer     Vitamin B 12 deficiency     NON ANEMIC       Past Surgical History:   Procedure Laterality Date    BREAST LUMPECTOMY Right 2013    BREAST SURGERY Right 2013    re-excision, rt lumpectomyscar evacuation of hematoma. CARDIOVASCULAR STRESS TEST  2015    NORMAL     SECTION      COLONOSCOPY      12 YOSSEF HEMORRHOIDS, MINIMAL DIVERTICULA    COLONOSCOPY  2012    DR PEREZ GERD & GASTRITIS    DILATION AND CURETTAGE OF UTERUS      ECHO COMPL W DOP COLOR FLOW  2015         ECHOCARDIOGRAM COMPLETE 2D W DOPPLER W COLOR  10/24/2012         HARDWARE REMOVAL Right 2018    Foot    KNEE ARTHROSCOPY Right     Right knee arthroscopy. Dr. Buck Cooper ARTHROSCOPY Right     Right knee meniscal repair. Dr. Tj Rangel.       MAMMO IMPLANT DIGITAL DIAG BI      3/24/15 BIRADS CAT 2 BENIGN, 10/15/15 BIRADS CAT 2    OTHER SURGICAL HISTORY      TILT TABLE TEST - DR Roby Murphy  NO ORTHOSTASIS    TYMPANOSTOMY TUBE PLACEMENT      UPPER GASTROINTESTINAL ENDOSCOPY      WITH CLOSED BIOPSY DR Yajaira Adams 12 GERD, GASTRITIS    UPPER GASTROINTESTINAL ENDOSCOPY  2012    DR Tahir Willett GERD & GASTRITIS    WISDOM TOOTH EXTRACTION         Family History   Problem Relation Age of Onset    High Blood Pressure Mother     Heart Disease Father     Seizures Father     Coronary Art Dis Father     Cancer Father         STOMACH    Deep Vein Thrombosis Sister     Breast Cancer Sister 43    Hypertension Maternal Grandmother        Medications:     Current Outpatient Medications:     predniSONE (DELTASONE) 10 MG tablet, 3 tabs once daily for 3 days, 2 tabs once daily for 3 days, 1 tab once daily for 3 days, Disp: 18 tablet, Rfl: 0    baclofen (LIORESAL) 10 MG tablet, Take 1 tablet by mouth 3 times daily as needed (pain), Disp: 20 tablet, Rfl: 0    SYMBICORT 160-4.5 MCG/ACT AERO, , Disp: , Rfl:     SPIRIVA RESPIMAT 1.25 MCG/ACT AERS inhaler, , Disp: , Rfl:     loratadine (CLARITIN) 10 MG tablet, , Disp: , Rfl:     hydrALAZINE (APRESOLINE) 10 MG tablet, Take 1 tablet by mouth 3 times daily as needed (systolic bp over 590), Disp: 90 tablet, Rfl: 3    rizatriptan (MAXALT) 10 MG tablet, Take 1 tablet by mouth daily as needed for Migraine, Disp: 9 tablet, Rfl: 2    meclizine (ANTIVERT) 12.5 MG tablet, Take 1 tablet by mouth See Admin Instructions, Disp: 30 tablet, Rfl: 0    Ubrogepant (UBRELVY) 100 MG TABS, TAKE 1 TABLET IF NEEDED MAY TAKE 1 MORE DOSE AFTER 1 HOUR IF NEEDED - DO NOT TAKE MORE THAN 200 MG IN 24 HRS, Disp: 12 tablet, Rfl: 2    ondansetron (ZOFRAN) 4 MG tablet, Take 1 tablet by mouth every 8 hours as needed for Nausea or Vomiting, Disp: 30 tablet, Rfl: 1    fluticasone (FLONASE) 50 MCG/ACT nasal spray, 1 spray by Nasal route daily, Disp: 1 each, Rfl: 5    albuterol sulfate HFA (PROAIR HFA) 108 (90 Base) MCG/ACT inhaler, Inhale 2 puffs into the lungs every 6 hours as needed for Wheezing, Disp: 1 each, Rfl: 0    esomeprazole (NEXIUM) 40 MG delayed release capsule, Take 1 capsule by mouth every morning (before breakfast), Disp: 90 capsule, Rfl: 3    montelukast (SINGULAIR) 10 MG tablet, Take 1 tablet by mouth nightly, Disp: 90 tablet, Rfl: 3    spironolactone (ALDACTONE) 50 MG tablet, Take 1 tablet by mouth in the morning and 1 tablet before bedtime. , Disp: 180 tablet, Rfl: 3    Spgbqc-XkGtt-LuMdb-Meth-FA-DHA (PRENATE MINI) 18-0.6-0.4-350 MG CAPS, Take 1 capsule by mouth daily, Disp: 30 capsule, Rfl: 4    acetaminophen (TYLENOL) 500 MG tablet, Take 2 tablets by mouth 3 times daily as needed for Pain or Fever, Disp: 42 tablet, Rfl: 0    Erenumab-aooe 140 MG/ML SOAJ, Inject 140 mg into the skin every 30 days, Disp: 1 pen, Rfl: 2 acetaminophen-codeine (TYLENOL #4) 300-60 MG per tablet, TK 1 T PO ONCE A DAY PRN FOR PAIN (Patient not taking: Reported on 9/12/2022), Disp: , Rfl: 0    NARCAN 4 MG/0.1ML LIQD nasal spray, , Disp: , Rfl: 1    famotidine (PEPCID) 40 MG tablet, Take 40 mg by mouth nightly , Disp: , Rfl:     pantoprazole (PROTONIX) 40 MG tablet, Take 40 mg by mouth daily , Disp: , Rfl:     aspirin 81 MG tablet, Take 81 mg by mouth daily, Disp: , Rfl:     Allergies: Allergies   Allergen Reactions    Ceftin [Cefuroxime Axetil] Nausea Only    Pcn [Penicillins]      ? reaction    Vicodin [Hydrocodone-Acetaminophen] Shortness Of Breath    Hydrocodone Other (See Comments)    Ibuprofen Swelling    Other      TAPE AND BANDAIDS SKIN IRRITATION    Percocet [Oxycodone-Acetaminophen]     Sudafed [Pseudoephedrine Hcl] Swelling    Ajovy [Fremanezumab-Vfrm] Rash    Augmentin [Amoxicillin-Pot Clavulanate] Diarrhea       Social History:     Social History     Tobacco Use    Smoking status: Never    Smokeless tobacco: Never   Vaping Use    Vaping Use: Never used   Substance Use Topics    Alcohol use: Not Currently     Comment: rare     Drug use: No       Patient lives at home. Physical Exam:     Vitals:    09/27/22 0929   BP: 122/76   Site: Right Upper Arm   Position: Sitting   Pulse: 83   Temp: 97.8 °F (36.6 °C)   TempSrc: Temporal   SpO2: 100%   Weight: 198 lb (89.8 kg)       Exam:  Physical Exam  Nurses note and vital signs reviewed and patient is not hypoxic. General: The patient appears well and in no apparent distress. Patient is resting comfortably on cart. Skin: Warm, dry, no pallor noted. There is no rash noted. Head: Normocephalic, atraumatic. Eye: Normal conjunctiva  Ears, Nose, Mouth, and Throat: Wearing a mask  Cardiovascular: Regular Rate noted  Respiratory: Patient is in no distress, no accessory muscle use  Back: Non-tender, no CVA tenderness bilaterally to percussion.   GI: Normal bowel sounds, no tenderness to palpation, no masses appreciated. No rebound, guarding, or rigidity noted. Musculoskeletal: There is no obvious deformity noted to the left lower extremity. No shortening or rotation. The patient has diffuse tenderness in the posterior aspect of the left hip and the left lateral hip area. Patient had no pain to the medial aspect of the thigh or the left knee. The patient had no calf tenderness, no edema, negative Homans' sign. Pulses intact the DP/PT 2+. The patient had significant pain with trying to stand and ambulate and rise from squatting/sitting position. The patient is not having any pain to the right hip. Neurological: A&O x4, normal speech      Testing:         XR HIP 2-3 VW W PELVIS LEFT    Result Date: 9/27/2022  EXAMINATION: ONE XRAY VIEW OF THE PELVIS AND TWO XRAY VIEWS LEFT HIP 9/27/2022 10:11 am COMPARISON: None. HISTORY: ORDERING SYSTEM PROVIDED HISTORY: Left hip pain TECHNOLOGIST PROVIDED HISTORY: Reason for exam:->left hip pain FINDINGS: No acute fracture or dislocation is identified. No significant degenerative or other arthritic changes are seen at the hips. There is facet arthritis in the lumbosacral spine. No bony erosion or destruction is visualized. Lumbosacral facet arthritis. No acute bony abnormality. Medical Decision Making:     Vital signs reviewed    Past medical history reviewed. Allergies reviewed. Medications reviewed. Patient on arrival does not appear to be in any apparent distress or discomfort. The patient has been seen and evaluated. The patient does not appear to be toxic or lethargic. The patient was sent for x-rays of the left hip and pelvis. Lumbosacral facet arthritis. No acute bony abnormality detected on the x-rays. The patient will be treated with IM injection of dexamethasone here. The patient will be given prednisone and baclofen for home. We are going to give her tizanidine but it does make her drowsy.   She did take Flexeril at home    The patient is to return to express care or go directly to the emergency department should any of the signs or symptoms worsen. The patient is to followup with primary care physician in 2-3 days for repeat evaluation. The patient has no other questions or concerns at this time the patient will be discharged home. Clinical Impression:   Benson Bowman was seen today for hip pain. Diagnoses and all orders for this visit:    Left hip pain  -     XR HIP 2-3 VW W PELVIS LEFT; Future    Other orders  -     dexamethasone (DECADRON) injection 10 mg  -     predniSONE (DELTASONE) 10 MG tablet; 3 tabs once daily for 3 days, 2 tabs once daily for 3 days, 1 tab once daily for 3 days  -     Discontinue: tiZANidine (ZANAFLEX) 4 MG tablet; Take 1 tablet by mouth 4 times daily as needed (pain)  -     baclofen (LIORESAL) 10 MG tablet; Take 1 tablet by mouth 3 times daily as needed (pain)      The patient is to call for any concerns or return if any of the signs or symptoms worsen. The patient is to follow-up with PCP in the next 2-3 days for repeat evaluation repeat assessment or go directly to the emergency department.      SIGNATURE: Aram Kirby III, PA-C

## 2022-10-10 RX ORDER — CYCLOBENZAPRINE HCL 10 MG
TABLET ORAL
Qty: 30 TABLET | Refills: 0 | Status: SHIPPED
Start: 2022-10-10 | End: 2022-10-31

## 2022-10-10 RX ORDER — ALBUTEROL SULFATE 90 UG/1
AEROSOL, METERED RESPIRATORY (INHALATION)
Qty: 8.5 G | Refills: 0 | Status: SHIPPED
Start: 2022-10-10 | End: 2022-10-31

## 2022-10-10 RX ORDER — MECLIZINE HCL 12.5 MG/1
TABLET ORAL
Qty: 30 TABLET | Refills: 0 | Status: SHIPPED | OUTPATIENT
Start: 2022-10-10

## 2022-10-30 DIAGNOSIS — T75.3XXA MOTION SICKNESS, INITIAL ENCOUNTER: ICD-10-CM

## 2022-10-31 RX ORDER — ONDANSETRON 4 MG/1
TABLET, FILM COATED ORAL
Qty: 30 TABLET | Refills: 1 | Status: SHIPPED | OUTPATIENT
Start: 2022-10-31

## 2022-10-31 RX ORDER — CYCLOBENZAPRINE HCL 10 MG
TABLET ORAL
Qty: 30 TABLET | Refills: 0 | Status: SHIPPED
Start: 2022-10-31 | End: 2022-11-28

## 2022-10-31 RX ORDER — ALBUTEROL SULFATE 90 UG/1
AEROSOL, METERED RESPIRATORY (INHALATION)
Qty: 8.5 G | Refills: 0 | Status: SHIPPED
Start: 2022-10-31 | End: 2022-11-28

## 2022-11-27 DIAGNOSIS — G43.711 INTRACTABLE CHRONIC MIGRAINE WITHOUT AURA AND WITH STATUS MIGRAINOSUS: ICD-10-CM

## 2022-11-28 RX ORDER — MECLIZINE HCL 12.5 MG/1
TABLET ORAL
Qty: 30 TABLET | Refills: 0 | Status: SHIPPED | OUTPATIENT
Start: 2022-11-28

## 2022-11-28 RX ORDER — RIZATRIPTAN BENZOATE 10 MG/1
TABLET ORAL
Qty: 9 TABLET | Refills: 2 | Status: SHIPPED | OUTPATIENT
Start: 2022-11-28

## 2022-11-28 RX ORDER — ALBUTEROL SULFATE 90 UG/1
AEROSOL, METERED RESPIRATORY (INHALATION)
Qty: 8.5 G | Refills: 0 | Status: SHIPPED | OUTPATIENT
Start: 2022-11-28

## 2022-11-28 RX ORDER — UBROGEPANT 100 MG/1
TABLET ORAL
Qty: 12 TABLET | Refills: 2 | Status: SHIPPED | OUTPATIENT
Start: 2022-11-28

## 2022-11-28 RX ORDER — CYCLOBENZAPRINE HCL 10 MG
TABLET ORAL
Qty: 30 TABLET | Refills: 0 | Status: SHIPPED | OUTPATIENT
Start: 2022-11-28

## 2022-12-06 RX ORDER — ASCORBIC ACID, CHOLECALCIFEROL, .ALPHA.-TOCOPHEROL ACETATE, DL-, PYRIDOXINE HYDROCHLORIDE, FOLIC ACID, CYANOCOBALAMIN, BIOTIN, CALCIUM CARBONATE, FERROUS ASPARTO GLYCINATE, IRON, POTASSIUM IODIDE, MAGNESIUM OXIDE, DOCONEXENT AND LOWBUSH BLUEBERRY 60; 1000; 10; 26; 400; 13; 280; 80; 9; 9; 150; 25; 350; 25; 600 MG/1; [IU]/1; [IU]/1; MG/1; UG/1; UG/1; UG/1; MG/1; MG/1; MG/1; UG/1; MG/1; MG/1; MG/1; UG/1
1 CAPSULE, GELATIN COATED ORAL DAILY
Qty: 30 CAPSULE | Refills: 4 | Status: SHIPPED
Start: 2022-12-06 | End: 2022-12-07 | Stop reason: SDUPTHER

## 2022-12-06 RX ORDER — ESOMEPRAZOLE MAGNESIUM 40 MG/1
40 CAPSULE, DELAYED RELEASE ORAL
Qty: 90 CAPSULE | Refills: 3 | Status: SHIPPED | OUTPATIENT
Start: 2022-12-06

## 2022-12-07 RX ORDER — ASCORBIC ACID, CHOLECALCIFEROL, .ALPHA.-TOCOPHEROL ACETATE, DL-, PYRIDOXINE HYDROCHLORIDE, FOLIC ACID, CYANOCOBALAMIN, BIOTIN, CALCIUM CARBONATE, FERROUS ASPARTO GLYCINATE, IRON, POTASSIUM IODIDE, MAGNESIUM OXIDE, DOCONEXENT AND LOWBUSH BLUEBERRY 60; 1000; 10; 26; 400; 13; 280; 80; 9; 9; 150; 25; 350; 25; 600 MG/1; [IU]/1; [IU]/1; MG/1; UG/1; UG/1; UG/1; MG/1; MG/1; MG/1; UG/1; MG/1; MG/1; MG/1; UG/1
1 CAPSULE, GELATIN COATED ORAL DAILY
Qty: 90 CAPSULE | Refills: 4 | Status: SHIPPED | OUTPATIENT
Start: 2022-12-07

## 2022-12-22 DIAGNOSIS — T75.3XXA MOTION SICKNESS, INITIAL ENCOUNTER: ICD-10-CM

## 2022-12-22 DIAGNOSIS — R03.0 ELEVATED BP WITHOUT DIAGNOSIS OF HYPERTENSION: ICD-10-CM

## 2022-12-22 DIAGNOSIS — Z86.16 HISTORY OF COVID-19: ICD-10-CM

## 2022-12-23 DIAGNOSIS — Z86.16 HISTORY OF COVID-19: ICD-10-CM

## 2022-12-23 DIAGNOSIS — R03.0 ELEVATED BP WITHOUT DIAGNOSIS OF HYPERTENSION: ICD-10-CM

## 2022-12-23 RX ORDER — HYDRALAZINE HYDROCHLORIDE 10 MG/1
TABLET, FILM COATED ORAL
Qty: 90 TABLET | Refills: 3 | Status: SHIPPED
Start: 2022-12-23 | End: 2022-12-23 | Stop reason: SDUPTHER

## 2022-12-23 RX ORDER — CYCLOBENZAPRINE HCL 10 MG
TABLET ORAL
Qty: 30 TABLET | Refills: 0 | Status: SHIPPED | OUTPATIENT
Start: 2022-12-23

## 2022-12-23 RX ORDER — HYDRALAZINE HYDROCHLORIDE 10 MG/1
TABLET, FILM COATED ORAL
Qty: 90 TABLET | Refills: 3 | Status: SHIPPED | OUTPATIENT
Start: 2022-12-23

## 2022-12-23 RX ORDER — ALBUTEROL SULFATE 90 UG/1
AEROSOL, METERED RESPIRATORY (INHALATION)
Qty: 8.5 G | Refills: 0 | Status: SHIPPED | OUTPATIENT
Start: 2022-12-23

## 2022-12-23 RX ORDER — ONDANSETRON 4 MG/1
TABLET, FILM COATED ORAL
Qty: 30 TABLET | Refills: 1 | Status: SHIPPED | OUTPATIENT
Start: 2022-12-23

## 2023-01-10 ENCOUNTER — OFFICE VISIT (OUTPATIENT)
Dept: FAMILY MEDICINE CLINIC | Age: 53
End: 2023-01-10
Payer: COMMERCIAL

## 2023-01-10 VITALS
HEART RATE: 70 BPM | WEIGHT: 209.4 LBS | SYSTOLIC BLOOD PRESSURE: 140 MMHG | TEMPERATURE: 97.3 F | OXYGEN SATURATION: 96 % | DIASTOLIC BLOOD PRESSURE: 78 MMHG | BODY MASS INDEX: 34.85 KG/M2

## 2023-01-10 DIAGNOSIS — R09.81 NASAL CONGESTION: ICD-10-CM

## 2023-01-10 DIAGNOSIS — R03.0 ELEVATED BP WITHOUT DIAGNOSIS OF HYPERTENSION: ICD-10-CM

## 2023-01-10 DIAGNOSIS — J01.90 ACUTE NON-RECURRENT SINUSITIS, UNSPECIFIED LOCATION: Primary | ICD-10-CM

## 2023-01-10 DIAGNOSIS — R51.9 SINUS HEADACHE: ICD-10-CM

## 2023-01-10 DIAGNOSIS — K21.9 GASTROESOPHAGEAL REFLUX DISEASE WITHOUT ESOPHAGITIS: ICD-10-CM

## 2023-01-10 PROCEDURE — 3077F SYST BP >= 140 MM HG: CPT | Performed by: PHYSICIAN ASSISTANT

## 2023-01-10 PROCEDURE — G8484 FLU IMMUNIZE NO ADMIN: HCPCS | Performed by: PHYSICIAN ASSISTANT

## 2023-01-10 PROCEDURE — 1036F TOBACCO NON-USER: CPT | Performed by: PHYSICIAN ASSISTANT

## 2023-01-10 PROCEDURE — G8427 DOCREV CUR MEDS BY ELIG CLIN: HCPCS | Performed by: PHYSICIAN ASSISTANT

## 2023-01-10 PROCEDURE — 3017F COLORECTAL CA SCREEN DOC REV: CPT | Performed by: PHYSICIAN ASSISTANT

## 2023-01-10 PROCEDURE — 99214 OFFICE O/P EST MOD 30 MIN: CPT | Performed by: PHYSICIAN ASSISTANT

## 2023-01-10 PROCEDURE — G8417 CALC BMI ABV UP PARAM F/U: HCPCS | Performed by: PHYSICIAN ASSISTANT

## 2023-01-10 PROCEDURE — 3078F DIAST BP <80 MM HG: CPT | Performed by: PHYSICIAN ASSISTANT

## 2023-01-10 RX ORDER — AZITHROMYCIN 250 MG/1
250 TABLET, FILM COATED ORAL SEE ADMIN INSTRUCTIONS
Qty: 6 TABLET | Refills: 0 | Status: SHIPPED | OUTPATIENT
Start: 2023-01-10 | End: 2023-01-15

## 2023-01-10 RX ORDER — CHLORPHENIRAMINE MALEATE 4 MG/1
4 TABLET ORAL EVERY 6 HOURS PRN
Qty: 20 TABLET | Refills: 0 | Status: SHIPPED | OUTPATIENT
Start: 2023-01-10

## 2023-01-10 RX ORDER — BENZONATATE 100 MG/1
100 CAPSULE ORAL 3 TIMES DAILY PRN
Qty: 21 CAPSULE | Refills: 0 | Status: SHIPPED | OUTPATIENT
Start: 2023-01-10 | End: 2023-01-17

## 2023-01-10 RX ORDER — SUCRALFATE 1 G/1
1 TABLET ORAL 4 TIMES DAILY
Qty: 56 TABLET | Refills: 0 | Status: SHIPPED | OUTPATIENT
Start: 2023-01-10 | End: 2023-01-24

## 2023-01-10 NOTE — PROGRESS NOTES
1/10/23  Abraham Andrade : 1970 Sex: female  Age 46 y.o. Subjective:  Chief Complaint   Patient presents with    Hypertension     Bp was 145/94 before coming here today         HPI:   Abraham Andrade , 46 y.o. female presents to express care for evaluation of sinus congestion, drainage, acid reflux, elevated blood pressure    HPI  71-year-old female presents to express care for evaluation of sinus congestion, drainage, acid reflux, elevated blood pressure. The patient has had some elevated blood pressure as of late. The patient has noted blood pressures in the 140s and 150s. The patient states that she was given a prescription for the hydralazine and was supposed to take as needed however she was taking on a regular basis. They had her stop taking that medication. The patient has had some increased congestion and drainage. The patient has been having quite a bit of sinus pressure. The patient states that she has been taking quite a bit of over-the-counter medications including a all-in-one Mucinex medication. This may be elevating her blood pressure as well. The patient is not having any chest pain, shortness of breath. The patient states that her acid reflux has been somewhat worsening. The patient has tried using the pantoprazole. The patient is also been taking the Nexium     ROS:   Unless otherwise stated in this report the patient's positive and negative responses for review of systems for constitutional, eyes, ENT, cardiovascular, respiratory, gastrointestinal, neurological, , musculoskeletal, and integument systems and related systems to the presenting problem are either stated in the history of present illness or were not pertinent or were negative for the symptoms and/or complaints related to the presenting medical problem. Positives and pertinent negatives as per HPI. All others reviewed and are negative.       PMH:     Past Medical History:   Diagnosis Date    Asthma DR Escobar Spindle    Back injury     Chronic back pain     PAIN RADIATES TO NECK AND LEGS    Chronic tension-type headache, not intractable 2018    DDD (degenerative disc disease), cervical 2018    DVT (deep venous thrombosis) ([facility])     Right Arm    GERD (gastroesophageal reflux disease)     Herniated disc, cervical     also lumbar spine    Hx of screening mammography 3/2015 & 10/2015    BIRADS 2 BENIGN    HX OTHER MEDICAL     PINCHED NERVE BACK OF NECK    Hypertension     Migraine     Migraine     DR Ruby Babin    MVP (mitral valve prolapse)     Numbness and tingling     LEGS AND FEET    Obesity     Sinus infection     Thyroid disease     no med    Torn ACL     RT KNEE    Ulcer     Vitamin B 12 deficiency     NON ANEMIC       Past Surgical History:   Procedure Laterality Date    BREAST LUMPECTOMY Right 2013    BREAST SURGERY Right 2013    re-excision, rt lumpectomyscar evacuation of hematoma. CARDIOVASCULAR STRESS TEST  2015    NORMAL     SECTION      COLONOSCOPY      12 UMER HEMORRHOIDS, MINIMAL DIVERTICULA    COLONOSCOPY  2012    DR PEREZ GERD & GASTRITIS    DILATION AND CURETTAGE OF UTERUS      ECHO COMPL W DOP COLOR FLOW  2015         ECHOCARDIOGRAM COMPLETE 2D W DOPPLER W COLOR  10/24/2012         HARDWARE REMOVAL Right 2018    Foot    KNEE ARTHROSCOPY Right     Right knee arthroscopy. Dr. Mary Ventura ARTHROSCOPY Right     Right knee meniscal repair. Dr. Sharma Hum.       MAMMO IMPLANT DIGITAL DIAG BI      3/24/15 BIRADS CAT 2 BENIGN, 10/15/15 BIRADS CAT 2    OTHER SURGICAL HISTORY      TILT TABLE TEST - DR Brianne Dia  NO ORTHOSTASIS    TYMPANOSTOMY TUBE PLACEMENT      UPPER GASTROINTESTINAL ENDOSCOPY      WITH CLOSED BIOPSY DR OWUSU 12 GERD, GASTRITIS    UPPER GASTROINTESTINAL ENDOSCOPY  2012    DR Radha Mike GERD & GASTRITIS    WISDOM TOOTH EXTRACTION         Family History   Problem Relation Age of Onset    High Blood Pressure Mother Heart Disease Father     Seizures Father     Coronary Art Dis Father     Cancer Father         STOMACH    Deep Vein Thrombosis Sister     Breast Cancer Sister 43    Hypertension Maternal Grandmother        Medications:     Current Outpatient Medications:     sucralfate (CARAFATE) 1 GM tablet, Take 1 tablet by mouth 4 times daily for 14 days, Disp: 56 tablet, Rfl: 0    azithromycin (ZITHROMAX) 250 MG tablet, Take 1 tablet by mouth See Admin Instructions for 5 days 500mg on day 1 followed by 250mg on days 2 - 5, Disp: 6 tablet, Rfl: 0    benzonatate (TESSALON) 100 MG capsule, Take 1 capsule by mouth 3 times daily as needed for Cough, Disp: 21 capsule, Rfl: 0    chlorpheniramine (ALLER-CHLOR) 4 MG tablet, Take 1 tablet by mouth every 6 hours as needed for Allergies, Disp: 20 tablet, Rfl: 0    cyclobenzaprine (FLEXERIL) 10 MG tablet, TAKE 1 TABLET TWICE A DAY  AS NEEDED FOR MUSCLE SPASMS, Disp: 30 tablet, Rfl: 0    albuterol sulfate HFA (PROVENTIL;VENTOLIN;PROAIR) 108 (90 Base) MCG/ACT inhaler, USE 2 INHALATIONS ORALLY   EVERY 6 HOURS AS NEEDED FORWHEEZING, Disp: 8.5 g, Rfl: 0    ondansetron (ZOFRAN) 4 MG tablet, TAKE 1 TABLET EVERY 8 HOURSAS NEEDED FOR NAUSEA OR    VOMITING, Disp: 30 tablet, Rfl: 1    hydrALAZINE (APRESOLINE) 10 MG tablet, TAKE 1 TABLET 3 TIMES A DAYAS NEEDED FOR SYSTOLIC     BLOOD PRESSURE OVER 150, Disp: 90 tablet, Rfl: 3    Karqgj-XiOqy-VgLor-Meth-FA-DHA (PRENATE MINI) 18-0.6-0.4-350 MG CAPS, Take 1 capsule by mouth daily, Disp: 90 capsule, Rfl: 4    esomeprazole (NEXIUM) 40 MG delayed release capsule, Take 1 capsule by mouth every morning (before breakfast), Disp: 90 capsule, Rfl: 3    rizatriptan (MAXALT) 10 MG tablet, TAKE 1 TABLET DAILY AS     NEEDED FOR MIGRAINE, Disp: 9 tablet, Rfl: 2    meclizine (ANTIVERT) 12.5 MG tablet, TAKE 1 TABLET PER          ADMINISTRATION INSTRUCTIONS, Disp: 30 tablet, Rfl: 0    Ubrogepant (UBRELVY) 100 MG TABS, TAKE 1 TABLET IF NEEDED MAY TAKE 1 MORE DOSE AFTER 1 HOUR IF NEEDED. DO NOT TAKE MORE THAN 200MG IN 24 HOURS, Disp: 12 tablet, Rfl: 2    baclofen (LIORESAL) 10 MG tablet, Take 1 tablet by mouth 3 times daily as needed (pain), Disp: 20 tablet, Rfl: 0    SYMBICORT 160-4.5 MCG/ACT AERO, , Disp: , Rfl:     SPIRIVA RESPIMAT 1.25 MCG/ACT AERS inhaler, , Disp: , Rfl:     loratadine (CLARITIN) 10 MG tablet, , Disp: , Rfl:     fluticasone (FLONASE) 50 MCG/ACT nasal spray, 1 spray by Nasal route daily, Disp: 1 each, Rfl: 5    montelukast (SINGULAIR) 10 MG tablet, Take 1 tablet by mouth nightly, Disp: 90 tablet, Rfl: 3    spironolactone (ALDACTONE) 50 MG tablet, Take 1 tablet by mouth in the morning and 1 tablet before bedtime. , Disp: 180 tablet, Rfl: 3    acetaminophen (TYLENOL) 500 MG tablet, Take 2 tablets by mouth 3 times daily as needed for Pain or Fever, Disp: 42 tablet, Rfl: 0    Erenumab-aooe 140 MG/ML SOAJ, Inject 140 mg into the skin every 30 days, Disp: 1 pen, Rfl: 2    acetaminophen-codeine (TYLENOL #4) 300-60 MG per tablet, TK 1 T PO ONCE A DAY PRN FOR PAIN (Patient not taking: Reported on 9/12/2022), Disp: , Rfl: 0    NARCAN 4 MG/0.1ML LIQD nasal spray, , Disp: , Rfl: 1    famotidine (PEPCID) 40 MG tablet, Take 40 mg by mouth nightly , Disp: , Rfl:     pantoprazole (PROTONIX) 40 MG tablet, Take 40 mg by mouth daily , Disp: , Rfl:     aspirin 81 MG tablet, Take 81 mg by mouth daily, Disp: , Rfl:     Allergies: Allergies   Allergen Reactions    Ceftin [Cefuroxime Axetil] Nausea Only    Pcn [Penicillins]      ?  reaction    Vicodin [Hydrocodone-Acetaminophen] Shortness Of Breath    Hydrocodone Other (See Comments)    Ibuprofen Swelling    Other      TAPE AND BANDAIDS SKIN IRRITATION    Percocet [Oxycodone-Acetaminophen]     Sudafed [Pseudoephedrine Hcl] Swelling    Ajovy [Fremanezumab-Vfrm] Rash    Augmentin [Amoxicillin-Pot Clavulanate] Diarrhea       Social History:     Social History     Tobacco Use    Smoking status: Never    Smokeless tobacco: Never   Vaping Use    Vaping Use: Never used   Substance Use Topics    Alcohol use: Not Currently     Comment: rare     Drug use: No       Patient lives at home. Physical Exam:     Vitals:    01/10/23 1530   BP: (!) 140/78   Site: Right Upper Arm   Position: Sitting   Pulse: 70   Temp: 97.3 °F (36.3 °C)   TempSrc: Temporal   SpO2: 96%   Weight: 209 lb 6.4 oz (95 kg)       Exam:  Physical Exam  Nurse's notes and vital signs reviewed. The patient is not hypoxic. ? General: Alert, no acute distress, patient resting comfortably Patient is not toxic or lethargic. Skin: Warm, intact, no pallor noted. There is no evidence of rash at this time. Head: Normocephalic, atraumatic  Eye: Normal conjunctiva  Ears, Nose, Throat: Right tympanic membrane clear, left tympanic membrane clear. No drainage or discharge noted. No pre- or post-auricular tenderness, erythema, or swelling noted. Nasal congestion, rhinorrhea  Posterior oropharynx shows no erythema, tonsillar hypertrophy, or exudate. the uvula is midline. No trismus or drooling is noted. Moist mucous membranes. Neck: No anterior/posterior lymphadenopathy noted. No erythema, no masses, no fluctuance or induration noted. No meningeal signs. Cardiovascular: Regular Rate and Rhythm  Respiratory: No acute distress, no rhonchi, wheezing or crackles noted. No stridor or retractions are noted. Abdomen: Soft, nontender, no rebound or guarding  Neurological: A&O x4, normal speech  Psychiatric: Cooperative       Testing:           Medical Decision Making:     Vital signs reviewed    Past medical history reviewed. Allergies reviewed. Medications reviewed. Patient on arrival does not appear to be in any apparent distress or discomfort. The patient has been seen and evaluated. The patient does not appear to be toxic or lethargic.     the patient's blood pressure is slightly elevated here with a blood pressure 140/78. The patient does have the hydralazine at home.   The patient will continue to monitor symptoms closely. We will treat the patient for her sinus symptoms with azithromycin, chlorpheniramine and Tessalon Perles. The patient will monitor symptoms closely. The patient will monitor blood pressure. Limit salt intake. The patient will also be given Carafate to help with some of the acid reflux. The patient will follow-up with PCP. Call with any questions or concerns    The patient was educated on the proper dosage of motrin and tylenol and the appropriate intervals of each. The patient is to increase fluid intake over the next several days. The patient is to use OTC decongestant as needed. The patient is to return to express care or go directly to the emergency department should any of the signs or symptoms worsen. The patient is to followup with primary care physician in 2-3 days for repeat evaluation. The patient has no other questions or concerns at this time the patient will be discharged home. Clinical Impression:   Margaret Abraham was seen today for hypertension. Diagnoses and all orders for this visit:    Acute non-recurrent sinusitis, unspecified location  -     azithromycin (ZITHROMAX) 250 MG tablet; Take 1 tablet by mouth See Admin Instructions for 5 days 500mg on day 1 followed by 250mg on days 2 - 5    Elevated BP without diagnosis of hypertension    Nasal congestion    Sinus headache    Gastroesophageal reflux disease without esophagitis    Other orders  -     sucralfate (CARAFATE) 1 GM tablet; Take 1 tablet by mouth 4 times daily for 14 days  -     benzonatate (TESSALON) 100 MG capsule; Take 1 capsule by mouth 3 times daily as needed for Cough  -     chlorpheniramine (ALLER-CHLOR) 4 MG tablet; Take 1 tablet by mouth every 6 hours as needed for Allergies      The patient is to call for any concerns or return if any of the signs or symptoms worsen.  The patient is to follow-up with PCP in the next 2-3 days for repeat evaluation repeat assessment or go directly to the emergency department.      SIGNATURE: Rashmi Brand III, PA-C

## 2023-01-11 ENCOUNTER — OFFICE VISIT (OUTPATIENT)
Dept: FAMILY MEDICINE CLINIC | Age: 53
End: 2023-01-11
Payer: COMMERCIAL

## 2023-01-11 VITALS
HEART RATE: 82 BPM | RESPIRATION RATE: 18 BRPM | OXYGEN SATURATION: 96 % | SYSTOLIC BLOOD PRESSURE: 132 MMHG | TEMPERATURE: 97.5 F | DIASTOLIC BLOOD PRESSURE: 68 MMHG

## 2023-01-11 DIAGNOSIS — R51.9 SINUS HEADACHE: Primary | ICD-10-CM

## 2023-01-11 DIAGNOSIS — K21.9 GASTROESOPHAGEAL REFLUX DISEASE WITHOUT ESOPHAGITIS: ICD-10-CM

## 2023-01-11 PROCEDURE — G8417 CALC BMI ABV UP PARAM F/U: HCPCS | Performed by: FAMILY MEDICINE

## 2023-01-11 PROCEDURE — 3078F DIAST BP <80 MM HG: CPT | Performed by: FAMILY MEDICINE

## 2023-01-11 PROCEDURE — 3017F COLORECTAL CA SCREEN DOC REV: CPT | Performed by: FAMILY MEDICINE

## 2023-01-11 PROCEDURE — G8427 DOCREV CUR MEDS BY ELIG CLIN: HCPCS | Performed by: FAMILY MEDICINE

## 2023-01-11 PROCEDURE — G8484 FLU IMMUNIZE NO ADMIN: HCPCS | Performed by: FAMILY MEDICINE

## 2023-01-11 PROCEDURE — 1036F TOBACCO NON-USER: CPT | Performed by: FAMILY MEDICINE

## 2023-01-11 PROCEDURE — 99213 OFFICE O/P EST LOW 20 MIN: CPT | Performed by: FAMILY MEDICINE

## 2023-01-11 PROCEDURE — 3074F SYST BP LT 130 MM HG: CPT | Performed by: FAMILY MEDICINE

## 2023-01-11 ASSESSMENT — PATIENT HEALTH QUESTIONNAIRE - PHQ9
1. LITTLE INTEREST OR PLEASURE IN DOING THINGS: 0
SUM OF ALL RESPONSES TO PHQ QUESTIONS 1-9: 0
2. FEELING DOWN, DEPRESSED OR HOPELESS: 0
SUM OF ALL RESPONSES TO PHQ QUESTIONS 1-9: 0
SUM OF ALL RESPONSES TO PHQ QUESTIONS 1-9: 0
SUM OF ALL RESPONSES TO PHQ9 QUESTIONS 1 & 2: 0
SUM OF ALL RESPONSES TO PHQ QUESTIONS 1-9: 0

## 2023-01-11 NOTE — PROGRESS NOTES
1/12/2023    Chief Complaint   Patient presents with    Follow-up     Shira Olvera yesterday    Congestion     On going since september       HPI    Suraj Reyes is a 46 y.o. patient that presents today for:    Went to Urgent Care for sinusitis. Note reviewed. Does not feel she is wheezing. GERD is worse now, does see Dr. Anais Han. Next visit in April. Has a call into his office. Patient's past medical, surgical, social and/or family history reviewed, updated in chart, and are non-contributory (unless otherwise stated). Medications and allergies also reviewed and updated in chart. ROS negative unless otherwise specified    Physical Exam  Temp Readings from Last 3 Encounters:   01/11/23 97.5 °F (36.4 °C)   01/10/23 97.3 °F (36.3 °C) (Temporal)   09/27/22 97.8 °F (36.6 °C) (Temporal)     Wt Readings from Last 3 Encounters:   01/10/23 209 lb 6.4 oz (95 kg)   09/27/22 198 lb (89.8 kg)   09/12/22 207 lb (93.9 kg)     BP Readings from Last 3 Encounters:   01/11/23 132/68   01/10/23 (!) 140/78   09/27/22 122/76     Pulse Readings from Last 3 Encounters:   01/11/23 82   01/10/23 70   09/27/22 83       General appearance: alert, well appearing, and in no distress, oriented to person, place, and time and normal appearing weight. CVS exam: normal rate, regular rhythm, normal S1, S2, no murmurs, rubs, clicks or gallops. Radial pulses 2+ bilateral.  PT/DP pulse 2+ bilat. No C/C/E    Chest: clear to auscultation, no wheezes, rales or rhonchi, symmetric air entry.      Abdomen: Soft, non-tender, non-distended, positive BS in all 4 quadrants    Extremities:Dorsalis pedis pulses palpated bilaterally, no clubbing, cyanosis, edema or erythema,     SKIN: no lesions, jaundice, petechiae, pallor, cyanosis, ecchymosis    NEURO: gross motor exam normal by observation, gait normal    Mental status - alert, oriented to person, place, and time, normal mood, behavior, speech, dress, motor activity, and thought processes      Assessment/Plan  Maci was seen today for follow-up and congestion. Diagnoses and all orders for this visit:    Sinus headache    Gastroesophageal reflux disease without esophagitis        No follow-ups on file. Samra Mcpherson, DO    Call or go to ED immediately if symptoms worsen or persist.    Educational materials and/or home exercises printed for patient's review and were included in patient instructions on his/her After Visit Summary and given to patient at the end of visit. Counseled regarding above diagnosis, including possible risks and complications,  especially if left uncontrolled. Counseled regarding the possible side effects, risks, benefits and alternatives to treatment; patient and/or guardian verbalizes understanding, agrees, feels comfortable with and wishes to proceed with above treatment plan. Advised patient to call with any new medication issues, and read all Rx info from pharmacy to assure aware of all possible risks and side effects of medication before taking. Reviewed age and gender appropriate health screening exams and vaccinations. Advised patient regarding importance of keeping up with recommended health maintenance and to schedule as soon as possible if overdue, as this is important in assessing for undiagnosed pathology, especially cancer, as well as protecting against potentially harmful/life threatening disease. Patient and/or guardian verbalizes understanding and agrees with above counseling, assessment and plan. All questions answered.

## 2023-01-11 NOTE — PATIENT INSTRUCTIONS
Medications safe to take with Hypertension:  Diphenhydramine (antihistamine)  Loratadine or Cetirizine (antihistamine)  Chlorpheniramine (antihistamine)  Guaifenesin (expertorant)  Dextromethorphan (cough suppressant)   Acetaminophen (pain/fever)  Nasal Saline or Breathe Right Strips (congestion)        Consider the following medications:    Contrave  Qsymia  Phentermine  GLP-1 agonists- Mounjaro, Victoza, Trulicity, Ozempic  Metformin  Wellbutrin  Topamax    ** weight loss medications may or may not be covered by your insurance. Please check your formulary to see what might be offered. Also, here are some diet tips from obesity medicine specialists:    Rules:  Limit sweets to one day per month  Limit chips to one day month  Limit restaurants (including fast food) to one time every two weeks  Eliminate all sugar additives to coffee and use only low calorie creamers (<25 calories)     Requirements:  Make sure protein intake is at least 70 grams per day (begin using a protein shake - Nectar, Premier, BeneProtein and GNC lean (which is lactose-free) are milk-based options;  Nectar, IsoPure Protein Drink, and Protein 2 O are water-based options; Quest (or [employer]co) and the Oh Yeah 1 protein bars can also be used, but have less protein in them )  Begin eating 3/4 cup of the original, plain Fiber One cereal daily (start with 1/4 cup daily and every week add 1/4 cup daily until reaching the goal of 3/4 cup daily)  Take one multivitamin every day     Goals:  Eat on a 6\" plate and do not eat seconds  Limit snacks to 2 per day  Divide the food on the plate to 1/4 entree, 1/4 starchy vegetables, 1/4 whole grains and 1/4 non-starch vegetables  Avoid eating 2 hours within bedtime       Count every calorie every day  Limit restaurants (including fast food) to one time every two weeks     Requirements:  Make sure protein intake is at least 65 grams per day (begin using a protein shake - Nectar, Premier, BeneProtein, Ensure Max, Boost Max and GNC lean (which is lactose-free) are milk-based options; Nectar, IsoPure Protein Drink, and Protein 2 O are water-based options; Quest (or [employer]) and the Oh Yeah 1 protein bars can also be used, but have less protein in them )  Begin eating 3/4 cup of the original, plain Fiber One cereal daily (start with 1/4 cup daily and every 2 weeks add 1/4 cup daily until reaching the goal of 3/4 cup daily)  Take one multivitamin every day     Targets:  Limit calorie intake to 1400 calories/day  Walk 30 minutes daily  Establish 16 hours of food-free periods each day - no period can be less than 1 hours, the periods need to be the same every day, sleep time should be included  Avoid eating 2 hours within bedtime     Tips:  Do not eat outside of the dining room or the kitchen  Do not eat while watching TV, videos, working on the computer or using a smart phone  Never eat food out of the bag. AVOID pseudoephedrine, ephedrine, phenylephrine, naphazoline and oxymetazoline.

## 2023-02-20 ENCOUNTER — HOSPITAL ENCOUNTER (OUTPATIENT)
Age: 53
Discharge: HOME | End: 2023-02-20
Payer: COMMERCIAL

## 2023-02-20 ENCOUNTER — HOSPITAL ENCOUNTER (OUTPATIENT)
Dept: GENERAL RADIOLOGY | Age: 53
Discharge: HOME | End: 2023-02-22
Payer: COMMERCIAL

## 2023-02-20 ENCOUNTER — HOSPITAL ENCOUNTER (OUTPATIENT)
Age: 53
Discharge: HOME | End: 2023-02-22
Payer: COMMERCIAL

## 2023-02-20 DIAGNOSIS — M54.16 LUMBAR RADICULOPATHY: ICD-10-CM

## 2023-02-20 LAB
BASOPHILS ABSOLUTE: 0.05 E9/L (ref 0–0.2)
BASOPHILS RELATIVE PERCENT: 0.5 % (ref 0–2)
EOSINOPHILS ABSOLUTE: 0.16 E9/L (ref 0.05–0.5)
EOSINOPHILS RELATIVE PERCENT: 1.6 % (ref 0–6)
FOLATE: >20 NG/ML (ref 4.8–24.2)
HCT VFR BLD CALC: 42 % (ref 34–48)
HEMOGLOBIN: 13.4 G/DL (ref 11.5–15.5)
IMMATURE GRANULOCYTES #: 0.19 E9/L
IMMATURE GRANULOCYTES %: 1.9 % (ref 0–5)
LYMPHOCYTES ABSOLUTE: 3.13 E9/L (ref 1.5–4)
LYMPHOCYTES RELATIVE PERCENT: 30.8 % (ref 20–42)
MCH RBC QN AUTO: 27.1 PG (ref 26–35)
MCHC RBC AUTO-ENTMCNC: 31.9 % (ref 32–34.5)
MCV RBC AUTO: 84.8 FL (ref 80–99.9)
MONOCYTES ABSOLUTE: 0.94 E9/L (ref 0.1–0.95)
MONOCYTES RELATIVE PERCENT: 9.3 % (ref 2–12)
NEUTROPHILS ABSOLUTE: 5.69 E9/L (ref 1.8–7.3)
NEUTROPHILS RELATIVE PERCENT: 55.9 % (ref 43–80)
PDW BLD-RTO: 14 FL (ref 11.5–15)
PLATELET # BLD: 280 E9/L (ref 130–450)
PMV BLD AUTO: 10.1 FL (ref 7–12)
RBC # BLD: 4.95 E12/L (ref 3.5–5.5)
T4 FREE: 1.16 NG/DL (ref 0.93–1.7)
TSH SERPL DL<=0.05 MIU/L-ACNC: 2.91 UIU/ML (ref 0.27–4.2)
VITAMIN B-12: 596 PG/ML (ref 211–946)
VITAMIN D 25-HYDROXY: 40 NG/ML (ref 30–100)
WBC # BLD: 10.2 E9/L (ref 4.5–11.5)

## 2023-02-20 PROCEDURE — 84443 ASSAY THYROID STIM HORMONE: CPT

## 2023-02-20 PROCEDURE — 36415 COLL VENOUS BLD VENIPUNCTURE: CPT

## 2023-02-20 PROCEDURE — 84439 ASSAY OF FREE THYROXINE: CPT

## 2023-02-20 PROCEDURE — 85025 COMPLETE CBC W/AUTO DIFF WBC: CPT

## 2023-02-20 PROCEDURE — 82306 VITAMIN D 25 HYDROXY: CPT

## 2023-02-20 PROCEDURE — 82746 ASSAY OF FOLIC ACID SERUM: CPT

## 2023-02-20 PROCEDURE — 72100 X-RAY EXAM L-S SPINE 2/3 VWS: CPT

## 2023-02-20 PROCEDURE — 82607 VITAMIN B-12: CPT

## 2023-03-06 ENCOUNTER — OFFICE VISIT (OUTPATIENT)
Dept: FAMILY MEDICINE CLINIC | Age: 53
End: 2023-03-06
Payer: COMMERCIAL

## 2023-03-06 ENCOUNTER — TELEPHONE (OUTPATIENT)
Dept: FAMILY MEDICINE CLINIC | Age: 53
End: 2023-03-06

## 2023-03-06 VITALS
DIASTOLIC BLOOD PRESSURE: 68 MMHG | SYSTOLIC BLOOD PRESSURE: 110 MMHG | RESPIRATION RATE: 16 BRPM | HEART RATE: 78 BPM | TEMPERATURE: 97.1 F | OXYGEN SATURATION: 98 % | BODY MASS INDEX: 33.99 KG/M2 | HEIGHT: 65 IN | WEIGHT: 204 LBS

## 2023-03-06 DIAGNOSIS — G43.711 INTRACTABLE CHRONIC MIGRAINE WITHOUT AURA AND WITH STATUS MIGRAINOSUS: ICD-10-CM

## 2023-03-06 DIAGNOSIS — T75.3XXA MOTION SICKNESS, INITIAL ENCOUNTER: ICD-10-CM

## 2023-03-06 PROCEDURE — 3074F SYST BP LT 130 MM HG: CPT | Performed by: FAMILY MEDICINE

## 2023-03-06 PROCEDURE — 99213 OFFICE O/P EST LOW 20 MIN: CPT | Performed by: FAMILY MEDICINE

## 2023-03-06 PROCEDURE — G8427 DOCREV CUR MEDS BY ELIG CLIN: HCPCS | Performed by: FAMILY MEDICINE

## 2023-03-06 PROCEDURE — 3078F DIAST BP <80 MM HG: CPT | Performed by: FAMILY MEDICINE

## 2023-03-06 PROCEDURE — 3017F COLORECTAL CA SCREEN DOC REV: CPT | Performed by: FAMILY MEDICINE

## 2023-03-06 PROCEDURE — 1036F TOBACCO NON-USER: CPT | Performed by: FAMILY MEDICINE

## 2023-03-06 PROCEDURE — G8417 CALC BMI ABV UP PARAM F/U: HCPCS | Performed by: FAMILY MEDICINE

## 2023-03-06 PROCEDURE — G8484 FLU IMMUNIZE NO ADMIN: HCPCS | Performed by: FAMILY MEDICINE

## 2023-03-06 RX ORDER — UBROGEPANT 100 MG/1
TABLET ORAL
Qty: 12 TABLET | Refills: 2 | Status: SHIPPED | OUTPATIENT
Start: 2023-03-06

## 2023-03-06 RX ORDER — ONDANSETRON 4 MG/1
TABLET, FILM COATED ORAL
Qty: 30 TABLET | Refills: 1 | Status: SHIPPED | OUTPATIENT
Start: 2023-03-06

## 2023-03-06 RX ORDER — PROMETHAZINE HYDROCHLORIDE 25 MG/1
25 TABLET ORAL 2 TIMES DAILY PRN
Qty: 30 TABLET | Refills: 2 | Status: SHIPPED | OUTPATIENT
Start: 2023-03-06

## 2023-03-06 SDOH — ECONOMIC STABILITY: FOOD INSECURITY: WITHIN THE PAST 12 MONTHS, YOU WORRIED THAT YOUR FOOD WOULD RUN OUT BEFORE YOU GOT MONEY TO BUY MORE.: NEVER TRUE

## 2023-03-06 SDOH — ECONOMIC STABILITY: INCOME INSECURITY: HOW HARD IS IT FOR YOU TO PAY FOR THE VERY BASICS LIKE FOOD, HOUSING, MEDICAL CARE, AND HEATING?: NOT HARD AT ALL

## 2023-03-06 SDOH — ECONOMIC STABILITY: FOOD INSECURITY: WITHIN THE PAST 12 MONTHS, THE FOOD YOU BOUGHT JUST DIDN'T LAST AND YOU DIDN'T HAVE MONEY TO GET MORE.: NEVER TRUE

## 2023-03-06 SDOH — ECONOMIC STABILITY: HOUSING INSECURITY
IN THE LAST 12 MONTHS, WAS THERE A TIME WHEN YOU DID NOT HAVE A STEADY PLACE TO SLEEP OR SLEPT IN A SHELTER (INCLUDING NOW)?: NO

## 2023-03-06 NOTE — PATIENT INSTRUCTIONS
Consider the following medications: OBESITY    Contrave  Qsymia  Phentermine  GLP-1 agonists- Mounjaro, Victoza, Trulicity, Ozempic  Metformin  Wellbutrin  Topamax    ** weight loss medications may or may not be covered by your insurance. Please check your formulary to see what might be offered. Also, here are some diet tips from obesity medicine specialists:    Rules:  Limit sweets to one day per month  Limit chips to one day month  Limit restaurants (including fast food) to one time every two weeks  Eliminate all sugar additives to coffee and use only low calorie creamers (<25 calories)     Requirements:  Make sure protein intake is at least 70 grams per day (begin using a protein shake - Nectar, Premier, BeneProtein and GNC lean (which is lactose-free) are milk-based options; Nectar, IsoPure Protein Drink, and Protein 2 O are water-based options; Quest (or [employer]co) and the [employer] 3D 1 protein bars can also be used, but have less protein in them )  Begin eating 3/4 cup of the original, plain Fiber One cereal daily (start with 1/4 cup daily and every week add 1/4 cup daily until reaching the goal of 3/4 cup daily)  Take one multivitamin every day     Goals:  Eat on a 6\" plate and do not eat seconds  Limit snacks to 2 per day  Divide the food on the plate to 1/4 entree, 1/4 starchy vegetables, 1/4 whole grains and 1/4 non-starch vegetables  Avoid eating 2 hours within bedtime       Count every calorie every day  Limit restaurants (including fast food) to one time every two weeks     Requirements:  Make sure protein intake is at least 65 grams per day (begin using a protein shake - Nectar, Premier, BeneProtein, Ensure Max, Boost Max and [employer] (which is lactose-free) are milk-based options;  Nectar, IsoPure Protein Drink, and Protein 2 O are water-based options; Quest (or [employer]) and the [employer] 3D 1 protein bars can also be used, but have less protein in them )  Begin eating 3/4 cup of the original, plain

## 2023-03-06 NOTE — PROGRESS NOTES
soon as possible if overdue, as this is important in assessing for undiagnosed pathology, especially cancer, as well as protecting against potentially harmful/life threatening disease. Patient and/or guardian verbalizes understanding and agrees with above counseling, assessment and plan. All questions answered.

## 2023-03-24 ENCOUNTER — OFFICE VISIT (OUTPATIENT)
Dept: FAMILY MEDICINE CLINIC | Age: 53
End: 2023-03-24

## 2023-03-24 VITALS
DIASTOLIC BLOOD PRESSURE: 78 MMHG | BODY MASS INDEX: 34.25 KG/M2 | SYSTOLIC BLOOD PRESSURE: 136 MMHG | OXYGEN SATURATION: 99 % | HEART RATE: 66 BPM | TEMPERATURE: 97 F | WEIGHT: 205.8 LBS

## 2023-03-24 DIAGNOSIS — S46.911A STRAIN OF RIGHT SHOULDER, INITIAL ENCOUNTER: Primary | ICD-10-CM

## 2023-03-24 DIAGNOSIS — S40.021A CONTUSION OF RIGHT UPPER ARM, INITIAL ENCOUNTER: ICD-10-CM

## 2023-03-24 RX ORDER — METHYLPREDNISOLONE 4 MG/1
TABLET ORAL
Qty: 1 KIT | Refills: 0 | Status: SHIPPED | OUTPATIENT
Start: 2023-03-24

## 2023-03-24 RX ORDER — TIZANIDINE 4 MG/1
4 TABLET ORAL 4 TIMES DAILY PRN
Qty: 20 TABLET | Refills: 0 | Status: SHIPPED | OUTPATIENT
Start: 2023-03-24

## 2023-03-24 NOTE — PROGRESS NOTES
4 MG tablet, Take 1 tablet by mouth 4 times daily as needed ([id number] pain), Disp: 20 tablet, Rfl: 0    ondansetron (ZOFRAN) 4 MG tablet, TAKE 1 TABLET EVERY 8 HOURSAS NEEDED FOR NAUSEA OR    VOMITING, Disp: 30 tablet, Rfl: 1    Ubrogepant (UBRELVY) 100 MG TABS, TAKE 1 TABLET IF NEEDED MAY TAKE 1 MORE DOSE AFTER 1 HOUR IF NEEDED. DO NOT TAKE MORE THAN 200MG IN 24 HOURS, Disp: 12 tablet, Rfl: 2    promethazine (PHENERGAN) 25 MG tablet, Take 1 tablet by mouth 2 times daily as needed for Nausea, Disp: 30 tablet, Rfl: 2    albuterol sulfate HFA (PROVENTIL;VENTOLIN;PROAIR) 108 (90 Base) MCG/ACT inhaler, USE 2 INHALATIONS ORALLY   EVERY 6 HOURS AS NEEDED FORWHEEZING, Disp: 8.5 g, Rfl: 0    Huwvei-AnYyg-PsIab-Meth-FA-DHA (PRENATE MINI) 18-0.6-0.4-350 MG CAPS, Take 1 capsule by mouth daily, Disp: 90 capsule, Rfl: 4    esomeprazole (NEXIUM) 40 MG delayed release capsule, Take 1 capsule by mouth every morning (before breakfast), Disp: 90 capsule, Rfl: 3    rizatriptan (MAXALT) 10 MG tablet, TAKE 1 TABLET DAILY AS     NEEDED FOR MIGRAINE, Disp: 9 tablet, Rfl: 2    SYMBICORT 160-4.5 MCG/ACT AERO, , Disp: , Rfl:     SPIRIVA RESPIMAT 1.25 MCG/ACT AERS inhaler, , Disp: , Rfl:     loratadine (CLARITIN) 10 MG tablet, , Disp: , Rfl:     fluticasone (FLONASE) 50 MCG/ACT nasal spray, 1 spray by Nasal route daily, Disp: 1 each, Rfl: 5    montelukast (SINGULAIR) 10 MG tablet, Take 1 tablet by mouth nightly, Disp: 90 tablet, Rfl: 3    spironolactone (ALDACTONE) 50 MG tablet, Take 1 tablet by mouth in the morning and 1 tablet before bedtime. , Disp: 180 tablet, Rfl: 3    acetaminophen-codeine (TYLENOL #4) 300-60 MG per tablet, , Disp: , Rfl: 0    NARCAN 4 MG/0.1ML LIQD nasal spray, , Disp: , Rfl: 1    famotidine (PEPCID) 40 MG tablet, Take 40 mg by mouth nightly , Disp: , Rfl:     pantoprazole (PROTONIX) 40 MG tablet, Take 40 mg by mouth daily , Disp: , Rfl:     aspirin 81 MG tablet, Take 81 mg by mouth daily, Disp: , Rfl:     Allergies:

## 2023-03-27 ENCOUNTER — TELEMEDICINE (OUTPATIENT)
Dept: FAMILY MEDICINE CLINIC | Age: 53
End: 2023-03-27
Payer: COMMERCIAL

## 2023-03-27 DIAGNOSIS — E66.09 CLASS 1 OBESITY DUE TO EXCESS CALORIES WITH SERIOUS COMORBIDITY AND BODY MASS INDEX (BMI) OF 34.0 TO 34.9 IN ADULT: Primary | ICD-10-CM

## 2023-03-27 PROCEDURE — G8427 DOCREV CUR MEDS BY ELIG CLIN: HCPCS | Performed by: FAMILY MEDICINE

## 2023-03-27 PROCEDURE — 99214 OFFICE O/P EST MOD 30 MIN: CPT | Performed by: FAMILY MEDICINE

## 2023-03-27 PROCEDURE — 3017F COLORECTAL CA SCREEN DOC REV: CPT | Performed by: FAMILY MEDICINE

## 2023-03-27 PROCEDURE — 1036F TOBACCO NON-USER: CPT | Performed by: FAMILY MEDICINE

## 2023-03-27 PROCEDURE — G8417 CALC BMI ABV UP PARAM F/U: HCPCS | Performed by: FAMILY MEDICINE

## 2023-03-27 PROCEDURE — G8484 FLU IMMUNIZE NO ADMIN: HCPCS | Performed by: FAMILY MEDICINE

## 2023-03-27 RX ORDER — PHENTERMINE HYDROCHLORIDE 15 MG/1
15 CAPSULE ORAL EVERY MORNING
Qty: 30 CAPSULE | Refills: 0 | Status: SHIPPED | OUTPATIENT
Start: 2023-03-27 | End: 2023-04-26

## 2023-03-27 NOTE — PROGRESS NOTES
following organ systems is limited: Vitals/Constitutional/EENT/Resp/CV/GI//MS/Neuro/Skin/Heme-Lymph-Imm. ASSESSMENT/PLAN:  Elif Toney was seen today for discuss medications. Diagnoses and all orders for this visit:    Class 1 obesity due to excess calories with serious comorbidity and body mass index (BMI) of 34.0 to 34.9 in adult  -     START:phentermine 15 MG capsule; Take 1 capsule by mouth every morning for 30 days. Max Daily Amount: 15 mg       No follow-ups on file. An  electronic signature was used to authenticate this note. --Shannon Tracy DO on 3/27/2023 at 12:37 }    Pursuant to the emergency declaration under the [facility] waiver authority and the [employer] Act, this Virtual  Visit was conducted, with patient's consent, to reduce the patient's risk of exposure to COVID-19 and provide continuity of care for an established patient. Services were provided through a video synchronous discussion virtually to substitute for in-person clinic visit.

## 2023-05-19 ENCOUNTER — TELEPHONE (OUTPATIENT)
Dept: FAMILY MEDICINE CLINIC | Age: 53
End: 2023-05-19

## 2023-05-19 RX ORDER — FLUCONAZOLE 150 MG/1
150 TABLET ORAL ONCE
Qty: 1 TABLET | Refills: 0 | Status: SHIPPED | OUTPATIENT
Start: 2023-05-19 | End: 2023-05-19

## 2023-05-19 RX ORDER — AZITHROMYCIN 250 MG/1
250 TABLET, FILM COATED ORAL SEE ADMIN INSTRUCTIONS
Qty: 6 TABLET | Refills: 0 | Status: SHIPPED | OUTPATIENT
Start: 2023-05-19 | End: 2023-05-24

## 2023-05-19 NOTE — TELEPHONE ENCOUNTER
Pt asking for a z-pck  symptoms sinus problems and drainage in ears as well and had an infection just got off amoxicillin. Feels like it is now draining in chest with a cough. Negative covid test. Please advise.        Pt also needs refill of diflucan

## 2023-05-24 ENCOUNTER — TELEPHONE (OUTPATIENT)
Dept: FAMILY MEDICINE CLINIC | Age: 53
End: 2023-05-24

## 2023-05-25 ENCOUNTER — OFFICE VISIT (OUTPATIENT)
Dept: FAMILY MEDICINE CLINIC | Age: 53
End: 2023-05-25
Payer: COMMERCIAL

## 2023-05-25 VITALS
HEIGHT: 65 IN | OXYGEN SATURATION: 98 % | WEIGHT: 194.7 LBS | RESPIRATION RATE: 16 BRPM | HEART RATE: 74 BPM | TEMPERATURE: 98.6 F | SYSTOLIC BLOOD PRESSURE: 124 MMHG | BODY MASS INDEX: 32.44 KG/M2 | DIASTOLIC BLOOD PRESSURE: 76 MMHG

## 2023-05-25 DIAGNOSIS — R73.03 PREDIABETES: Primary | ICD-10-CM

## 2023-05-25 DIAGNOSIS — E66.09 CLASS 1 OBESITY DUE TO EXCESS CALORIES WITH SERIOUS COMORBIDITY AND BODY MASS INDEX (BMI) OF 34.0 TO 34.9 IN ADULT: ICD-10-CM

## 2023-05-25 PROCEDURE — G8428 CUR MEDS NOT DOCUMENT: HCPCS | Performed by: FAMILY MEDICINE

## 2023-05-25 PROCEDURE — 3017F COLORECTAL CA SCREEN DOC REV: CPT | Performed by: FAMILY MEDICINE

## 2023-05-25 PROCEDURE — 99213 OFFICE O/P EST LOW 20 MIN: CPT | Performed by: FAMILY MEDICINE

## 2023-05-25 PROCEDURE — 3078F DIAST BP <80 MM HG: CPT | Performed by: FAMILY MEDICINE

## 2023-05-25 PROCEDURE — G8417 CALC BMI ABV UP PARAM F/U: HCPCS | Performed by: FAMILY MEDICINE

## 2023-05-25 PROCEDURE — 3074F SYST BP LT 130 MM HG: CPT | Performed by: FAMILY MEDICINE

## 2023-05-25 PROCEDURE — 1036F TOBACCO NON-USER: CPT | Performed by: FAMILY MEDICINE

## 2023-05-25 RX ORDER — PHENTERMINE HYDROCHLORIDE 15 MG/1
15 CAPSULE ORAL EVERY MORNING
Qty: 30 CAPSULE | Refills: 0 | Status: SHIPPED | OUTPATIENT
Start: 2023-05-25 | End: 2023-06-24

## 2023-05-25 NOTE — PROGRESS NOTES
5/25/2023    Chief Complaint   Patient presents with    Medication Check       HPI    Emily Mederos is a 46 y.o. patient that presents today for:    Obesity: Here today for follow-up of chronic, persistent obesity. Started on phentermine 15 mg. Tolerating well. Diet and exercise are discussed. Obesity History  Has lost 10 lbs since starting medication. Starting BMI:   Current BMI: body mass index is 32.4 kg/m². Goal Weight: 175       Patient's past medical, surgical, social and/or family history reviewed, updated in chart, and are non-contributory (unless otherwise stated). Medications and allergies also reviewed and updated in chart. ROS negative unless otherwise specified    Physical Exam  Temp Readings from Last 3 Encounters:   05/25/23 98.6 °F (37 °C) (Temporal)   03/24/23 97 °F (36.1 °C) (Temporal)   03/06/23 97.1 °F (36.2 °C) (Temporal)     Wt Readings from Last 3 Encounters:   05/25/23 194 lb 11.2 oz (88.3 kg)   03/24/23 205 lb 12.8 oz (93.4 kg)   03/22/23 207 lb (93.9 kg)     BP Readings from Last 3 Encounters:   05/25/23 124/76   03/24/23 136/78   03/22/23 133/82     Pulse Readings from Last 3 Encounters:   05/25/23 74   03/24/23 66   03/06/23 78       General appearance: alert, well appearing, and in no distress, oriented to person, place, and time and normal appearing weight. CVS exam: normal rate, regular rhythm, normal S1, S2, no murmurs, rubs, clicks or gallops. Radial pulses 2+ bilateral.  PT/DP pulse 2+ bilat. No C/C/E    Chest: clear to auscultation, no wheezes, rales or rhonchi, symmetric air entry.      Abdomen: Soft, non-tender, non-distended, positive BS in all 4 quadrants    Extremities:Dorsalis pedis pulses palpated bilaterally, no clubbing, cyanosis, edema or erythema,     SKIN: no lesions, jaundice, petechiae, pallor, cyanosis, ecchymosis    NEURO: gross motor exam normal by observation, gait normal    Mental status - alert, oriented to person, place, and

## 2023-06-23 ENCOUNTER — OFFICE VISIT (OUTPATIENT)
Dept: FAMILY MEDICINE CLINIC | Age: 53
End: 2023-06-23
Payer: COMMERCIAL

## 2023-06-23 ENCOUNTER — TELEPHONE (OUTPATIENT)
Dept: FAMILY MEDICINE CLINIC | Age: 53
End: 2023-06-23

## 2023-06-23 VITALS
OXYGEN SATURATION: 97 % | SYSTOLIC BLOOD PRESSURE: 110 MMHG | TEMPERATURE: 98 F | WEIGHT: 192 LBS | DIASTOLIC BLOOD PRESSURE: 78 MMHG | BODY MASS INDEX: 31.99 KG/M2 | HEART RATE: 91 BPM | HEIGHT: 65 IN

## 2023-06-23 DIAGNOSIS — R09.81 NASAL CONGESTION: ICD-10-CM

## 2023-06-23 DIAGNOSIS — J01.90 ACUTE NON-RECURRENT SINUSITIS, UNSPECIFIED LOCATION: Primary | ICD-10-CM

## 2023-06-23 DIAGNOSIS — R09.82 POSTNASAL DRIP: ICD-10-CM

## 2023-06-23 DIAGNOSIS — J04.0 ACUTE LARYNGITIS: ICD-10-CM

## 2023-06-23 PROCEDURE — 3078F DIAST BP <80 MM HG: CPT | Performed by: PHYSICIAN ASSISTANT

## 2023-06-23 PROCEDURE — 3074F SYST BP LT 130 MM HG: CPT | Performed by: PHYSICIAN ASSISTANT

## 2023-06-23 PROCEDURE — 99214 OFFICE O/P EST MOD 30 MIN: CPT | Performed by: PHYSICIAN ASSISTANT

## 2023-06-23 PROCEDURE — 3017F COLORECTAL CA SCREEN DOC REV: CPT | Performed by: PHYSICIAN ASSISTANT

## 2023-06-23 PROCEDURE — G8417 CALC BMI ABV UP PARAM F/U: HCPCS | Performed by: PHYSICIAN ASSISTANT

## 2023-06-23 PROCEDURE — G8427 DOCREV CUR MEDS BY ELIG CLIN: HCPCS | Performed by: PHYSICIAN ASSISTANT

## 2023-06-23 PROCEDURE — 96372 THER/PROPH/DIAG INJ SC/IM: CPT | Performed by: PHYSICIAN ASSISTANT

## 2023-06-23 PROCEDURE — 1036F TOBACCO NON-USER: CPT | Performed by: PHYSICIAN ASSISTANT

## 2023-06-23 RX ORDER — PREDNISONE 10 MG/1
TABLET ORAL
Qty: 18 TABLET | Refills: 0 | Status: SHIPPED | OUTPATIENT
Start: 2023-06-23

## 2023-06-23 RX ORDER — DOXYCYCLINE HYCLATE 100 MG
100 TABLET ORAL 2 TIMES DAILY
Qty: 20 TABLET | Refills: 0 | Status: SHIPPED | OUTPATIENT
Start: 2023-06-23 | End: 2023-07-03

## 2023-06-23 RX ORDER — METHYLPREDNISOLONE ACETATE 80 MG/ML
80 INJECTION, SUSPENSION INTRA-ARTICULAR; INTRALESIONAL; INTRAMUSCULAR; SOFT TISSUE ONCE
Status: COMPLETED | OUTPATIENT
Start: 2023-06-23 | End: 2023-06-23

## 2023-06-23 RX ADMIN — METHYLPREDNISOLONE ACETATE 80 MG: 80 INJECTION, SUSPENSION INTRA-ARTICULAR; INTRALESIONAL; INTRAMUSCULAR; SOFT TISSUE at 14:58

## 2023-06-23 NOTE — PROGRESS NOTES
23  Ozzie Fuentes : 1970 Sex: female  Age 46 y.o. Subjective:  Chief Complaint   Patient presents with    Sinusitis     Started sore throat 2 days ago has not been hurting since over the counter medication. Pt states that since 2 days ago she has been light headed all day long. Pt would like to see provider before testing. HPI:   Ozzie Fuentes , 46 y.o. female presents to express care for evaluation of sinus congestion, drainage    HPI  69-year-old female presents to express care for evaluation of sinus congestion, drainage. The patient said the symptoms ongoing for the last 2 days. The patient has been increasingly congested. The patient has started to lose her voice. This is actually been an ongoing issue since October. The patient states that she does have allergies and does have hayfever. The patient is not having any fevers. The patient did have a minimal fever yesterday though. The patient is been taking Advil Cold and Sinus and Tylenol Cold and sinus. The patient uses nasal spray and does use Singulair as well as Claritin every night. ROS:   Unless otherwise stated in this report the patient's positive and negative responses for review of systems for constitutional, eyes, ENT, cardiovascular, respiratory, gastrointestinal, neurological, , musculoskeletal, and integument systems and related systems to the presenting problem are either stated in the history of present illness or were not pertinent or were negative for the symptoms and/or complaints related to the presenting medical problem. Positives and pertinent negatives as per HPI. All others reviewed and are negative.       PMH:     Past Medical History:   Diagnosis Date    Asthma     DR [facility]ARK    Back injury     Chronic back pain     PAIN RADIATES TO NECK AND LEGS    Chronic tension-type headache, not intractable 2018    DDD (degenerative disc disease), cervical 2018    DVT (deep venous

## 2023-06-27 ENCOUNTER — OFFICE VISIT (OUTPATIENT)
Dept: FAMILY MEDICINE CLINIC | Age: 53
End: 2023-06-27
Payer: COMMERCIAL

## 2023-06-27 VITALS
BODY MASS INDEX: 32.82 KG/M2 | DIASTOLIC BLOOD PRESSURE: 76 MMHG | OXYGEN SATURATION: 98 % | SYSTOLIC BLOOD PRESSURE: 128 MMHG | TEMPERATURE: 97.2 F | HEIGHT: 65 IN | HEART RATE: 80 BPM | RESPIRATION RATE: 16 BRPM | WEIGHT: 197 LBS

## 2023-06-27 DIAGNOSIS — R09.81 NASAL CONGESTION: Primary | ICD-10-CM

## 2023-06-27 DIAGNOSIS — I10 PRIMARY HYPERTENSION: ICD-10-CM

## 2023-06-27 DIAGNOSIS — R51.9 SINUS HEADACHE: ICD-10-CM

## 2023-06-27 DIAGNOSIS — R79.89 INCREASED AMMONIA LEVEL: ICD-10-CM

## 2023-06-27 DIAGNOSIS — R53.83 FATIGUE, UNSPECIFIED TYPE: ICD-10-CM

## 2023-06-27 DIAGNOSIS — E66.09 CLASS 1 OBESITY DUE TO EXCESS CALORIES WITH SERIOUS COMORBIDITY AND BODY MASS INDEX (BMI) OF 34.0 TO 34.9 IN ADULT: ICD-10-CM

## 2023-06-27 LAB
INFLUENZA A ANTIGEN, POC: NEGATIVE
INFLUENZA B ANTIGEN, POC: NEGATIVE
LOT EXPIRE DATE: NORMAL
LOT KIT NUMBER: NORMAL
S PYO AG THROAT QL: NORMAL
SARS-COV-2, POC: NORMAL
VALID INTERNAL CONTROL: NORMAL
VENDOR AND KIT NAME POC: NORMAL

## 2023-06-27 PROCEDURE — 99214 OFFICE O/P EST MOD 30 MIN: CPT | Performed by: FAMILY MEDICINE

## 2023-06-27 PROCEDURE — 87880 STREP A ASSAY W/OPTIC: CPT | Performed by: FAMILY MEDICINE

## 2023-06-27 PROCEDURE — 3078F DIAST BP <80 MM HG: CPT | Performed by: FAMILY MEDICINE

## 2023-06-27 PROCEDURE — G8427 DOCREV CUR MEDS BY ELIG CLIN: HCPCS | Performed by: FAMILY MEDICINE

## 2023-06-27 PROCEDURE — G8417 CALC BMI ABV UP PARAM F/U: HCPCS | Performed by: FAMILY MEDICINE

## 2023-06-27 PROCEDURE — 87428 SARSCOV & INF VIR A&B AG IA: CPT | Performed by: FAMILY MEDICINE

## 2023-06-27 PROCEDURE — 3074F SYST BP LT 130 MM HG: CPT | Performed by: FAMILY MEDICINE

## 2023-06-27 PROCEDURE — 1036F TOBACCO NON-USER: CPT | Performed by: FAMILY MEDICINE

## 2023-06-27 PROCEDURE — 3017F COLORECTAL CA SCREEN DOC REV: CPT | Performed by: FAMILY MEDICINE

## 2023-06-27 RX ORDER — PHENTERMINE HYDROCHLORIDE 37.5 MG/1
37.5 TABLET ORAL
Qty: 30 TABLET | Refills: 0 | Status: SHIPPED | OUTPATIENT
Start: 2023-06-27 | End: 2023-07-27

## 2023-06-27 RX ORDER — FLUTICASONE FUROATE, UMECLIDINIUM BROMIDE AND VILANTEROL TRIFENATATE 100; 62.5; 25 UG/1; UG/1; UG/1
1 POWDER RESPIRATORY (INHALATION) DAILY
COMMUNITY

## 2023-06-27 RX ORDER — OLOPATADINE HYDROCHLORIDE 1 MG/ML
1 SOLUTION/ DROPS OPHTHALMIC 2 TIMES DAILY
Qty: 5 ML | Refills: 5 | Status: SHIPPED | OUTPATIENT
Start: 2023-06-27 | End: 2024-04-22

## 2023-06-27 RX ORDER — AMOXICILLIN 875 MG/1
875 TABLET, COATED ORAL 2 TIMES DAILY
Qty: 20 TABLET | Refills: 0 | Status: SHIPPED | OUTPATIENT
Start: 2023-06-27 | End: 2023-07-07

## 2023-07-03 DIAGNOSIS — E04.2 MULTINODULAR GOITER: Primary | ICD-10-CM

## 2023-07-13 ENCOUNTER — HOSPITAL ENCOUNTER (OUTPATIENT)
Dept: ULTRASOUND IMAGING | Age: 53
Discharge: HOME | End: 2023-07-15
Attending: INTERNAL MEDICINE
Payer: COMMERCIAL

## 2023-07-13 DIAGNOSIS — E04.2 MULTINODULAR GOITER: ICD-10-CM

## 2023-07-13 PROCEDURE — 76536 US EXAM OF HEAD AND NECK: CPT

## 2023-07-20 ENCOUNTER — TELEPHONE (OUTPATIENT)
Dept: ENDOCRINOLOGY | Age: 53
End: 2023-07-20

## 2023-07-20 DIAGNOSIS — E06.3 HASHIMOTO'S THYROIDITIS: ICD-10-CM

## 2023-07-20 DIAGNOSIS — E22.1 HYPERPROLACTINEMIA (HCC): Primary | ICD-10-CM

## 2023-07-20 DIAGNOSIS — E04.2 MULTINODULAR GOITER: ICD-10-CM

## 2023-07-20 DIAGNOSIS — Z76.0 MEDICATION REFILL: ICD-10-CM

## 2023-07-21 ENCOUNTER — TELEPHONE (OUTPATIENT)
Dept: ENDOCRINOLOGY | Age: 53
End: 2023-07-21

## 2023-07-21 ENCOUNTER — HOSPITAL ENCOUNTER (OUTPATIENT)
Age: 53
Discharge: HOME | End: 2023-07-21
Payer: COMMERCIAL

## 2023-07-21 DIAGNOSIS — E22.1 HYPERPROLACTINEMIA (HCC): ICD-10-CM

## 2023-07-21 DIAGNOSIS — E28.2 PCOS (POLYCYSTIC OVARIAN SYNDROME): Primary | ICD-10-CM

## 2023-07-21 DIAGNOSIS — E06.3 HASHIMOTO'S THYROIDITIS: ICD-10-CM

## 2023-07-21 DIAGNOSIS — R53.83 FATIGUE, UNSPECIFIED TYPE: ICD-10-CM

## 2023-07-21 DIAGNOSIS — I10 PRIMARY HYPERTENSION: ICD-10-CM

## 2023-07-21 LAB
25(OH)D3 SERPL-MCNC: 41.4 NG/ML (ref 30–100)
ALBUMIN SERPL-MCNC: 4.6 G/DL (ref 3.5–5.2)
ALP SERPL-CCNC: 114 U/L (ref 35–104)
ALT SERPL-CCNC: 27 U/L (ref 0–32)
ANION GAP SERPL CALCULATED.3IONS-SCNC: 12 MMOL/L (ref 7–16)
AST SERPL-CCNC: 30 U/L (ref 0–31)
BASOPHILS # BLD: 0.01 K/UL (ref 0–0.2)
BASOPHILS NFR BLD: 0 % (ref 0–2)
BILIRUB SERPL-MCNC: 0.3 MG/DL (ref 0–1.2)
BUN SERPL-MCNC: 11 MG/DL (ref 6–20)
CALCIUM SERPL-MCNC: 9.9 MG/DL (ref 8.6–10.2)
CHLORIDE SERPL-SCNC: 103 MMOL/L (ref 98–107)
CHOLEST SERPL-MCNC: 234 MG/DL
CO2 SERPL-SCNC: 24 MMOL/L (ref 22–29)
CREAT SERPL-MCNC: 1.2 MG/DL (ref 0.5–1)
EOSINOPHIL # BLD: 0 K/UL (ref 0.05–0.5)
EOSINOPHILS RELATIVE PERCENT: 0 % (ref 0–6)
ERYTHROCYTE [DISTWIDTH] IN BLOOD BY AUTOMATED COUNT: 14.6 % (ref 11.5–15)
FERRITIN SERPL-MCNC: 169 NG/ML
FOLATE SERPL-MCNC: >20 NG/ML (ref 4.8–24.2)
GFR SERPL CREATININE-BSD FRML MDRD: 57 ML/MIN/1.73M2
GLUCOSE SERPL-MCNC: 106 MG/DL (ref 74–99)
HCT VFR BLD AUTO: 43.3 % (ref 34–48)
HDLC SERPL-MCNC: 71 MG/DL
HGB BLD-MCNC: 14.1 G/DL (ref 11.5–15.5)
IMM GRANULOCYTES # BLD AUTO: 0.03 K/UL (ref 0–0.58)
IMM GRANULOCYTES NFR BLD: 0 % (ref 0–5)
INR PPP: 1.1
IRON SATN MFR SERPL: 22 % (ref 15–50)
IRON SERPL-MCNC: 71 UG/DL (ref 37–145)
LDLC SERPL CALC-MCNC: 146 MG/DL
LYMPHOCYTES NFR BLD: 2.2 K/UL (ref 1.5–4)
LYMPHOCYTES RELATIVE PERCENT: 32 % (ref 20–42)
MCH RBC QN AUTO: 28.1 PG (ref 26–35)
MCHC RBC AUTO-ENTMCNC: 32.6 G/DL (ref 32–34.5)
MCV RBC AUTO: 86.4 FL (ref 80–99.9)
MONOCYTES NFR BLD: 0.47 K/UL (ref 0.1–0.95)
MONOCYTES NFR BLD: 7 % (ref 2–12)
NEUTROPHILS NFR BLD: 61 % (ref 43–80)
NEUTS SEG NFR BLD: 4.17 K/UL (ref 1.8–7.3)
PLATELET # BLD AUTO: 259 K/UL (ref 130–450)
PMV BLD AUTO: 9.3 FL (ref 7–12)
POTASSIUM SERPL-SCNC: 4.4 MMOL/L (ref 3.5–5)
PROT SERPL-MCNC: 7.8 G/DL (ref 6.4–8.3)
PROTHROMBIN TIME: 12 SEC (ref 9.3–12.4)
RBC # BLD AUTO: 5.01 M/UL (ref 3.5–5.5)
SODIUM SERPL-SCNC: 139 MMOL/L (ref 132–146)
T4 FREE SERPL-MCNC: 1.4 NG/DL (ref 0.9–1.7)
TIBC SERPL-MCNC: 330 UG/DL (ref 250–450)
TRIGL SERPL-MCNC: 86 MG/DL
TSH SERPL DL<=0.05 MIU/L-ACNC: 1.28 UIU/ML (ref 0.27–4.2)
VIT B12 SERPL-MCNC: 535 PG/ML (ref 211–946)
VLDLC SERPL CALC-MCNC: 17 MG/DL
WBC OTHER # BLD: 6.9 K/UL (ref 4.5–11.5)

## 2023-07-21 PROCEDURE — 82607 VITAMIN B-12: CPT

## 2023-07-21 PROCEDURE — 36415 COLL VENOUS BLD VENIPUNCTURE: CPT

## 2023-07-21 PROCEDURE — 82728 ASSAY OF FERRITIN: CPT

## 2023-07-21 PROCEDURE — 85610 PROTHROMBIN TIME: CPT

## 2023-07-21 PROCEDURE — 83540 ASSAY OF IRON: CPT

## 2023-07-21 PROCEDURE — 85027 COMPLETE CBC AUTOMATED: CPT

## 2023-07-21 PROCEDURE — 84439 ASSAY OF FREE THYROXINE: CPT

## 2023-07-21 PROCEDURE — 83550 IRON BINDING TEST: CPT

## 2023-07-21 PROCEDURE — 84146 ASSAY OF PROLACTIN: CPT

## 2023-07-21 PROCEDURE — 80061 LIPID PANEL: CPT

## 2023-07-21 PROCEDURE — 80053 COMPREHEN METABOLIC PANEL: CPT

## 2023-07-21 PROCEDURE — 82746 ASSAY OF FOLIC ACID SERUM: CPT

## 2023-07-21 PROCEDURE — 82306 VITAMIN D 25 HYDROXY: CPT

## 2023-07-21 PROCEDURE — 84443 ASSAY THYROID STIM HORMONE: CPT

## 2023-07-21 RX ORDER — SPIRONOLACTONE 50 MG/1
50 TABLET, FILM COATED ORAL 2 TIMES DAILY
Qty: 180 TABLET | Refills: 3 | Status: SHIPPED | OUTPATIENT
Start: 2023-07-21

## 2023-07-21 NOTE — TELEPHONE ENCOUNTER
Thyroid thyroid ultrasound showed multiple benign looking thyroid nodules.   We will discuss this in details at her appointment in few weeks

## 2023-07-21 NOTE — TELEPHONE ENCOUNTER
Pt was going to get labs and wanted to know if she could have a testosterone and cortisol total as well. Last done 10/4/21.

## 2023-07-22 LAB — PROLACTIN SERPL-MCNC: 6.04 NG/ML

## 2023-07-23 ENCOUNTER — TELEPHONE (OUTPATIENT)
Dept: ENDOCRINOLOGY | Age: 53
End: 2023-07-23

## 2023-07-23 NOTE — TELEPHONE ENCOUNTER
Notify patient  Right thyroid hormone normal and thyroid study showed stable thyroid nodules compared with all previous studies.

## 2023-07-24 NOTE — TELEPHONE ENCOUNTER
Called pt. She is very concerned and wanted to move up her appt. There are no openings so I told her you could try to give her a call to discuss result.

## 2023-07-31 ENCOUNTER — OFFICE VISIT (OUTPATIENT)
Dept: ENDOCRINOLOGY | Age: 53
End: 2023-07-31
Payer: COMMERCIAL

## 2023-07-31 VITALS
WEIGHT: 189 LBS | DIASTOLIC BLOOD PRESSURE: 88 MMHG | BODY MASS INDEX: 31.49 KG/M2 | SYSTOLIC BLOOD PRESSURE: 137 MMHG | HEART RATE: 74 BPM | HEIGHT: 65 IN | OXYGEN SATURATION: 99 %

## 2023-07-31 DIAGNOSIS — Z76.0 MEDICATION REFILL: ICD-10-CM

## 2023-07-31 DIAGNOSIS — E04.2 MULTINODULAR GOITER: Primary | ICD-10-CM

## 2023-07-31 DIAGNOSIS — E22.1 HYPERPROLACTINEMIA (HCC): ICD-10-CM

## 2023-07-31 DIAGNOSIS — R73.03 PREDIABETES: ICD-10-CM

## 2023-07-31 PROCEDURE — 3075F SYST BP GE 130 - 139MM HG: CPT | Performed by: INTERNAL MEDICINE

## 2023-07-31 PROCEDURE — 3079F DIAST BP 80-89 MM HG: CPT | Performed by: INTERNAL MEDICINE

## 2023-07-31 PROCEDURE — 3017F COLORECTAL CA SCREEN DOC REV: CPT | Performed by: INTERNAL MEDICINE

## 2023-07-31 PROCEDURE — G8427 DOCREV CUR MEDS BY ELIG CLIN: HCPCS | Performed by: INTERNAL MEDICINE

## 2023-07-31 PROCEDURE — G8417 CALC BMI ABV UP PARAM F/U: HCPCS | Performed by: INTERNAL MEDICINE

## 2023-07-31 PROCEDURE — 99214 OFFICE O/P EST MOD 30 MIN: CPT | Performed by: INTERNAL MEDICINE

## 2023-07-31 PROCEDURE — 1036F TOBACCO NON-USER: CPT | Performed by: INTERNAL MEDICINE

## 2023-07-31 RX ORDER — SPIRONOLACTONE 50 MG/1
50 TABLET, FILM COATED ORAL 2 TIMES DAILY
Qty: 180 TABLET | Refills: 3 | Status: SHIPPED | OUTPATIENT
Start: 2023-07-31

## 2023-07-31 NOTE — PROGRESS NOTES
139 07/21/2023 01:36 PM    K 4.4 07/21/2023 01:36 PM    CO2 24 07/21/2023 01:36 PM    BUN 11 07/21/2023 01:36 PM    CREATININE 1.2 (H) 07/21/2023 01:36 PM    CALCIUM 9.9 07/21/2023 01:36 PM    LABGLOM 57 (L) 07/21/2023 01:36 PM    GFRAA >60 08/30/2022 09:20 AM    GFRAA >60 08/30/2022 09:20 AM      Lab Results   Component Value Date/Time    TSH 1.28 07/21/2023 01:36 PM    T4FREE 1.4 07/21/2023 01:36 PM    L0JYVKJ 7.8 06/28/2018 12:17 PM    FT3 3.3 03/18/2022 08:43 AM    FT3 2.9 06/28/2018 12:17 PM    FT3 2.8 07/10/2016 11:22 AM    FT3 3.1 04/02/2016 09:55 AM    TPOABS 42.7 (H) 02/28/2020 04:41 PM    THGAB <0.9 02/28/2020 04:41 PM     Lab Results   Component Value Date/Time    LABA1C 6.1 06/20/2022 04:01 PM    GLUCOSE 106 07/21/2023 01:36 PM    GLUCOSE 96 03/25/2012 08:54 PM    LABCREA 207 10/04/2021 08:30 AM     Lab Results   Component Value Date/Time    TRIG 86 07/21/2023 01:36 PM    HDL 71 07/21/2023 01:36 PM    LDLCALC 160 08/30/2022 09:20 AM    CHOL 234 07/21/2023 01:36 PM    CHOL 237 08/30/2022 09:20 AM     Lab Results   Component Value Date/Time    VITD25 41.4 07/21/2023 01:36 PM    VITD25 40 02/20/2023 03:17 PM     ASSESSMENT & RECOMMENDATIONS   Amando Pina, a 48 y.o.-old female seen in for the following issues     H/o Hyperprolactinemia   Repeated PRL is WNL     Multinodular goiter   US 7/2023 --> 1.6 cm nodule in theft thyroid lobe. Nodules looks benign on US but pt pt is very concerning due to change in her voice. Will obtain FNA   TFT WNL   Will continue monitoring     vitD deficiency  At goal, continue vitD supplement     Prediabetes   Controlled with diet     I personally reviewed external notes from PCP and other patient's care team providers, and personally interpreted labs associated with the above diagnosis. I also ordered labs to further assess and manage the above addressed medical conditions    Return in about 4 months (around 11/30/2023).     The above issues were reviewed with the patient

## 2023-08-09 DIAGNOSIS — T75.3XXA MOTION SICKNESS, INITIAL ENCOUNTER: ICD-10-CM

## 2023-08-09 DIAGNOSIS — G43.711 INTRACTABLE CHRONIC MIGRAINE WITHOUT AURA AND WITH STATUS MIGRAINOSUS: ICD-10-CM

## 2023-08-09 DIAGNOSIS — B37.9 ANTIBIOTIC-INDUCED YEAST INFECTION: ICD-10-CM

## 2023-08-09 DIAGNOSIS — T36.95XA ANTIBIOTIC-INDUCED YEAST INFECTION: ICD-10-CM

## 2023-08-09 RX ORDER — AZITHROMYCIN 250 MG/1
TABLET, FILM COATED ORAL
Qty: 6 TABLET | Refills: 0 | Status: SHIPPED | OUTPATIENT
Start: 2023-08-09 | End: 2023-08-19

## 2023-08-09 RX ORDER — FLUCONAZOLE 150 MG/1
150 TABLET ORAL ONCE
Qty: 1 TABLET | Refills: 0 | Status: SHIPPED
Start: 2023-08-09 | End: 2023-08-10 | Stop reason: SDUPTHER

## 2023-08-09 RX ORDER — SCOLOPAMINE TRANSDERMAL SYSTEM 1 MG/1
1 PATCH, EXTENDED RELEASE TRANSDERMAL
Qty: 5 PATCH | Refills: 0 | Status: SHIPPED | OUTPATIENT
Start: 2023-08-09

## 2023-08-09 RX ORDER — RIZATRIPTAN BENZOATE 10 MG/1
TABLET ORAL
Qty: 9 TABLET | Refills: 2 | Status: SHIPPED | OUTPATIENT
Start: 2023-08-09

## 2023-08-09 RX ORDER — UBROGEPANT 100 MG/1
TABLET ORAL
Qty: 12 TABLET | Refills: 2 | Status: SHIPPED | OUTPATIENT
Start: 2023-08-09

## 2023-08-09 NOTE — TELEPHONE ENCOUNTER
Pt is also requesting Diflucan, zpak and scopolamine. States that she is going on vacation and is having sinus congestion-pt states that this is NOT COVID, states that she was seen in June for the same thing and she was negative for everything. Also states that she is going on vacation so she needs the motion sickness patches called in for her.

## 2023-08-10 DIAGNOSIS — B37.9 ANTIBIOTIC-INDUCED YEAST INFECTION: ICD-10-CM

## 2023-08-10 DIAGNOSIS — G43.711 INTRACTABLE CHRONIC MIGRAINE WITHOUT AURA AND WITH STATUS MIGRAINOSUS: ICD-10-CM

## 2023-08-10 DIAGNOSIS — T36.95XA ANTIBIOTIC-INDUCED YEAST INFECTION: ICD-10-CM

## 2023-08-10 RX ORDER — UBROGEPANT 100 MG/1
TABLET ORAL
Qty: 12 TABLET | Refills: 2 | OUTPATIENT
Start: 2023-08-10

## 2023-08-10 RX ORDER — FLUCONAZOLE 150 MG/1
150 TABLET ORAL ONCE
Qty: 1 TABLET | Refills: 1 | Status: SHIPPED | OUTPATIENT
Start: 2023-08-10 | End: 2023-08-10

## 2023-08-23 ENCOUNTER — TELEPHONE (OUTPATIENT)
Dept: FAMILY MEDICINE CLINIC | Age: 53
End: 2023-08-23

## 2023-08-23 ENCOUNTER — OFFICE VISIT (OUTPATIENT)
Dept: FAMILY MEDICINE CLINIC | Age: 53
End: 2023-08-23
Payer: COMMERCIAL

## 2023-08-23 VITALS
HEIGHT: 65 IN | SYSTOLIC BLOOD PRESSURE: 148 MMHG | DIASTOLIC BLOOD PRESSURE: 92 MMHG | OXYGEN SATURATION: 98 % | TEMPERATURE: 97.9 F | WEIGHT: 190 LBS | BODY MASS INDEX: 31.65 KG/M2 | HEART RATE: 72 BPM

## 2023-08-23 DIAGNOSIS — I10 PRIMARY HYPERTENSION: Primary | ICD-10-CM

## 2023-08-23 PROCEDURE — 1036F TOBACCO NON-USER: CPT | Performed by: FAMILY MEDICINE

## 2023-08-23 PROCEDURE — G8417 CALC BMI ABV UP PARAM F/U: HCPCS | Performed by: FAMILY MEDICINE

## 2023-08-23 PROCEDURE — 3077F SYST BP >= 140 MM HG: CPT | Performed by: FAMILY MEDICINE

## 2023-08-23 PROCEDURE — G8427 DOCREV CUR MEDS BY ELIG CLIN: HCPCS | Performed by: FAMILY MEDICINE

## 2023-08-23 PROCEDURE — 3017F COLORECTAL CA SCREEN DOC REV: CPT | Performed by: FAMILY MEDICINE

## 2023-08-23 PROCEDURE — 3080F DIAST BP >= 90 MM HG: CPT | Performed by: FAMILY MEDICINE

## 2023-08-23 PROCEDURE — 99213 OFFICE O/P EST LOW 20 MIN: CPT | Performed by: FAMILY MEDICINE

## 2023-08-23 RX ORDER — AMLODIPINE BESYLATE 5 MG/1
5 TABLET ORAL DAILY
Qty: 30 TABLET | Refills: 3 | Status: SHIPPED
Start: 2023-08-23 | End: 2023-08-24 | Stop reason: SDUPTHER

## 2023-08-23 RX ORDER — ACETAMINOPHEN AND CHLORPHENIRAMINE MALEATE 325; 2 MG/1; MG/1
2 TABLET, FILM COATED ORAL EVERY 4 HOURS PRN
Qty: 80 TABLET | Refills: 0 | Status: SHIPPED | OUTPATIENT
Start: 2023-08-23 | End: 2023-08-30

## 2023-08-23 ASSESSMENT — ENCOUNTER SYMPTOMS
SHORTNESS OF BREATH: 0
NAUSEA: 0
COUGH: 0
BLOOD IN STOOL: 0
PHOTOPHOBIA: 0
CONSTIPATION: 0
VOMITING: 0
SORE THROAT: 0
BACK PAIN: 0
ABDOMINAL PAIN: 0
DIARRHEA: 0

## 2023-08-23 NOTE — TELEPHONE ENCOUNTER
Pt called in asking if Dr Nasima Ricks has anything available this morning. I told her no she does not. She asked us to call her if doctor were to have an opening. I gave her the opening tomorrow at 8 am, but did not take it. She was also scheduled on Monday 8/21/2023 and did not show up to her appt. Pt stated her BP keeps raising. Please Advise.

## 2023-08-23 NOTE — PROGRESS NOTES
Pam Thomas (:  1970) is a 48 y.o. female,Established patient, here for evaluation of the following chief complaint(s):  Hypertension (X2 weeks, states steadily raising. Taking hctz)         ASSESSMENT/PLAN:  1. Primary hypertension  -     amLODIPine (NORVASC) 5 MG tablet; Take 1 tablet by mouth daily, Disp-30 tablet, R-3Normal  This time we will send over Coricidin for her sinus issues and start amlodipine. Continue to monitor blood pressure over the next several days and keep follow-up appointment with PCP in the next several weeks for further evaluation and treatment. Red flags discussed with patient if these occur return to clinic or emergency department. Patient voiced understanding. No follow-ups on file. Subjective   SUBJECTIVE/OBJECTIVE:  Hypertension  Pertinent negatives include no chest pain, headaches, palpitations or shortness of breath. Patient presents today for worsening issues with increasing blood pressure. Patient states it has been worse over the last 2 weeks. Checking at work and has noticed an increase in 150s/110s. No chest pain but has had shortness of breath which she relates to her history of asthma. Of note patient states she has been dealing with sinus issues for the last several months and may have been taking medications with pseudoephedrine derivatives. She did have a follow-up appointment with her PCP but she did not keep it for some reason. Patient states that she was recently on a cruise and was having slight issues at that time as well. No nausea vomiting or diarrhea. No known sick contact. Fever or chills. Review of Systems   Constitutional:  Negative for chills and fever. HENT:  Positive for congestion. Negative for hearing loss, nosebleeds and sore throat. Eyes:  Negative for photophobia. Respiratory:  Negative for cough and shortness of breath. Cardiovascular:  Negative for chest pain, palpitations and leg swelling.

## 2023-08-24 ENCOUNTER — OFFICE VISIT (OUTPATIENT)
Dept: FAMILY MEDICINE CLINIC | Age: 53
End: 2023-08-24
Payer: COMMERCIAL

## 2023-08-24 VITALS
SYSTOLIC BLOOD PRESSURE: 128 MMHG | HEIGHT: 65 IN | OXYGEN SATURATION: 97 % | WEIGHT: 189 LBS | RESPIRATION RATE: 16 BRPM | BODY MASS INDEX: 31.49 KG/M2 | TEMPERATURE: 96.7 F | DIASTOLIC BLOOD PRESSURE: 76 MMHG | HEART RATE: 77 BPM

## 2023-08-24 DIAGNOSIS — I10 PRIMARY HYPERTENSION: ICD-10-CM

## 2023-08-24 PROCEDURE — 3074F SYST BP LT 130 MM HG: CPT | Performed by: FAMILY MEDICINE

## 2023-08-24 PROCEDURE — 1036F TOBACCO NON-USER: CPT | Performed by: FAMILY MEDICINE

## 2023-08-24 PROCEDURE — 3017F COLORECTAL CA SCREEN DOC REV: CPT | Performed by: FAMILY MEDICINE

## 2023-08-24 PROCEDURE — 99214 OFFICE O/P EST MOD 30 MIN: CPT | Performed by: FAMILY MEDICINE

## 2023-08-24 PROCEDURE — 3078F DIAST BP <80 MM HG: CPT | Performed by: FAMILY MEDICINE

## 2023-08-24 PROCEDURE — G8427 DOCREV CUR MEDS BY ELIG CLIN: HCPCS | Performed by: FAMILY MEDICINE

## 2023-08-24 PROCEDURE — G8417 CALC BMI ABV UP PARAM F/U: HCPCS | Performed by: FAMILY MEDICINE

## 2023-08-24 RX ORDER — FAMOTIDINE 40 MG/1
40 TABLET, FILM COATED ORAL NIGHTLY
Qty: 90 TABLET | Refills: 1 | Status: SHIPPED | OUTPATIENT
Start: 2023-08-24

## 2023-08-24 RX ORDER — HYDROCHLOROTHIAZIDE 12.5 MG/1
12.5 CAPSULE, GELATIN COATED ORAL DAILY
Qty: 90 CAPSULE | Refills: 3 | Status: SHIPPED | OUTPATIENT
Start: 2023-08-24

## 2023-08-24 RX ORDER — ASCORBIC ACID, CHOLECALCIFEROL, .ALPHA.-TOCOPHEROL ACETATE, DL-, PYRIDOXINE HYDROCHLORIDE, FOLIC ACID, CYANOCOBALAMIN, BIOTIN, CALCIUM CARBONATE, FERROUS ASPARTO GLYCINATE, IRON, POTASSIUM IODIDE, MAGNESIUM OXIDE, DOCONEXENT AND LOWBUSH BLUEBERRY 60; 1000; 10; 26; 400; 13; 280; 80; 9; 9; 150; 25; 350; 25; 600 MG/1; [IU]/1; [IU]/1; MG/1; UG/1; UG/1; UG/1; MG/1; MG/1; MG/1; UG/1; MG/1; MG/1; MG/1; UG/1
1 CAPSULE, GELATIN COATED ORAL DAILY
Qty: 90 CAPSULE | Refills: 4 | Status: SHIPPED | OUTPATIENT
Start: 2023-08-24

## 2023-08-24 RX ORDER — AMLODIPINE BESYLATE 5 MG/1
5 TABLET ORAL DAILY
Qty: 90 TABLET | Refills: 3 | Status: SHIPPED | OUTPATIENT
Start: 2023-08-24

## 2023-09-11 ENCOUNTER — HOSPITAL ENCOUNTER (OUTPATIENT)
Dept: ULTRASOUND IMAGING | Age: 53
Discharge: HOME | End: 2023-09-13
Payer: COMMERCIAL

## 2023-09-11 DIAGNOSIS — E04.2 MULTINODULAR GOITER: ICD-10-CM

## 2023-09-11 PROCEDURE — 10005 FNA BX W/US GDN 1ST LES: CPT

## 2023-09-11 PROCEDURE — 88172 CYTP DX EVAL FNA 1ST EA SITE: CPT

## 2023-09-11 PROCEDURE — 88173 CYTOPATH EVAL FNA REPORT: CPT

## 2023-09-11 PROCEDURE — 88305 TISSUE EXAM BY PATHOLOGIST: CPT

## 2023-09-11 NOTE — DISCHARGE INSTRUCTIONS
Discharge Instructions for Needle Biopsy: Thyroid     A needle biopsy is done to remove a small sample of tissue from your thyroid gland. The sample is then tested to see if there are malignant (cancerous) or benign (noncancerous) cells. There are two types of biopsies:   Fine-needle aspiration (FNA)the most common type    Coarse-needle biopsy (CNB)   This procedure is usually done in the doctor's office, but may also be done as an outpatient procedure at the hospital.   Steps to [address] [place] [address]    In most cases, you can return home or to work without any negative effects. After a FNA, you should not have any pain or tenderness. For a CNB, you may feel soreness at the site of the biopsy for 1-2 days after the test.   There are no complications associated with a FNA. A CNB may cause bleeding into the thyroid gland. For a FNA, remove the bandage after a few hours. For a CNB, remove the bandage after a few days. Ask your doctor if you can shower and bathe. Diet    Resume your normal diet. Physical Activity    Avoid vigorous physical activity for 24 hours. Ask your doctor when it is safe for you to drive. Medications    If you had to stop medicines before the procedure, ask your doctor when you can start again. Medicines often stopped include:   Anti-inflammatory drugs (eg, aspirin )   Blood thinners like clopidogrel (Plavix) or warfarin (Coumadin)   Your doctor may tell you to take an over-the-counter pain medicine, such as acetaminophen. If you are taking medications, follow these general guidelines:   Take your medication as directed. Do not change the amount or the schedule. Do not stop taking them without talking to your doctor. Do not share them. Know what the results and side effects. Report them to your doctor. Some drugs can be dangerous when mixed. Talk to a doctor or pharmacist if you are taking more than one drug.  This includes over-the-counter medication and herb or dietary

## 2023-09-13 LAB — NON-GYN CYTOLOGY REPORT: NORMAL

## 2023-09-20 ENCOUNTER — OFFICE VISIT (OUTPATIENT)
Dept: ENDOCRINOLOGY | Age: 53
End: 2023-09-20

## 2023-09-20 VITALS
SYSTOLIC BLOOD PRESSURE: 116 MMHG | HEIGHT: 65 IN | WEIGHT: 188 LBS | OXYGEN SATURATION: 97 % | HEART RATE: 75 BPM | BODY MASS INDEX: 31.32 KG/M2 | DIASTOLIC BLOOD PRESSURE: 74 MMHG

## 2023-09-20 DIAGNOSIS — E04.2 MULTINODULAR GOITER: Primary | ICD-10-CM

## 2023-09-20 DIAGNOSIS — Z76.0 MEDICATION REFILL: ICD-10-CM

## 2023-09-20 DIAGNOSIS — E22.1 HYPERPROLACTINEMIA (HCC): ICD-10-CM

## 2023-09-20 DIAGNOSIS — R73.03 PREDIABETES: ICD-10-CM

## 2023-09-20 RX ORDER — SPIRONOLACTONE 50 MG/1
50 TABLET, FILM COATED ORAL 2 TIMES DAILY
Qty: 180 TABLET | Refills: 3 | Status: SHIPPED | OUTPATIENT
Start: 2023-09-20

## 2023-09-20 NOTE — PROGRESS NOTES
conditions    Return in about 1 year (around 9/20/2024) for Multinodular goiter. The above issues were reviewed with the patient who understood and agreed with the plan. Thank you for allowing us to participate in the care of this patient. Please do not hesitate to contact us with any additional questions. Diagnosis Orders   1. Multinodular goiter  TSH    T4, Free    US THYROID      2. Hyperprolactinemia ([address])        3. Prediabetes  Hemoglobin A1C      4.  Medication refill  spironolactone (ALDACTONE) 50 MG tablet            Paige Lagunas MD  Endocrinologist, [facility])   [address] [place] [id number]   Phone: [phone]  Fax: [phone]  -------------------------------------------------------------  Electronically signed by Lazarus Bal MD

## 2023-10-23 ENCOUNTER — OFFICE VISIT (OUTPATIENT)
Dept: FAMILY MEDICINE CLINIC | Age: 53
End: 2023-10-23
Payer: COMMERCIAL

## 2023-10-23 VITALS
HEART RATE: 78 BPM | SYSTOLIC BLOOD PRESSURE: 118 MMHG | WEIGHT: 176.6 LBS | OXYGEN SATURATION: 96 % | DIASTOLIC BLOOD PRESSURE: 66 MMHG | HEIGHT: 65 IN | RESPIRATION RATE: 18 BRPM | BODY MASS INDEX: 29.42 KG/M2 | TEMPERATURE: 97.4 F

## 2023-10-23 DIAGNOSIS — I10 PRIMARY HYPERTENSION: Primary | ICD-10-CM

## 2023-10-23 DIAGNOSIS — E66.09 CLASS 1 OBESITY DUE TO EXCESS CALORIES WITH SERIOUS COMORBIDITY AND BODY MASS INDEX (BMI) OF 34.0 TO 34.9 IN ADULT: ICD-10-CM

## 2023-10-23 DIAGNOSIS — J30.89 SEASONAL ALLERGIC RHINITIS DUE TO OTHER ALLERGIC TRIGGER: ICD-10-CM

## 2023-10-23 DIAGNOSIS — T75.3XXA MOTION SICKNESS, INITIAL ENCOUNTER: ICD-10-CM

## 2023-10-23 DIAGNOSIS — G43.711 INTRACTABLE CHRONIC MIGRAINE WITHOUT AURA AND WITH STATUS MIGRAINOSUS: ICD-10-CM

## 2023-10-23 DIAGNOSIS — Z76.0 MEDICATION REFILL: ICD-10-CM

## 2023-10-23 PROCEDURE — G8484 FLU IMMUNIZE NO ADMIN: HCPCS | Performed by: FAMILY MEDICINE

## 2023-10-23 PROCEDURE — G8427 DOCREV CUR MEDS BY ELIG CLIN: HCPCS | Performed by: FAMILY MEDICINE

## 2023-10-23 PROCEDURE — 1036F TOBACCO NON-USER: CPT | Performed by: FAMILY MEDICINE

## 2023-10-23 PROCEDURE — 3074F SYST BP LT 130 MM HG: CPT | Performed by: FAMILY MEDICINE

## 2023-10-23 PROCEDURE — 3078F DIAST BP <80 MM HG: CPT | Performed by: FAMILY MEDICINE

## 2023-10-23 PROCEDURE — 99214 OFFICE O/P EST MOD 30 MIN: CPT | Performed by: FAMILY MEDICINE

## 2023-10-23 PROCEDURE — G8417 CALC BMI ABV UP PARAM F/U: HCPCS | Performed by: FAMILY MEDICINE

## 2023-10-23 PROCEDURE — 3017F COLORECTAL CA SCREEN DOC REV: CPT | Performed by: FAMILY MEDICINE

## 2023-10-23 RX ORDER — UBROGEPANT 100 MG/1
TABLET ORAL
Qty: 36 TABLET | Refills: 3 | Status: SHIPPED | OUTPATIENT
Start: 2023-10-23

## 2023-10-23 RX ORDER — ALBUTEROL SULFATE 90 UG/1
AEROSOL, METERED RESPIRATORY (INHALATION)
Qty: 8.5 G | Refills: 0 | Status: SHIPPED | OUTPATIENT
Start: 2023-10-23

## 2023-10-23 RX ORDER — PROMETHAZINE HYDROCHLORIDE 25 MG/1
25 TABLET ORAL 2 TIMES DAILY PRN
Qty: 30 TABLET | Refills: 2 | Status: SHIPPED | OUTPATIENT
Start: 2023-10-23

## 2023-10-23 RX ORDER — ESOMEPRAZOLE MAGNESIUM 40 MG/1
40 CAPSULE, DELAYED RELEASE ORAL
Qty: 90 CAPSULE | Refills: 3 | Status: SHIPPED | OUTPATIENT
Start: 2023-10-23

## 2023-10-23 RX ORDER — RIZATRIPTAN BENZOATE 10 MG/1
TABLET ORAL
Qty: 27 TABLET | Refills: 3 | Status: SHIPPED | OUTPATIENT
Start: 2023-10-23

## 2023-10-23 RX ORDER — PHENTERMINE HYDROCHLORIDE 37.5 MG/1
37.5 TABLET ORAL
Qty: 30 TABLET | Refills: 0 | Status: SHIPPED | OUTPATIENT
Start: 2023-10-23 | End: 2023-11-22

## 2023-10-23 RX ORDER — AMLODIPINE BESYLATE 5 MG/1
5 TABLET ORAL DAILY
Qty: 90 TABLET | Refills: 3 | Status: SHIPPED | OUTPATIENT
Start: 2023-10-23

## 2023-10-23 RX ORDER — OLOPATADINE HYDROCHLORIDE 1 MG/ML
1 SOLUTION/ DROPS OPHTHALMIC 2 TIMES DAILY
Qty: 5 ML | Refills: 5 | Status: SHIPPED | OUTPATIENT
Start: 2023-10-23 | End: 2024-08-18

## 2023-10-23 RX ORDER — SPIRONOLACTONE 50 MG/1
50 TABLET, FILM COATED ORAL 2 TIMES DAILY
Qty: 180 TABLET | Refills: 3 | Status: SHIPPED | OUTPATIENT
Start: 2023-10-23

## 2023-10-23 RX ORDER — FAMOTIDINE 40 MG/1
40 TABLET, FILM COATED ORAL NIGHTLY
Qty: 90 TABLET | Refills: 1 | Status: SHIPPED | OUTPATIENT
Start: 2023-10-23

## 2023-10-23 RX ORDER — MONTELUKAST SODIUM 10 MG/1
10 TABLET ORAL NIGHTLY
Qty: 90 TABLET | Refills: 3 | Status: SHIPPED | OUTPATIENT
Start: 2023-10-23

## 2023-10-23 RX ORDER — FLUTICASONE PROPIONATE 50 MCG
1 SPRAY, SUSPENSION (ML) NASAL DAILY
Qty: 1 EACH | Refills: 5 | Status: SHIPPED | OUTPATIENT
Start: 2023-10-23

## 2023-10-23 RX ORDER — HYDROCHLOROTHIAZIDE 12.5 MG/1
12.5 CAPSULE, GELATIN COATED ORAL DAILY
Qty: 90 CAPSULE | Refills: 3 | Status: SHIPPED | OUTPATIENT
Start: 2023-10-23

## 2023-10-25 DIAGNOSIS — N64.4 BREAST PAIN IN FEMALE: ICD-10-CM

## 2023-10-25 DIAGNOSIS — N63.25 BREAST LUMP ON LEFT SIDE AT 9 O'CLOCK POSITION: ICD-10-CM

## 2023-10-25 DIAGNOSIS — R92.30 BREAST DENSITY: ICD-10-CM

## 2023-10-25 DIAGNOSIS — N64.4 BREAST PAIN: ICD-10-CM

## 2023-11-27 ENCOUNTER — TELEPHONE (OUTPATIENT)
Dept: FAMILY MEDICINE CLINIC | Age: 53
End: 2023-11-27

## 2023-11-27 NOTE — TELEPHONE ENCOUNTER
Patient called and is wanting to come in for a refill but she has to see you first , also she just had surgery and needs for you to check some things to make sure she is not getting a blood clot, you have no upcoming appts available and wanted to know when you could see her

## 2023-11-29 NOTE — TELEPHONE ENCOUNTER
Patient needs a refill of her phenergan she stated and she also said that her surgeon told her to call you about the blood clot but she will go to the ER

## 2023-12-14 ENCOUNTER — OFFICE VISIT (OUTPATIENT)
Dept: FAMILY MEDICINE CLINIC | Age: 53
End: 2023-12-14
Payer: COMMERCIAL

## 2023-12-14 VITALS
TEMPERATURE: 97.7 F | OXYGEN SATURATION: 98 % | WEIGHT: 169 LBS | SYSTOLIC BLOOD PRESSURE: 140 MMHG | RESPIRATION RATE: 18 BRPM | HEIGHT: 65 IN | BODY MASS INDEX: 28.16 KG/M2 | HEART RATE: 88 BPM | DIASTOLIC BLOOD PRESSURE: 90 MMHG

## 2023-12-14 DIAGNOSIS — J01.41 ACUTE RECURRENT PANSINUSITIS: Primary | ICD-10-CM

## 2023-12-14 PROCEDURE — 3080F DIAST BP >= 90 MM HG: CPT | Performed by: FAMILY MEDICINE

## 2023-12-14 PROCEDURE — 1036F TOBACCO NON-USER: CPT | Performed by: FAMILY MEDICINE

## 2023-12-14 PROCEDURE — G8427 DOCREV CUR MEDS BY ELIG CLIN: HCPCS | Performed by: FAMILY MEDICINE

## 2023-12-14 PROCEDURE — G8417 CALC BMI ABV UP PARAM F/U: HCPCS | Performed by: FAMILY MEDICINE

## 2023-12-14 PROCEDURE — 3077F SYST BP >= 140 MM HG: CPT | Performed by: FAMILY MEDICINE

## 2023-12-14 PROCEDURE — 99213 OFFICE O/P EST LOW 20 MIN: CPT | Performed by: FAMILY MEDICINE

## 2023-12-14 PROCEDURE — G8484 FLU IMMUNIZE NO ADMIN: HCPCS | Performed by: FAMILY MEDICINE

## 2023-12-14 PROCEDURE — 3017F COLORECTAL CA SCREEN DOC REV: CPT | Performed by: FAMILY MEDICINE

## 2023-12-14 RX ORDER — LEVOFLOXACIN 250 MG/1
250 TABLET, FILM COATED ORAL DAILY
Qty: 7 TABLET | Refills: 0 | Status: SHIPPED | OUTPATIENT
Start: 2023-12-14 | End: 2023-12-21

## 2023-12-14 NOTE — PROGRESS NOTES
23  Nithin Dean : 1970 Sex: female  Age: 48 y.o. Assessment and Plan:  Paulie Quinonez was seen today for pharyngitis and congestion. Diagnoses and all orders for this visit:    Acute recurrent pansinusitis  -     levoFLOXacin (LEVAQUIN) 250 MG tablet; Take 1 tablet by mouth daily for 7 days    She had a course of doxycycline several weeks ago which was ineffective. Will go ahead and change to a week of Levaquin once daily. She can continue symptomatic treatment including Tylenol, fluids, rest, Mucinex, Claritin, coolmist vaporizer. If complaints do not improve, or worsen in any way, follow-up with her PCP. Return 3 to 5-day recheck if not improved. Chief Complaint   Patient presents with    Pharyngitis     Been going on for 1 month, home test negative COVID    Congestion       Congestion, pressure, drainage, facial tenderness, mild headache, sore throat, cough, intermittent over the last 4 weeks. Denies fever, chills, diaphoresis, nausea, vomiting, decreased oral intake. Denies other GI or  complaints. OTC treatments minimally effective. Review of Systems   Constitutional:  Negative for appetite change, fatigue and unexpected weight change. HENT:  Positive for congestion, postnasal drip, sinus pressure and sore throat. Negative for ear pain, hearing loss, sinus pain and trouble swallowing. Eyes:  Negative for photophobia, pain, discharge and redness. Respiratory:  Positive for cough. Negative for chest tightness and shortness of breath. Cardiovascular:  Negative for chest pain, palpitations and leg swelling. Gastrointestinal:  Negative for abdominal pain, blood in stool, diarrhea, nausea and vomiting. Endocrine: Negative. Genitourinary:  Negative for dysuria, flank pain, frequency and hematuria. Musculoskeletal:  Negative for arthralgias, back pain, joint swelling and myalgias. Skin: Negative. Allergic/Immunologic: Negative.     Neurological:

## 2024-01-22 ENCOUNTER — TELEPHONE (OUTPATIENT)
Dept: FAMILY MEDICINE CLINIC | Age: 54
End: 2024-01-22

## 2024-01-22 NOTE — TELEPHONE ENCOUNTER
Pt called in wanting to come in sooner than later for a follow up for tonsillitis and to talk about a referral for dermatology.    Please advise

## 2024-01-26 ENCOUNTER — OFFICE VISIT (OUTPATIENT)
Dept: FAMILY MEDICINE CLINIC | Age: 54
End: 2024-01-26
Payer: COMMERCIAL

## 2024-01-26 VITALS
SYSTOLIC BLOOD PRESSURE: 110 MMHG | WEIGHT: 171.6 LBS | BODY MASS INDEX: 28.59 KG/M2 | HEIGHT: 65 IN | TEMPERATURE: 97.8 F | RESPIRATION RATE: 16 BRPM | DIASTOLIC BLOOD PRESSURE: 70 MMHG | OXYGEN SATURATION: 98 % | HEART RATE: 88 BPM

## 2024-01-26 DIAGNOSIS — I10 PRIMARY HYPERTENSION: ICD-10-CM

## 2024-01-26 DIAGNOSIS — T75.3XXA MOTION SICKNESS, INITIAL ENCOUNTER: ICD-10-CM

## 2024-01-26 DIAGNOSIS — M25.532 PAIN OF BOTH WRIST JOINTS: ICD-10-CM

## 2024-01-26 DIAGNOSIS — R73.03 PREDIABETES: ICD-10-CM

## 2024-01-26 DIAGNOSIS — L30.9 DERMATITIS: Primary | ICD-10-CM

## 2024-01-26 DIAGNOSIS — M25.531 PAIN OF BOTH WRIST JOINTS: ICD-10-CM

## 2024-01-26 DIAGNOSIS — J30.89 SEASONAL ALLERGIC RHINITIS DUE TO OTHER ALLERGIC TRIGGER: ICD-10-CM

## 2024-01-26 LAB
C-REACTIVE PROTEIN: 6 MG/L (ref 0–5)
RHEUMATOID FACTOR: <10 IU/ML (ref 0–13)
SEDIMENTATION RATE, ERYTHROCYTE: 25 MM/HR (ref 0–20)
T4 FREE: 1.2 NG/DL (ref 0.9–1.7)

## 2024-01-26 PROCEDURE — G8427 DOCREV CUR MEDS BY ELIG CLIN: HCPCS | Performed by: FAMILY MEDICINE

## 2024-01-26 PROCEDURE — 3017F COLORECTAL CA SCREEN DOC REV: CPT | Performed by: FAMILY MEDICINE

## 2024-01-26 PROCEDURE — G8484 FLU IMMUNIZE NO ADMIN: HCPCS | Performed by: FAMILY MEDICINE

## 2024-01-26 PROCEDURE — G8417 CALC BMI ABV UP PARAM F/U: HCPCS | Performed by: FAMILY MEDICINE

## 2024-01-26 PROCEDURE — 99214 OFFICE O/P EST MOD 30 MIN: CPT | Performed by: FAMILY MEDICINE

## 2024-01-26 PROCEDURE — 3074F SYST BP LT 130 MM HG: CPT | Performed by: FAMILY MEDICINE

## 2024-01-26 PROCEDURE — 1036F TOBACCO NON-USER: CPT | Performed by: FAMILY MEDICINE

## 2024-01-26 PROCEDURE — 36415 COLL VENOUS BLD VENIPUNCTURE: CPT | Performed by: FAMILY MEDICINE

## 2024-01-26 PROCEDURE — 3078F DIAST BP <80 MM HG: CPT | Performed by: FAMILY MEDICINE

## 2024-01-26 RX ORDER — HYDRALAZINE HYDROCHLORIDE 10 MG/1
TABLET, FILM COATED ORAL
COMMUNITY
Start: 2023-08-15

## 2024-01-26 RX ORDER — ALBUTEROL SULFATE 90 UG/1
AEROSOL, METERED RESPIRATORY (INHALATION)
Qty: 8.5 G | Refills: 0 | Status: SHIPPED | OUTPATIENT
Start: 2024-01-26

## 2024-01-26 RX ORDER — AMOXICILLIN 875 MG/1
TABLET, COATED ORAL
COMMUNITY
Start: 2024-01-22

## 2024-01-26 RX ORDER — FAMOTIDINE 40 MG/1
40 TABLET, FILM COATED ORAL NIGHTLY
Qty: 90 TABLET | Refills: 1 | Status: SHIPPED | OUTPATIENT
Start: 2024-01-26

## 2024-01-26 RX ORDER — TIZANIDINE 4 MG/1
4 TABLET ORAL 4 TIMES DAILY PRN
Qty: 20 TABLET | Refills: 0 | Status: CANCELLED | OUTPATIENT
Start: 2024-01-26

## 2024-01-26 RX ORDER — PROMETHAZINE HYDROCHLORIDE 25 MG/1
25 TABLET ORAL 2 TIMES DAILY PRN
Qty: 30 TABLET | Refills: 2 | Status: SHIPPED | OUTPATIENT
Start: 2024-01-26

## 2024-01-26 RX ORDER — ONDANSETRON 4 MG/1
TABLET, FILM COATED ORAL
Qty: 30 TABLET | Refills: 1 | Status: SHIPPED | OUTPATIENT
Start: 2024-01-26

## 2024-01-26 RX ORDER — FLUTICASONE PROPIONATE 50 MCG
1 SPRAY, SUSPENSION (ML) NASAL DAILY
Qty: 1 EACH | Refills: 5 | Status: SHIPPED | OUTPATIENT
Start: 2024-01-26

## 2024-01-26 ASSESSMENT — PATIENT HEALTH QUESTIONNAIRE - PHQ9
1. LITTLE INTEREST OR PLEASURE IN DOING THINGS: 0
SUM OF ALL RESPONSES TO PHQ QUESTIONS 1-9: 0
2. FEELING DOWN, DEPRESSED OR HOPELESS: 0
SUM OF ALL RESPONSES TO PHQ9 QUESTIONS 1 & 2: 0

## 2024-01-26 NOTE — PROGRESS NOTES
1/31/2024    Chief Complaint   Patient presents with    Follow-up     Pt is looking for referral to dermatology. Pt was diagnosed with tonsillitis and has been on 4 rounds of atb with no change.        AJAY Thomas is a 53 y.o. patient that presents today for:    Recent ear infection and tonsillitis. Given amoxicillin. Denies fever, chills, nausea or vomiting. Using flonase and loratadine with little ros.     Would like to see Derm due to dry skin. Has tried OTC products.     Carpal Tunnel Syndrome: Patient presents for presents evaluation of pain in hands and hand paresthesias.  Onset of the symptoms was several years ago. Current symptoms include  NTW . Inciting event/aggravating factors: work related keyboarding Patient's course of sx:gradually worsening. Evaluation to date: none.  Treatment to date: none. R>L, RHD  Hands are swollen and painful.       Patient's past medical, surgical, social and/or family history reviewed, updated in chart, and are non-contributory (unless otherwise stated).  Medications and allergies also reviewed and updated in chart.     ROS negative unless otherwise specified    Physical Exam  Temp Readings from Last 3 Encounters:   01/26/24 97.8 °F (36.6 °C) (Temporal)   12/14/23 97.7 °F (36.5 °C) (Temporal)   10/23/23 97.4 °F (36.3 °C)     Wt Readings from Last 3 Encounters:   01/26/24 77.8 kg (171 lb 9.6 oz)   12/14/23 76.7 kg (169 lb)   12/08/23 75.8 kg (167 lb)     BP Readings from Last 3 Encounters:   01/26/24 110/70   12/14/23 (!) 140/90   12/08/23 134/81     Pulse Readings from Last 3 Encounters:   01/26/24 88   12/14/23 88   10/23/23 78       General appearance: alert, well appearing, and in no distress, oriented to person, place, and time and normal appearing weight.    CVS exam: normal rate, regular rhythm, normal S1, S2, no murmurs, rubs, clicks or gallops.  Radial pulses 2+ bilateral.  PT/DP pulse 2+ bilat.  No C/C/E    Chest: clear to auscultation, no

## 2024-01-29 LAB — ANTI-NUCLEAR ANTIBODY (ANA): NEGATIVE

## 2024-01-31 LAB — CCP IGG ANTIBODIES: 1.5 U/ML (ref 0–7)

## 2024-02-22 DIAGNOSIS — Z76.0 MEDICATION REFILL: ICD-10-CM

## 2024-02-22 DIAGNOSIS — G43.711 INTRACTABLE CHRONIC MIGRAINE WITHOUT AURA AND WITH STATUS MIGRAINOSUS: ICD-10-CM

## 2024-02-22 RX ORDER — MONTELUKAST SODIUM 10 MG/1
10 TABLET ORAL NIGHTLY
Qty: 90 TABLET | Refills: 3 | Status: SHIPPED | OUTPATIENT
Start: 2024-02-22

## 2024-02-22 RX ORDER — RIZATRIPTAN BENZOATE 10 MG/1
TABLET ORAL
Qty: 27 TABLET | Refills: 3 | Status: SHIPPED | OUTPATIENT
Start: 2024-02-22

## 2024-02-22 RX ORDER — ASCORBIC ACID, CHOLECALCIFEROL, .ALPHA.-TOCOPHEROL ACETATE, DL-, PYRIDOXINE HYDROCHLORIDE, FOLIC ACID, CYANOCOBALAMIN, BIOTIN, CALCIUM CARBONATE, FERROUS ASPARTO GLYCINATE, IRON, POTASSIUM IODIDE, MAGNESIUM OXIDE, DOCONEXENT AND LOWBUSH BLUEBERRY 60; 1000; 10; 26; 400; 13; 280; 80; 9; 9; 150; 25; 350; 25; 600 MG/1; [IU]/1; [IU]/1; MG/1; UG/1; UG/1; UG/1; MG/1; MG/1; MG/1; UG/1; MG/1; MG/1; MG/1; UG/1
1 CAPSULE, GELATIN COATED ORAL DAILY
Qty: 90 CAPSULE | Refills: 4 | Status: SHIPPED | OUTPATIENT
Start: 2024-02-22

## 2024-02-22 RX ORDER — ESOMEPRAZOLE MAGNESIUM 40 MG/1
40 CAPSULE, DELAYED RELEASE ORAL
Qty: 90 CAPSULE | Refills: 3 | Status: SHIPPED | OUTPATIENT
Start: 2024-02-22

## 2024-02-22 RX ORDER — UBROGEPANT 100 MG/1
TABLET ORAL
Qty: 36 TABLET | Refills: 3 | Status: SHIPPED | OUTPATIENT
Start: 2024-02-22

## 2024-02-22 RX ORDER — SPIRONOLACTONE 50 MG/1
50 TABLET, FILM COATED ORAL 2 TIMES DAILY
Qty: 180 TABLET | Refills: 3 | Status: SHIPPED | OUTPATIENT
Start: 2024-02-22

## 2024-03-04 ENCOUNTER — OFFICE VISIT (OUTPATIENT)
Dept: FAMILY MEDICINE CLINIC | Age: 54
End: 2024-03-04
Payer: COMMERCIAL

## 2024-03-04 VITALS
OXYGEN SATURATION: 97 % | BODY MASS INDEX: 28.62 KG/M2 | DIASTOLIC BLOOD PRESSURE: 72 MMHG | WEIGHT: 172 LBS | HEART RATE: 74 BPM | TEMPERATURE: 98.5 F | SYSTOLIC BLOOD PRESSURE: 112 MMHG

## 2024-03-04 DIAGNOSIS — J32.9 SINOBRONCHITIS: Primary | ICD-10-CM

## 2024-03-04 DIAGNOSIS — R05.9 COUGH, UNSPECIFIED TYPE: ICD-10-CM

## 2024-03-04 DIAGNOSIS — J40 SINOBRONCHITIS: Primary | ICD-10-CM

## 2024-03-04 LAB
INFLUENZA A ANTIBODY: NORMAL
INFLUENZA B ANTIBODY: NORMAL
Lab: NORMAL
PERFORMING INSTRUMENT: NORMAL
QC PASS/FAIL: NORMAL
SARS-COV-2, POC: NORMAL

## 2024-03-04 PROCEDURE — 3074F SYST BP LT 130 MM HG: CPT

## 2024-03-04 PROCEDURE — 3017F COLORECTAL CA SCREEN DOC REV: CPT

## 2024-03-04 PROCEDURE — 1036F TOBACCO NON-USER: CPT

## 2024-03-04 PROCEDURE — 99214 OFFICE O/P EST MOD 30 MIN: CPT

## 2024-03-04 PROCEDURE — G8427 DOCREV CUR MEDS BY ELIG CLIN: HCPCS

## 2024-03-04 PROCEDURE — 3078F DIAST BP <80 MM HG: CPT

## 2024-03-04 PROCEDURE — G8484 FLU IMMUNIZE NO ADMIN: HCPCS

## 2024-03-04 PROCEDURE — 87804 INFLUENZA ASSAY W/OPTIC: CPT

## 2024-03-04 PROCEDURE — 3006F CXR DOC REV: CPT

## 2024-03-04 PROCEDURE — 87426 SARSCOV CORONAVIRUS AG IA: CPT

## 2024-03-04 PROCEDURE — G8417 CALC BMI ABV UP PARAM F/U: HCPCS

## 2024-03-04 RX ORDER — AMOXICILLIN 875 MG/1
875 TABLET, COATED ORAL 2 TIMES DAILY
Qty: 20 TABLET | Refills: 0 | Status: SHIPPED | OUTPATIENT
Start: 2024-03-04 | End: 2024-03-14

## 2024-03-04 RX ORDER — PREDNISONE 5 MG/1
TABLET ORAL
Qty: 30 TABLET | Refills: 0 | Status: SHIPPED | OUTPATIENT
Start: 2024-03-04

## 2024-03-04 NOTE — PROGRESS NOTES
Chief Complaint       Cough (Started 6 days ago), Congestion, Drainage, and Fatigue    History of Present Illness   Source of history provided by:  patient.      Maci Thomas is a 53 y.o. old female presenting to the walk in clinic for evaluation of sinus pressure, nasal congestion, discolored nasal drainage, bilateral ear pressure, mild productive cough green mucous x 7 days.  Patient has had symptoms intermittently over the past few months, worsening and more noticeable over the past week.  She reports she does feel like her asthma has been flaring up and would like chest x-ray due to increased shortness of breath. Denies any fever, chills, wheezing, CP, SOB, or GI symptoms.     ROS    Unless otherwise stated in this report or unable to obtain because of the patient's clinical or mental status as evidenced by the medical record, this patients's positive and negative responses for Review of Systems, constitutional, psych, eyes, ENT, cardiovascular, respiratory, gastrointestinal, neurological, genitourinary, musculoskeletal, integument systems and systems related to the presenting problem are either stated in the preceding or were not pertinent or were negative for the symptoms and/or complaints related to the medical problem.    Physical Exam         VS:  /72 (Site: Right Upper Arm, Position: Sitting)   Pulse 74   Temp 98.5 °F (36.9 °C) (Temporal)   Wt 78 kg (172 lb)   LMP 10/01/2018 (Approximate)   SpO2 97%   BMI 28.62 kg/m²    Oxygen Saturation Interpretation: Normal.    Constitutional:  Alert, development consistent with age.  Head: Mild TTP over the maxillary sinuses.  Ears:  External Ears: Bilateral pinna normal. TMs translucent without erythema or perforation bilaterally.  Canals normal bilaterally without swelling or exudate  Nose: Mild congestion of the nasal mucosa. There is no injection to middle turbinates bilaterally.   Throat: Mild posterior pharyngeal erythema with mild post nasal

## 2024-03-05 ENCOUNTER — TELEPHONE (OUTPATIENT)
Dept: FAMILY MEDICINE CLINIC | Age: 54
End: 2024-03-05

## 2024-03-08 DIAGNOSIS — Z76.0 MEDICATION REFILL: ICD-10-CM

## 2024-03-08 RX ORDER — SPIRONOLACTONE 50 MG/1
50 TABLET, FILM COATED ORAL 2 TIMES DAILY
Qty: 180 TABLET | Refills: 3 | Status: SHIPPED | OUTPATIENT
Start: 2024-03-08

## 2024-03-11 ENCOUNTER — OFFICE VISIT (OUTPATIENT)
Dept: FAMILY MEDICINE CLINIC | Age: 54
End: 2024-03-11
Payer: COMMERCIAL

## 2024-03-11 ENCOUNTER — TELEPHONE (OUTPATIENT)
Dept: FAMILY MEDICINE CLINIC | Age: 54
End: 2024-03-11

## 2024-03-11 VITALS
SYSTOLIC BLOOD PRESSURE: 122 MMHG | HEART RATE: 50 BPM | RESPIRATION RATE: 16 BRPM | BODY MASS INDEX: 28.62 KG/M2 | HEIGHT: 65 IN | TEMPERATURE: 97.8 F | OXYGEN SATURATION: 97 % | DIASTOLIC BLOOD PRESSURE: 72 MMHG

## 2024-03-11 DIAGNOSIS — Z76.0 MEDICATION REFILL: ICD-10-CM

## 2024-03-11 DIAGNOSIS — T75.3XXA MOTION SICKNESS, INITIAL ENCOUNTER: ICD-10-CM

## 2024-03-11 DIAGNOSIS — G43.711 INTRACTABLE CHRONIC MIGRAINE WITHOUT AURA AND WITH STATUS MIGRAINOSUS: ICD-10-CM

## 2024-03-11 DIAGNOSIS — J40 SINOBRONCHITIS: Primary | ICD-10-CM

## 2024-03-11 DIAGNOSIS — J32.9 SINOBRONCHITIS: Primary | ICD-10-CM

## 2024-03-11 PROCEDURE — 99214 OFFICE O/P EST MOD 30 MIN: CPT | Performed by: FAMILY MEDICINE

## 2024-03-11 PROCEDURE — 3078F DIAST BP <80 MM HG: CPT | Performed by: FAMILY MEDICINE

## 2024-03-11 PROCEDURE — G8427 DOCREV CUR MEDS BY ELIG CLIN: HCPCS | Performed by: FAMILY MEDICINE

## 2024-03-11 PROCEDURE — 3017F COLORECTAL CA SCREEN DOC REV: CPT | Performed by: FAMILY MEDICINE

## 2024-03-11 PROCEDURE — 3074F SYST BP LT 130 MM HG: CPT | Performed by: FAMILY MEDICINE

## 2024-03-11 PROCEDURE — G8484 FLU IMMUNIZE NO ADMIN: HCPCS | Performed by: FAMILY MEDICINE

## 2024-03-11 PROCEDURE — G8417 CALC BMI ABV UP PARAM F/U: HCPCS | Performed by: FAMILY MEDICINE

## 2024-03-11 PROCEDURE — 1036F TOBACCO NON-USER: CPT | Performed by: FAMILY MEDICINE

## 2024-03-11 RX ORDER — UBROGEPANT 100 MG/1
TABLET ORAL
Qty: 36 TABLET | Refills: 3 | Status: SHIPPED | OUTPATIENT
Start: 2024-03-11

## 2024-03-11 RX ORDER — PROMETHAZINE HYDROCHLORIDE 25 MG/1
25 TABLET ORAL 2 TIMES DAILY PRN
Qty: 30 TABLET | Refills: 2 | Status: SHIPPED | OUTPATIENT
Start: 2024-03-11

## 2024-03-11 RX ORDER — RIZATRIPTAN BENZOATE 10 MG/1
TABLET ORAL
Qty: 27 TABLET | Refills: 3 | Status: SHIPPED | OUTPATIENT
Start: 2024-03-11

## 2024-03-11 RX ORDER — CODEINE PHOSPHATE/GUAIFENESIN 10-100MG/5
5 LIQUID (ML) ORAL 4 TIMES DAILY PRN
Qty: 180 ML | Refills: 0 | Status: SHIPPED | OUTPATIENT
Start: 2024-03-11 | End: 2024-03-16

## 2024-03-11 RX ORDER — MONTELUKAST SODIUM 10 MG/1
10 TABLET ORAL NIGHTLY
Qty: 90 TABLET | Refills: 3 | Status: SHIPPED | OUTPATIENT
Start: 2024-03-11

## 2024-03-11 RX ORDER — SPIRONOLACTONE 50 MG/1
50 TABLET, FILM COATED ORAL 2 TIMES DAILY
Qty: 180 TABLET | Refills: 3 | Status: SHIPPED | OUTPATIENT
Start: 2024-03-11

## 2024-03-11 SDOH — ECONOMIC STABILITY: INCOME INSECURITY: HOW HARD IS IT FOR YOU TO PAY FOR THE VERY BASICS LIKE FOOD, HOUSING, MEDICAL CARE, AND HEATING?: NOT HARD AT ALL

## 2024-03-11 SDOH — ECONOMIC STABILITY: FOOD INSECURITY: WITHIN THE PAST 12 MONTHS, YOU WORRIED THAT YOUR FOOD WOULD RUN OUT BEFORE YOU GOT MONEY TO BUY MORE.: NEVER TRUE

## 2024-03-11 SDOH — ECONOMIC STABILITY: FOOD INSECURITY: WITHIN THE PAST 12 MONTHS, THE FOOD YOU BOUGHT JUST DIDN'T LAST AND YOU DIDN'T HAVE MONEY TO GET MORE.: NEVER TRUE

## 2024-03-11 NOTE — PROGRESS NOTES
3/11/2024    Chief Complaint   Patient presents with    Congestion    Cough     SS for the past 2 weeks. Went to urgent care 1 week ago and was given an atb and steroid. She was covid and flu negative. Pt was given another steroid from pulmonologist.         AJAY Thomas is a 53 y.o. patient that presents today with cold symptoms.    Upper Respiratory Infection:  Patient complains of congestion, cough, and irritability. Onset of symptoms was many days ago, gradually worsening since that time.  Seen severl times, no improvement.   Did not take prednisone or abx per urgent care.     Patient's past medical, surgical, social and/or family history reviewed, updated in chart, and are non-contributory (unless otherwise stated).  Medications and allergies also reviewed and updated in chart.     ROS negative unless otherwise specified    Physical Exam  /72   Pulse 50   Temp 97.8 °F (36.6 °C) (Temporal)   Resp 16   Ht 1.651 m (5' 5\")   LMP 10/01/2018 (Approximate)   SpO2 97%   BMI 28.62 kg/m²     Wt Readings from Last 3 Encounters:   03/04/24 78 kg (172 lb)   01/26/24 77.8 kg (171 lb 9.6 oz)   12/14/23 76.7 kg (169 lb)     BP Readings from Last 3 Encounters:   03/11/24 122/72   03/04/24 112/72   01/26/24 110/70         General appearance: alert, well appearing, and in no distress, oriented to person, place, and time and normal appearing weight.    HEENT:  HEAD: Atraumatic, normocephalic  EYES: PERRL  EOM intact  EARS: bilateral TM's and external ear canals normal  NOSE: nasal mucosa, septum, turbinates normal bilaterally  MOUTH: mucous membranes moist and normal tonsils  NECK: supple, full range of motion, no mass, normal lymphadenopathy, no thyromegaly    CVS exam: normal rate, regular rhythm, normal S1, S2, no murmurs, rubs, clicks or gallops.  Radial pulses 2+ bilateral.  PT/DP pulse 2+ bilat.  No C/C/E    Chest: clear to auscultation, no wheezes, rales or rhonchi, symmetric air entry.

## 2024-03-11 NOTE — TELEPHONE ENCOUNTER
Pt called in stating that she has been sick for two weeks. States her symptoms are drainage all over and feels like it is in her chest. She can't cough up the mucus. She has been taking mucinex. She states she has been in and out of the walk in cares. She wanted to know if you would see her?    Please advise

## 2024-03-12 DIAGNOSIS — E66.09 CLASS 1 OBESITY DUE TO EXCESS CALORIES WITH SERIOUS COMORBIDITY AND BODY MASS INDEX (BMI) OF 34.0 TO 34.9 IN ADULT: ICD-10-CM

## 2024-03-12 RX ORDER — PHENTERMINE HYDROCHLORIDE 37.5 MG/1
37.5 TABLET ORAL
Qty: 30 TABLET | Refills: 0 | Status: SHIPPED | OUTPATIENT
Start: 2024-03-12 | End: 2024-04-11

## 2024-03-12 NOTE — TELEPHONE ENCOUNTER
Maci called and said that when she was here earlier the only prescription that was not called in was the Phentermine 37.5mg sent to [facility]

## 2024-04-01 DIAGNOSIS — E66.09 CLASS 1 OBESITY DUE TO EXCESS CALORIES WITH SERIOUS COMORBIDITY AND BODY MASS INDEX (BMI) OF 34.0 TO 34.9 IN ADULT: ICD-10-CM

## 2024-04-01 NOTE — TELEPHONE ENCOUNTER
Wants seen ASAP to discuss swelling and medications. Offered her Thursday at 10am but wants something sooner.

## 2024-04-02 RX ORDER — PHENTERMINE HYDROCHLORIDE 37.5 MG/1
37.5 TABLET ORAL
Qty: 30 TABLET | Refills: 0 | Status: SHIPPED | OUTPATIENT
Start: 2024-04-02 | End: 2024-05-02

## 2024-04-02 RX ORDER — PANTOPRAZOLE SODIUM 40 MG/1
40 TABLET, DELAYED RELEASE ORAL DAILY
Qty: 90 TABLET | Refills: 0 | Status: SHIPPED | OUTPATIENT
Start: 2024-04-02

## 2024-04-04 ENCOUNTER — OFFICE VISIT (OUTPATIENT)
Dept: FAMILY MEDICINE CLINIC | Age: 54
End: 2024-04-04
Payer: COMMERCIAL

## 2024-04-04 VITALS
OXYGEN SATURATION: 98 % | HEART RATE: 76 BPM | RESPIRATION RATE: 16 BRPM | HEIGHT: 65 IN | DIASTOLIC BLOOD PRESSURE: 86 MMHG | TEMPERATURE: 97.2 F | SYSTOLIC BLOOD PRESSURE: 124 MMHG | WEIGHT: 177.9 LBS | BODY MASS INDEX: 29.64 KG/M2

## 2024-04-04 DIAGNOSIS — R73.03 PREDIABETES: ICD-10-CM

## 2024-04-04 DIAGNOSIS — E66.09 CLASS 1 OBESITY DUE TO EXCESS CALORIES WITH SERIOUS COMORBIDITY AND BODY MASS INDEX (BMI) OF 34.0 TO 34.9 IN ADULT: ICD-10-CM

## 2024-04-04 DIAGNOSIS — E78.00 HYPERCHOLESTEROLEMIA: ICD-10-CM

## 2024-04-04 DIAGNOSIS — G43.711 INTRACTABLE CHRONIC MIGRAINE WITHOUT AURA AND WITH STATUS MIGRAINOSUS: ICD-10-CM

## 2024-04-04 DIAGNOSIS — I10 PRIMARY HYPERTENSION: Primary | ICD-10-CM

## 2024-04-04 LAB
ALBUMIN SERPL-MCNC: 4.1 G/DL (ref 3.5–5.2)
ALP BLD-CCNC: 108 U/L (ref 35–104)
ALT SERPL-CCNC: 19 U/L (ref 0–32)
ANION GAP SERPL CALCULATED.3IONS-SCNC: 16 MMOL/L (ref 7–16)
AST SERPL-CCNC: 20 U/L (ref 0–31)
BASOPHILS ABSOLUTE: 0.02 K/UL (ref 0–0.2)
BASOPHILS RELATIVE PERCENT: 0 % (ref 0–2)
BILIRUB SERPL-MCNC: <0.2 MG/DL (ref 0–1.2)
BUN BLDV-MCNC: 15 MG/DL (ref 6–20)
CALCIUM SERPL-MCNC: 9.5 MG/DL (ref 8.6–10.2)
CHLORIDE BLD-SCNC: 102 MMOL/L (ref 98–107)
CHOLESTEROL: 241 MG/DL
CO2: 22 MMOL/L (ref 22–29)
CREAT SERPL-MCNC: 0.8 MG/DL (ref 0.5–1)
EOSINOPHILS ABSOLUTE: 0.03 K/UL (ref 0.05–0.5)
EOSINOPHILS RELATIVE PERCENT: 0 % (ref 0–6)
GFR SERPL CREATININE-BSD FRML MDRD: 84 ML/MIN/1.73M2
GLUCOSE BLD-MCNC: 74 MG/DL (ref 74–99)
HCT VFR BLD CALC: 40.9 % (ref 34–48)
HDLC SERPL-MCNC: 59 MG/DL
HEMOGLOBIN: 12.9 G/DL (ref 11.5–15.5)
IMMATURE GRANULOCYTES %: 2 % (ref 0–5)
IMMATURE GRANULOCYTES ABSOLUTE: 0.13 K/UL (ref 0–0.58)
LDL CHOLESTEROL: 160 MG/DL
LYMPHOCYTES ABSOLUTE: 2.69 K/UL (ref 1.5–4)
LYMPHOCYTES RELATIVE PERCENT: 31 % (ref 20–42)
MCH RBC QN AUTO: 28 PG (ref 26–35)
MCHC RBC AUTO-ENTMCNC: 31.5 G/DL (ref 32–34.5)
MCV RBC AUTO: 88.7 FL (ref 80–99.9)
MONOCYTES ABSOLUTE: 0.72 K/UL (ref 0.1–0.95)
MONOCYTES RELATIVE PERCENT: 8 % (ref 2–12)
NEUTROPHILS ABSOLUTE: 5.04 K/UL (ref 1.8–7.3)
NEUTROPHILS RELATIVE PERCENT: 59 % (ref 43–80)
PDW BLD-RTO: 14.4 % (ref 11.5–15)
PLATELET # BLD: 288 K/UL (ref 130–450)
PMV BLD AUTO: 9.9 FL (ref 7–12)
POTASSIUM SERPL-SCNC: 4.7 MMOL/L (ref 3.5–5)
RBC # BLD: 4.61 M/UL (ref 3.5–5.5)
SODIUM BLD-SCNC: 140 MMOL/L (ref 132–146)
TOTAL PROTEIN: 7.3 G/DL (ref 6.4–8.3)
TRIGL SERPL-MCNC: 111 MG/DL
TSH SERPL DL<=0.05 MIU/L-ACNC: 2.3 UIU/ML (ref 0.27–4.2)
VLDLC SERPL CALC-MCNC: 22 MG/DL
WBC # BLD: 8.6 K/UL (ref 4.5–11.5)

## 2024-04-04 PROCEDURE — 3079F DIAST BP 80-89 MM HG: CPT | Performed by: FAMILY MEDICINE

## 2024-04-04 PROCEDURE — 3074F SYST BP LT 130 MM HG: CPT | Performed by: FAMILY MEDICINE

## 2024-04-04 PROCEDURE — G8417 CALC BMI ABV UP PARAM F/U: HCPCS | Performed by: FAMILY MEDICINE

## 2024-04-04 PROCEDURE — G8427 DOCREV CUR MEDS BY ELIG CLIN: HCPCS | Performed by: FAMILY MEDICINE

## 2024-04-04 PROCEDURE — 36415 COLL VENOUS BLD VENIPUNCTURE: CPT | Performed by: FAMILY MEDICINE

## 2024-04-04 PROCEDURE — 1036F TOBACCO NON-USER: CPT | Performed by: FAMILY MEDICINE

## 2024-04-04 PROCEDURE — 99214 OFFICE O/P EST MOD 30 MIN: CPT | Performed by: FAMILY MEDICINE

## 2024-04-04 PROCEDURE — 3017F COLORECTAL CA SCREEN DOC REV: CPT | Performed by: FAMILY MEDICINE

## 2024-04-04 NOTE — PROGRESS NOTES
4/16/2024    Chief Complaint   Patient presents with    Edema     Pt complains of swelling in bilateral arms and legs and stomach. Sunday night Pt stated that's when it was at it's worst, Pt stated there was pitting but no difficulty breathing.     Tachycardia     Pt states over a month ago there was one instance at rest her heart rate went up in the 150s but went away after a couple minutes, only other symptom associated is a headache.     Pt has refused leaving a micro        HPI    Maci Thomas is a 53 y.o. patient that presents today for:    Has not been on Phentermine, though PDMP shows it has been filled.     Patient's past medical, surgical, social and/or family history reviewed, updated in chart, and are non-contributory (unless otherwise stated).  Medications and allergies also reviewed and updated in chart.     ROS negative unless otherwise specified    Physical Exam  Temp Readings from Last 3 Encounters:   04/04/24 97.2 °F (36.2 °C) (Temporal)   03/11/24 97.8 °F (36.6 °C) (Temporal)   03/04/24 98.5 °F (36.9 °C) (Temporal)     Wt Readings from Last 3 Encounters:   04/04/24 80.7 kg (177 lb 14.4 oz)   03/04/24 78 kg (172 lb)   01/26/24 77.8 kg (171 lb 9.6 oz)     BP Readings from Last 3 Encounters:   04/04/24 124/86   03/11/24 122/72   03/04/24 112/72     Pulse Readings from Last 3 Encounters:   04/04/24 76   03/11/24 50   03/04/24 74       General appearance: alert, well appearing, and in no distress, oriented to person, place, and time and normal appearing weight.    CVS exam: normal rate, regular rhythm, normal S1, S2, no murmurs, rubs, clicks or gallops.  Radial pulses 2+ bilateral.  PT/DP pulse 2+ bilat.  No C/C/E    Chest: clear to auscultation, no wheezes, rales or rhonchi, symmetric air entry.     Abdomen: Soft, non-tender, non-distended, positive BS in all 4 quadrants    Extremities:Dorsalis pedis pulses palpated bilaterally, no clubbing, cyanosis, edema or erythema,     SKIN: no

## 2024-04-09 ENCOUNTER — TELEPHONE (OUTPATIENT)
Dept: FAMILY MEDICINE CLINIC | Age: 54
End: 2024-04-09

## 2024-04-09 NOTE — TELEPHONE ENCOUNTER
Patient called and stated that she is worried about her BP, on Saturday is was 74/56 and she almost passed out. She said that she had blood work done recently and no one called her to go over the results and was wondering if anything in the labs could explain her BP.

## 2024-04-11 NOTE — TELEPHONE ENCOUNTER
Tried to call multiple times but states \"Unable to reach at this time\", will try to call again.

## 2024-04-16 RX ORDER — PHENTERMINE HYDROCHLORIDE 37.5 MG/1
37.5 TABLET ORAL
Qty: 30 TABLET | Refills: 0 | Status: SHIPPED | OUTPATIENT
Start: 2024-04-16 | End: 2024-05-16

## 2024-04-16 RX ORDER — PANTOPRAZOLE SODIUM 40 MG/1
40 TABLET, DELAYED RELEASE ORAL DAILY
Qty: 90 TABLET | Refills: 0 | Status: SHIPPED | OUTPATIENT
Start: 2024-04-16

## 2024-04-16 RX ORDER — RIZATRIPTAN BENZOATE 10 MG/1
TABLET ORAL
Qty: 27 TABLET | Refills: 3 | Status: SHIPPED | OUTPATIENT
Start: 2024-04-16

## 2024-04-19 ENCOUNTER — OFFICE VISIT (OUTPATIENT)
Dept: FAMILY MEDICINE CLINIC | Age: 54
End: 2024-04-19
Payer: COMMERCIAL

## 2024-04-19 ENCOUNTER — HOSPITAL ENCOUNTER (EMERGENCY)
Age: 54
Discharge: LEFT BEFORE DISCHARGE | End: 2024-04-19
Payer: COMMERCIAL

## 2024-04-19 VITALS
HEIGHT: 65 IN | HEART RATE: 85 BPM | TEMPERATURE: 97.5 F | WEIGHT: 164 LBS | OXYGEN SATURATION: 98 % | DIASTOLIC BLOOD PRESSURE: 84 MMHG | SYSTOLIC BLOOD PRESSURE: 129 MMHG | RESPIRATION RATE: 16 BRPM | BODY MASS INDEX: 27.32 KG/M2

## 2024-04-19 VITALS
OXYGEN SATURATION: 98 % | BODY MASS INDEX: 28.19 KG/M2 | WEIGHT: 169.4 LBS | SYSTOLIC BLOOD PRESSURE: 110 MMHG | HEART RATE: 83 BPM | TEMPERATURE: 97.7 F | DIASTOLIC BLOOD PRESSURE: 76 MMHG

## 2024-04-19 DIAGNOSIS — Z53.21 ELOPED FROM EMERGENCY DEPARTMENT: Primary | ICD-10-CM

## 2024-04-19 DIAGNOSIS — R42 DIZZINESS: Primary | ICD-10-CM

## 2024-04-19 DIAGNOSIS — R55 NEAR SYNCOPE: ICD-10-CM

## 2024-04-19 DIAGNOSIS — I95.9 HYPOTENSION, UNSPECIFIED HYPOTENSION TYPE: ICD-10-CM

## 2024-04-19 LAB — GLUCOSE BLD-MCNC: 100 MG/DL (ref 74–99)

## 2024-04-19 PROCEDURE — 99215 OFFICE O/P EST HI 40 MIN: CPT | Performed by: PHYSICIAN ASSISTANT

## 2024-04-19 PROCEDURE — 82962 GLUCOSE BLOOD TEST: CPT

## 2024-04-19 PROCEDURE — 93010 ELECTROCARDIOGRAM REPORT: CPT | Performed by: PHYSICIAN ASSISTANT

## 2024-04-19 PROCEDURE — 3078F DIAST BP <80 MM HG: CPT | Performed by: PHYSICIAN ASSISTANT

## 2024-04-19 PROCEDURE — G8417 CALC BMI ABV UP PARAM F/U: HCPCS | Performed by: PHYSICIAN ASSISTANT

## 2024-04-19 PROCEDURE — 3074F SYST BP LT 130 MM HG: CPT | Performed by: PHYSICIAN ASSISTANT

## 2024-04-19 PROCEDURE — 1036F TOBACCO NON-USER: CPT | Performed by: PHYSICIAN ASSISTANT

## 2024-04-19 PROCEDURE — 3017F COLORECTAL CA SCREEN DOC REV: CPT | Performed by: PHYSICIAN ASSISTANT

## 2024-04-19 PROCEDURE — 99282 EMERGENCY DEPT VISIT SF MDM: CPT

## 2024-04-19 PROCEDURE — G8427 DOCREV CUR MEDS BY ELIG CLIN: HCPCS | Performed by: PHYSICIAN ASSISTANT

## 2024-04-19 PROCEDURE — 93000 ELECTROCARDIOGRAM COMPLETE: CPT | Performed by: PHYSICIAN ASSISTANT

## 2024-04-19 ASSESSMENT — PAIN - FUNCTIONAL ASSESSMENT: PAIN_FUNCTIONAL_ASSESSMENT: NONE - DENIES PAIN

## 2024-04-19 ASSESSMENT — LIFESTYLE VARIABLES
HOW MANY STANDARD DRINKS CONTAINING ALCOHOL DO YOU HAVE ON A TYPICAL DAY: PATIENT DOES NOT DRINK
HOW OFTEN DO YOU HAVE A DRINK CONTAINING ALCOHOL: NEVER

## 2024-04-19 NOTE — PROGRESS NOTES
24  Maci Thomas : 1970 Sex: female  Age 53 y.o.      Subjective:  Chief Complaint   Patient presents with    Dizziness    Fatigue    Sinusitis    Hypotension         HPI:   HPI  Maci Thomas , 53 y.o. female presents to express care for evaluation of dizziness, fatigue, hypotension.  The patient has had the symptoms ongoing for the last couple of weeks.  The patient thought it was related to recently being on antihypertensive.  The patient was placed on amlodipine was having some swelling and then placed on hydrochlorothiazide.  The patient was told to stop both of the medication but still having dizziness.  At the onset the patient was having some vertiginous-like symptoms and was taking Antivert.  The patient states that she had rolled over in bed and noted dizziness.  She has had dizziness in the past but she is never experienced any low blood pressure.    The patient's blood pressure was noted to be 66/54.    The patient has not any chest pain, shortness of breath.  The patient does have a little bit of congestion.    The patient is not have any syncope.  No diarrhea.  She has now been off of the blood pressure medication still feeling lightheaded and dizzy.      ROS:   Unless otherwise stated in this report the patient's positive and negative responses for review of systems for constitutional, eyes, ENT, cardiovascular, respiratory, gastrointestinal, neurological, , musculoskeletal, and integument systems and related systems to the presenting problem are either stated in the history of present illness or were not pertinent or were negative for the symptoms and/or complaints related to the presenting medical problem.  Positives and pertinent negatives as per HPI.  All others reviewed and are negative.      PMH:     Past Medical History:   Diagnosis Date    Asthma     DR PARK    Back injury     Chronic back pain     PAIN RADIATES TO NECK AND LEGS    Chronic tension-type

## 2024-04-19 NOTE — ED TRIAGE NOTES
Patient came to triage desk and stated she was sick of waiting and she was tired. She stated this was why she didn't want to come to the ER anyway.  She had been up to [facility] shortly before and she was informed it wouldn't be much longer of a wait.

## 2024-04-19 NOTE — ED PROVIDER NOTES
hand grasp symmetrically strong.  Lymphatics: no edema    NIH Stroke Scale/Score at time of initial evaluation:  1A: Level of Consciousness 0 - alert; keenly responsive   1B: Ask Month and Age 0 - answers both questions correctly   1C: Tell Patient To Open and Close Eyes, then Hand  Squeeze 0 - performs both tasks correctly   2: Test Horizontal Extraocular Movements 0 - normal   3: Test Visual Fields 0 - no visual loss   4: Test Facial Palsy 0 - normal symmetric movement   5A: Test Left Arm Motor Drift 0 - no drift, limb holds 90 (or 45) degrees for full 10 seconds   5B: Test Right Arm Motor Drift 0 - no drift, limb holds 90 (or 45) degrees for full 10 seconds   6A: Test Left Leg Motor Drift 0 - no drift; leg holds 30 degree position for full 5 seconds   6B: Test Right Leg Motor Drift 0 - no drift; leg holds 30 degree position for full 5 seconds   7: Test Limb Ataxia   (FNF/Heel-Shin) 0 - absent   8: Test Sensation 0 - normal; no sensory loss   9: Test Language/Aphasia 0 - no aphasia, normal   10: Test Dysarthria 1 - mild to moderate, patient slurs at least some words and at worst, can be understood with some difficulty   11: Test Extinction/Inattention 0 - no abnormality   Total Score: 0      Initial Plan of Care: All treatment areas with department are currently occupied. Plan to order/Initiate the following while awaiting opening in ED: EKG.  Initiate Treatment-Testing, Proceed toTreatment Area When Bed Available for ED Attending/MLP to Continue Care    Electronically signed by LYNETTE Benavides CNP   DD: 4/19/24      Izzy Nicholson APRN - CNP  04/20/24 0147       Izzy Nicholson APRN - CNP  04/20/24 0212

## 2024-04-21 ENCOUNTER — APPOINTMENT (OUTPATIENT)
Dept: GENERAL RADIOLOGY | Age: 54
End: 2024-04-21
Payer: COMMERCIAL

## 2024-04-21 ENCOUNTER — HOSPITAL ENCOUNTER (EMERGENCY)
Age: 54
Discharge: HOME | End: 2024-04-22
Attending: STUDENT IN AN ORGANIZED HEALTH CARE EDUCATION/TRAINING PROGRAM
Payer: COMMERCIAL

## 2024-04-21 VITALS
TEMPERATURE: 98.6 F | OXYGEN SATURATION: 100 % | HEIGHT: 65 IN | BODY MASS INDEX: 27.32 KG/M2 | RESPIRATION RATE: 16 BRPM | WEIGHT: 164 LBS | HEART RATE: 66 BPM | SYSTOLIC BLOOD PRESSURE: 128 MMHG | DIASTOLIC BLOOD PRESSURE: 84 MMHG

## 2024-04-21 DIAGNOSIS — R07.9 CHEST PAIN, UNSPECIFIED TYPE: Primary | ICD-10-CM

## 2024-04-21 LAB
ALBUMIN SERPL-MCNC: 4.6 G/DL (ref 3.5–5.2)
ALP SERPL-CCNC: 105 U/L (ref 35–104)
ALT SERPL-CCNC: 20 U/L (ref 0–32)
ANION GAP SERPL CALCULATED.3IONS-SCNC: 9 MMOL/L (ref 7–16)
AST SERPL-CCNC: 22 U/L (ref 0–31)
BASOPHILS # BLD: 0.04 K/UL (ref 0–0.2)
BASOPHILS NFR BLD: 1 % (ref 0–2)
BILIRUB SERPL-MCNC: <0.2 MG/DL (ref 0–1.2)
BUN SERPL-MCNC: 18 MG/DL (ref 6–20)
CALCIUM SERPL-MCNC: 9.4 MG/DL (ref 8.6–10.2)
CHLORIDE SERPL-SCNC: 96 MMOL/L (ref 98–107)
CO2 SERPL-SCNC: 29 MMOL/L (ref 22–29)
CREAT SERPL-MCNC: 1.2 MG/DL (ref 0.5–1)
D DIMER: <200 NG/ML DDU (ref 0–232)
EOSINOPHIL # BLD: 0.1 K/UL (ref 0.05–0.5)
EOSINOPHILS RELATIVE PERCENT: 1 % (ref 0–6)
ERYTHROCYTE [DISTWIDTH] IN BLOOD BY AUTOMATED COUNT: 13.5 % (ref 11.5–15)
GFR SERPL CREATININE-BSD FRML MDRD: 57 ML/MIN/1.73M2
GLUCOSE SERPL-MCNC: 121 MG/DL (ref 74–99)
HCT VFR BLD AUTO: 41.4 % (ref 34–48)
HGB BLD-MCNC: 13.2 G/DL (ref 11.5–15.5)
IMM GRANULOCYTES # BLD AUTO: 0.13 K/UL (ref 0–0.58)
IMM GRANULOCYTES NFR BLD: 2 % (ref 0–5)
LYMPHOCYTES NFR BLD: 2.85 K/UL (ref 1.5–4)
LYMPHOCYTES RELATIVE PERCENT: 32 % (ref 20–42)
MAGNESIUM SERPL-MCNC: 2 MG/DL (ref 1.6–2.6)
MCH RBC QN AUTO: 28 PG (ref 26–35)
MCHC RBC AUTO-ENTMCNC: 31.9 G/DL (ref 32–34.5)
MCV RBC AUTO: 87.7 FL (ref 80–99.9)
MONOCYTES NFR BLD: 0.68 K/UL (ref 0.1–0.95)
MONOCYTES NFR BLD: 8 % (ref 2–12)
NEUTROPHILS NFR BLD: 57 % (ref 43–80)
NEUTS SEG NFR BLD: 5 K/UL (ref 1.8–7.3)
PLATELET # BLD AUTO: 329 K/UL (ref 130–450)
PMV BLD AUTO: 9.2 FL (ref 7–12)
POTASSIUM SERPL-SCNC: 4.8 MMOL/L (ref 3.5–5)
PROT SERPL-MCNC: 7.8 G/DL (ref 6.4–8.3)
RBC # BLD AUTO: 4.72 M/UL (ref 3.5–5.5)
SODIUM SERPL-SCNC: 134 MMOL/L (ref 132–146)
TROPONIN I SERPL HS-MCNC: 9 NG/L (ref 0–9)
WBC OTHER # BLD: 8.8 K/UL (ref 4.5–11.5)

## 2024-04-21 PROCEDURE — 99285 EMERGENCY DEPT VISIT HI MDM: CPT

## 2024-04-21 PROCEDURE — 85025 COMPLETE CBC W/AUTO DIFF WBC: CPT

## 2024-04-21 PROCEDURE — 71046 X-RAY EXAM CHEST 2 VIEWS: CPT

## 2024-04-21 PROCEDURE — 93005 ELECTROCARDIOGRAM TRACING: CPT

## 2024-04-21 PROCEDURE — 85379 FIBRIN DEGRADATION QUANT: CPT

## 2024-04-21 PROCEDURE — 83735 ASSAY OF MAGNESIUM: CPT

## 2024-04-21 PROCEDURE — 84484 ASSAY OF TROPONIN QUANT: CPT

## 2024-04-21 PROCEDURE — 80053 COMPREHEN METABOLIC PANEL: CPT

## 2024-04-21 ASSESSMENT — PAIN DESCRIPTION - LOCATION: LOCATION: CHEST

## 2024-04-21 ASSESSMENT — PAIN - FUNCTIONAL ASSESSMENT: PAIN_FUNCTIONAL_ASSESSMENT: 0-10

## 2024-04-21 ASSESSMENT — PAIN SCALES - GENERAL: PAINLEVEL_OUTOF10: 8

## 2024-04-22 LAB
EKG ATRIAL RATE: 67 BPM
EKG P AXIS: 64 DEGREES
EKG P-R INTERVAL: 122 MS
EKG Q-T INTERVAL: 386 MS
EKG QRS DURATION: 82 MS
EKG QTC CALCULATION (BAZETT): 407 MS
EKG R AXIS: -16 DEGREES
EKG T AXIS: 18 DEGREES
EKG VENTRICULAR RATE: 67 BPM
TROPONIN I SERPL HS-MCNC: 10 NG/L (ref 0–9)

## 2024-04-22 PROCEDURE — 93010 ELECTROCARDIOGRAM REPORT: CPT | Performed by: INTERNAL MEDICINE

## 2024-04-22 PROCEDURE — 84484 ASSAY OF TROPONIN QUANT: CPT

## 2024-04-22 NOTE — ED PROVIDER NOTES
[facility] EMERGENCY DEPARTMENT  EMERGENCY DEPARTMENT ENCOUNTER        Pt Name: Maci Thomas  MRN: [id number]  Birthdate 1970  Date of evaluation: 4/21/2024  Provider: Jadyn Woodruff MD  PCP: Samra Sanderson DO  Note Started: 6:43 AM EDT 4/22/24    CHIEF COMPLAINT       Chief Complaint   Patient presents with    Chest Pain     Sick x2 weeks. Chest pain started yesterday. Was seen at [facility] on Friday        HISTORY OF PRESENT ILLNESS: 1 or more Elements     Maci Thomas is a 53 y.o. female past medical history of hypertension, asthma, who presents chest pain.  States that she has been sick for the past 2 weeks chest pain started yesterday.  She describes pain as a pressure, nonradiating.  Associated with shortness of breath which is worse with exertion.  She was seen in [facility] on Friday for similar symptoms.  Denies any nausea or vomiting.  Patient states she does have a history of a clot on her arm which they are unsure what was the cause she states that she is on aspirin but not on any anticoagulation.  Denies any fever, chills, n/v, headache, dizziness, vision changes, neck tenderness or stiffness, weakness,  palpitations, leg swelling/tenderness, cough, abd pain, dysuria, hematuria, diarrhea, constipation, bloody stools.    Nursing Notes were all reviewed and agreed with or any disagreements were addressed in the HPI.    ROS:   Pertinent positives and negatives are stated within HPI, all other systems reviewed and are negative.      --------------------------------------------- PAST HISTORY ---------------------------------------------  Past Medical History:  has a past medical history of Asthma, Back injury, Chronic back pain, Chronic tension-type headache, not intractable, DDD (degenerative disc disease), cervical, DVT (deep venous thrombosis) ([facility]), GERD (gastroesophageal reflux disease), Herniated disc, cervical, Hx of screening mammography,

## 2024-04-30 ENCOUNTER — OFFICE VISIT (OUTPATIENT)
Dept: CARDIOLOGY CLINIC | Age: 54
End: 2024-04-30
Payer: COMMERCIAL

## 2024-04-30 VITALS
RESPIRATION RATE: 18 BRPM | DIASTOLIC BLOOD PRESSURE: 74 MMHG | WEIGHT: 164.6 LBS | HEIGHT: 65 IN | HEART RATE: 89 BPM | OXYGEN SATURATION: 98 % | BODY MASS INDEX: 27.42 KG/M2 | SYSTOLIC BLOOD PRESSURE: 108 MMHG

## 2024-04-30 DIAGNOSIS — I34.1 MVP (MITRAL VALVE PROLAPSE): Primary | ICD-10-CM

## 2024-04-30 DIAGNOSIS — R00.2 PALPITATION: ICD-10-CM

## 2024-04-30 PROCEDURE — G8427 DOCREV CUR MEDS BY ELIG CLIN: HCPCS | Performed by: INTERNAL MEDICINE

## 2024-04-30 PROCEDURE — 93000 ELECTROCARDIOGRAM COMPLETE: CPT | Performed by: INTERNAL MEDICINE

## 2024-04-30 PROCEDURE — 3017F COLORECTAL CA SCREEN DOC REV: CPT | Performed by: INTERNAL MEDICINE

## 2024-04-30 PROCEDURE — 99204 OFFICE O/P NEW MOD 45 MIN: CPT | Performed by: INTERNAL MEDICINE

## 2024-04-30 PROCEDURE — 3078F DIAST BP <80 MM HG: CPT | Performed by: INTERNAL MEDICINE

## 2024-04-30 PROCEDURE — 3074F SYST BP LT 130 MM HG: CPT | Performed by: INTERNAL MEDICINE

## 2024-04-30 PROCEDURE — G8417 CALC BMI ABV UP PARAM F/U: HCPCS | Performed by: INTERNAL MEDICINE

## 2024-04-30 PROCEDURE — 1036F TOBACCO NON-USER: CPT | Performed by: INTERNAL MEDICINE

## 2024-04-30 RX ORDER — HYDROCODONE BITARTRATE AND ACETAMINOPHEN 10; 325 MG/1; MG/1
1 TABLET ORAL EVERY 6 HOURS PRN
COMMUNITY

## 2024-04-30 ASSESSMENT — ENCOUNTER SYMPTOMS
ABDOMINAL PAIN: 0
BLOOD IN STOOL: 0
COUGH: 0
SHORTNESS OF BREATH: 0
VOMITING: 0
NAUSEA: 0
BACK PAIN: 0
CHEST TIGHTNESS: 0

## 2024-04-30 NOTE — PROGRESS NOTES
14 day zio Xt  Monitor was placed per Dr Pepe.  Serial number [id number].  Instructions were given & patient verbalized understanding.

## 2024-04-30 NOTE — PROGRESS NOTES
OFFICE VISIT    NAME: Maci Thomas     YOB: 1970   AGE: 53 y.o.   MEDICAL RECORD NUMBER: [id number]       CONSULTATION INFORMATION:   DATE OF CLINIC CONSULTATION: 4/30/2024   PRINCIPLE DIAGNOSIS / reason for visit: Dizziness/vertigo    CARE TEAM MEMBERS    PCP : Samra Sanderson DO     REFERRING PROVIDER: No ref. provider found     HISTORY OF PRESENT ILLNESS:   Maci Thomas is a 53 y.o. female referred for evaluation of dizziness/vertigo. Past medical history of HTN, vertigo, DVT, asthma, GERD, and migraine.      Patient reports that she has history of vertigo and and she uses meclizine with improvement of her symptoms.  She had an episode about a week ago when she went to the emergency room.  Her symptoms were pretty much vertigo with feeling of the room spinning that occurs anytime but especially with changing position.  She does report history of palpitations with heart rate sometimes goes up to 159 bpm although she would not be doing any strenuous activities.  Reports baseline shortness of breath from her asthma.  She used to take hydrochlorothiazide for lower extremity swelling but was stopped due to low blood pressure.  She takes spironolactone 25 mg twice a day for facial hair reduction.    Patient had recent presentation to the emergency room on 4/25/2024 with dizziness and vertigo.      PERTINENT RADIOGRAPHIC/DIAGNOSTICS:     -Orthostatic BP:  Laying: /70 mmHg -HR 72 bpm  Sitting: /74 mmHg -HR 75 bpm  Standing: /74 mmHg -HR 89 bpm    -TTE 10/4/2021:   Summary   Normal left ventricle size. Estimated left ventricle ejection fraction   60+/-5 %.   Normal right ventricular size and function.   Probably normal diastolic function for age.   No significant valve disease.    -ECG 4/30/2024: Normal sinus rhythm, within normal limits.    -Nuclear stress test 12/2/2015:  IMPRESSION:  1.  No reversible ischemia and no defect or infarction.  2.  Ejection fraction is more

## 2024-05-16 ENCOUNTER — OFFICE VISIT (OUTPATIENT)
Dept: FAMILY MEDICINE CLINIC | Age: 54
End: 2024-05-16
Payer: COMMERCIAL

## 2024-05-16 VITALS
TEMPERATURE: 97.8 F | WEIGHT: 164 LBS | DIASTOLIC BLOOD PRESSURE: 82 MMHG | BODY MASS INDEX: 27.32 KG/M2 | HEIGHT: 65 IN | HEART RATE: 86 BPM | SYSTOLIC BLOOD PRESSURE: 120 MMHG | OXYGEN SATURATION: 97 %

## 2024-05-16 DIAGNOSIS — M47.812 SPONDYLOSIS OF CERVICAL REGION WITHOUT MYELOPATHY OR RADICULOPATHY: Primary | ICD-10-CM

## 2024-05-16 PROCEDURE — G8417 CALC BMI ABV UP PARAM F/U: HCPCS | Performed by: FAMILY MEDICINE

## 2024-05-16 PROCEDURE — 3017F COLORECTAL CA SCREEN DOC REV: CPT | Performed by: FAMILY MEDICINE

## 2024-05-16 PROCEDURE — 3074F SYST BP LT 130 MM HG: CPT | Performed by: FAMILY MEDICINE

## 2024-05-16 PROCEDURE — 3079F DIAST BP 80-89 MM HG: CPT | Performed by: FAMILY MEDICINE

## 2024-05-16 PROCEDURE — G8428 CUR MEDS NOT DOCUMENT: HCPCS | Performed by: FAMILY MEDICINE

## 2024-05-16 PROCEDURE — 1036F TOBACCO NON-USER: CPT | Performed by: FAMILY MEDICINE

## 2024-05-16 PROCEDURE — 99214 OFFICE O/P EST MOD 30 MIN: CPT | Performed by: FAMILY MEDICINE

## 2024-05-16 RX ORDER — DEXAMETHASONE 4 MG/1
4 TABLET ORAL 2 TIMES DAILY WITH MEALS
Qty: 10 TABLET | Refills: 0 | Status: SHIPPED | OUTPATIENT
Start: 2024-05-16 | End: 2024-05-21

## 2024-05-16 RX ORDER — BACLOFEN 10 MG/1
10 TABLET ORAL 2 TIMES DAILY
Qty: 20 TABLET | Refills: 0 | Status: SHIPPED | OUTPATIENT
Start: 2024-05-16

## 2024-05-16 NOTE — PROGRESS NOTES
24  Maci Thomas : 1970 Sex: female  Age: 53 y.o.      Assessment and Plan:  Maci was seen today for neck pain.    Diagnoses and all orders for this visit:    Spondylosis of cervical region without myelopathy or radiculopathy  -     XR CERVICAL SPINE (2-3 VIEWS); Future  -     baclofen (LIORESAL) 10 MG tablet; Take 1 tablet by mouth 2 times daily  -     dexAMETHasone (DECADRON) 4 MG tablet; Take 1 tablet by mouth 2 times daily (with meals) for 5 days    C-spine x-ray shows spasm with mild degenerative changes.  She shows no other red flag complaints such as fever, chills, nuchal rigidity, radiculopathy, sore throat, earache, cough.  Will go ahead and treat her spasm with baclofen twice daily and inflammation with Decadron burst for 5 days.  She may use ice or heat to the area, whichever seems to help better.  If complaints do not improve, or if they worsen in any way, present back to the office or to the emergency room immediately.    Return 1 to 3-day recheck with PCP.    Chief Complaint   Patient presents with    Neck Pain     No injury noted.  Woke up last Friday with pain and it has  gotten worse as time goes on.        Patient presents with 1 week history of neck pain and spasm.  She denies trauma or overuse.  She also denies radiculopathy.  She applied some over-the-counter ointment and developed a mild skin rash secondary to that.  Muscle relaxants she has at home are partially effective and leaving her spasm.        Review of Systems      Current Outpatient Medications:     baclofen (LIORESAL) 10 MG tablet, Take 1 tablet by mouth 2 times daily, Disp: 20 tablet, Rfl: 0    dexAMETHasone (DECADRON) 4 MG tablet, Take 1 tablet by mouth 2 times daily (with meals) for 5 days, Disp: 10 tablet, Rfl: 0    HYDROcodone-acetaminophen (NORCO)  MG per tablet, Take 1 tablet by mouth every 6 hours as needed for Pain. Only taking when needed, Disp: , Rfl:     phentermine (ADIPEX-P) 37.5 MG

## 2024-05-22 ENCOUNTER — TELEPHONE (OUTPATIENT)
Dept: FAMILY MEDICINE CLINIC | Age: 54
End: 2024-05-22

## 2024-05-22 NOTE — TELEPHONE ENCOUNTER
Maci calling in, trying to reach Angi to return a phone call. States she has been transferred 6 times, so did not want to be transferred again; she is just requesting a call back at [phone].

## 2024-06-17 ENCOUNTER — TELEPHONE (OUTPATIENT)
Dept: ENT CLINIC | Age: 54
End: 2024-06-17

## 2024-06-17 NOTE — TELEPHONE ENCOUNTER
Called to reschedule pts missed appt. Called abd left a voicemail. Final attempt to reach out to pt. Electronically signed by Yamilex Bautista on 6/17/2024 at 3:21 PM

## 2024-06-20 ENCOUNTER — TELEPHONE (OUTPATIENT)
Dept: CARDIOLOGY CLINIC | Age: 54
End: 2024-06-20

## 2024-06-28 ENCOUNTER — TELEPHONE (OUTPATIENT)
Dept: CARDIOLOGY CLINIC | Age: 54
End: 2024-06-28

## 2024-07-05 ENCOUNTER — OFFICE VISIT (OUTPATIENT)
Dept: FAMILY MEDICINE CLINIC | Age: 54
End: 2024-07-05
Payer: COMMERCIAL

## 2024-07-05 VITALS
BODY MASS INDEX: 27.99 KG/M2 | OXYGEN SATURATION: 98 % | DIASTOLIC BLOOD PRESSURE: 68 MMHG | SYSTOLIC BLOOD PRESSURE: 122 MMHG | HEART RATE: 81 BPM | HEIGHT: 65 IN | WEIGHT: 168 LBS

## 2024-07-05 DIAGNOSIS — T14.90XA TRAUMA: Primary | ICD-10-CM

## 2024-07-05 PROCEDURE — G8417 CALC BMI ABV UP PARAM F/U: HCPCS | Performed by: FAMILY MEDICINE

## 2024-07-05 PROCEDURE — 3017F COLORECTAL CA SCREEN DOC REV: CPT | Performed by: FAMILY MEDICINE

## 2024-07-05 PROCEDURE — 3078F DIAST BP <80 MM HG: CPT | Performed by: FAMILY MEDICINE

## 2024-07-05 PROCEDURE — 99213 OFFICE O/P EST LOW 20 MIN: CPT | Performed by: FAMILY MEDICINE

## 2024-07-05 PROCEDURE — 3074F SYST BP LT 130 MM HG: CPT | Performed by: FAMILY MEDICINE

## 2024-07-05 PROCEDURE — G8427 DOCREV CUR MEDS BY ELIG CLIN: HCPCS | Performed by: FAMILY MEDICINE

## 2024-07-05 PROCEDURE — 1036F TOBACCO NON-USER: CPT | Performed by: FAMILY MEDICINE

## 2024-07-05 RX ORDER — PREDNISONE 10 MG/1
TABLET ORAL
Qty: 30 TABLET | Refills: 0 | Status: SHIPPED | OUTPATIENT
Start: 2024-07-05

## 2024-07-05 ASSESSMENT — ENCOUNTER SYMPTOMS
GASTROINTESTINAL NEGATIVE: 1
RESPIRATORY NEGATIVE: 1

## 2024-07-05 NOTE — PROGRESS NOTES
done    Diabetic retinal exam  Never done    DTaP/Tdap/Td vaccine (1 - Tdap) Never done    Shingles vaccine (1 of 2) Never done    A1C test (Diabetic or Prediabetic)  06/20/2023    Cervical cancer screen  07/29/2024    Breast cancer screen  08/01/2024    Flu vaccine (1) 08/01/2024    Depression Screen  01/26/2025    Lipids  04/04/2025    GFR test (Diabetes, CKD 3-4, OR last GFR 15-59)  04/21/2025    Colorectal Cancer Screen  08/04/2030    Hepatitis C screen  Completed    HIV screen  Completed    Hepatitis A vaccine  Aged Out    Hib vaccine  Aged Out    Polio vaccine  Aged Out    Meningococcal (ACWY) vaccine  Aged Out      There are no preventive care reminders to display for this patient.   There are no preventive care reminders to display for this patient.     /68   Pulse 81   Ht 1.651 m (5' 5\")   Wt 76.2 kg (168 lb)   LMP 10/01/2018 (Approximate)   SpO2 98%   BMI 27.96 kg/m²     Objective   Physical Exam  Vitals reviewed.   Constitutional:       Appearance: Normal appearance.   HENT:      Head: Normocephalic and atraumatic.   Eyes:      Extraocular Movements: Extraocular movements intact.      Pupils: Pupils are equal, round, and reactive to light.   Cardiovascular:      Rate and Rhythm: Normal rate and regular rhythm.   Pulmonary:      Effort: Pulmonary effort is normal.      Breath sounds: Normal breath sounds.   Musculoskeletal:         General: Swelling, tenderness and signs of injury present.      Left hand: Swelling and tenderness present. Decreased range of motion.      Left knee: Swelling present. Decreased range of motion. Tenderness present.   Skin:     General: Skin is warm and dry.   Neurological:      Mental Status: She is alert.      Gait: Gait abnormal.   Psychiatric:         Mood and Affect: Mood normal.                  An electronic signature was used to authenticate this note.    --Jam Michel,

## 2024-08-27 ENCOUNTER — OFFICE VISIT (OUTPATIENT)
Dept: FAMILY MEDICINE CLINIC | Age: 54
End: 2024-08-27
Payer: COMMERCIAL

## 2024-08-27 VITALS
WEIGHT: 180.3 LBS | OXYGEN SATURATION: 95 % | BODY MASS INDEX: 30.04 KG/M2 | DIASTOLIC BLOOD PRESSURE: 78 MMHG | HEIGHT: 65 IN | TEMPERATURE: 98.2 F | HEART RATE: 51 BPM | SYSTOLIC BLOOD PRESSURE: 126 MMHG | RESPIRATION RATE: 16 BRPM

## 2024-08-27 DIAGNOSIS — M47.812 SPONDYLOSIS OF CERVICAL REGION WITHOUT MYELOPATHY OR RADICULOPATHY: ICD-10-CM

## 2024-08-27 DIAGNOSIS — T14.90XA TRAUMA: ICD-10-CM

## 2024-08-27 DIAGNOSIS — R53.83 FATIGUE, UNSPECIFIED TYPE: Primary | ICD-10-CM

## 2024-08-27 DIAGNOSIS — I10 PRIMARY HYPERTENSION: ICD-10-CM

## 2024-08-27 DIAGNOSIS — J32.9 SINOBRONCHITIS: ICD-10-CM

## 2024-08-27 DIAGNOSIS — J30.89 SEASONAL ALLERGIC RHINITIS DUE TO OTHER ALLERGIC TRIGGER: ICD-10-CM

## 2024-08-27 DIAGNOSIS — G43.711 INTRACTABLE CHRONIC MIGRAINE WITHOUT AURA AND WITH STATUS MIGRAINOSUS: ICD-10-CM

## 2024-08-27 DIAGNOSIS — J40 SINOBRONCHITIS: ICD-10-CM

## 2024-08-27 DIAGNOSIS — T75.3XXA MOTION SICKNESS, INITIAL ENCOUNTER: ICD-10-CM

## 2024-08-27 DIAGNOSIS — Z76.0 MEDICATION REFILL: ICD-10-CM

## 2024-08-27 PROCEDURE — 1036F TOBACCO NON-USER: CPT | Performed by: FAMILY MEDICINE

## 2024-08-27 PROCEDURE — G8427 DOCREV CUR MEDS BY ELIG CLIN: HCPCS | Performed by: FAMILY MEDICINE

## 2024-08-27 PROCEDURE — G8417 CALC BMI ABV UP PARAM F/U: HCPCS | Performed by: FAMILY MEDICINE

## 2024-08-27 PROCEDURE — 3074F SYST BP LT 130 MM HG: CPT | Performed by: FAMILY MEDICINE

## 2024-08-27 PROCEDURE — 99214 OFFICE O/P EST MOD 30 MIN: CPT | Performed by: FAMILY MEDICINE

## 2024-08-27 PROCEDURE — 3017F COLORECTAL CA SCREEN DOC REV: CPT | Performed by: FAMILY MEDICINE

## 2024-08-27 PROCEDURE — 3078F DIAST BP <80 MM HG: CPT | Performed by: FAMILY MEDICINE

## 2024-08-27 RX ORDER — UBROGEPANT 100 MG/1
TABLET ORAL
Qty: 36 TABLET | Refills: 3 | Status: SHIPPED | OUTPATIENT
Start: 2024-08-27

## 2024-08-27 RX ORDER — MECLIZINE HYDROCHLORIDE 25 MG/1
25 TABLET ORAL 3 TIMES DAILY PRN
Qty: 30 TABLET | Refills: 0 | Status: SHIPPED | OUTPATIENT
Start: 2024-08-27 | End: 2024-09-06

## 2024-08-27 RX ORDER — RIZATRIPTAN BENZOATE 10 MG/1
TABLET ORAL
Qty: 27 TABLET | Refills: 3 | Status: SHIPPED | OUTPATIENT
Start: 2024-08-27

## 2024-08-27 RX ORDER — PREDNISONE 10 MG/1
TABLET ORAL
Qty: 30 TABLET | Refills: 5 | Status: SHIPPED | OUTPATIENT
Start: 2024-08-27

## 2024-08-27 RX ORDER — AMOXICILLIN 875 MG
875 TABLET ORAL 2 TIMES DAILY
Qty: 20 TABLET | Refills: 0 | Status: SHIPPED | OUTPATIENT
Start: 2024-08-27 | End: 2024-09-06

## 2024-08-27 RX ORDER — ALBUTEROL SULFATE 90 UG/1
AEROSOL, METERED RESPIRATORY (INHALATION)
Qty: 18 G | Refills: 5 | Status: SHIPPED | OUTPATIENT
Start: 2024-08-27

## 2024-08-27 RX ORDER — MONTELUKAST SODIUM 10 MG/1
10 TABLET ORAL NIGHTLY
Qty: 90 TABLET | Refills: 3 | Status: SHIPPED | OUTPATIENT
Start: 2024-08-27

## 2024-08-27 RX ORDER — FAMOTIDINE 40 MG/1
40 TABLET, FILM COATED ORAL NIGHTLY
Qty: 90 TABLET | Refills: 3 | Status: SHIPPED | OUTPATIENT
Start: 2024-08-27

## 2024-08-27 RX ORDER — PANTOPRAZOLE SODIUM 40 MG/1
40 TABLET, DELAYED RELEASE ORAL DAILY
Qty: 90 TABLET | Refills: 5 | Status: SHIPPED | OUTPATIENT
Start: 2024-08-27

## 2024-08-27 RX ORDER — FLUTICASONE PROPIONATE 50 MCG
1 SPRAY, SUSPENSION (ML) NASAL DAILY
Qty: 1 EACH | Refills: 5 | Status: SHIPPED | OUTPATIENT
Start: 2024-08-27

## 2024-08-27 RX ORDER — ESOMEPRAZOLE MAGNESIUM 40 MG/1
40 CAPSULE, DELAYED RELEASE ORAL
Qty: 90 CAPSULE | Refills: 3 | Status: SHIPPED | OUTPATIENT
Start: 2024-08-27

## 2024-08-27 RX ORDER — PREDNISONE 5 MG/1
TABLET ORAL
Qty: 30 TABLET | Refills: 0 | Status: SHIPPED | OUTPATIENT
Start: 2024-08-27

## 2024-08-27 RX ORDER — HYDROCHLOROTHIAZIDE 12.5 MG/1
12.5 CAPSULE ORAL DAILY
Qty: 90 CAPSULE | Refills: 3 | Status: SHIPPED | OUTPATIENT
Start: 2024-08-27

## 2024-08-27 RX ORDER — FLUCONAZOLE 150 MG/1
150 TABLET ORAL DAILY
Qty: 3 TABLET | Refills: 0 | Status: SHIPPED | OUTPATIENT
Start: 2024-08-27 | End: 2024-08-30

## 2024-08-27 RX ORDER — ASCORBIC ACID, CHOLECALCIFEROL, .ALPHA.-TOCOPHEROL ACETATE, DL-, PYRIDOXINE HYDROCHLORIDE, FOLIC ACID, CYANOCOBALAMIN, BIOTIN, CALCIUM CARBONATE, FERROUS ASPARTO GLYCINATE, IRON, POTASSIUM IODIDE, MAGNESIUM OXIDE, DOCONEXENT AND LOWBUSH BLUEBERRY 60; 1000; 10; 26; 400; 13; 280; 80; 9; 9; 150; 25; 350; 25; 600 MG/1; [IU]/1; [IU]/1; MG/1; UG/1; UG/1; UG/1; MG/1; MG/1; MG/1; UG/1; MG/1; MG/1; MG/1; UG/1
1 CAPSULE, GELATIN COATED ORAL DAILY
Qty: 90 CAPSULE | Refills: 3 | Status: SHIPPED | OUTPATIENT
Start: 2024-08-27

## 2024-08-27 RX ORDER — SPIRONOLACTONE 50 MG/1
50 TABLET, FILM COATED ORAL 2 TIMES DAILY
Qty: 180 TABLET | Refills: 3 | Status: SHIPPED | OUTPATIENT
Start: 2024-08-27

## 2024-08-27 RX ORDER — PROMETHAZINE HYDROCHLORIDE 25 MG/1
25 TABLET ORAL 2 TIMES DAILY PRN
Qty: 30 TABLET | Refills: 5 | Status: SHIPPED | OUTPATIENT
Start: 2024-08-27

## 2024-08-27 RX ORDER — BACLOFEN 10 MG/1
10 TABLET ORAL 2 TIMES DAILY
Qty: 20 TABLET | Refills: 5 | Status: CANCELLED | OUTPATIENT
Start: 2024-08-27

## 2024-08-27 NOTE — PROGRESS NOTES
8/27/2024    Chief Complaint   Patient presents with    Congestion       HPI    Maci Thomas is a 54 y.o. patient that presents today with cold symptoms.    Sinus Pain: Patient complains of congestion and sinus pressure. Symptoms include post nasal drip with no fever, chills, night sweats or weight loss. Onset of symptoms was several weeks ago, gradually worsening since that time. She is drinking plenty of fluids.      Fatigue, chronic. Diet has not been good. Weight gain and feeling bloated.     Patient's past medical, surgical, social and/or family history reviewed, updated in chart, and are non-contributory (unless otherwise stated).  Medications and allergies also reviewed and updated in chart.     ROS negative unless otherwise specified    Physical Exam  /78 (Site: Left Upper Arm)   Pulse 51   Temp 98.2 °F (36.8 °C)   Resp 16   Ht 1.651 m (5' 5\")   Wt 81.8 kg (180 lb 4.8 oz)   LMP 10/01/2018 (Approximate)   SpO2 95%   BMI 30.00 kg/m²     Wt Readings from Last 3 Encounters:   08/27/24 81.8 kg (180 lb 4.8 oz)   07/29/24 76.7 kg (169 lb)   07/05/24 76.2 kg (168 lb)     BP Readings from Last 3 Encounters:   08/27/24 126/78   07/29/24 114/73   07/05/24 122/68         General appearance: alert, well appearing, and in no distress, oriented to person, place, and time and normal appearing weight.    HEENT:  HEAD: Atraumatic, normocephalic  EYES: PERRL  EOM intact  EARS: bilateral TM's and external ear canals normal  NOSE: nasal mucosa, septum, turbinates normal bilaterally  MOUTH: mucous membranes moist and normal tonsils  NECK: supple, full range of motion, no mass, normal lymphadenopathy, no thyromegaly    CVS exam: normal rate, regular rhythm, normal S1, S2, no murmurs, rubs, clicks or gallops.  Radial pulses 2+ bilateral.  PT/DP pulse 2+ bilat.  No C/C/E    Chest: clear to auscultation, no wheezes, rales or rhonchi, symmetric air entry.     SKIN: no lesions, jaundice, petechiae, pallor,  cyanosis, ecchymosis        Assessment/Plan  Maci was seen today for congestion.    Diagnoses and all orders for this visit:    Fatigue, unspecified type  -     CBC; Future  -     Comprehensive Metabolic Panel; Future  -     Vitamin D 25 Hydroxy; Future  -     Vitamin B12 & Folate; Future    Spondylosis of cervical region without myelopathy or radiculopathy    Seasonal allergic rhinitis due to other allergic trigger  -     fluticasone (FLONASE) 50 MCG/ACT nasal spray; 1 spray by Nasal route daily    Primary hypertension  -     hydroCHLOROthiazide (MICROZIDE) 12.5 MG capsule; Take 1 capsule by mouth daily STOP prior prescription. New prescription reflects 90 day supply    Trauma  -     predniSONE (DELTASONE) 10 MG tablet; Take 4 tabs x 3 days, 3 tabs x 3 days, 2 tabs x 3 days, 1 tab x 3 days, stop.    Sinobronchitis  -     predniSONE (DELTASONE) 5 MG tablet; Take 4 tablets daily for 3 days, Take 3 tablets daily for 3 days, Take 2 tablets daily for 3 days, Take 1 tablet daily for 3 days.    Motion sickness, initial encounter  -     promethazine (PHENERGAN) 25 MG tablet; Take 1 tablet by mouth 2 times daily as needed for Nausea    Intractable chronic migraine without aura and with status migrainosus  -     rizatriptan (MAXALT) 10 MG tablet; TAKE 1 TABLET DAILY AS     NEEDED FOR MIGRAINE  -     Ubrogepant (UBRELVY) 100 MG TABS; TAKE 1 TABLET IF NEEDED MAY TAKE 1 MORE DOSE AFTER 1 HOUR IF NEEDED. DO NOT TAKE MORE THAN 200MG IN 24 HOURS    Medication refill  -     spironolactone (ALDACTONE) 50 MG tablet; Take 1 tablet by mouth 2 times daily    Other orders  -     albuterol sulfate HFA (PROVENTIL;VENTOLIN;PROAIR) 108 (90 Base) MCG/ACT inhaler; USE 2 INHALATIONS ORALLY   EVERY 6 HOURS AS NEEDED FORWHEEZING  -     esomeprazole (NEXIUM) 40 MG delayed release capsule; Take 1 capsule by mouth every morning (before breakfast)  -     famotidine (PEPCID) 40 MG tablet; Take 1 tablet by mouth nightly  -     montelukast (SINGULAIR)

## 2024-09-23 DIAGNOSIS — E66.09 CLASS 1 OBESITY DUE TO EXCESS CALORIES WITH SERIOUS COMORBIDITY AND BODY MASS INDEX (BMI) OF 34.0 TO 34.9 IN ADULT: ICD-10-CM

## 2024-09-23 RX ORDER — ASCORBIC ACID, CHOLECALCIFEROL, .ALPHA.-TOCOPHEROL ACETATE, DL-, PYRIDOXINE HYDROCHLORIDE, FOLIC ACID, CYANOCOBALAMIN, BIOTIN, CALCIUM CARBONATE, FERROUS ASPARTO GLYCINATE, IRON, POTASSIUM IODIDE, MAGNESIUM OXIDE, DOCONEXENT AND LOWBUSH BLUEBERRY 60; 1000; 10; 26; 400; 13; 280; 80; 9; 9; 150; 25; 350; 25; 600 MG/1; [IU]/1; [IU]/1; MG/1; UG/1; UG/1; UG/1; MG/1; MG/1; MG/1; UG/1; MG/1; MG/1; MG/1; UG/1
1 CAPSULE, GELATIN COATED ORAL DAILY
Qty: 90 CAPSULE | Refills: 3 | Status: SHIPPED | OUTPATIENT
Start: 2024-09-23

## 2024-09-25 RX ORDER — PHENTERMINE HYDROCHLORIDE 37.5 MG/1
37.5 TABLET ORAL
Qty: 30 TABLET | Refills: 0 | Status: SHIPPED | OUTPATIENT
Start: 2024-09-25 | End: 2024-10-25

## 2024-10-18 ENCOUNTER — HOSPITAL ENCOUNTER (OUTPATIENT)
Dept: GENERAL RADIOLOGY | Age: 54
Discharge: HOME | End: 2024-10-20
Payer: COMMERCIAL

## 2024-10-18 DIAGNOSIS — N64.3 GALACTORRHEA: ICD-10-CM

## 2024-10-18 PROCEDURE — 77066 DX MAMMO INCL CAD BI: CPT

## 2024-10-18 PROCEDURE — G0279 TOMOSYNTHESIS, MAMMO: HCPCS

## 2024-10-18 PROCEDURE — 76642 ULTRASOUND BREAST LIMITED: CPT

## 2024-11-12 ENCOUNTER — HOSPITAL ENCOUNTER (OUTPATIENT)
Age: 54
Discharge: HOME | End: 2024-11-12
Payer: COMMERCIAL

## 2024-11-12 ENCOUNTER — TELEPHONE (OUTPATIENT)
Dept: ENDOCRINOLOGY | Age: 54
End: 2024-11-12

## 2024-11-12 DIAGNOSIS — E04.2 MULTINODULAR GOITER: Primary | ICD-10-CM

## 2024-11-12 DIAGNOSIS — R73.03 PREDIABETES: Primary | ICD-10-CM

## 2024-11-12 DIAGNOSIS — E04.2 MULTINODULAR GOITER: ICD-10-CM

## 2024-11-12 DIAGNOSIS — R53.83 FATIGUE, UNSPECIFIED TYPE: ICD-10-CM

## 2024-11-12 DIAGNOSIS — R73.03 PREDIABETES: ICD-10-CM

## 2024-11-12 LAB
ALBUMIN SERPL-MCNC: 4.3 G/DL (ref 3.5–5.2)
ALP SERPL-CCNC: 128 U/L (ref 35–104)
ALT SERPL-CCNC: 15 U/L (ref 0–32)
ANION GAP SERPL CALCULATED.3IONS-SCNC: 11 MMOL/L (ref 7–16)
AST SERPL-CCNC: 17 U/L (ref 0–31)
BILIRUB SERPL-MCNC: 0.2 MG/DL (ref 0–1.2)
BUN SERPL-MCNC: 13 MG/DL (ref 6–20)
CALCIUM SERPL-MCNC: 9.7 MG/DL (ref 8.6–10.2)
CHLORIDE SERPL-SCNC: 103 MMOL/L (ref 98–107)
CO2 SERPL-SCNC: 25 MMOL/L (ref 22–29)
CREAT SERPL-MCNC: 1 MG/DL (ref 0.5–1)
ERYTHROCYTE [DISTWIDTH] IN BLOOD BY AUTOMATED COUNT: 14 % (ref 11.5–15)
FOLATE SERPL-MCNC: 13.4 NG/ML (ref 4.8–24.2)
GFR, ESTIMATED: 68 ML/MIN/1.73M2
GLUCOSE SERPL-MCNC: 100 MG/DL (ref 74–99)
HBA1C MFR BLD: 6.2 % (ref 4–5.6)
HCT VFR BLD AUTO: 41.8 % (ref 34–48)
HGB BLD-MCNC: 13.3 G/DL (ref 11.5–15.5)
MCH RBC QN AUTO: 27.5 PG (ref 26–35)
MCHC RBC AUTO-ENTMCNC: 31.8 G/DL (ref 32–34.5)
MCV RBC AUTO: 86.4 FL (ref 80–99.9)
PLATELET # BLD AUTO: 272 K/UL (ref 130–450)
PMV BLD AUTO: 10 FL (ref 7–12)
POTASSIUM SERPL-SCNC: 4.2 MMOL/L (ref 3.5–5)
PROT SERPL-MCNC: 7.3 G/DL (ref 6.4–8.3)
RBC # BLD AUTO: 4.84 M/UL (ref 3.5–5.5)
SODIUM SERPL-SCNC: 139 MMOL/L (ref 132–146)
T4 FREE SERPL-MCNC: 1.2 NG/DL (ref 0.9–1.7)
TSH SERPL DL<=0.05 MIU/L-ACNC: 1.68 UIU/ML (ref 0.27–4.2)
VIT B12 SERPL-MCNC: 480 PG/ML (ref 211–946)
WBC OTHER # BLD: 7.2 K/UL (ref 4.5–11.5)

## 2024-11-12 PROCEDURE — 82607 VITAMIN B-12: CPT

## 2024-11-12 PROCEDURE — 82306 VITAMIN D 25 HYDROXY: CPT

## 2024-11-12 PROCEDURE — 36415 COLL VENOUS BLD VENIPUNCTURE: CPT

## 2024-11-12 PROCEDURE — 84443 ASSAY THYROID STIM HORMONE: CPT

## 2024-11-12 PROCEDURE — 84439 ASSAY OF FREE THYROXINE: CPT

## 2024-11-12 PROCEDURE — 83036 HEMOGLOBIN GLYCOSYLATED A1C: CPT

## 2024-11-12 PROCEDURE — 85027 COMPLETE CBC AUTOMATED: CPT

## 2024-11-12 PROCEDURE — 82746 ASSAY OF FOLIC ACID SERUM: CPT

## 2024-11-12 PROCEDURE — 80053 COMPREHEN METABOLIC PANEL: CPT

## 2024-11-13 LAB — 25(OH)D3 SERPL-MCNC: 36.4 NG/ML (ref 30–100)

## 2024-11-27 ENCOUNTER — HOSPITAL ENCOUNTER (OUTPATIENT)
Dept: ULTRASOUND IMAGING | Age: 54
Discharge: HOME | End: 2024-11-29
Attending: INTERNAL MEDICINE
Payer: COMMERCIAL

## 2024-11-27 DIAGNOSIS — E04.2 MULTINODULAR GOITER: ICD-10-CM

## 2024-11-27 PROCEDURE — 76536 US EXAM OF HEAD AND NECK: CPT

## 2024-12-19 ENCOUNTER — FOLLOWUP TELEPHONE ENCOUNTER (OUTPATIENT)
Dept: ENDOCRINOLOGY | Age: 54
End: 2024-12-19

## 2024-12-19 ENCOUNTER — OFFICE VISIT (OUTPATIENT)
Dept: ENDOCRINOLOGY | Age: 54
End: 2024-12-19
Payer: COMMERCIAL

## 2024-12-19 VITALS
DIASTOLIC BLOOD PRESSURE: 77 MMHG | SYSTOLIC BLOOD PRESSURE: 111 MMHG | BODY MASS INDEX: 31.32 KG/M2 | RESPIRATION RATE: 18 BRPM | HEIGHT: 65 IN | OXYGEN SATURATION: 98 % | HEART RATE: 63 BPM | WEIGHT: 188 LBS

## 2024-12-19 DIAGNOSIS — E06.3 HASHIMOTO'S THYROIDITIS: ICD-10-CM

## 2024-12-19 DIAGNOSIS — E04.2 MULTINODULAR GOITER: ICD-10-CM

## 2024-12-19 DIAGNOSIS — R53.82 CHRONIC FATIGUE: Primary | ICD-10-CM

## 2024-12-19 DIAGNOSIS — R73.03 PREDIABETES: ICD-10-CM

## 2024-12-19 PROCEDURE — 3074F SYST BP LT 130 MM HG: CPT | Performed by: INTERNAL MEDICINE

## 2024-12-19 PROCEDURE — 1036F TOBACCO NON-USER: CPT | Performed by: INTERNAL MEDICINE

## 2024-12-19 PROCEDURE — G2211 COMPLEX E/M VISIT ADD ON: HCPCS | Performed by: INTERNAL MEDICINE

## 2024-12-19 PROCEDURE — 3078F DIAST BP <80 MM HG: CPT | Performed by: INTERNAL MEDICINE

## 2024-12-19 PROCEDURE — G8427 DOCREV CUR MEDS BY ELIG CLIN: HCPCS | Performed by: INTERNAL MEDICINE

## 2024-12-19 PROCEDURE — G8417 CALC BMI ABV UP PARAM F/U: HCPCS | Performed by: INTERNAL MEDICINE

## 2024-12-19 PROCEDURE — 3017F COLORECTAL CA SCREEN DOC REV: CPT | Performed by: INTERNAL MEDICINE

## 2024-12-19 PROCEDURE — 99214 OFFICE O/P EST MOD 30 MIN: CPT | Performed by: INTERNAL MEDICINE

## 2024-12-19 PROCEDURE — G8484 FLU IMMUNIZE NO ADMIN: HCPCS | Performed by: INTERNAL MEDICINE

## 2024-12-19 NOTE — TELEPHONE ENCOUNTER
Met with patient in endo office for prediabetes education. Eats 1 meal/day, states this has been normal for her for a long time, now c/o 30 lb weight gain since September, sugar cravings, and fatigue. Documented weight gain of 24 lbs since May 2024, increased 8 lbs since September. Patient unable to determine cause. Low physical activity, unable to determine portion sizes at meals. Discussed importance of carb controlled meals 2-3 times/day to prevent overeating. Encouraged high protein and high fiber, provided food lists. Encouraged daily physical activity, minimum 30 min/day. Patient agreeable to suggestions, f/u as needed.

## 2024-12-19 NOTE — PROGRESS NOTES
deficiency  Continue vitamin D supplements  Prediabetes   Controlled with diet   The patient will meet with our dietitian today    I personally reviewed external notes from PCP and other patient's care team providers, and personally interpreted labs associated with the above diagnosis. I also ordered labs to further assess and manage the above addressed medical conditions    Return in about 1 year (around 12/19/2025) for prediabete, MNG .    The above issues were reviewed with the patient who understood and agreed with the plan.    Thank you for allowing us to participate in the care of this patient. Please do not hesitate to contact us with any additional questions.    Diagnosis Orders   1. Chronic fatigue  Cortisol Total      2. Prediabetes  Hemoglobin A1C    Basic Metabolic Panel      3. Multinodular goiter  TSH    T4, Free    US THYROID      4. Hashimoto's thyroiditis  TSH    T4, Free              Rio Anderson MD  Endocrinologist, [address] [address]   Phone: [phone]  Fax: [phone]  -------------------------------------------------------------  Electronically signed by Rio Anderson MD

## 2024-12-31 ENCOUNTER — OFFICE VISIT (OUTPATIENT)
Dept: FAMILY MEDICINE CLINIC | Age: 54
End: 2024-12-31
Payer: COMMERCIAL

## 2024-12-31 ENCOUNTER — TELEPHONE (OUTPATIENT)
Dept: FAMILY MEDICINE CLINIC | Age: 54
End: 2024-12-31

## 2024-12-31 VITALS
BODY MASS INDEX: 31.99 KG/M2 | TEMPERATURE: 97.6 F | HEIGHT: 65 IN | RESPIRATION RATE: 18 BRPM | HEART RATE: 69 BPM | OXYGEN SATURATION: 98 % | SYSTOLIC BLOOD PRESSURE: 116 MMHG | WEIGHT: 192 LBS | DIASTOLIC BLOOD PRESSURE: 62 MMHG

## 2024-12-31 DIAGNOSIS — T78.40XA ALLERGIC REACTION, INITIAL ENCOUNTER: Primary | ICD-10-CM

## 2024-12-31 DIAGNOSIS — M62.838 MUSCLE SPASMS OF NECK: ICD-10-CM

## 2024-12-31 DIAGNOSIS — E04.2 MULTINODULAR GOITER: Primary | ICD-10-CM

## 2024-12-31 PROCEDURE — 3017F COLORECTAL CA SCREEN DOC REV: CPT

## 2024-12-31 PROCEDURE — G8427 DOCREV CUR MEDS BY ELIG CLIN: HCPCS

## 2024-12-31 PROCEDURE — G8417 CALC BMI ABV UP PARAM F/U: HCPCS

## 2024-12-31 PROCEDURE — 3074F SYST BP LT 130 MM HG: CPT

## 2024-12-31 PROCEDURE — 1036F TOBACCO NON-USER: CPT

## 2024-12-31 PROCEDURE — G8484 FLU IMMUNIZE NO ADMIN: HCPCS

## 2024-12-31 PROCEDURE — 3078F DIAST BP <80 MM HG: CPT

## 2024-12-31 PROCEDURE — 99213 OFFICE O/P EST LOW 20 MIN: CPT

## 2024-12-31 PROCEDURE — 96372 THER/PROPH/DIAG INJ SC/IM: CPT

## 2024-12-31 RX ORDER — CLOBETASOL PROPIONATE 0.5 MG/G
OINTMENT TOPICAL
Qty: 15 G | Refills: 0 | Status: SHIPPED | OUTPATIENT
Start: 2024-12-31

## 2024-12-31 RX ORDER — TRIAMCINOLONE ACETONIDE 40 MG/ML
40 INJECTION, SUSPENSION INTRA-ARTICULAR; INTRAMUSCULAR ONCE
Status: COMPLETED | OUTPATIENT
Start: 2024-12-31 | End: 2024-12-31

## 2024-12-31 RX ORDER — METHOCARBAMOL 750 MG/1
750 TABLET, FILM COATED ORAL 4 TIMES DAILY
Qty: 40 TABLET | Refills: 0 | Status: SHIPPED | OUTPATIENT
Start: 2024-12-31 | End: 2025-01-10

## 2024-12-31 RX ADMIN — TRIAMCINOLONE ACETONIDE 40 MG: 40 INJECTION, SUSPENSION INTRA-ARTICULAR; INTRAMUSCULAR at 08:56

## 2024-12-31 NOTE — PROGRESS NOTES
for 10 days       Electronically signed by LYNETTE Gimenez CNP   DD: 12/31/24    **This report was transcribed using voice recognition software. Every effort was made to ensure accuracy; however, inadvertent computerized transcription errors may be present.

## 2024-12-31 NOTE — TELEPHONE ENCOUNTER
Pt called she wants all her current meds refilled and sent to [facility].      Pt wants RX Phentermine sent to [facility].

## 2025-01-06 ENCOUNTER — OFFICE VISIT (OUTPATIENT)
Dept: FAMILY MEDICINE CLINIC | Age: 55
End: 2025-01-06
Payer: COMMERCIAL

## 2025-01-06 VITALS
HEART RATE: 74 BPM | DIASTOLIC BLOOD PRESSURE: 72 MMHG | RESPIRATION RATE: 18 BRPM | BODY MASS INDEX: 31.95 KG/M2 | OXYGEN SATURATION: 97 % | TEMPERATURE: 97.6 F | SYSTOLIC BLOOD PRESSURE: 124 MMHG | WEIGHT: 192 LBS

## 2025-01-06 DIAGNOSIS — M54.2 NECK PAIN: Primary | ICD-10-CM

## 2025-01-06 PROCEDURE — G8417 CALC BMI ABV UP PARAM F/U: HCPCS | Performed by: PHYSICIAN ASSISTANT

## 2025-01-06 PROCEDURE — 96372 THER/PROPH/DIAG INJ SC/IM: CPT | Performed by: PHYSICIAN ASSISTANT

## 2025-01-06 PROCEDURE — 3078F DIAST BP <80 MM HG: CPT | Performed by: PHYSICIAN ASSISTANT

## 2025-01-06 PROCEDURE — 1036F TOBACCO NON-USER: CPT | Performed by: PHYSICIAN ASSISTANT

## 2025-01-06 PROCEDURE — 3017F COLORECTAL CA SCREEN DOC REV: CPT | Performed by: PHYSICIAN ASSISTANT

## 2025-01-06 PROCEDURE — G8427 DOCREV CUR MEDS BY ELIG CLIN: HCPCS | Performed by: PHYSICIAN ASSISTANT

## 2025-01-06 PROCEDURE — 99214 OFFICE O/P EST MOD 30 MIN: CPT | Performed by: PHYSICIAN ASSISTANT

## 2025-01-06 PROCEDURE — 3074F SYST BP LT 130 MM HG: CPT | Performed by: PHYSICIAN ASSISTANT

## 2025-01-06 PROCEDURE — M1308 PR FLU IMMUNIZE NO ADMIN: HCPCS | Performed by: PHYSICIAN ASSISTANT

## 2025-01-06 RX ORDER — METHYLPREDNISOLONE ACETATE 80 MG/ML
80 INJECTION, SUSPENSION INTRA-ARTICULAR; INTRALESIONAL; INTRAMUSCULAR; SOFT TISSUE ONCE
Status: COMPLETED | OUTPATIENT
Start: 2025-01-06 | End: 2025-01-06

## 2025-01-06 RX ORDER — PREDNISONE 10 MG/1
TABLET ORAL
Qty: 18 TABLET | Refills: 0 | Status: SHIPPED | OUTPATIENT
Start: 2025-01-06

## 2025-01-06 RX ADMIN — METHYLPREDNISOLONE ACETATE 80 MG: 80 INJECTION, SUSPENSION INTRA-ARTICULAR; INTRALESIONAL; INTRAMUSCULAR; SOFT TISSUE at 16:15

## 2025-01-06 NOTE — PROGRESS NOTES
25  Maci Thomas : 1970 Sex: female  Age 54 y.o.      Subjective:  Chief Complaint   Patient presents with    Neck Pain     Left side - feels like a spasm         HPI:     History of Present Illness  The patient is a 54-year-old female who presents for evaluation of neck pain.    She reports experiencing muscle spasms in her neck, which she initially attributed to a typical muscle spasm. However, the pain has since escalated, radiating into her head and ear over the past few days. The onset of these symptoms was approximately one month ago. She reports no associated fevers, chills, falls, injuries, or weakness in her arms or legs. She also reports no chest pain but describes an intermittent sensation akin to being squeezed. She is left-handed and has not engaged in heavy lifting due to recent rotator cuff surgery on her shoulder in 2024. She sought medical attention 1 to 2 weeks ago and was prescribed a topical cream, Tiger Balm, which unfortunately resulted in an allergic reaction. Despite this, she continued to use the cream. She was also administered a Kenalog injection and prescribed muscle relaxants, which have proven ineffective. Initially, she found relief with ibuprofen 400 to 800 mg, but this is no longer providing significant relief. No radiographic imaging has been conducted.    ALLERGIES  The patient has had an allergic reaction to TIGER BALM.    MEDICATIONS  Current: Ibuprofen            ROS:   Unless otherwise stated in this report the patient's positive and negative responses for review of systems for constitutional, eyes, ENT, cardiovascular, respiratory, gastrointestinal, neurological, , musculoskeletal, and integument systems and related systems to the presenting problem are either stated in the history of present illness or were not pertinent or were negative for the symptoms and/or complaints related to the presenting medical problem.  Positives and pertinent

## 2025-01-07 ENCOUNTER — OFFICE VISIT (OUTPATIENT)
Dept: FAMILY MEDICINE CLINIC | Age: 55
End: 2025-01-07
Payer: COMMERCIAL

## 2025-01-07 VITALS
HEART RATE: 68 BPM | OXYGEN SATURATION: 97 % | DIASTOLIC BLOOD PRESSURE: 74 MMHG | WEIGHT: 192.7 LBS | SYSTOLIC BLOOD PRESSURE: 118 MMHG | TEMPERATURE: 97 F | BODY MASS INDEX: 32.11 KG/M2 | HEIGHT: 65 IN | RESPIRATION RATE: 16 BRPM

## 2025-01-07 DIAGNOSIS — M62.838 MUSCLE SPASMS OF NECK: ICD-10-CM

## 2025-01-07 DIAGNOSIS — M47.892 OTHER OSTEOARTHRITIS OF SPINE, CERVICAL REGION: ICD-10-CM

## 2025-01-07 DIAGNOSIS — G43.101 MIGRAINE WITH AURA AND WITH STATUS MIGRAINOSUS, NOT INTRACTABLE: Primary | ICD-10-CM

## 2025-01-07 DIAGNOSIS — M54.2 NECK PAIN: ICD-10-CM

## 2025-01-07 DIAGNOSIS — E66.09 CLASS 1 OBESITY DUE TO EXCESS CALORIES WITH SERIOUS COMORBIDITY AND BODY MASS INDEX (BMI) OF 34.0 TO 34.9 IN ADULT: ICD-10-CM

## 2025-01-07 DIAGNOSIS — E66.811 CLASS 1 OBESITY DUE TO EXCESS CALORIES WITH SERIOUS COMORBIDITY AND BODY MASS INDEX (BMI) OF 34.0 TO 34.9 IN ADULT: ICD-10-CM

## 2025-01-07 PROCEDURE — 99214 OFFICE O/P EST MOD 30 MIN: CPT | Performed by: FAMILY MEDICINE

## 2025-01-07 PROCEDURE — 3078F DIAST BP <80 MM HG: CPT | Performed by: FAMILY MEDICINE

## 2025-01-07 PROCEDURE — G8427 DOCREV CUR MEDS BY ELIG CLIN: HCPCS | Performed by: FAMILY MEDICINE

## 2025-01-07 PROCEDURE — 3074F SYST BP LT 130 MM HG: CPT | Performed by: FAMILY MEDICINE

## 2025-01-07 PROCEDURE — 1036F TOBACCO NON-USER: CPT | Performed by: FAMILY MEDICINE

## 2025-01-07 PROCEDURE — G8417 CALC BMI ABV UP PARAM F/U: HCPCS | Performed by: FAMILY MEDICINE

## 2025-01-07 PROCEDURE — 3017F COLORECTAL CA SCREEN DOC REV: CPT | Performed by: FAMILY MEDICINE

## 2025-01-07 PROCEDURE — 96372 THER/PROPH/DIAG INJ SC/IM: CPT | Performed by: FAMILY MEDICINE

## 2025-01-07 RX ORDER — KETOROLAC TROMETHAMINE 30 MG/ML
60 INJECTION, SOLUTION INTRAMUSCULAR; INTRAVENOUS ONCE
Status: COMPLETED | OUTPATIENT
Start: 2025-01-07 | End: 2025-01-07

## 2025-01-07 RX ORDER — PHENTERMINE HYDROCHLORIDE 37.5 MG/1
37.5 TABLET ORAL
Qty: 30 TABLET | Refills: 0 | Status: SHIPPED | OUTPATIENT
Start: 2025-01-07 | End: 2025-02-06

## 2025-01-07 RX ADMIN — KETOROLAC TROMETHAMINE 30 MG: 30 INJECTION, SOLUTION INTRAMUSCULAR; INTRAVENOUS at 12:35

## 2025-01-07 ASSESSMENT — PATIENT HEALTH QUESTIONNAIRE - PHQ9
SUM OF ALL RESPONSES TO PHQ QUESTIONS 1-9: 0
SUM OF ALL RESPONSES TO PHQ QUESTIONS 1-9: 0
2. FEELING DOWN, DEPRESSED OR HOPELESS: NOT AT ALL
SUM OF ALL RESPONSES TO PHQ QUESTIONS 1-9: 0
1. LITTLE INTEREST OR PLEASURE IN DOING THINGS: NOT AT ALL
SUM OF ALL RESPONSES TO PHQ9 QUESTIONS 1 & 2: 0
SUM OF ALL RESPONSES TO PHQ QUESTIONS 1-9: 0

## 2025-01-07 NOTE — TELEPHONE ENCOUNTER
Pt states that she does not want these medications sent in anymore- she needed these before the end of the year.

## 2025-01-07 NOTE — PROGRESS NOTES
Huma, a specialist in physical medicine and rehabilitation, have been made for further evaluation and management.    2. Medication management.  A prescription for phentermine will be sent to her pharmacy.    3. Dermatology referral.  She is advised to call and schedule an appointment with Dr. Galeano or the physician's assistant at the dermatology office where she is an established patient.    PROCEDURE  The patient received a Kenalog injection during her first visit to the urgent care last week and a prednisone injection during her second visit yesterday.    A Toradol injection was administered today to alleviate her discomfort.       Maci was seen today for pain, skin problem and medication refill.    Diagnoses and all orders for this visit:    Migraine with aura and with status migrainosus, not intractable  -     Navin Morgan APRN-CNP, Neurology, [place]  -     Janel Reese MD, Physical Medicine and Rehabilitation, [place]    Class 1 obesity due to excess calories with serious comorbidity and body mass index (BMI) of 34.0 to 34.9 in adult  -     phentermine (ADIPEX-P) 37.5 MG tablet; Take 1 tablet by mouth every morning (before breakfast) for 30 days. Max Daily Amount: 37.5 mg    Muscle spasms of neck  -     Janel Reese MD, Physical Medicine and Rehabilitation, [place]    Neck pain  -     Janel Reese MD, Physical Medicine and Rehabilitation, [place]    Other osteoarthritis of spine, cervical region  -     Janel Reese MD, Physical Medicine and Rehabilitation, [place]         Samra Mcpherson, DO    Call or go to ED immediately if symptoms worsen or persist.    Educational materials and/or home exercises printed for patient's review and were included in patient instructions on his/her After Visit Summary and given to patient at the end of visit.       Counseled regarding above diagnosis, including possible risks and complications,  especially if left

## 2025-02-06 ENCOUNTER — OFFICE VISIT (OUTPATIENT)
Dept: FAMILY MEDICINE CLINIC | Age: 55
End: 2025-02-06
Payer: COMMERCIAL

## 2025-02-06 VITALS
DIASTOLIC BLOOD PRESSURE: 84 MMHG | HEIGHT: 65 IN | WEIGHT: 189.3 LBS | RESPIRATION RATE: 16 BRPM | HEART RATE: 82 BPM | OXYGEN SATURATION: 99 % | TEMPERATURE: 97.3 F | BODY MASS INDEX: 31.54 KG/M2 | SYSTOLIC BLOOD PRESSURE: 122 MMHG

## 2025-02-06 DIAGNOSIS — J30.89 SEASONAL ALLERGIC RHINITIS DUE TO OTHER ALLERGIC TRIGGER: ICD-10-CM

## 2025-02-06 DIAGNOSIS — J40 SINOBRONCHITIS: Primary | ICD-10-CM

## 2025-02-06 DIAGNOSIS — G43.711 INTRACTABLE CHRONIC MIGRAINE WITHOUT AURA AND WITH STATUS MIGRAINOSUS: ICD-10-CM

## 2025-02-06 DIAGNOSIS — L30.9 DERMATITIS: ICD-10-CM

## 2025-02-06 DIAGNOSIS — J32.9 SINOBRONCHITIS: Primary | ICD-10-CM

## 2025-02-06 PROCEDURE — 1036F TOBACCO NON-USER: CPT | Performed by: FAMILY MEDICINE

## 2025-02-06 PROCEDURE — G8427 DOCREV CUR MEDS BY ELIG CLIN: HCPCS | Performed by: FAMILY MEDICINE

## 2025-02-06 PROCEDURE — 3079F DIAST BP 80-89 MM HG: CPT | Performed by: FAMILY MEDICINE

## 2025-02-06 PROCEDURE — G8417 CALC BMI ABV UP PARAM F/U: HCPCS | Performed by: FAMILY MEDICINE

## 2025-02-06 PROCEDURE — 99214 OFFICE O/P EST MOD 30 MIN: CPT | Performed by: FAMILY MEDICINE

## 2025-02-06 PROCEDURE — 3074F SYST BP LT 130 MM HG: CPT | Performed by: FAMILY MEDICINE

## 2025-02-06 PROCEDURE — 3017F COLORECTAL CA SCREEN DOC REV: CPT | Performed by: FAMILY MEDICINE

## 2025-02-06 RX ORDER — FLUTICASONE PROPIONATE 50 MCG
1 SPRAY, SUSPENSION (ML) NASAL DAILY
Qty: 1 EACH | Refills: 5 | Status: SHIPPED | OUTPATIENT
Start: 2025-02-06

## 2025-02-06 RX ORDER — AMOXICILLIN 875 MG/1
875 TABLET, COATED ORAL 2 TIMES DAILY
Qty: 20 TABLET | Refills: 0 | Status: SHIPPED | OUTPATIENT
Start: 2025-02-06 | End: 2025-02-16

## 2025-02-06 RX ORDER — RIZATRIPTAN BENZOATE 10 MG/1
TABLET ORAL
Qty: 27 TABLET | Refills: 3 | Status: SHIPPED | OUTPATIENT
Start: 2025-02-06

## 2025-02-06 RX ORDER — UBROGEPANT 100 MG/1
TABLET ORAL
Qty: 36 TABLET | Refills: 3 | Status: SHIPPED | OUTPATIENT
Start: 2025-02-06

## 2025-02-06 RX ORDER — PREDNISONE 20 MG/1
20 TABLET ORAL DAILY
Qty: 5 TABLET | Refills: 0 | Status: SHIPPED | OUTPATIENT
Start: 2025-02-06 | End: 2025-02-11

## 2025-02-06 SDOH — ECONOMIC STABILITY: FOOD INSECURITY: WITHIN THE PAST 12 MONTHS, YOU WORRIED THAT YOUR FOOD WOULD RUN OUT BEFORE YOU GOT MONEY TO BUY MORE.: NEVER TRUE

## 2025-02-06 SDOH — ECONOMIC STABILITY: FOOD INSECURITY: WITHIN THE PAST 12 MONTHS, THE FOOD YOU BOUGHT JUST DIDN'T LAST AND YOU DIDN'T HAVE MONEY TO GET MORE.: NEVER TRUE

## 2025-02-06 NOTE — PROGRESS NOTES
Encounters:   02/06/25 85.9 kg (189 lb 4.8 oz)   01/07/25 87.4 kg (192 lb 11.2 oz)   01/06/25 87.1 kg (192 lb)     BP Readings from Last 3 Encounters:   02/06/25 122/84   01/07/25 118/74   01/06/25 124/72     Pulse Readings from Last 3 Encounters:   02/06/25 82   01/07/25 68   01/06/25 74       General appearance: alert, well appearing, and in no distress, oriented to person, place, and time and normal appearing weight.    CVS exam: normal rate, regular rhythm, normal S1, S2, no murmurs, rubs, clicks or gallops.  Radial pulses 2+ bilateral.  PT/DP pulse 2+ bilat.  No C/C/E    Chest: clear to auscultation, no wheezes, rales or rhonchi, symmetric air entry.     Abdomen: Soft, non-tender, non-distended, positive BS in all 4 quadrants    Extremities:Dorsalis pedis pulses palpated bilaterally, no clubbing, cyanosis, edema or erythema,     SKIN: no lesions, jaundice, petechiae, pallor, cyanosis, ecchymosis    NEURO: gross motor exam normal by observation, gait normal    Mental status - alert, oriented to person, place, and time, normal mood, behavior, speech, dress, motor activity, and thought processes    Assessment & Plan  1. Sinusitis.  She has been experiencing sinus issues since the fall, with worsening symptoms over the past 3 weeks. Examination revealed fluid behind the membranes and slight drainage down the back of her neck. Her cheeks appear irritated and inflamed. She reports a runny nose, stuffiness, and headaches. She is allergic to PENICILLIN but can take amoxicillin. A prescription for high-dose amoxicillin 875 mg twice daily for 10 days has been provided. Additionally, a 5-day course of prednisone has been prescribed to help open the eustachian tubes and clear the lungs. She is advised to use over-the-counter cough and cold preparations to alleviate symptoms. Flonase is recommended to help with nasal symptoms. Prescriptions will be sent to [facility].    2. Asthma.  She reports very rare

## 2025-02-14 ENCOUNTER — OFFICE VISIT (OUTPATIENT)
Dept: FAMILY MEDICINE CLINIC | Age: 55
End: 2025-02-14

## 2025-02-14 VITALS
TEMPERATURE: 97.5 F | SYSTOLIC BLOOD PRESSURE: 128 MMHG | RESPIRATION RATE: 18 BRPM | DIASTOLIC BLOOD PRESSURE: 84 MMHG | BODY MASS INDEX: 30.95 KG/M2 | HEART RATE: 84 BPM | OXYGEN SATURATION: 97 % | WEIGHT: 186 LBS

## 2025-02-14 DIAGNOSIS — R09.82 POSTNASAL DRIP: ICD-10-CM

## 2025-02-14 DIAGNOSIS — R09.81 NASAL CONGESTION: ICD-10-CM

## 2025-02-14 DIAGNOSIS — J01.90 ACUTE NON-RECURRENT SINUSITIS, UNSPECIFIED LOCATION: Primary | ICD-10-CM

## 2025-02-14 RX ORDER — DOXYCYCLINE HYCLATE 100 MG
100 TABLET ORAL 2 TIMES DAILY
Qty: 20 TABLET | Refills: 0 | Status: SHIPPED | OUTPATIENT
Start: 2025-02-14 | End: 2025-02-24

## 2025-02-14 RX ORDER — METHYLPREDNISOLONE ACETATE 80 MG/ML
80 INJECTION, SUSPENSION INTRA-ARTICULAR; INTRALESIONAL; INTRAMUSCULAR; SOFT TISSUE ONCE
Status: COMPLETED | OUTPATIENT
Start: 2025-02-14 | End: 2025-02-14

## 2025-02-14 RX ADMIN — METHYLPREDNISOLONE ACETATE 80 MG: 80 INJECTION, SUSPENSION INTRA-ARTICULAR; INTRALESIONAL; INTRAMUSCULAR; SOFT TISSUE at 13:15

## 2025-02-14 NOTE — PROGRESS NOTES
25  Maci Thomas : 1970 Sex: female  Age 54 y.o.      Subjective:  Chief Complaint   Patient presents with    Nasal Congestion     X3 weeks - currently on ATB         HPI:     History of Present Illness  The patient is a 54-year-old female who presents for evaluation of sinus symptoms.    She has been experiencing sinus-related symptoms since the fall, which have progressively worsened, particularly towards the end of 2025. She reports no cough but mentions occasional low-grade fevers. She has not yet initiated her prescribed steroid treatment. She was told that her breathing was not good, so she was given the steroid. Her current treatment regimen includes amoxicillin and Flonase. She also takes Mucinex Sinus pills or Advil Sinus as needed.    ALLERGIES  The patient is allergic to PENICILLIN.  However she can tolerate amoxicillin which she is currently on    MEDICATIONS  Current: amoxicillin, Flonase, Mucinex sinus pills, Advil sinus            ROS:   Unless otherwise stated in this report the patient's positive and negative responses for review of systems for constitutional, eyes, ENT, cardiovascular, respiratory, gastrointestinal, neurological, , musculoskeletal, and integument systems and related systems to the presenting problem are either stated in the history of present illness or were not pertinent or were negative for the symptoms and/or complaints related to the presenting medical problem.  Positives and pertinent negatives as per HPI.  All others reviewed and are negative.      PMH:     Past Medical History:   Diagnosis Date    Asthma     [facility]    Atypical ductal hyperplasia, breast     right breast 2013    Back injury     Chronic back pain     PAIN RADIATES TO NECK AND LEGS    Chronic tension-type headache, not intractable 2018    DDD (degenerative disc disease), cervical 2018    DVT (deep venous thrombosis) ([facility])     Right Arm    GERD

## 2025-02-20 ENCOUNTER — OFFICE VISIT (OUTPATIENT)
Dept: ENT CLINIC | Age: 55
End: 2025-02-20
Payer: COMMERCIAL

## 2025-02-20 VITALS
HEIGHT: 65 IN | DIASTOLIC BLOOD PRESSURE: 82 MMHG | OXYGEN SATURATION: 97 % | HEART RATE: 84 BPM | SYSTOLIC BLOOD PRESSURE: 122 MMHG | WEIGHT: 185 LBS | BODY MASS INDEX: 30.82 KG/M2 | TEMPERATURE: 97.5 F

## 2025-02-20 DIAGNOSIS — J30.9 CHRONIC ALLERGIC RHINITIS: Primary | ICD-10-CM

## 2025-02-20 DIAGNOSIS — J32.4 CHRONIC PANSINUSITIS: ICD-10-CM

## 2025-02-20 PROCEDURE — 99203 OFFICE O/P NEW LOW 30 MIN: CPT

## 2025-02-20 PROCEDURE — G8427 DOCREV CUR MEDS BY ELIG CLIN: HCPCS

## 2025-02-20 PROCEDURE — 3079F DIAST BP 80-89 MM HG: CPT

## 2025-02-20 PROCEDURE — 3017F COLORECTAL CA SCREEN DOC REV: CPT

## 2025-02-20 PROCEDURE — G8417 CALC BMI ABV UP PARAM F/U: HCPCS

## 2025-02-20 PROCEDURE — 3074F SYST BP LT 130 MM HG: CPT

## 2025-02-20 PROCEDURE — 1036F TOBACCO NON-USER: CPT

## 2025-02-20 RX ORDER — AZELASTINE 1 MG/ML
2 SPRAY, METERED NASAL 2 TIMES DAILY
Qty: 30 ML | Refills: 1 | Status: SHIPPED | OUTPATIENT
Start: 2025-02-20

## 2025-02-20 ASSESSMENT — ENCOUNTER SYMPTOMS
ALLERGIC/IMMUNOLOGIC NEGATIVE: 1
SINUS PRESSURE: 1
SORE THROAT: 0
COUGH: 0
EYE DISCHARGE: 0
EYE PAIN: 0
SHORTNESS OF BREATH: 0
BACK PAIN: 0
VOMITING: 0
RHINORRHEA: 1
DIARRHEA: 0

## 2025-02-20 NOTE — PROGRESS NOTES
Skin:  Negative for rash.   Allergic/Immunologic: Negative.    Neurological:  Negative for syncope and headaches.   All other systems reviewed and are negative.      Objective:   Physical Exam  Vitals reviewed.   Constitutional:       Appearance: Normal appearance.   HENT:      Head: Normocephalic and atraumatic.      Jaw: There is normal jaw occlusion. No tenderness.      Right Ear: Tympanic membrane, ear canal and external ear normal.      Left Ear: Tympanic membrane, ear canal and external ear normal.      Nose: Congestion and rhinorrhea present. Rhinorrhea is purulent.      Right Turbinates: Pale. Not swollen.      Left Turbinates: Pale. Not swollen.      Mouth/Throat:      Lips: Pink.      Mouth: Mucous membranes are moist.      Pharynx: Oropharynx is clear.   Eyes:      General: Lids are normal.      Conjunctiva/sclera: Conjunctivae normal.      Pupils: Pupils are equal, round, and reactive to light.   Cardiovascular:      Rate and Rhythm: Normal rate and regular rhythm.      Pulses: Normal pulses.   Pulmonary:      Effort: Pulmonary effort is normal. No respiratory distress.      Breath sounds: No stridor.   Abdominal:      General: Abdomen is flat.      Palpations: Abdomen is soft.   Musculoskeletal:         General: Normal range of motion.      Cervical back: Normal range of motion. No rigidity.   Skin:     General: Skin is warm and dry.   Neurological:      General: No focal deficit present.      Mental Status: She is alert and oriented to person, place, and time.   Psychiatric:         Attention and Perception: Attention normal.         Mood and Affect: Affect normal.         Behavior: Behavior normal. Behavior is cooperative.         Thought Content: Thought content normal.         Judgment: Judgment normal.         Assessment:       Diagnosis Orders   1. Chronic allergic rhinitis        2. Chronic pansinusitis                   Plan:     Maci is a 54-year-old female who presents to the office today

## 2025-03-07 ENCOUNTER — OFFICE VISIT (OUTPATIENT)
Dept: FAMILY MEDICINE CLINIC | Age: 55
End: 2025-03-07

## 2025-03-07 VITALS
WEIGHT: 187.8 LBS | DIASTOLIC BLOOD PRESSURE: 74 MMHG | HEART RATE: 79 BPM | TEMPERATURE: 97.5 F | SYSTOLIC BLOOD PRESSURE: 112 MMHG | HEIGHT: 65 IN | RESPIRATION RATE: 18 BRPM | OXYGEN SATURATION: 97 % | BODY MASS INDEX: 31.29 KG/M2

## 2025-03-07 DIAGNOSIS — J01.90 ACUTE NON-RECURRENT SINUSITIS, UNSPECIFIED LOCATION: Primary | ICD-10-CM

## 2025-03-07 RX ORDER — PREDNISONE 10 MG/1
TABLET ORAL
Qty: 18 TABLET | Refills: 0 | Status: SHIPPED | OUTPATIENT
Start: 2025-03-07

## 2025-03-07 RX ORDER — AMOXICILLIN 875 MG/1
875 TABLET, COATED ORAL 2 TIMES DAILY
Qty: 20 TABLET | Refills: 0 | Status: SHIPPED | OUTPATIENT
Start: 2025-03-07 | End: 2025-03-17

## 2025-03-07 RX ORDER — METHYLPREDNISOLONE ACETATE 80 MG/ML
80 INJECTION, SUSPENSION INTRA-ARTICULAR; INTRALESIONAL; INTRAMUSCULAR; SOFT TISSUE ONCE
Status: COMPLETED | OUTPATIENT
Start: 2025-03-07 | End: 2025-03-07

## 2025-03-07 RX ADMIN — METHYLPREDNISOLONE ACETATE 80 MG: 80 INJECTION, SUSPENSION INTRA-ARTICULAR; INTRALESIONAL; INTRAMUSCULAR; SOFT TISSUE at 12:46

## 2025-03-07 NOTE — PROGRESS NOTES
mouth 2 times daily, Disp: 20 tablet, Rfl: 0    HYDROcodone-acetaminophen (NORCO)  MG per tablet, Take 1 tablet by mouth every 6 hours as needed for Pain. Only taking when needed, Disp: , Rfl:     ondansetron (ZOFRAN) 4 MG tablet, TAKE 1 TABLET EVERY 8 HOURSAS NEEDED FOR NAUSEA OR    VOMITING, Disp: 30 tablet, Rfl: 1    SYMBICORT 160-4.5 MCG/ACT AERO, , Disp: , Rfl:     loratadine (CLARITIN) 10 MG tablet, , Disp: , Rfl:     aspirin 81 MG tablet, Take 1 tablet by mouth daily, Disp: , Rfl:     Allergies:     Allergies   Allergen Reactions    Ceftin [Cefuroxime Axetil] Nausea Only    Hydrocodone Other (See Comments)     Other reaction(s): Shortness of Breath, Unknown    Ibuprofen Swelling     Other reaction(s): Face/Tongue/Throat Swelling    Penicillins      ? reaction  Other reaction(s): Unknown    Vicodin [Hydrocodone-Acetaminophen] Shortness Of Breath    Other      TAPE AND BANDAIDS SKIN IRRITATION    Percocet [Oxycodone-Acetaminophen]     Sudafed [Pseudoephedrine Hcl] Swelling    Ajovy [Fremanezumab-Vfrm] Rash    Augmentin [Amoxicillin-Pot Clavulanate] Diarrhea       Social History:     Social History     Tobacco Use    Smoking status: Never    Smokeless tobacco: Never   Vaping Use    Vaping status: Never Used   Substance Use Topics    Alcohol use: Not Currently     Comment: rare     Drug use: No       Patient lives at home.    Physical Exam:     Vitals:    03/07/25 1226   BP: 112/74   Site: Left Upper Arm   Position: Sitting   Pulse: 79   Resp: 18   Temp: 97.5 °F (36.4 °C)   TempSrc: Temporal   SpO2: 97%   Weight: 85.2 kg (187 lb 12.8 oz)   Height: 1.651 m (5' 5\")       Exam:  Physical Exam  Nurse's notes and vital signs reviewed. The patient is not hypoxic.  ?  General: Alert, no acute distress, patient resting comfortably Patient is not toxic or lethargic.  Skin: Warm, intact, no pallor noted. There is no evidence of rash at this time.  Head: Normocephalic, atraumatic  Eye: Normal conjunctiva  Ears, Nose,

## 2025-03-13 ENCOUNTER — OFFICE VISIT (OUTPATIENT)
Dept: FAMILY MEDICINE CLINIC | Age: 55
End: 2025-03-13
Payer: COMMERCIAL

## 2025-03-13 ENCOUNTER — OFFICE VISIT (OUTPATIENT)
Dept: NEUROLOGY | Age: 55
End: 2025-03-13
Payer: COMMERCIAL

## 2025-03-13 VITALS — HEART RATE: 72 BPM | OXYGEN SATURATION: 100 % | RESPIRATION RATE: 16 BRPM | TEMPERATURE: 98 F

## 2025-03-13 VITALS
HEART RATE: 70 BPM | OXYGEN SATURATION: 96 % | BODY MASS INDEX: 31.9 KG/M2 | RESPIRATION RATE: 16 BRPM | DIASTOLIC BLOOD PRESSURE: 86 MMHG | WEIGHT: 191.5 LBS | HEIGHT: 65 IN | TEMPERATURE: 97.2 F | SYSTOLIC BLOOD PRESSURE: 124 MMHG

## 2025-03-13 DIAGNOSIS — H53.9 CHANGES IN VISION: ICD-10-CM

## 2025-03-13 DIAGNOSIS — J01.90 ACUTE BACTERIAL SINUSITIS: Primary | ICD-10-CM

## 2025-03-13 DIAGNOSIS — G43.101 MIGRAINE WITH AURA AND WITH STATUS MIGRAINOSUS, NOT INTRACTABLE: Primary | ICD-10-CM

## 2025-03-13 DIAGNOSIS — B96.89 ACUTE BACTERIAL SINUSITIS: Primary | ICD-10-CM

## 2025-03-13 PROCEDURE — 99205 OFFICE O/P NEW HI 60 MIN: CPT | Performed by: PHYSICIAN ASSISTANT

## 2025-03-13 PROCEDURE — 3074F SYST BP LT 130 MM HG: CPT | Performed by: FAMILY MEDICINE

## 2025-03-13 PROCEDURE — G8427 DOCREV CUR MEDS BY ELIG CLIN: HCPCS | Performed by: FAMILY MEDICINE

## 2025-03-13 PROCEDURE — G8417 CALC BMI ABV UP PARAM F/U: HCPCS | Performed by: FAMILY MEDICINE

## 2025-03-13 PROCEDURE — 3017F COLORECTAL CA SCREEN DOC REV: CPT | Performed by: PHYSICIAN ASSISTANT

## 2025-03-13 PROCEDURE — 1036F TOBACCO NON-USER: CPT | Performed by: PHYSICIAN ASSISTANT

## 2025-03-13 PROCEDURE — 99213 OFFICE O/P EST LOW 20 MIN: CPT | Performed by: FAMILY MEDICINE

## 2025-03-13 PROCEDURE — G8427 DOCREV CUR MEDS BY ELIG CLIN: HCPCS | Performed by: PHYSICIAN ASSISTANT

## 2025-03-13 PROCEDURE — 1036F TOBACCO NON-USER: CPT | Performed by: FAMILY MEDICINE

## 2025-03-13 PROCEDURE — 3017F COLORECTAL CA SCREEN DOC REV: CPT | Performed by: FAMILY MEDICINE

## 2025-03-13 PROCEDURE — G8417 CALC BMI ABV UP PARAM F/U: HCPCS | Performed by: PHYSICIAN ASSISTANT

## 2025-03-13 PROCEDURE — 3079F DIAST BP 80-89 MM HG: CPT | Performed by: FAMILY MEDICINE

## 2025-03-13 RX ORDER — AMOXICILLIN 875 MG/1
875 TABLET, COATED ORAL 2 TIMES DAILY
Qty: 20 TABLET | Refills: 0 | Status: SHIPPED | OUTPATIENT
Start: 2025-03-13 | End: 2025-03-23

## 2025-03-13 RX ORDER — ERENUMAB-AOOE 70 MG/ML
70 INJECTION SUBCUTANEOUS
Qty: 3 ADJUSTABLE DOSE PRE-FILLED PEN SYRINGE | Refills: 4 | Status: SHIPPED | OUTPATIENT
Start: 2025-03-13

## 2025-03-13 NOTE — PATIENT INSTRUCTIONS
Medications safe to take with Hypertension:  Diphenhydramine (antihistamine)  Loratadine or Cetirizine (antihistamine)  Chlorpheniramine (antihistamine)  Guaifenesin (expertorant)  Dextromethorphan (cough suppressant)   Acetaminophen (pain/fever)  Nasal Saline or Breathe Right Strips (congestion)      AVOID pseudoephedrine, ephedrine, phenylephrine, naphazoline and oxymetazoline.

## 2025-03-13 NOTE — PROGRESS NOTES
3/13/2025    Maci Thomas (:  1970) is a 54 y.o. female, is here for evaluation of the following chief complaint(s):  Spasms (On left side ) and Ear Pain (Left ear thinks she may have a ear infection )      History of Present Illness  The patient presents for evaluation of sinusitis.    She reports persistent symptoms of lightheadedness and dizziness, which have been particularly severe over the past 1.5 weeks. She has been experiencing significant discomfort in her left ear, describing it as \"killing\" her. She is uncertain if this is due to fluid accumulation. Additionally, she reports nasal congestion. She has not been taking any over-the-counter medications for the past few weeks due to a previous episode of elevated blood pressure. She has been using Flonase nightly, administering one spray in the morning and another at night. She was also taking Claritin but ran out of the medication this week. She recalls a previous adverse reaction to Medrol Dosepak, which resulted in increased appetite. She tolerates amoxicillin well. She mentions that she has previously discussed with the provider about the phenylephrine content in over-the-counter medications, which she believes may be contributing to her symptoms. She also reports experiencing rebound headaches.    ALLERGIES  The patient has no known allergies.    MEDICATIONS  Current: Flonase, Claritin  Past: Amoxicillin    Patient's past medical, surgical, social and/or family history reviewed, updated in chart, and are non-contributory (unless otherwise stated).  Medications and allergies also reviewed and updated in chart.     ROS negative unless otherwise specified    Physical Exam  Temp Readings from Last 3 Encounters:   25 97.2 °F (36.2 °C) (Temporal)   25 98 °F (36.7 °C) (Temporal)   25 97.5 °F (36.4 °C) (Temporal)     Wt Readings from Last 3 Encounters:   25 86.9 kg (191 lb 8 oz)   25 85.2 kg (187 lb 12.8 oz)

## 2025-03-13 NOTE — PROGRESS NOTES
that she is able to tolerate Aimovig.   Will continue patient on Ubrelvy and patient was given Zavzpret samples to try  -- Discussed possibility of Botox injections in the future, patient does not want to explore this avenue at this time  Plan:     Continue Ubrelvy     Zavzpret samples given    Start Aimovig    [facility] were at [facility] [place] [facility], patient is able to get samples (2) at [facility] if these are available    MRI brain wo contrast    Recommend ophthalmology    Labs sed rate and crp       RTO in 3 months or sooner prn  Dolores Martin PA-C  9:43 AM  3/13/2025    I spent 65 minutes with this patient obtaining the HPI and discussing the exam with greater than 50% of the time providing counseling and education on medications and other treatment plans. All questions were answered prior to leaving my office.

## 2025-03-17 ENCOUNTER — TELEPHONE (OUTPATIENT)
Dept: FAMILY MEDICINE CLINIC | Age: 55
End: 2025-03-17

## 2025-03-17 RX ORDER — MECLIZINE HCL 12.5 MG 12.5 MG/1
12.5 TABLET ORAL 3 TIMES DAILY PRN
Qty: 30 TABLET | Refills: 1 | Status: SHIPPED | OUTPATIENT
Start: 2025-03-17 | End: 2025-04-16

## 2025-03-17 NOTE — TELEPHONE ENCOUNTER
Maci is having the same symptoms she had last week when she was here the lightheadedness and dizziness-please advise

## 2025-03-19 ENCOUNTER — APPOINTMENT (OUTPATIENT)
Dept: CT IMAGING | Age: 55
End: 2025-03-19
Payer: COMMERCIAL

## 2025-03-19 ENCOUNTER — HOSPITAL ENCOUNTER (OUTPATIENT)
Age: 55
Setting detail: OBSERVATION
Discharge: HOME | End: 2025-03-20
Attending: EMERGENCY MEDICINE | Admitting: INTERNAL MEDICINE
Payer: COMMERCIAL

## 2025-03-19 ENCOUNTER — TELEPHONE (OUTPATIENT)
Dept: ENT CLINIC | Age: 55
End: 2025-03-19

## 2025-03-19 ENCOUNTER — APPOINTMENT (OUTPATIENT)
Dept: GENERAL RADIOLOGY | Age: 55
End: 2025-03-19
Payer: COMMERCIAL

## 2025-03-19 DIAGNOSIS — R51.9 NONINTRACTABLE HEADACHE, UNSPECIFIED CHRONICITY PATTERN, UNSPECIFIED HEADACHE TYPE: ICD-10-CM

## 2025-03-19 DIAGNOSIS — I47.10 SVT (SUPRAVENTRICULAR TACHYCARDIA): Primary | ICD-10-CM

## 2025-03-19 DIAGNOSIS — I47.10 PAROXYSMAL SUPRAVENTRICULAR TACHYCARDIA: ICD-10-CM

## 2025-03-19 DIAGNOSIS — I47.10 PAROXYSMAL SVT (SUPRAVENTRICULAR TACHYCARDIA): ICD-10-CM

## 2025-03-19 LAB
AMPHET UR QL SCN: NEGATIVE
ANION GAP SERPL CALCULATED.3IONS-SCNC: 15 MMOL/L (ref 7–16)
BARBITURATES UR QL SCN: NEGATIVE
BASOPHILS # BLD: 0.03 K/UL (ref 0–0.2)
BASOPHILS NFR BLD: 0 % (ref 0–2)
BENZODIAZ UR QL: NEGATIVE
BUN SERPL-MCNC: 13 MG/DL (ref 6–20)
BUPRENORPHINE UR QL: NEGATIVE
CALCIUM SERPL-MCNC: 9.4 MG/DL (ref 8.6–10.2)
CANNABINOIDS UR QL SCN: NEGATIVE
CHLORIDE SERPL-SCNC: 104 MMOL/L (ref 98–107)
CO2 SERPL-SCNC: 23 MMOL/L (ref 22–29)
COCAINE UR QL SCN: NEGATIVE
CREAT SERPL-MCNC: 0.9 MG/DL (ref 0.5–1)
D-DIMER QUANTITATIVE: <200 NG/ML DDU (ref 0–230)
EKG ATRIAL RATE: 84 BPM
EKG ATRIAL RATE: 94 BPM
EKG P AXIS: 31 DEGREES
EKG P-R INTERVAL: 110 MS
EKG Q-T INTERVAL: 276 MS
EKG Q-T INTERVAL: 358 MS
EKG QRS DURATION: 72 MS
EKG QRS DURATION: 92 MS
EKG QTC CALCULATION (BAZETT): 447 MS
EKG QTC CALCULATION (BAZETT): 465 MS
EKG R AXIS: -10 DEGREES
EKG R AXIS: -25 DEGREES
EKG T AXIS: -2 DEGREES
EKG T AXIS: 53 DEGREES
EKG VENTRICULAR RATE: 171 BPM
EKG VENTRICULAR RATE: 94 BPM
EOSINOPHIL # BLD: 0.2 K/UL (ref 0.05–0.5)
EOSINOPHILS RELATIVE PERCENT: 2 % (ref 0–6)
ERYTHROCYTE [DISTWIDTH] IN BLOOD BY AUTOMATED COUNT: 14.2 % (ref 11.5–15)
FENTANYL UR QL: NEGATIVE
GFR, ESTIMATED: 72 ML/MIN/1.73M2
GLUCOSE SERPL-MCNC: 117 MG/DL (ref 74–99)
HBA1C MFR BLD: 6.4 % (ref 4–5.6)
HCG UR QL: NEGATIVE
HCT VFR BLD AUTO: 44.8 % (ref 34–48)
HGB BLD-MCNC: 14.1 G/DL (ref 11.5–15.5)
IMM GRANULOCYTES # BLD AUTO: 0.14 K/UL (ref 0–0.58)
IMM GRANULOCYTES NFR BLD: 2 % (ref 0–5)
LYMPHOCYTES NFR BLD: 2.54 K/UL (ref 1.5–4)
LYMPHOCYTES RELATIVE PERCENT: 28 % (ref 20–42)
MAGNESIUM SERPL-MCNC: 2 MG/DL (ref 1.6–2.6)
MCH RBC QN AUTO: 28 PG (ref 26–35)
MCHC RBC AUTO-ENTMCNC: 31.5 G/DL (ref 32–34.5)
MCV RBC AUTO: 88.9 FL (ref 80–99.9)
METHADONE UR QL: NEGATIVE
MONOCYTES NFR BLD: 0.68 K/UL (ref 0.1–0.95)
MONOCYTES NFR BLD: 7 % (ref 2–12)
NEUTROPHILS NFR BLD: 61 % (ref 43–80)
NEUTS SEG NFR BLD: 5.59 K/UL (ref 1.8–7.3)
OPIATES UR QL SCN: NEGATIVE
OXYCODONE UR QL SCN: NEGATIVE
PCP UR QL SCN: NEGATIVE
PLATELET # BLD AUTO: 287 K/UL (ref 130–450)
PMV BLD AUTO: 9.8 FL (ref 7–12)
POTASSIUM SERPL-SCNC: 4 MMOL/L (ref 3.5–5)
RBC # BLD AUTO: 5.04 M/UL (ref 3.5–5.5)
SODIUM SERPL-SCNC: 142 MMOL/L (ref 132–146)
TEST INFORMATION: NORMAL
TROPONIN I SERPL HS-MCNC: 17 NG/L (ref 0–9)
TROPONIN I SERPL HS-MCNC: 29 NG/L (ref 0–9)
TSH SERPL DL<=0.05 MIU/L-ACNC: 3.3 UIU/ML (ref 0.27–4.2)
WBC OTHER # BLD: 9.2 K/UL (ref 4.5–11.5)

## 2025-03-19 PROCEDURE — 6370000000 HC RX 637 (ALT 250 FOR IP): Performed by: INTERNAL MEDICINE

## 2025-03-19 PROCEDURE — 85025 COMPLETE CBC W/AUTO DIFF WBC: CPT

## 2025-03-19 PROCEDURE — 83036 HEMOGLOBIN GLYCOSYLATED A1C: CPT

## 2025-03-19 PROCEDURE — 84484 ASSAY OF TROPONIN QUANT: CPT

## 2025-03-19 PROCEDURE — 2500000003 HC RX 250 WO HCPCS: Performed by: NURSE PRACTITIONER

## 2025-03-19 PROCEDURE — 6370000000 HC RX 637 (ALT 250 FOR IP)

## 2025-03-19 PROCEDURE — 99223 1ST HOSP IP/OBS HIGH 75: CPT | Performed by: INTERNAL MEDICINE

## 2025-03-19 PROCEDURE — 83735 ASSAY OF MAGNESIUM: CPT

## 2025-03-19 PROCEDURE — 2500000003 HC RX 250 WO HCPCS: Performed by: INTERNAL MEDICINE

## 2025-03-19 PROCEDURE — 85379 FIBRIN DEGRADATION QUANT: CPT

## 2025-03-19 PROCEDURE — 99285 EMERGENCY DEPT VISIT HI MDM: CPT

## 2025-03-19 PROCEDURE — 96361 HYDRATE IV INFUSION ADD-ON: CPT

## 2025-03-19 PROCEDURE — 84443 ASSAY THYROID STIM HORMONE: CPT

## 2025-03-19 PROCEDURE — 80048 BASIC METABOLIC PNL TOTAL CA: CPT

## 2025-03-19 PROCEDURE — 96374 THER/PROPH/DIAG INJ IV PUSH: CPT

## 2025-03-19 PROCEDURE — 2580000003 HC RX 258

## 2025-03-19 PROCEDURE — 70450 CT HEAD/BRAIN W/O DYE: CPT

## 2025-03-19 PROCEDURE — 36415 COLL VENOUS BLD VENIPUNCTURE: CPT

## 2025-03-19 PROCEDURE — 71045 X-RAY EXAM CHEST 1 VIEW: CPT

## 2025-03-19 PROCEDURE — APPSS180 APP SPLIT SHARED TIME > 60 MINUTES: Performed by: NURSE PRACTITIONER

## 2025-03-19 PROCEDURE — 96360 HYDRATION IV INFUSION INIT: CPT

## 2025-03-19 PROCEDURE — 93005 ELECTROCARDIOGRAM TRACING: CPT

## 2025-03-19 PROCEDURE — 84703 CHORIONIC GONADOTROPIN ASSAY: CPT

## 2025-03-19 PROCEDURE — 80307 DRUG TEST PRSMV CHEM ANLYZR: CPT

## 2025-03-19 PROCEDURE — G0378 HOSPITAL OBSERVATION PER HR: HCPCS

## 2025-03-19 PROCEDURE — 2580000003 HC RX 258: Performed by: NURSE PRACTITIONER

## 2025-03-19 PROCEDURE — 93010 ELECTROCARDIOGRAM REPORT: CPT | Performed by: INTERNAL MEDICINE

## 2025-03-19 PROCEDURE — 2580000003 HC RX 258: Performed by: INTERNAL MEDICINE

## 2025-03-19 RX ORDER — FAMOTIDINE 20 MG/1
40 TABLET, FILM COATED ORAL NIGHTLY
Status: DISCONTINUED | OUTPATIENT
Start: 2025-03-19 | End: 2025-03-19

## 2025-03-19 RX ORDER — POLYETHYLENE GLYCOL 3350 17 G/17G
17 POWDER, FOR SOLUTION ORAL DAILY PRN
Status: DISCONTINUED | OUTPATIENT
Start: 2025-03-19 | End: 2025-03-20 | Stop reason: HOSPADM

## 2025-03-19 RX ORDER — METOPROLOL TARTRATE 25 MG/1
25 TABLET, FILM COATED ORAL 2 TIMES DAILY
Status: DISCONTINUED | OUTPATIENT
Start: 2025-03-19 | End: 2025-03-19

## 2025-03-19 RX ORDER — SUMATRIPTAN 50 MG/1
50 TABLET, FILM COATED ORAL ONCE
Status: COMPLETED | OUTPATIENT
Start: 2025-03-19 | End: 2025-03-19

## 2025-03-19 RX ORDER — BUDESONIDE 0.25 MG/2ML
0.25 INHALANT ORAL
Status: DISCONTINUED | OUTPATIENT
Start: 2025-03-19 | End: 2025-03-20 | Stop reason: HOSPADM

## 2025-03-19 RX ORDER — CETIRIZINE HYDROCHLORIDE 10 MG/1
10 TABLET ORAL NIGHTLY
COMMUNITY

## 2025-03-19 RX ORDER — ARFORMOTEROL TARTRATE 15 UG/2ML
15 SOLUTION RESPIRATORY (INHALATION)
Status: DISCONTINUED | OUTPATIENT
Start: 2025-03-19 | End: 2025-03-20 | Stop reason: HOSPADM

## 2025-03-19 RX ORDER — METOPROLOL SUCCINATE 25 MG/1
25 TABLET, EXTENDED RELEASE ORAL 2 TIMES DAILY
Status: DISCONTINUED | OUTPATIENT
Start: 2025-03-20 | End: 2025-03-20 | Stop reason: HOSPADM

## 2025-03-19 RX ORDER — ENOXAPARIN SODIUM 100 MG/ML
40 INJECTION SUBCUTANEOUS DAILY
Status: DISCONTINUED | OUTPATIENT
Start: 2025-03-19 | End: 2025-03-20 | Stop reason: HOSPADM

## 2025-03-19 RX ORDER — ACETAMINOPHEN 325 MG/1
650 TABLET ORAL ONCE
Status: COMPLETED | OUTPATIENT
Start: 2025-03-19 | End: 2025-03-19

## 2025-03-19 RX ORDER — METOCLOPRAMIDE HYDROCHLORIDE 5 MG/ML
10 INJECTION INTRAMUSCULAR; INTRAVENOUS ONCE
Status: DISCONTINUED | OUTPATIENT
Start: 2025-03-19 | End: 2025-03-19

## 2025-03-19 RX ORDER — 0.9 % SODIUM CHLORIDE 0.9 %
1000 INTRAVENOUS SOLUTION INTRAVENOUS ONCE
Status: COMPLETED | OUTPATIENT
Start: 2025-03-19 | End: 2025-03-19

## 2025-03-19 RX ORDER — SODIUM CHLORIDE 0.9 % (FLUSH) 0.9 %
5-40 SYRINGE (ML) INJECTION EVERY 12 HOURS SCHEDULED
Status: DISCONTINUED | OUTPATIENT
Start: 2025-03-19 | End: 2025-03-20 | Stop reason: HOSPADM

## 2025-03-19 RX ORDER — METOPROLOL TARTRATE 1 MG/ML
2.5 INJECTION, SOLUTION INTRAVENOUS ONCE
Status: DISCONTINUED | OUTPATIENT
Start: 2025-03-19 | End: 2025-03-19

## 2025-03-19 RX ORDER — PANTOPRAZOLE SODIUM 40 MG/1
40 TABLET, DELAYED RELEASE ORAL
Status: DISCONTINUED | OUTPATIENT
Start: 2025-03-19 | End: 2025-03-20 | Stop reason: HOSPADM

## 2025-03-19 RX ORDER — ONDANSETRON 4 MG/1
4 TABLET, ORALLY DISINTEGRATING ORAL EVERY 8 HOURS PRN
Status: DISCONTINUED | OUTPATIENT
Start: 2025-03-19 | End: 2025-03-20 | Stop reason: HOSPADM

## 2025-03-19 RX ORDER — BACLOFEN 10 MG/1
10 TABLET ORAL 2 TIMES DAILY PRN
COMMUNITY

## 2025-03-19 RX ORDER — ONDANSETRON 2 MG/ML
4 INJECTION INTRAMUSCULAR; INTRAVENOUS EVERY 6 HOURS PRN
Status: DISCONTINUED | OUTPATIENT
Start: 2025-03-19 | End: 2025-03-20 | Stop reason: HOSPADM

## 2025-03-19 RX ORDER — MONTELUKAST SODIUM 10 MG/1
10 TABLET ORAL NIGHTLY
Status: DISCONTINUED | OUTPATIENT
Start: 2025-03-19 | End: 2025-03-20 | Stop reason: HOSPADM

## 2025-03-19 RX ORDER — AMOXICILLIN 875 MG/1
875 TABLET, COATED ORAL 2 TIMES DAILY
Status: DISCONTINUED | OUTPATIENT
Start: 2025-03-19 | End: 2025-03-19

## 2025-03-19 RX ORDER — PANTOPRAZOLE SODIUM 40 MG/1
40 TABLET, DELAYED RELEASE ORAL DAILY
Status: DISCONTINUED | OUTPATIENT
Start: 2025-03-19 | End: 2025-03-19

## 2025-03-19 RX ORDER — ASPIRIN 81 MG/1
81 TABLET ORAL DAILY
Status: DISCONTINUED | OUTPATIENT
Start: 2025-03-20 | End: 2025-03-20 | Stop reason: HOSPADM

## 2025-03-19 RX ORDER — CETIRIZINE HYDROCHLORIDE 10 MG/1
10 TABLET ORAL DAILY
Status: DISCONTINUED | OUTPATIENT
Start: 2025-03-19 | End: 2025-03-19

## 2025-03-19 RX ORDER — ADENOSINE 3 MG/ML
INJECTION, SOLUTION INTRAVENOUS
Status: DISPENSED
Start: 2025-03-19 | End: 2025-03-19

## 2025-03-19 RX ORDER — SPIRONOLACTONE 25 MG/1
50 TABLET ORAL 2 TIMES DAILY
Status: DISCONTINUED | OUTPATIENT
Start: 2025-03-19 | End: 2025-03-20 | Stop reason: HOSPADM

## 2025-03-19 RX ORDER — SODIUM CHLORIDE 9 MG/ML
INJECTION, SOLUTION INTRAVENOUS PRN
Status: DISCONTINUED | OUTPATIENT
Start: 2025-03-19 | End: 2025-03-20 | Stop reason: HOSPADM

## 2025-03-19 RX ORDER — DILTIAZEM HYDROCHLORIDE 5 MG/ML
10 INJECTION INTRAVENOUS ONCE
Status: COMPLETED | OUTPATIENT
Start: 2025-03-19 | End: 2025-03-19

## 2025-03-19 RX ORDER — HYDROCHLOROTHIAZIDE 12.5 MG/1
12.5 CAPSULE ORAL DAILY
Status: DISCONTINUED | OUTPATIENT
Start: 2025-03-19 | End: 2025-03-20 | Stop reason: HOSPADM

## 2025-03-19 RX ORDER — MECLIZINE HCL 12.5 MG 12.5 MG/1
12.5 TABLET ORAL 3 TIMES DAILY PRN
Status: DISCONTINUED | OUTPATIENT
Start: 2025-03-19 | End: 2025-03-20 | Stop reason: HOSPADM

## 2025-03-19 RX ORDER — SODIUM CHLORIDE 0.9 % (FLUSH) 0.9 %
5-40 SYRINGE (ML) INJECTION PRN
Status: DISCONTINUED | OUTPATIENT
Start: 2025-03-19 | End: 2025-03-20 | Stop reason: HOSPADM

## 2025-03-19 RX ORDER — METOPROLOL SUCCINATE 25 MG/1
25 TABLET, EXTENDED RELEASE ORAL 2 TIMES DAILY
Status: DISCONTINUED | OUTPATIENT
Start: 2025-03-19 | End: 2025-03-19

## 2025-03-19 RX ORDER — METOPROLOL SUCCINATE 25 MG/1
25 TABLET, EXTENDED RELEASE ORAL 2 TIMES DAILY
Status: DISCONTINUED | OUTPATIENT
Start: 2025-03-20 | End: 2025-03-19

## 2025-03-19 RX ORDER — PANTOPRAZOLE SODIUM 40 MG/1
40 TABLET, DELAYED RELEASE ORAL
Status: DISCONTINUED | OUTPATIENT
Start: 2025-03-20 | End: 2025-03-19

## 2025-03-19 RX ORDER — 0.9 % SODIUM CHLORIDE 0.9 %
500 INTRAVENOUS SOLUTION INTRAVENOUS ONCE
Status: COMPLETED | OUTPATIENT
Start: 2025-03-19 | End: 2025-03-19

## 2025-03-19 RX ORDER — DIPHENHYDRAMINE HYDROCHLORIDE 50 MG/ML
25 INJECTION, SOLUTION INTRAMUSCULAR; INTRAVENOUS ONCE
Status: DISCONTINUED | OUTPATIENT
Start: 2025-03-19 | End: 2025-03-19

## 2025-03-19 RX ORDER — BACLOFEN 10 MG/1
10 TABLET ORAL 2 TIMES DAILY PRN
Status: DISCONTINUED | OUTPATIENT
Start: 2025-03-19 | End: 2025-03-20 | Stop reason: HOSPADM

## 2025-03-19 RX ADMIN — ACETAMINOPHEN 650 MG: 325 TABLET ORAL at 14:55

## 2025-03-19 RX ADMIN — SUMATRIPTAN SUCCINATE 50 MG: 50 TABLET ORAL at 17:52

## 2025-03-19 RX ADMIN — ACETAMINOPHEN 650 MG: 325 TABLET ORAL at 07:51

## 2025-03-19 RX ADMIN — SODIUM CHLORIDE 500 ML: 9 INJECTION, SOLUTION INTRAVENOUS at 16:58

## 2025-03-19 RX ADMIN — DILTIAZEM HYDROCHLORIDE 10 MG: 5 INJECTION, SOLUTION INTRAVENOUS at 16:31

## 2025-03-19 RX ADMIN — PANTOPRAZOLE SODIUM 40 MG: 40 TABLET, DELAYED RELEASE ORAL at 17:53

## 2025-03-19 RX ADMIN — SODIUM CHLORIDE 1000 ML: 0.9 INJECTION, SOLUTION INTRAVENOUS at 07:55

## 2025-03-19 RX ADMIN — SODIUM CHLORIDE, PRESERVATIVE FREE 10 ML: 5 INJECTION INTRAVENOUS at 20:48

## 2025-03-19 RX ADMIN — METOPROLOL TARTRATE 25 MG: 25 TABLET, FILM COATED ORAL at 15:59

## 2025-03-19 RX ADMIN — SPIRONOLACTONE 50 MG: 25 TABLET ORAL at 20:38

## 2025-03-19 RX ADMIN — MONTELUKAST SODIUM 10 MG: 10 TABLET, COATED ORAL at 20:38

## 2025-03-19 ASSESSMENT — PAIN DESCRIPTION - LOCATION
LOCATION: HEAD
LOCATION: HEAD

## 2025-03-19 ASSESSMENT — PAIN SCALES - GENERAL
PAINLEVEL_OUTOF10: 9
PAINLEVEL_OUTOF10: 9
PAINLEVEL_OUTOF10: 3
PAINLEVEL_OUTOF10: 6

## 2025-03-19 ASSESSMENT — PAIN DESCRIPTION - DESCRIPTORS
DESCRIPTORS: ACHING
DESCRIPTORS: ACHING

## 2025-03-19 ASSESSMENT — PAIN - FUNCTIONAL ASSESSMENT
PAIN_FUNCTIONAL_ASSESSMENT: NONE - DENIES PAIN
PAIN_FUNCTIONAL_ASSESSMENT: ACTIVITIES ARE NOT PREVENTED
PAIN_FUNCTIONAL_ASSESSMENT: ACTIVITIES ARE NOT PREVENTED

## 2025-03-19 NOTE — H&P
[facility]   History and Physical      CHIEF COMPLAINT: Palpitations    History of Present Illness:  54 y.o. female with a history of GERD, migraine headache, SVT, and asthma presenting with above complaint.  About 5:45 AM this morning, patient started having palpitations with associated presyncopal symptoms.  No provoking or palliating factors.  Lasted several minutes, thus prompting her to come to the ED.  In the ED, found to have heart rate 160s.  Twelve-lead EKG consistent with SVT.  Spontaneously converted to sinus rhythm.  Started on p.o. beta-blocker, however shortly after receiving the first dose heart rate jumped back into the 140s to 150s and appears to be SVT on monitor.  Cardiology notified.    Informant(s) for H&P: Patient, ED resident physician, chart review    REVIEW OF SYSTEMS:  no fevers, chills, cp, sob, n/v, ha, vision/hearing changes, wt changes, hot/cold flashes, other open skin lesions, diarrhea, constipation, dysuria/hematuria unless noted in HPI. Complete ROS performed with the patient and is otherwise negative.      PMH:  Past Medical History:   Diagnosis Date    Asthma     DR PARK    Atypical ductal hyperplasia, breast     right breast 5/2013    Back injury     Chronic back pain     PAIN RADIATES TO NECK AND LEGS    Chronic tension-type headache, not intractable 11/09/2018    DDD (degenerative disc disease), cervical 11/09/2018    DVT (deep venous thrombosis) ([facility])     Right Arm    GERD (gastroesophageal reflux disease)     Herniated disc, cervical     also lumbar spine    Hx of screening mammography 3/2015 & 10/2015    BIRADS 2 BENIGN    HX OTHER MEDICAL     PINCHED NERVE BACK OF NECK    Hypertension     Migraine     Migraine     DR MARCOS    MVP (mitral valve prolapse)     Numbness and tingling     LEGS AND FEET    Obesity     Sinus infection     Thyroid disease     no med    Torn ACL     RT KNEE    Ulcer     Vitamin B 12 deficiency     NON  migrainosus, not intractable    Asthma  Resolved Problems:    * No resolved hospital problems. *      Paroxysmal supraventricular tachycardia  -Started on p.o. metoprolol tartrate, however SVT has recurred  -Will give 500 mL normal saline bolus in anticipation of possible adenosine.  Thus far her blood pressure has been stable  -Cardiology consulted and notified of recurrence of SVT    GERD  -Notes that she recently restarted Pepcid in addition to PPI.  Will hold Pepcid for now as tachycardia can be a side effect    Possible primary hypertension  -Patient notes that she does not have a history of high blood pressure, though this is listed in her chart.  States she takes spironolactone for another indication.  Will monitor blood pressure with the addition of metoprolol to tartrate    Chronic daily headache/recurrent migraine headache  -Patient notes an episode coming on today.  Will give one-time dose of sumatriptan    Moderate intermittent asthma  -Continue home ICS/LABA  -Continue home Singulair    Code Status: Full  DVT prophylaxis: Enoxaparin    Disposition: Admit for observation to telemetry    Discussed with cardiology     NOTE: This report was transcribed using voice recognition software. Every effort was made to ensure accuracy; however, inadvertent computerized transcription errors may be present.     Electronically signed by Robert James DO on 3/19/2025 at 4:24 PM

## 2025-03-19 NOTE — PROGRESS NOTES
Secure message sent to Dr. Warner to update that cardizem drip was never started in ER due to Heart rate in 50's. Patient currently with heart rate in high 50's.

## 2025-03-19 NOTE — CONSULTS
stable multinodular goiter.  History of hyperprolactinemia  Hashimoto's thyroiditis.  TSH 1.68 (2024)  Spironolactone for hirsutism  Vitamin D deficiency  Prediabetes: Hemoglobin A1c 6.2 (2024)  Chronic back pain with history of back injury  History of atypical ductal hyperplasia of the right breast 2013.  Vitamin B12 deficiency      PAST SURGICAL HISTORY:    Past Surgical History:   Procedure Laterality Date    BREAST LUMPECTOMY Right 2013    BREAST SURGERY Right 2013    re-excision, rt lumpectomyscar evacuation of hematoma.    CARDIOVASCULAR STRESS TEST  2015    NORMAL     SECTION      COLONOSCOPY      12 UMER HEMORRHOIDS, MINIMAL DIVERTICULA    COLONOSCOPY  2012    DR PEREZ GERD & GASTRITIS    DILATION AND CURETTAGE OF UTERUS      ECHO COMPL W DOP COLOR FLOW  2015         ECHOCARDIOGRAM COMPLETE 2D W DOPPLER W COLOR  10/24/2012         HARDWARE REMOVAL Right 2018    Foot    KNEE ARTHROSCOPY Right     Right knee arthroscopy.  Dr. Kan    KNEE ARTHROSCOPY Right     Right knee meniscal repair.  Dr. Douglas.      MAMMO IMPLANT DIGITAL DIAG BI      3/24/15 BIRADS CAT 2 BENIGN, 10/15/15 BIRADS CAT 2    OTHER SURGICAL HISTORY      TILT TABLE TEST - DR PALADINO  NO ORTHOSTASIS    TYMPANOSTOMY TUBE PLACEMENT      UPPER GASTROINTESTINAL ENDOSCOPY      WITH CLOSED BIOPSY DR OWUSU 12 GERD, GASTRITIS    UPPER GASTROINTESTINAL ENDOSCOPY  2012    DR TRACY GERD & GASTRITIS    WISDOM TOOTH EXTRACTION         HOME MEDICATIONS:  Prior to Admission medications    Medication Sig Start Date End Date Taking? Authorizing Provider   meclizine (ANTIVERT) 12.5 MG tablet Take 1 tablet by mouth 3 times daily as needed for Dizziness 3/17/25 4/16/25  Samra Mcpherson DO   Zavegepant HCl 10 MG/ACT SOLN 10 mg by Nasal route as needed (migraine) 3/13/25 9/9/25  Dolores Martin PA-C   Erenumab-aooe (AIMOVIG) 70 MG/ML SOAJ Inject 70 mg into the skin every 30  patient. I have reviewed the above documentation completed by the JEANETH. Please see my additional contributions to the HPI, physical exam, and assessment / medical decision making.  54-year-old female with history of palpitation and SVT who present with recurrent palpitations, lightheadedness, heart pounding and racing, associated with presyncope and on presentation found to have SVT for which adenosine was given and now has been having recurrent since and subsequently cardiology consulted.      Review of Systems:  Cardiac: As per HPI  General: No fever, chills  Pulmonary: As per HPI  GI: No nausea, vomiting  Musculoskeletal: JAIME x 4, no focal motor deficits  Skin: Intact, no rashes  Neuro/Psych: No headache or seizures    Physical Exam:  BP (!) 130/102   Pulse (!) 144   Temp 98.5 °F (36.9 °C) (Oral)   Resp 18   Ht 1.651 m (5' 5\")   Wt 84.4 kg (186 lb)   LMP 10/01/2018 (Approximate)   SpO2 100%   BMI 30.95 kg/m²   Appearance: Awake, alert, no acute respiratory distress  Skin: Intact, no rash  Head: Normocephalic, atraumatic  ENMT: MMM, no rhinorrhea  Neck: Supple, no carotid bruits  Lungs: Clear to auscultation bilaterally. No wheezes, rales, or rhonchi.  Cardiac: Regular rate and rhythm, +S1S2, no murmurs apparent  Abdomen: Soft, +bowel sounds  Extremities: Moves all extremities x 4, no lower extremity edema  Neurologic: No focal motor deficits apparent, normal mood and affect    TTE: 10/24/2012: LVEF greater than 75% with mild LVH no significant VHD.    TTE: 6/24/2015: LVEF 65% with no significant VHD.    Lexiscan MPS: 12/2/2015: LVEF 70%, no significant wall motion abnormality and no reversible ischemia    TTE: LVEF 70% 11/27/2016    TE: 10/4/2021 (Dr. Carvajal): LVEF 60±5%, normal RV function, no significant valvular disease, probably normal diastolic function for age    EKG March 19, 2025 revealed  bpm and nonspecific ST-T wave changes  Assessment  SVT  Palpitation  Mild trop

## 2025-03-19 NOTE — ED PROVIDER NOTES
[facility] EMERGENCY DEPARTMENT  EMERGENCY DEPARTMENT ENCOUNTER        Pt Name: Maci Thomas  MRN: [id number]  Birthdate 1970  Date of evaluation: 3/19/2025  Provider: Jann Mckeon DO  PCP: Samra Mcpherson DO  Note Started: 7:59 AM EDT 3/19/25    CHIEF COMPLAINT       Chief Complaint   Patient presents with    Tachycardia       HISTORY OF PRESENT ILLNESS: 1 or more Elements   History From: patient    Limitations to history : None    Maci Thomas is a 54 y.o. female who presents for tachycardia that started a few hours prior to arrival.  She has a history of SVT, first episode was 1 year ago.  She reports she had an episode like this approximately 1 week ago that self resolved at home.  Has been persistent so she came to the ER for evaluation. Patient denies fever, chills, headache, shortness of breath, chest pain, abdominal pain, nausea, vomiting, diarrhea, dysuria, hematuria, hematochezia, and melena.    Nursing Notes were all reviewed and agreed with or any disagreements were addressed in the HPI.        REVIEW OF SYSTEMS :           Positives and Pertinent negatives as per HPI.     SURGICAL HISTORY     Past Surgical History:   Procedure Laterality Date    BREAST LUMPECTOMY Right 2013    BREAST SURGERY Right 2013    re-excision, rt lumpectomyscar evacuation of hematoma.    CARDIOVASCULAR STRESS TEST  2015    NORMAL     SECTION      COLONOSCOPY      12 YOSSEF HEMORRHOIDS, MINIMAL DIVERTICULA    COLONOSCOPY  2012    DR PEREZ GERD & GASTRITIS    DILATION AND CURETTAGE OF UTERUS      ECHO COMPL W DOP COLOR FLOW  2015         ECHOCARDIOGRAM COMPLETE 2D W DOPPLER W COLOR  10/24/2012         HARDWARE REMOVAL Right 2018    Foot    KNEE ARTHROSCOPY Right     Right knee arthroscopy.  Dr. Kan    KNEE ARTHROSCOPY Right     Right knee meniscal repair.  Dr. Douglas.      MAMMO IMPLANT DIGITAL DIAG BI      3/24/15 BIRADS CAT  NEEDED MAY TAKE 1 MORE DOSE AFTER 1 HOUR IF NEEDED. DO NOT TAKE MORE THAN 200MG IN 24 HOURS    ZAVEGEPANT HCL 10 MG/ACT SOLN    10 mg by Nasal route as needed (migraine)       ALLERGIES     Motrin [ibuprofen], Pcn [penicillins], Percocet [oxycodone-acetaminophen], Adhesive tape, Ajovy [fremanezumab-vfrm], Augmentin [amoxicillin-pot clavulanate], Ceftin [cefuroxime axetil], and Sudafed [pseudoephedrine hcl]    FAMILYHISTORY       Family History   Problem Relation Age of Onset    High Blood Pressure Mother     Heart Disease Father     Seizures Father     Coronary Art Dis Father     Cancer Father         STOMACH    Deep Vein Thrombosis Sister     Breast Cancer Sister 42    Hypertension Maternal Grandmother         SOCIAL HISTORY       Social History     Tobacco Use    Smoking status: Never    Smokeless tobacco: Never   Vaping Use    Vaping status: Never Used   Substance Use Topics    Alcohol use: Not Currently     Comment: rare     Drug use: No       SCREENINGS        Atkins Coma Scale  Eye Opening: Spontaneous  Best Verbal Response: Oriented  Best Motor Response: Obeys commands  Atkins Coma Scale Score: 15                CIWA Assessment  BP: 113/72  Pulse: 63           PHYSICAL EXAM  1 or more Elements     ED Triage Vitals [03/19/25 0710]   BP Systolic BP Percentile Diastolic BP Percentile Temp Temp Source Pulse Respirations SpO2   102/71 -- -- 97.5 °F (36.4 °C) Oral (!) 171 16 100 %      Height Weight - Scale         1.651 m (5' 5\") 84.4 kg (186 lb)                 Constitutional/General: Alert and oriented x3  Head: Normocephalic and atraumatic  Eyes: PERRL, EOMI, conjunctiva normal, sclera non icteric  ENT:  Oropharynx clear, handling secretions, no trismus, no asymmetry of the posterior oropharynx or uvular edema  Neck: Supple, full ROM, no stridor, no meningeal signs  Respiratory: Lungs clear to auscultation bilaterally, no wheezes, rales, or rhonchi. Not in respiratory distress  Cardiovascular: Tachycardic

## 2025-03-19 NOTE — TELEPHONE ENCOUNTER
Pt would like Brent to give her a call regarding appt. Pt did not want to tell me specifics but was adamant for a call.     Please advise,     Electronically signed by Karla Spaulding on 3/19/2025 at 1:06 PM

## 2025-03-19 NOTE — ED NOTES
ED to Inpatient Handoff Report    Notified floor that electronic handoff available and patient ready for transport to room 401.    Safety Risks: None identified    Patient in Restraints: no    Constant Observer or Patient : no    Telemetry Monitoring Ordered: Yes     Cardiac Rhythm: Sinus rhythm    Order to transfer to unit without monitor: NO    Last MEWS: 2 Time completed: 1704    Deterioration Index: 32.06    Vitals:    03/19/25 1557 03/19/25 1636 03/19/25 1657 03/19/25 1704   BP: (!) 130/102  (!) 122/100    Pulse: (!) 144 (!) 137  63   Resp: 18 22 21 22   Temp: 98.5 °F (36.9 °C)      TempSrc: Oral      SpO2: 100%      Weight:       Height:           Opportunity for questions and clarification was provided.

## 2025-03-19 NOTE — PROGRESS NOTES
4 Eyes Skin Assessment     NAME:  Maci Thomas  YOB: 1970  MEDICAL RECORD NUMBER:  [id number]    The patient is being assessed for  Admission    I agree that at least one RN has performed a thorough Head to Toe Skin Assessment on the patient. ALL assessment sites listed below have been assessed.      Areas assessed by both nurses:    Head, Face, Ears, Shoulders, Back, Chest, Arms, Elbows, Hands, Sacrum. Buttock, Coccyx, Ischium, Legs. Feet and Heels, and Under Medical Devices         Does the Patient have a Wound? No noted wound(s)       Jd Prevention initiated by RN: No  Wound Care Orders initiated by RN: No    Pressure Injury (Stage 3,4, Unstageable, DTI, NWPT, and Complex wounds) if present, place Wound referral order by RN under : No    New Ostomies, if present place, Ostomy referral order under : No     Nurse 1 eSignature: Electronically signed by Isamar Delgado RN on 3/19/25 at 5:44 PM EDT    **SHARE this note so that the co-signing nurse can place an eSignature**    Nurse 2 eSignature: Electronically signed by Evelyne Veliz RN on 3/19/25 at 5:46 PM EDT

## 2025-03-20 ENCOUNTER — APPOINTMENT (OUTPATIENT)
Dept: NUCLEAR MEDICINE | Age: 55
End: 2025-03-20
Payer: COMMERCIAL

## 2025-03-20 ENCOUNTER — APPOINTMENT (OUTPATIENT)
Age: 55
End: 2025-03-20
Attending: INTERNAL MEDICINE
Payer: COMMERCIAL

## 2025-03-20 ENCOUNTER — APPOINTMENT (OUTPATIENT)
Age: 55
End: 2025-03-20
Payer: COMMERCIAL

## 2025-03-20 VITALS
HEIGHT: 65 IN | OXYGEN SATURATION: 99 % | HEART RATE: 73 BPM | SYSTOLIC BLOOD PRESSURE: 118 MMHG | BODY MASS INDEX: 30.99 KG/M2 | RESPIRATION RATE: 16 BRPM | DIASTOLIC BLOOD PRESSURE: 71 MMHG | TEMPERATURE: 98.1 F | WEIGHT: 186 LBS

## 2025-03-20 LAB
ECHO AO ASC DIAM: 2.6 CM
ECHO AO ASCENDING AORTA INDEX: 1.35 CM/M2
ECHO AV AREA PEAK VELOCITY: 2.2 CM2
ECHO AV AREA VTI: 2.3 CM2
ECHO AV AREA/BSA PEAK VELOCITY: 1.1 CM2/M2
ECHO AV AREA/BSA VTI: 1.2 CM2/M2
ECHO AV CUSP MM: 1.8 CM
ECHO AV MEAN GRADIENT: 3 MMHG
ECHO AV MEAN VELOCITY: 0.8 M/S
ECHO AV PEAK GRADIENT: 7 MMHG
ECHO AV PEAK VELOCITY: 1.3 M/S
ECHO AV VELOCITY RATIO: 0.69
ECHO AV VTI: 27.4 CM
ECHO BSA: 1.97 M2
ECHO BSA: 1.97 M2
ECHO EST RA PRESSURE: 3 MMHG
ECHO LA DIAMETER INDEX: 2.14 CM/M2
ECHO LA DIAMETER: 4.1 CM
ECHO LA VOL A-L A2C: 52 ML (ref 22–52)
ECHO LA VOL A-L A4C: 53 ML (ref 22–52)
ECHO LA VOL MOD A2C: 49 ML (ref 22–52)
ECHO LA VOL MOD A4C: 50 ML (ref 22–52)
ECHO LA VOLUME AREA LENGTH: 55 ML
ECHO LA VOLUME INDEX A-L A2C: 27 ML/M2 (ref 16–34)
ECHO LA VOLUME INDEX A-L A4C: 28 ML/M2 (ref 16–34)
ECHO LA VOLUME INDEX AREA LENGTH: 29 ML/M2 (ref 16–34)
ECHO LA VOLUME INDEX MOD A2C: 26 ML/M2 (ref 16–34)
ECHO LA VOLUME INDEX MOD A4C: 26 ML/M2 (ref 16–34)
ECHO LV E' LATERAL VELOCITY: 10 CM/S
ECHO LV E' SEPTAL VELOCITY: 7 CM/S
ECHO LV EF PHYSICIAN: 55 %
ECHO LV FRACTIONAL SHORTENING: 29 % (ref 28–44)
ECHO LV INTERNAL DIMENSION DIASTOLE INDEX: 2.34 CM/M2
ECHO LV INTERNAL DIMENSION DIASTOLIC: 4.5 CM (ref 3.9–5.3)
ECHO LV INTERNAL DIMENSION SYSTOLIC INDEX: 1.67 CM/M2
ECHO LV INTERNAL DIMENSION SYSTOLIC: 3.2 CM
ECHO LV ISOVOLUMETRIC RELAXATION TIME (IVRT): 92.3 MS
ECHO LV IVSD: 1.1 CM (ref 0.6–0.9)
ECHO LV IVSS: 1.4 CM
ECHO LV MASS 2D: 164 G (ref 67–162)
ECHO LV MASS INDEX 2D: 85.4 G/M2 (ref 43–95)
ECHO LV POSTERIOR WALL DIASTOLIC: 1 CM (ref 0.6–0.9)
ECHO LV POSTERIOR WALL SYSTOLIC: 1.2 CM
ECHO LV RELATIVE WALL THICKNESS RATIO: 0.44
ECHO LVOT AREA: 3.1 CM2
ECHO LVOT AV VTI INDEX: 0.74
ECHO LVOT DIAM: 2 CM
ECHO LVOT MEAN GRADIENT: 1 MMHG
ECHO LVOT PEAK GRADIENT: 3 MMHG
ECHO LVOT PEAK VELOCITY: 0.9 M/S
ECHO LVOT STROKE VOLUME INDEX: 33.4 ML/M2
ECHO LVOT SV: 64.1 ML
ECHO LVOT VTI: 20.4 CM
ECHO MV "A" WAVE DURATION: 129.2 MSEC
ECHO MV A VELOCITY: 0.81 M/S
ECHO MV AREA PHT: 3.1 CM2
ECHO MV AREA VTI: 1.9 CM2
ECHO MV E DECELERATION TIME (DT): 220.4 MS
ECHO MV E VELOCITY: 0.97 M/S
ECHO MV E/A RATIO: 1.2
ECHO MV E/E' LATERAL: 9.7
ECHO MV E/E' RATIO (AVERAGED): 11.78
ECHO MV E/E' SEPTAL: 13.86
ECHO MV LVOT VTI INDEX: 1.69
ECHO MV MAX VELOCITY: 0.9 M/S
ECHO MV MEAN GRADIENT: 1 MMHG
ECHO MV MEAN VELOCITY: 0.5 M/S
ECHO MV PEAK GRADIENT: 3 MMHG
ECHO MV PRESSURE HALF TIME (PHT): 70.2 MS
ECHO MV VTI: 34.4 CM
ECHO PV MAX VELOCITY: 0.9 M/S
ECHO PV MEAN GRADIENT: 2 MMHG
ECHO PV MEAN VELOCITY: 0.6 M/S
ECHO PV PEAK GRADIENT: 3 MMHG
ECHO PV VTI: 21.1 CM
ECHO PVEIN A DURATION: 129.2 MS
ECHO PVEIN A VELOCITY: 0.2 M/S
ECHO PVEIN PEAK D VELOCITY: 0.4 M/S
ECHO PVEIN PEAK S VELOCITY: 0.5 M/S
ECHO PVEIN S/D RATIO: 1.3
ECHO RIGHT VENTRICULAR SYSTOLIC PRESSURE (RVSP): 21 MMHG
ECHO RV INTERNAL DIMENSION: 2.9 CM
ECHO RV TAPSE: 1.8 CM (ref 1.7–?)
ECHO TV REGURGITANT MAX VELOCITY: 2.13 M/S
ECHO TV REGURGITANT PEAK GRADIENT: 18 MMHG
EKG ATRIAL RATE: 170 BPM
EKG Q-T INTERVAL: 280 MS
EKG QRS DURATION: 80 MS
EKG QTC CALCULATION (BAZETT): 445 MS
EKG R AXIS: -21 DEGREES
EKG T AXIS: 6 DEGREES
EKG VENTRICULAR RATE: 152 BPM
NUC STRESS EJECTION FRACTION: 65 %
STRESS BASELINE DIAS BP: 57 MMHG
STRESS BASELINE HR: 60 BPM
STRESS BASELINE SYS BP: 111 MMHG
STRESS ESTIMATED WORKLOAD: 1 METS
STRESS PEAK DIAS BP: 57 MMHG
STRESS PEAK SYS BP: 111 MMHG
STRESS PERCENT HR ACHIEVED: 68 %
STRESS POST PEAK HR: 113 BPM
STRESS RATE PRESSURE PRODUCT: NORMAL BPM*MMHG
STRESS ST DEPRESSION: 0 MM
STRESS TARGET HR: 166 BPM
TID: 1.18

## 2025-03-20 PROCEDURE — 6360000002 HC RX W HCPCS: Performed by: INTERNAL MEDICINE

## 2025-03-20 PROCEDURE — 93010 ELECTROCARDIOGRAM REPORT: CPT | Performed by: INTERNAL MEDICINE

## 2025-03-20 PROCEDURE — G0378 HOSPITAL OBSERVATION PER HR: HCPCS

## 2025-03-20 PROCEDURE — 93308 TTE F-UP OR LMTD: CPT | Performed by: INTERNAL MEDICINE

## 2025-03-20 PROCEDURE — 93016 CV STRESS TEST SUPVJ ONLY: CPT | Performed by: INTERNAL MEDICINE

## 2025-03-20 PROCEDURE — 93306 TTE W/DOPPLER COMPLETE: CPT

## 2025-03-20 PROCEDURE — 99233 SBSQ HOSP IP/OBS HIGH 50: CPT | Performed by: INTERNAL MEDICINE

## 2025-03-20 PROCEDURE — 93246 EXT ECG>7D<15D RECORDING: CPT

## 2025-03-20 PROCEDURE — 78452 HT MUSCLE IMAGE SPECT MULT: CPT

## 2025-03-20 PROCEDURE — 6370000000 HC RX 637 (ALT 250 FOR IP): Performed by: INTERNAL MEDICINE

## 2025-03-20 PROCEDURE — A9500 TC99M SESTAMIBI: HCPCS | Performed by: RADIOLOGY

## 2025-03-20 PROCEDURE — 3430000000 HC RX DIAGNOSTIC RADIOPHARMACEUTICAL: Performed by: RADIOLOGY

## 2025-03-20 PROCEDURE — 2500000003 HC RX 250 WO HCPCS: Performed by: INTERNAL MEDICINE

## 2025-03-20 PROCEDURE — 93325 DOPPLER ECHO COLOR FLOW MAPG: CPT | Performed by: INTERNAL MEDICINE

## 2025-03-20 PROCEDURE — 93321 DOPPLER ECHO F-UP/LMTD STD: CPT | Performed by: INTERNAL MEDICINE

## 2025-03-20 PROCEDURE — 78452 HT MUSCLE IMAGE SPECT MULT: CPT | Performed by: INTERNAL MEDICINE

## 2025-03-20 PROCEDURE — 99239 HOSP IP/OBS DSCHRG MGMT >30: CPT | Performed by: INTERNAL MEDICINE

## 2025-03-20 PROCEDURE — 93017 CV STRESS TEST TRACING ONLY: CPT

## 2025-03-20 PROCEDURE — 93018 CV STRESS TEST I&R ONLY: CPT | Performed by: INTERNAL MEDICINE

## 2025-03-20 RX ORDER — METOPROLOL TARTRATE 25 MG/1
TABLET, FILM COATED ORAL
Status: DISCONTINUED
Start: 2025-03-20 | End: 2025-03-20 | Stop reason: WASHOUT

## 2025-03-20 RX ORDER — CETIRIZINE HYDROCHLORIDE 10 MG/1
10 TABLET ORAL NIGHTLY
Status: DISCONTINUED | OUTPATIENT
Start: 2025-03-20 | End: 2025-03-20 | Stop reason: HOSPADM

## 2025-03-20 RX ORDER — SUMATRIPTAN 50 MG/1
100 TABLET, FILM COATED ORAL ONCE
Status: COMPLETED | OUTPATIENT
Start: 2025-03-20 | End: 2025-03-20

## 2025-03-20 RX ORDER — TETRAKIS(2-METHOXYISOBUTYLISOCYANIDE)COPPER(I) TETRAFLUOROBORATE 1 MG/ML
10 INJECTION, POWDER, LYOPHILIZED, FOR SOLUTION INTRAVENOUS
Status: COMPLETED | OUTPATIENT
Start: 2025-03-20 | End: 2025-03-20

## 2025-03-20 RX ORDER — METOPROLOL SUCCINATE 25 MG/1
25 TABLET, EXTENDED RELEASE ORAL 2 TIMES DAILY
Qty: 30 TABLET | Refills: 3 | Status: SHIPPED | OUTPATIENT
Start: 2025-03-20 | End: 2025-03-27 | Stop reason: SDUPTHER

## 2025-03-20 RX ORDER — REGADENOSON 0.08 MG/ML
0.4 INJECTION, SOLUTION INTRAVENOUS
Status: COMPLETED | OUTPATIENT
Start: 2025-03-20 | End: 2025-03-20

## 2025-03-20 RX ORDER — FLUTICASONE PROPIONATE 50 MCG
1 SPRAY, SUSPENSION (ML) NASAL DAILY PRN
Status: DISCONTINUED | OUTPATIENT
Start: 2025-03-20 | End: 2025-03-20 | Stop reason: HOSPADM

## 2025-03-20 RX ORDER — SUMATRIPTAN 50 MG/1
50 TABLET, FILM COATED ORAL ONCE
Status: COMPLETED | OUTPATIENT
Start: 2025-03-20 | End: 2025-03-20

## 2025-03-20 RX ORDER — ACETAMINOPHEN 325 MG/1
650 TABLET ORAL ONCE
Status: COMPLETED | OUTPATIENT
Start: 2025-03-20 | End: 2025-03-20

## 2025-03-20 RX ORDER — TETRAKIS(2-METHOXYISOBUTYLISOCYANIDE)COPPER(I) TETRAFLUOROBORATE 1 MG/ML
30 INJECTION, POWDER, LYOPHILIZED, FOR SOLUTION INTRAVENOUS
Status: COMPLETED | OUTPATIENT
Start: 2025-03-20 | End: 2025-03-20

## 2025-03-20 RX ADMIN — PANTOPRAZOLE SODIUM 40 MG: 40 TABLET, DELAYED RELEASE ORAL at 06:24

## 2025-03-20 RX ADMIN — SUMATRIPTAN SUCCINATE 100 MG: 50 TABLET ORAL at 10:28

## 2025-03-20 RX ADMIN — SODIUM CHLORIDE, PRESERVATIVE FREE 10 ML: 5 INJECTION INTRAVENOUS at 10:28

## 2025-03-20 RX ADMIN — REGADENOSON 0.4 MG: 0.08 INJECTION, SOLUTION INTRAVENOUS at 09:40

## 2025-03-20 RX ADMIN — SUMATRIPTAN SUCCINATE 50 MG: 50 TABLET ORAL at 05:14

## 2025-03-20 RX ADMIN — ACETAMINOPHEN 650 MG: 325 TABLET ORAL at 05:14

## 2025-03-20 RX ADMIN — ASPIRIN 81 MG: 81 TABLET, COATED ORAL at 11:18

## 2025-03-20 RX ADMIN — Medication 30 MILLICURIE: at 08:16

## 2025-03-20 RX ADMIN — Medication 9000 UNITS: at 18:35

## 2025-03-20 RX ADMIN — PANTOPRAZOLE SODIUM 40 MG: 40 TABLET, DELAYED RELEASE ORAL at 16:21

## 2025-03-20 RX ADMIN — SPIRONOLACTONE 50 MG: 25 TABLET ORAL at 11:19

## 2025-03-20 RX ADMIN — Medication 10 MILLICURIE: at 08:16

## 2025-03-20 RX ADMIN — FLUTICASONE PROPIONATE 1 SPRAY: 50 SPRAY, METERED NASAL at 05:32

## 2025-03-20 RX ADMIN — CETIRIZINE HYDROCHLORIDE 10 MG: 10 TABLET, FILM COATED ORAL at 05:14

## 2025-03-20 ASSESSMENT — PAIN DESCRIPTION - DESCRIPTORS
DESCRIPTORS: ACHING

## 2025-03-20 ASSESSMENT — PAIN SCALES - GENERAL
PAINLEVEL_OUTOF10: 8
PAINLEVEL_OUTOF10: 8
PAINLEVEL_OUTOF10: 5

## 2025-03-20 ASSESSMENT — PAIN DESCRIPTION - LOCATION
LOCATION: HEAD

## 2025-03-20 ASSESSMENT — PAIN DESCRIPTION - ORIENTATION: ORIENTATION: MID

## 2025-03-20 ASSESSMENT — PAIN - FUNCTIONAL ASSESSMENT
PAIN_FUNCTIONAL_ASSESSMENT: ACTIVITIES ARE NOT PREVENTED
PAIN_FUNCTIONAL_ASSESSMENT: ACTIVITIES ARE NOT PREVENTED

## 2025-03-20 ASSESSMENT — PAIN DESCRIPTION - FREQUENCY: FREQUENCY: INTERMITTENT

## 2025-03-20 ASSESSMENT — PAIN DESCRIPTION - PAIN TYPE: TYPE: ACUTE PAIN

## 2025-03-20 NOTE — PROCEDURES
PROCEDURE NOTE  Date: 3/20/2025   Name: Maci Thomas  YOB: 1970    Procedures:    Lexiscan Nuclear Stress Test:    Cardiologist: Dr. Calixto Stewart    Baseline EKG: NSR, normal EKG.    Indications for study: SVT    1. No chest pain  2. No new arrhythmias  3. No EKG changes suggestive of stress induced ischemia  4. Nuclear images pending    Calixto Stewart MD., FACC.   [facility]

## 2025-03-20 NOTE — PROGRESS NOTES
Patient does not want breathing treatments because she \"does not know what it is or is in it.\" Explained medications and how nebulized medications that are ordered is what is in her home inhalers, and nebulizers are given due to not having inhalers in pharmacy. Patient still refused treatments. Patient understands to call if she needs/wants treatments. Nurse made aware.

## 2025-03-20 NOTE — PROGRESS NOTES
INPATIENT CARDIOLOGY CONSULT     Reason for Consult: Paroxysmal SVT    Cardiologist: Dr. Lee    Requesting Physician:  Dr. Duff     Date of Consultation: 3/20/2025    HISTORY OF PRESENT ILLNESS:   Maci Thomas is a 54-year-old obese AA female who is known to Dr. Pepe (previously known to Dr. Carvajal in 8/2021) during initial OP visit with Dr. Pepe on 4/2024 for dizziness/lightheadedness/Palpitations -->  (Event monitor was ordered on 4/30/2024; showing no significant arrhythmias -- see details below) --> Cardiology did recommend patient to follow-up with neurology/ENT for evaluation of vertigo.  Negative for orthostatic hypotension at that time.  Patient was recommended that if hypotensive and should consider decreasing/stopping spironolactone.  Patient has had no follow-up with cardiology since that time.  See PMH below.    Interim  Stress test and echocardiogram were fine  Zio patch heart monitor is arranged  Follow-up with your cardiologist in the outpatient  I have discussed in detail with patient and she verbalized understanding and reported doing well and has no symptoms    PAST MEDICAL HISTORY:    TTE: 10/24/2012: LVEF greater than 75% with mild LVH no significant VHD.  TTE: 6/24/2015: LVEF 65% with no significant VHD.  Lexiscan MPS: 12/2/2015: LVEF 70%, no significant wall motion abnormality and no reversible ischemia.  TTE: LVEF 70% 11/27/2016  TTE: 10/4/2021 (Dr. Carvajal): LVEF 60±5%, normal RV function, no significant valvular disease, probably normal diastolic function for age  Hypertension  Most recent Cholesterol panel:   Total cholesterol 241, HDL 59, , triglycerides 111 (4/4/2024)  Not on statin therapy   Vertigo, PRN meclizine   Hx of Palpitations: (Event monitor was ordered on 4/30/2024; not yet completed by patient) --> patient was referred to Dr. Pepe for dizziness, lightheadedness and palpitations.  Cardiology did recommend patient to follow-up with neurology/ENT for

## 2025-03-20 NOTE — CARE COORDINATION
3/20/2025  Social Work Discharge Planning:Tachycardia.Stress and echo today.Waiting for results. Possible discharge. This worker met with Pt to discuss  role and transition of care/discharge planning.Pt is independent from home alone and uses no DME. Room air. Pharmacy is [facility] in [facility] and PCP is Dr. Campos. Pt drove here and will drive herself home at discharge. Electronically signed by MAYITO Cool on 3/20/2025 at 2:29 PM

## 2025-03-21 NOTE — DISCHARGE SUMMARY
[facility] Hospitalist Physician Discharge Summary       Nicholas Lee MD  [address] [facility] [address]  [phone]    Schedule an appointment as soon as possible for a visit        Activity level: As tolerated     Dispo: Home      Condition on discharge: Stable     Patient ID:  Maci Thomas  [id number]  54 y.o.  1970    Admit date: 3/19/2025    Discharge date and time:  3/20/2025  07:50 PM    Admission Diagnoses: Principal Problem:    Paroxysmal supraventricular tachycardia  Active Problems:    Hypertension    Gastroesophageal reflux disease without esophagitis    Nonintractable headache    Migraine with aura and with status migrainosus, not intractable    Asthma  Resolved Problems:    * No resolved hospital problems. *      Discharge Diagnoses: Principal Problem:    Paroxysmal supraventricular tachycardia  Active Problems:    Hypertension    Gastroesophageal reflux disease without esophagitis    Nonintractable headache    Migraine with aura and with status migrainosus, not intractable    Asthma  Resolved Problems:    * No resolved hospital problems. *      Consults:  IP CONSULT TO CARDIOLOGY  IP CONSULT TO INTERNAL MEDICINE    Procedures:     Hospital Course:   Patient Maci Thomas is a 54 y.o. presented with Paroxysmal supraventricular tachycardia [I47.10]  SVT (supraventricular tachycardia) [I47.10]  Paroxysmal SVT (supraventricular tachycardia) [I47.10]  Nonintractable headache, unspecified chronicity pattern, unspecified headache type [R51.9]    Patient is a 54F w/ PMHx of SVT, asthma, GERD, migraines, and obesity presented with early morning palpitations and presyncopal symptoms. Found in ED to be in SVT (~160 bpm) with spontaneous conversion to sinus rhythm. Restarted on PO beta-blocker but shortly after reverted to SVT on telemetry. Cardiology consulted.    Past cardiac workup includes normal echo and stress test, and outpatient Zio monitor revealing rare  prescription. New prescription reflects 90 day supply     meclizine 12.5 MG tablet  Commonly known as: ANTIVERT  Take 1 tablet by mouth 3 times daily as needed for Dizziness     montelukast 10 MG tablet  Commonly known as: SINGULAIR  Take 1 tablet by mouth nightly     NexIUM 24HR 20 MG delayed release capsule  Generic drug: esomeprazole     pantoprazole 40 MG tablet  Commonly known as: PROTONIX  Take 1 tablet by mouth daily     promethazine 25 MG tablet  Commonly known as: PHENERGAN  Take 1 tablet by mouth 2 times daily as needed for Nausea     rizatriptan 10 MG tablet  Commonly known as: MAXALT  TAKE 1 TABLET DAILY AS     NEEDED FOR MIGRAINE     spironolactone 50 MG tablet  Commonly known as: ALDACTONE  Take 1 tablet by mouth 2 times daily     Symbicort 160-4.5 MCG/ACT Aero  Generic drug: budesonide-formoterol     Ubrelvy 100 MG Tabs  Generic drug: Ubrogepant  TAKE 1 TABLET IF NEEDED MAY TAKE 1 MORE DOSE AFTER 1 HOUR IF NEEDED. DO NOT TAKE MORE THAN 200MG IN 24 HOURS     Zavegepant HCl 10 MG/ACT Soln  10 mg by Nasal route as needed (migraine)               Where to Get Your Medications        These medications [facility] P [phone] - F [phone]  [facility] [address]      Phone: [phone]   metoprolol succinate 25 MG extended release tablet           Note that more than 30 minutes was spent in preparing discharge papers, discussing discharge with patient, medication review, etc.    Signed:  Electronically signed by Marco A Mccracken MD on 3/21/2025 at 6:32 PM

## 2025-03-27 ENCOUNTER — OFFICE VISIT (OUTPATIENT)
Dept: FAMILY MEDICINE CLINIC | Age: 55
End: 2025-03-27
Payer: COMMERCIAL

## 2025-03-27 VITALS
DIASTOLIC BLOOD PRESSURE: 76 MMHG | HEART RATE: 65 BPM | OXYGEN SATURATION: 97 % | HEIGHT: 65 IN | TEMPERATURE: 97.2 F | RESPIRATION RATE: 18 BRPM | BODY MASS INDEX: 30.95 KG/M2 | SYSTOLIC BLOOD PRESSURE: 122 MMHG

## 2025-03-27 DIAGNOSIS — I47.10 PAROXYSMAL SUPRAVENTRICULAR TACHYCARDIA: Primary | ICD-10-CM

## 2025-03-27 DIAGNOSIS — I10 PRIMARY HYPERTENSION: ICD-10-CM

## 2025-03-27 DIAGNOSIS — Z09 HOSPITAL DISCHARGE FOLLOW-UP: ICD-10-CM

## 2025-03-27 DIAGNOSIS — K21.9 GASTROESOPHAGEAL REFLUX DISEASE WITHOUT ESOPHAGITIS: ICD-10-CM

## 2025-03-27 DIAGNOSIS — G44.229 CHRONIC TENSION-TYPE HEADACHE, NOT INTRACTABLE: Chronic | ICD-10-CM

## 2025-03-27 PROCEDURE — 1111F DSCHRG MED/CURRENT MED MERGE: CPT | Performed by: FAMILY MEDICINE

## 2025-03-27 PROCEDURE — 3078F DIAST BP <80 MM HG: CPT | Performed by: FAMILY MEDICINE

## 2025-03-27 PROCEDURE — G8427 DOCREV CUR MEDS BY ELIG CLIN: HCPCS | Performed by: FAMILY MEDICINE

## 2025-03-27 PROCEDURE — 3074F SYST BP LT 130 MM HG: CPT | Performed by: FAMILY MEDICINE

## 2025-03-27 PROCEDURE — 99213 OFFICE O/P EST LOW 20 MIN: CPT | Performed by: FAMILY MEDICINE

## 2025-03-27 PROCEDURE — G8417 CALC BMI ABV UP PARAM F/U: HCPCS | Performed by: FAMILY MEDICINE

## 2025-03-27 PROCEDURE — 1036F TOBACCO NON-USER: CPT | Performed by: FAMILY MEDICINE

## 2025-03-27 PROCEDURE — 3017F COLORECTAL CA SCREEN DOC REV: CPT | Performed by: FAMILY MEDICINE

## 2025-03-27 RX ORDER — METOPROLOL SUCCINATE 25 MG/1
25 TABLET, EXTENDED RELEASE ORAL 2 TIMES DAILY
Qty: 60 TABLET | Refills: 0 | Status: SHIPPED | OUTPATIENT
Start: 2025-03-27

## 2025-03-27 NOTE — PROGRESS NOTES
Post-Discharge Transitional Care Follow Up      Maci Thomas   YOB: 1970    Date of Office Visit:  3/27/2025  Date of Hospital Admission: 3/19/25  Date of Hospital Discharge: 3/20/25  Readmission Risk Score (high >=14%. Medium >=10%):No data recorded    Care management risk score Rising risk (score 2-5) and Complex Care (Scores >=6): No Risk Score On File     Non face to face  following discharge, date last encounter closed (first attempt may have been earlier): *No documented post hospital discharge outreach found in the last 14 days     Call initiated 2 business days of discharge: *No response recorded in the last 14 days     Hospital discharge follow-up  -     [place] DISCHARGE MEDS RECONCILED W/ CURRENT OUTPATIENT MED LIST    Medical Decision Making: moderate complexity  Return in 1 month (on 4/27/2025).         Assessment & Plan  1. Post-hospitalization follow-up.  - Recurrence of elevated heart rate, reaching up to 175 bpm, led to recent hospitalization.  - Symptoms included lightheadedness, dizziness, and chest pain when given metoprolol in the ER; alternative IV medication effectively reduced heart rate to 63 bpm.  - Advised to initiate metoprolol therapy; prescription for a 30-day supply sent to [facility].  - Follow-up with Dr. Anderson next week and appointment with Dr. Fraser in May; advised to seek immediate medical attention if symptoms persist or worsen.    2. Elevated A1c.  - A1c level increased to 6.4, indicating a need for better glycemic control.  - Counseled on the importance of dietary modifications and regular physical activity to manage blood sugar levels.  - Advised to reduce sugar intake and consider sugar-free alternatives to manage cravings.  - Emphasized the health risks associated with high sugar consumption, including high blood pressure, high cholesterol, heart disease, stroke, and cancers.        Subjective:   HPI    Inpatient course: Discharge summary

## 2025-04-01 ENCOUNTER — HOSPITAL ENCOUNTER (OUTPATIENT)
Dept: MRI IMAGING | Age: 55
Discharge: HOME | End: 2025-04-03
Payer: COMMERCIAL

## 2025-04-01 DIAGNOSIS — G43.101 MIGRAINE WITH AURA AND WITH STATUS MIGRAINOSUS, NOT INTRACTABLE: ICD-10-CM

## 2025-04-01 DIAGNOSIS — H53.9 CHANGES IN VISION: ICD-10-CM

## 2025-04-01 PROCEDURE — 70551 MRI BRAIN STEM W/O DYE: CPT

## 2025-04-03 ENCOUNTER — OFFICE VISIT (OUTPATIENT)
Dept: ENT CLINIC | Age: 55
End: 2025-04-03
Payer: COMMERCIAL

## 2025-04-03 VITALS
OXYGEN SATURATION: 95 % | BODY MASS INDEX: 31.75 KG/M2 | HEIGHT: 65 IN | WEIGHT: 190.6 LBS | SYSTOLIC BLOOD PRESSURE: 118 MMHG | RESPIRATION RATE: 18 BRPM | HEART RATE: 56 BPM | TEMPERATURE: 97.3 F | DIASTOLIC BLOOD PRESSURE: 73 MMHG

## 2025-04-03 DIAGNOSIS — J30.9 CHRONIC ALLERGIC RHINITIS: Primary | ICD-10-CM

## 2025-04-03 DIAGNOSIS — J30.89 SEASONAL ALLERGIC RHINITIS DUE TO OTHER ALLERGIC TRIGGER: ICD-10-CM

## 2025-04-03 PROCEDURE — 3017F COLORECTAL CA SCREEN DOC REV: CPT

## 2025-04-03 PROCEDURE — 1036F TOBACCO NON-USER: CPT

## 2025-04-03 PROCEDURE — 99213 OFFICE O/P EST LOW 20 MIN: CPT

## 2025-04-03 PROCEDURE — G8427 DOCREV CUR MEDS BY ELIG CLIN: HCPCS

## 2025-04-03 PROCEDURE — 3078F DIAST BP <80 MM HG: CPT

## 2025-04-03 PROCEDURE — 3074F SYST BP LT 130 MM HG: CPT

## 2025-04-03 PROCEDURE — G8417 CALC BMI ABV UP PARAM F/U: HCPCS

## 2025-04-03 RX ORDER — FEXOFENADINE HCL 180 MG/1
180 TABLET ORAL DAILY
Qty: 30 TABLET | Refills: 1 | Status: SHIPPED | OUTPATIENT
Start: 2025-04-03

## 2025-04-03 RX ORDER — FLUTICASONE PROPIONATE 50 MCG
1 SPRAY, SUSPENSION (ML) NASAL DAILY
Qty: 1 EACH | Refills: 5 | Status: SHIPPED | OUTPATIENT
Start: 2025-04-03

## 2025-04-03 ASSESSMENT — ENCOUNTER SYMPTOMS
RHINORRHEA: 1
DIARRHEA: 0
SHORTNESS OF BREATH: 0
EYE DISCHARGE: 0
SORE THROAT: 0
COUGH: 0
SINUS PRESSURE: 0
EYE PAIN: 0
BACK PAIN: 0
ALLERGIC/IMMUNOLOGIC NEGATIVE: 1
VOMITING: 0

## 2025-04-03 NOTE — PROGRESS NOTES
Subjective:      Patient ID:  Maci Thomas is a 54 y.o. female.    HPI:  Pt returns for recheck of allergies.       History of   no surgery    Nasal Steroid: yes   Name: fluticasone (Flonase)   Still taking: yes    Other therapy:   Astelin- yes - using as needed as it makes her too dry   oral antihistamine- none  leukotriene inhibitor- Singulair  oral decongestant- none    which has been  somewhat effective     Pt has been on therapy for 5 week(s) and is doing marginally    Was recently admitted for and currently following up with cardiology for SVT.       The patient is complaining of nasal congestion, rhinorrhea, and PND    The patients worst time of year is all seasons    Patient's medications, allergies, past medical, surgical, social and family histories were reviewed and updated as appropriate.        Review of Systems   Constitutional:  Negative for chills and fever.   HENT:  Positive for congestion, postnasal drip and rhinorrhea. Negative for ear discharge, ear pain, hearing loss, sinus pressure, sneezing and sore throat.    Eyes:  Negative for pain and discharge.   Respiratory:  Negative for cough and shortness of breath.    Cardiovascular:  Negative for chest pain.   Gastrointestinal:  Negative for diarrhea and vomiting.   Genitourinary:  Negative for flank pain.   Musculoskeletal:  Negative for back pain and neck pain.   Skin:  Negative for rash.   Allergic/Immunologic: Negative.    Neurological:  Negative for syncope and headaches.   All other systems reviewed and are negative.      Objective:     Vitals:    04/03/25 0751   BP: 118/73   Pulse: 56   Resp: 18   Temp: 97.3 °F (36.3 °C)   SpO2: 95%     Physical Exam  Vitals reviewed.   Constitutional:       Appearance: Normal appearance.   HENT:      Head: Normocephalic and atraumatic.      Jaw: There is normal jaw occlusion. No tenderness.      Right Ear: Tympanic membrane, ear canal and external ear normal.      Left Ear: Tympanic membrane, ear canal  Private Residence

## 2025-04-04 ENCOUNTER — OFFICE VISIT (OUTPATIENT)
Dept: CARDIOLOGY CLINIC | Age: 55
End: 2025-04-04
Payer: COMMERCIAL

## 2025-04-04 VITALS
DIASTOLIC BLOOD PRESSURE: 70 MMHG | RESPIRATION RATE: 20 BRPM | HEIGHT: 65 IN | WEIGHT: 186 LBS | SYSTOLIC BLOOD PRESSURE: 104 MMHG | BODY MASS INDEX: 30.99 KG/M2 | HEART RATE: 70 BPM

## 2025-04-04 DIAGNOSIS — I10 HYPERTENSION, UNSPECIFIED TYPE: Primary | ICD-10-CM

## 2025-04-04 DIAGNOSIS — I47.10 SVT (SUPRAVENTRICULAR TACHYCARDIA): ICD-10-CM

## 2025-04-04 PROCEDURE — 3074F SYST BP LT 130 MM HG: CPT | Performed by: INTERNAL MEDICINE

## 2025-04-04 PROCEDURE — G8417 CALC BMI ABV UP PARAM F/U: HCPCS | Performed by: INTERNAL MEDICINE

## 2025-04-04 PROCEDURE — 1036F TOBACCO NON-USER: CPT | Performed by: INTERNAL MEDICINE

## 2025-04-04 PROCEDURE — 93000 ELECTROCARDIOGRAM COMPLETE: CPT | Performed by: INTERNAL MEDICINE

## 2025-04-04 PROCEDURE — 3078F DIAST BP <80 MM HG: CPT | Performed by: INTERNAL MEDICINE

## 2025-04-04 PROCEDURE — G8427 DOCREV CUR MEDS BY ELIG CLIN: HCPCS | Performed by: INTERNAL MEDICINE

## 2025-04-04 PROCEDURE — 3017F COLORECTAL CA SCREEN DOC REV: CPT | Performed by: INTERNAL MEDICINE

## 2025-04-04 PROCEDURE — 99214 OFFICE O/P EST MOD 30 MIN: CPT | Performed by: INTERNAL MEDICINE

## 2025-04-04 RX ORDER — DILTIAZEM HYDROCHLORIDE 120 MG/1
120 CAPSULE, COATED, EXTENDED RELEASE ORAL DAILY
Qty: 30 CAPSULE | Refills: 4 | Status: SHIPPED | OUTPATIENT
Start: 2025-04-04

## 2025-04-04 ASSESSMENT — ENCOUNTER SYMPTOMS
CHEST TIGHTNESS: 0
BACK PAIN: 0
COUGH: 0
ABDOMINAL PAIN: 0
BLOOD IN STOOL: 0
VOMITING: 0
NAUSEA: 0
SHORTNESS OF BREATH: 0

## 2025-04-04 NOTE — PROGRESS NOTES
BP:  Laying: /70 mmHg -HR 72 bpm  Sitting: /74 mmHg -HR 75 bpm  Standing: /74 mmHg -HR 89 bpm    -TTE 10/4/2021:   Summary   Normal left ventricle size. Estimated left ventricle ejection fraction   60+/-5 %.   Normal right ventricular size and function.   Probably normal diastolic function for age.   No significant valve disease.    -TTE 3/20/2025:    Left Ventricle: The EF by visual approximation is 55 - 60%.    Right Ventricle: Right ventricle size is normal. Normal systolic function. TAPSE is 1.8 cm.    Aortic Valve: Trileaflet valve. No stenosis. AV area by continuity VTI is 2.3 cm2. AV area by peak velocity is 2.2 cm2.    Mitral Valve: Trace regurgitation. No stenosis noted. MV area by PHT is 3.1 cm2. MV area by continuity equation is 1.9 cm2.    Tricuspid Valve: Mild regurgitation. The estimated RVSP is 21 mmHg.    Pulmonic Valve: Trace regurgitation.    Interatrial Septum: Agitated saline study was negative with and without provocation.    Image quality is adequate.    -Nuclear stress test 3/20/2025:    Stress Combined Conclusion: The study is negative for myocardial ischemia. Findings suggest a low risk of cardiac events.    Stress Function: Left ventricular function post-stress is normal with attenuation artifacts. Post-stress ejection fraction is 65%.    Perfusion Comments: LV perfusion is equivocal. There is no evidence of inducible ischemia.    Perfusion Defect: There is a mild severity left ventricular stress perfusion defect that is small in size present in the anterior segment(s) that is fixed. Likely attenuation artifact. Can not rule out prior small anterior infarct.    Stress Test: A pharmacological stress test was performed using regadenoson (Lexiscan). PO caffeine given as a reversal agent. The patient reported dyspnea and no chest pain during the stress test. Hemodynamics are adequate for diagnosis. Blood pressure demonstrated a normal response and heart rate demonstrated a

## 2025-04-15 ENCOUNTER — OFFICE VISIT (OUTPATIENT)
Dept: FAMILY MEDICINE CLINIC | Age: 55
End: 2025-04-15
Payer: COMMERCIAL

## 2025-04-15 VITALS
DIASTOLIC BLOOD PRESSURE: 64 MMHG | OXYGEN SATURATION: 96 % | HEIGHT: 65 IN | RESPIRATION RATE: 17 BRPM | TEMPERATURE: 98.1 F | BODY MASS INDEX: 32.12 KG/M2 | SYSTOLIC BLOOD PRESSURE: 114 MMHG | HEART RATE: 75 BPM | WEIGHT: 192.8 LBS

## 2025-04-15 DIAGNOSIS — R42 POSTURAL DIZZINESS: ICD-10-CM

## 2025-04-15 DIAGNOSIS — H81.13 VERTIGO, BENIGN PAROXYSMAL, BILATERAL: Primary | ICD-10-CM

## 2025-04-15 DIAGNOSIS — I47.10 SVT (SUPRAVENTRICULAR TACHYCARDIA): Primary | ICD-10-CM

## 2025-04-15 LAB
CHP ED QC CHECK: NORMAL
GLUCOSE BLD-MCNC: 91 MG/DL

## 2025-04-15 PROCEDURE — 99214 OFFICE O/P EST MOD 30 MIN: CPT

## 2025-04-15 PROCEDURE — 1036F TOBACCO NON-USER: CPT

## 2025-04-15 PROCEDURE — 82962 GLUCOSE BLOOD TEST: CPT

## 2025-04-15 PROCEDURE — G8417 CALC BMI ABV UP PARAM F/U: HCPCS

## 2025-04-15 PROCEDURE — 3017F COLORECTAL CA SCREEN DOC REV: CPT

## 2025-04-15 PROCEDURE — 3078F DIAST BP <80 MM HG: CPT

## 2025-04-15 PROCEDURE — 93000 ELECTROCARDIOGRAM COMPLETE: CPT

## 2025-04-15 PROCEDURE — 3074F SYST BP LT 130 MM HG: CPT

## 2025-04-15 PROCEDURE — G8427 DOCREV CUR MEDS BY ELIG CLIN: HCPCS

## 2025-04-15 RX ORDER — MECLIZINE HCL 12.5 MG 12.5 MG/1
12.5 TABLET ORAL EVERY 8 HOURS PRN
Qty: 30 TABLET | Refills: 0 | Status: CANCELLED | OUTPATIENT
Start: 2025-04-15 | End: 2025-04-25

## 2025-04-15 RX ORDER — MECLIZINE HYDROCHLORIDE 25 MG/1
25 TABLET ORAL EVERY 8 HOURS PRN
Qty: 15 TABLET | Refills: 0 | Status: SHIPPED | OUTPATIENT
Start: 2025-04-15 | End: 2025-04-25

## 2025-04-15 NOTE — PROGRESS NOTES
mother; Hypertension in her maternal grandmother; Seizures in her father.  Allergies: Motrin [ibuprofen], Pcn [penicillins], Percocet [oxycodone-acetaminophen], Adhesive tape, Ajovy [fremanezumab-vfrm], Augmentin [amoxicillin-pot clavulanate], Ceftin [cefuroxime axetil], and Sudafed [pseudoephedrine hcl]    Physical Exam   Vital Signs:  /70 (BP Site: Right Upper Arm, Patient Position: Sitting)   Pulse 59   Temp 98.1 °F (36.7 °C) (Temporal)   Resp 17   Ht 1.651 m (5' 5\")   Wt 87.5 kg (192 lb 12.8 oz)   LMP 10/01/2018 (Approximate)   SpO2 98%   BMI 32.08 kg/m²    Oxygen Saturation Interpretation: Normal.    Constitutional/General: Alert and oriented x3, well appearing, non toxic in NAD  Head: Normocephalic and atraumatic  Eyes: PERRL, EOMI, no nystagmus  Mouth: Oropharynx clear, handling secretions, no trismus  Neck: Supple, full ROM, non tender to palpation in the midline, no stridor, no crepitus, no meningeal signs. No carotid bruits  Pulmonary: Lungs clear to auscultation bilaterally, no wheezes, rales, or rhonchi.   Cardiovascular:  Regular rate. Regular rhythm. No murmurs, gallops, or rubs. 2+ distal pulses  Musculoskeletal: Moves all extremities x 4. Warm and well perfused, no clubbing, cyanosis, or edema. Capillary refill <3 seconds  Skin: warm and dry. No rashes.   Neurologic: GCS 15, CN II-XII grossly intact, no focal deficits, symmetric strength 5/5 in the upper and lower extremities bilaterally. Negative ataxia. Negative finger to nose.  Negative romberg. Negative pronator drift.    Test Results Section   (All laboratory and radiology results have been personally reviewed by myself)  Labs:  Results for orders placed or performed in visit on 04/15/25   POCT Glucose   Result Value Ref Range    POC Glucose 91     QC OK? pass      EKG #1:  Interpreted by this provider unless otherwise noted.  Time:  1031    Rate: 55  Rhythm: bradycardic  Interpretation: sinus  Comparison: yes    Orthostatic Blood

## 2025-04-16 ENCOUNTER — OFFICE VISIT (OUTPATIENT)
Dept: FAMILY MEDICINE CLINIC | Age: 55
End: 2025-04-16
Payer: COMMERCIAL

## 2025-04-16 VITALS
WEIGHT: 192 LBS | HEIGHT: 65 IN | OXYGEN SATURATION: 96 % | TEMPERATURE: 97.4 F | BODY MASS INDEX: 31.99 KG/M2 | RESPIRATION RATE: 16 BRPM | HEART RATE: 59 BPM | SYSTOLIC BLOOD PRESSURE: 124 MMHG | DIASTOLIC BLOOD PRESSURE: 72 MMHG

## 2025-04-16 DIAGNOSIS — R42 POSTURAL DIZZINESS: Primary | ICD-10-CM

## 2025-04-16 DIAGNOSIS — H81.13 VERTIGO, BENIGN PAROXYSMAL, BILATERAL: ICD-10-CM

## 2025-04-16 PROCEDURE — G8417 CALC BMI ABV UP PARAM F/U: HCPCS | Performed by: FAMILY MEDICINE

## 2025-04-16 PROCEDURE — 3074F SYST BP LT 130 MM HG: CPT | Performed by: FAMILY MEDICINE

## 2025-04-16 PROCEDURE — 3078F DIAST BP <80 MM HG: CPT | Performed by: FAMILY MEDICINE

## 2025-04-16 PROCEDURE — 1036F TOBACCO NON-USER: CPT | Performed by: FAMILY MEDICINE

## 2025-04-16 PROCEDURE — 99213 OFFICE O/P EST LOW 20 MIN: CPT | Performed by: FAMILY MEDICINE

## 2025-04-16 PROCEDURE — 3017F COLORECTAL CA SCREEN DOC REV: CPT | Performed by: FAMILY MEDICINE

## 2025-04-16 PROCEDURE — G8427 DOCREV CUR MEDS BY ELIG CLIN: HCPCS | Performed by: FAMILY MEDICINE

## 2025-04-16 NOTE — PROGRESS NOTES
2025    Maci Thomas (:  1970) is a 54 y.o. female, is here for evaluation of the following chief complaint(s):  Dizziness (Has been ongoing since last week )      History of Present Illness  The patient presents for evaluation of dizziness.    Persistent dizziness has been experienced since the previous Thursday, which escalated over the weekend, particularly on  night, culminating in a severe headache. Upon awakening on Monday, symptoms had intensified, prompting medical attention at a walk-in clinic where a diagnosis of vertigo was made. However, skepticism about this diagnosis is expressed. No urinary symptoms are reported, and adequate hydration is maintained, although frequent urination is noted. An upcoming appointment with Dr. Fraser is scheduled for May 2025. A history of lightheadedness and dizziness is noted, with similar episodes occurring last summer. An incident yesterday required sitting in the car for approximately 1.5 hours due to severe dizziness, which slightly improved after rest. Symptoms are most pronounced in the morning upon waking but persist throughout the day. A sensation of spinning is described, initially attributed to an ear infection. Difficulty focusing is also reported, although no feelings of depression are endorsed. Meclizine has been prescribed, with half a tablet taken during the day if symptoms worsen, and a full tablet at night due to its drowsiness effect. Previously, an ENT specialist advised performing certain maneuvers when dizzy, suspecting symptoms might be due to ear crystals.    Sinus medication has been discontinued due to an increase in blood pressure. A few migraine pills and Advil have been taken, but overuse is avoided due to potential blood pressure elevation.    FAMILY HISTORY  Her mother had Ménière's disease.    Patient's past medical, surgical, social and/or family history reviewed, updated in chart, and are non-contributory

## 2025-04-17 DIAGNOSIS — G43.711 INTRACTABLE CHRONIC MIGRAINE WITHOUT AURA AND WITH STATUS MIGRAINOSUS: ICD-10-CM

## 2025-04-17 RX ORDER — UBROGEPANT 100 MG/1
TABLET ORAL
Qty: 36 TABLET | Refills: 3 | Status: SHIPPED | OUTPATIENT
Start: 2025-04-17

## 2025-04-17 RX ORDER — MONTELUKAST SODIUM 10 MG/1
10 TABLET ORAL NIGHTLY
Qty: 90 TABLET | Refills: 3 | Status: SHIPPED | OUTPATIENT
Start: 2025-04-17

## 2025-04-17 RX ORDER — RIZATRIPTAN BENZOATE 10 MG/1
TABLET ORAL
Qty: 27 TABLET | Refills: 3 | Status: SHIPPED | OUTPATIENT
Start: 2025-04-17

## 2025-04-17 RX ORDER — PANTOPRAZOLE SODIUM 40 MG/1
40 TABLET, DELAYED RELEASE ORAL DAILY
Qty: 90 TABLET | Refills: 5 | Status: SHIPPED | OUTPATIENT
Start: 2025-04-17

## 2025-04-21 DIAGNOSIS — I10 PRIMARY HYPERTENSION: ICD-10-CM

## 2025-04-21 DIAGNOSIS — G43.711 INTRACTABLE CHRONIC MIGRAINE WITHOUT AURA AND WITH STATUS MIGRAINOSUS: ICD-10-CM

## 2025-04-21 DIAGNOSIS — J30.89 SEASONAL ALLERGIC RHINITIS DUE TO OTHER ALLERGIC TRIGGER: Primary | ICD-10-CM

## 2025-04-21 DIAGNOSIS — G43.101 MIGRAINE WITH AURA AND WITH STATUS MIGRAINOSUS, NOT INTRACTABLE: ICD-10-CM

## 2025-04-21 DIAGNOSIS — H81.13 VERTIGO, BENIGN PAROXYSMAL, BILATERAL: ICD-10-CM

## 2025-04-21 DIAGNOSIS — T75.3XXA MOTION SICKNESS, INITIAL ENCOUNTER: ICD-10-CM

## 2025-04-21 DIAGNOSIS — Z76.0 MEDICATION REFILL: ICD-10-CM

## 2025-04-21 RX ORDER — MECLIZINE HYDROCHLORIDE 25 MG/1
25 TABLET ORAL EVERY 8 HOURS PRN
Qty: 90 TABLET | Refills: 0 | Status: SHIPPED | OUTPATIENT
Start: 2025-04-21 | End: 2025-07-20

## 2025-04-21 RX ORDER — ALBUTEROL SULFATE 90 UG/1
INHALANT RESPIRATORY (INHALATION)
Qty: 18 G | Refills: 5 | Status: SHIPPED | OUTPATIENT
Start: 2025-04-21

## 2025-04-21 RX ORDER — FAMOTIDINE 40 MG/1
40 TABLET, FILM COATED ORAL NIGHTLY
Qty: 90 TABLET | Refills: 3 | Status: SHIPPED | OUTPATIENT
Start: 2025-04-21

## 2025-04-21 RX ORDER — RIZATRIPTAN BENZOATE 10 MG/1
TABLET ORAL
Qty: 27 TABLET | Refills: 3 | Status: SHIPPED | OUTPATIENT
Start: 2025-04-21

## 2025-04-21 RX ORDER — PROMETHAZINE HYDROCHLORIDE 25 MG/1
25 TABLET ORAL 2 TIMES DAILY PRN
Qty: 30 TABLET | Refills: 5 | Status: SHIPPED | OUTPATIENT
Start: 2025-04-21

## 2025-04-21 RX ORDER — MONTELUKAST SODIUM 10 MG/1
10 TABLET ORAL NIGHTLY
Qty: 90 TABLET | Refills: 3 | Status: SHIPPED | OUTPATIENT
Start: 2025-04-21

## 2025-04-21 RX ORDER — UBROGEPANT 100 MG/1
TABLET ORAL
Qty: 36 TABLET | Refills: 3 | Status: SHIPPED | OUTPATIENT
Start: 2025-04-21

## 2025-04-21 RX ORDER — SPIRONOLACTONE 50 MG/1
50 TABLET, FILM COATED ORAL 2 TIMES DAILY
Qty: 180 TABLET | Refills: 3 | Status: SHIPPED | OUTPATIENT
Start: 2025-04-21

## 2025-04-21 RX ORDER — HYDROCHLOROTHIAZIDE 12.5 MG/1
12.5 CAPSULE ORAL DAILY
Qty: 90 CAPSULE | Refills: 3 | Status: SHIPPED | OUTPATIENT
Start: 2025-04-21

## 2025-04-21 RX ORDER — PANTOPRAZOLE SODIUM 40 MG/1
40 TABLET, DELAYED RELEASE ORAL DAILY
Qty: 90 TABLET | Refills: 5 | Status: SHIPPED | OUTPATIENT
Start: 2025-04-21

## 2025-04-22 RX ORDER — FEXOFENADINE HCL 180 MG/1
180 TABLET ORAL DAILY
Qty: 90 TABLET | Refills: 1 | Status: SHIPPED | OUTPATIENT
Start: 2025-04-22

## 2025-04-22 RX ORDER — DILTIAZEM HYDROCHLORIDE 120 MG/1
120 CAPSULE, COATED, EXTENDED RELEASE ORAL DAILY
Qty: 30 CAPSULE | Refills: 4 | Status: SHIPPED | OUTPATIENT
Start: 2025-04-22

## 2025-04-22 RX ORDER — FLUTICASONE PROPIONATE 50 MCG
1 SPRAY, SUSPENSION (ML) NASAL 2 TIMES DAILY
Qty: 48 G | Refills: 3 | Status: SHIPPED | OUTPATIENT
Start: 2025-04-22

## 2025-04-23 RX ORDER — ERENUMAB-AOOE 70 MG/ML
70 INJECTION SUBCUTANEOUS
Qty: 3 ADJUSTABLE DOSE PRE-FILLED PEN SYRINGE | Refills: 4 | Status: SHIPPED | OUTPATIENT
Start: 2025-04-23

## 2025-04-28 ENCOUNTER — OFFICE VISIT (OUTPATIENT)
Dept: FAMILY MEDICINE CLINIC | Age: 55
End: 2025-04-28
Payer: COMMERCIAL

## 2025-04-28 VITALS
BODY MASS INDEX: 30.94 KG/M2 | HEIGHT: 65 IN | RESPIRATION RATE: 16 BRPM | OXYGEN SATURATION: 98 % | HEART RATE: 77 BPM | DIASTOLIC BLOOD PRESSURE: 82 MMHG | WEIGHT: 185.7 LBS | TEMPERATURE: 97.7 F | SYSTOLIC BLOOD PRESSURE: 126 MMHG

## 2025-04-28 DIAGNOSIS — R60.0 PERIPHERAL EDEMA: Primary | ICD-10-CM

## 2025-04-28 PROCEDURE — 1036F TOBACCO NON-USER: CPT | Performed by: FAMILY MEDICINE

## 2025-04-28 PROCEDURE — 99213 OFFICE O/P EST LOW 20 MIN: CPT | Performed by: FAMILY MEDICINE

## 2025-04-28 PROCEDURE — 3079F DIAST BP 80-89 MM HG: CPT | Performed by: FAMILY MEDICINE

## 2025-04-28 PROCEDURE — G8427 DOCREV CUR MEDS BY ELIG CLIN: HCPCS | Performed by: FAMILY MEDICINE

## 2025-04-28 PROCEDURE — 3074F SYST BP LT 130 MM HG: CPT | Performed by: FAMILY MEDICINE

## 2025-04-28 PROCEDURE — G8417 CALC BMI ABV UP PARAM F/U: HCPCS | Performed by: FAMILY MEDICINE

## 2025-04-28 PROCEDURE — 3017F COLORECTAL CA SCREEN DOC REV: CPT | Performed by: FAMILY MEDICINE

## 2025-04-28 NOTE — PROGRESS NOTES
2025    Maci Thomas (:  1970) is a 54 y.o. female, is here for evaluation of the following chief complaint(s):  Migraine (Still having issues with migraines as before ) and Other (Fluid retention in the legs )      History of Present Illness  The patient presents for evaluation of bilateral leg swelling.    Bilateral leg swelling is reported, initially noticed due to marks on the legs and accompanied by pain in the wrist and swelling in the upper lip. These symptoms were observed after Easter, during which ham and eggs were consumed. A stomach bug has significantly reduced her appetite. Attempts to resume walking have been challenging due to persistent fatigue and exhaustion. Severe dizziness continues to be experienced. Appointments are scheduled with a neurologist in 2025, Dr. Fraser on 2025, and an ENT specialist on 05/15/2025. A consultation with a cardiologist or electrophysiologist is being considered. Past experiences of palpitations are recalled, raising curiosity about their relation to current symptoms. Hydrochlorothiazide has been taken, typically causing frequent urination at night, but was not taken last night due to fatigue and forgetfulness.    Patient's past medical, surgical, social and/or family history reviewed, updated in chart, and are non-contributory (unless otherwise stated).  Medications and allergies also reviewed and updated in chart.     ROS negative unless otherwise specified    Physical Exam  Temp Readings from Last 3 Encounters:   25 97.7 °F (36.5 °C) (Temporal)   25 97.4 °F (36.3 °C) (Temporal)   04/15/25 98.1 °F (36.7 °C) (Temporal)     Wt Readings from Last 3 Encounters:   25 84.2 kg (185 lb 11.2 oz)   25 87.1 kg (192 lb)   04/15/25 87.5 kg (192 lb 12.8 oz)     BP Readings from Last 3 Encounters:   25 126/82   25 124/72   04/15/25 114/64     Pulse Readings from Last 3 Encounters:   25 77   25 59

## 2025-05-01 ENCOUNTER — TELEPHONE (OUTPATIENT)
Dept: ENT CLINIC | Age: 55
End: 2025-05-01

## 2025-05-01 NOTE — TELEPHONE ENCOUNTER
Patient has an appointment scheduled on 5/15/25 with RAZ Calero, asking if there has been a cancellation so she can be seen sooner. Requested a call b ack at [phone]. Please advise.

## 2025-05-01 NOTE — TELEPHONE ENCOUNTER
Called patient to reschedule appointment patient is scheduled 5/12/25 at 10:15 will call if cancellation.

## 2025-05-06 ENCOUNTER — OFFICE VISIT (OUTPATIENT)
Dept: CARDIOLOGY CLINIC | Age: 55
End: 2025-05-06
Payer: COMMERCIAL

## 2025-05-06 VITALS
SYSTOLIC BLOOD PRESSURE: 134 MMHG | WEIGHT: 189.1 LBS | OXYGEN SATURATION: 98 % | TEMPERATURE: 96.9 F | BODY MASS INDEX: 31.5 KG/M2 | HEART RATE: 58 BPM | RESPIRATION RATE: 18 BRPM | DIASTOLIC BLOOD PRESSURE: 78 MMHG | HEIGHT: 65 IN

## 2025-05-06 DIAGNOSIS — M50.30 DDD (DEGENERATIVE DISC DISEASE), CERVICAL: Chronic | ICD-10-CM

## 2025-05-06 DIAGNOSIS — G43.101 MIGRAINE WITH AURA AND WITH STATUS MIGRAINOSUS, NOT INTRACTABLE: ICD-10-CM

## 2025-05-06 DIAGNOSIS — G62.9 PERIPHERAL NERVE DYSFUNCTION: ICD-10-CM

## 2025-05-06 DIAGNOSIS — E11.42 DIABETIC PERIPHERAL NEUROPATHY (HCC): ICD-10-CM

## 2025-05-06 DIAGNOSIS — I47.10 PAROXYSMAL SUPRAVENTRICULAR TACHYCARDIA: Primary | ICD-10-CM

## 2025-05-06 DIAGNOSIS — M51.16 INTERVERTEBRAL DISC DISORDERS WITH RADICULOPATHY, LUMBAR REGION: ICD-10-CM

## 2025-05-06 DIAGNOSIS — R29.818 SUSPECTED SLEEP APNEA: ICD-10-CM

## 2025-05-06 DIAGNOSIS — M47.26 OSTEOARTHRITIS OF SPINE WITH RADICULOPATHY, LUMBAR REGION: ICD-10-CM

## 2025-05-06 DIAGNOSIS — R00.2 PALPITATIONS: ICD-10-CM

## 2025-05-06 DIAGNOSIS — E66.09 NON MORBID OBESITY DUE TO EXCESS CALORIES: ICD-10-CM

## 2025-05-06 DIAGNOSIS — I34.1 MVP (MITRAL VALVE PROLAPSE): ICD-10-CM

## 2025-05-06 DIAGNOSIS — M17.11 OSTEOARTHRITIS OF RIGHT KNEE, UNSPECIFIED OSTEOARTHRITIS TYPE: ICD-10-CM

## 2025-05-06 DIAGNOSIS — I10 HYPERTENSION, UNSPECIFIED TYPE: ICD-10-CM

## 2025-05-06 DIAGNOSIS — E78.00 HYPERCHOLESTEROLEMIA: ICD-10-CM

## 2025-05-06 DIAGNOSIS — R06.83 SNORES: ICD-10-CM

## 2025-05-06 DIAGNOSIS — K21.9 GASTROESOPHAGEAL REFLUX DISEASE WITHOUT ESOPHAGITIS: ICD-10-CM

## 2025-05-06 DIAGNOSIS — M23.50 OLD ANTERIOR CRUCIATE LIGAMENT DISRUPTION, UNSPECIFIED LATERALITY: ICD-10-CM

## 2025-05-06 PROCEDURE — 3017F COLORECTAL CA SCREEN DOC REV: CPT | Performed by: INTERNAL MEDICINE

## 2025-05-06 PROCEDURE — 1036F TOBACCO NON-USER: CPT | Performed by: INTERNAL MEDICINE

## 2025-05-06 PROCEDURE — 2022F DILAT RTA XM EVC RTNOPTHY: CPT | Performed by: INTERNAL MEDICINE

## 2025-05-06 PROCEDURE — 93000 ELECTROCARDIOGRAM COMPLETE: CPT | Performed by: INTERNAL MEDICINE

## 2025-05-06 PROCEDURE — 99214 OFFICE O/P EST MOD 30 MIN: CPT | Performed by: INTERNAL MEDICINE

## 2025-05-06 PROCEDURE — 3044F HG A1C LEVEL LT 7.0%: CPT | Performed by: INTERNAL MEDICINE

## 2025-05-06 PROCEDURE — 3075F SYST BP GE 130 - 139MM HG: CPT | Performed by: INTERNAL MEDICINE

## 2025-05-06 PROCEDURE — 3078F DIAST BP <80 MM HG: CPT | Performed by: INTERNAL MEDICINE

## 2025-05-06 PROCEDURE — G8417 CALC BMI ABV UP PARAM F/U: HCPCS | Performed by: INTERNAL MEDICINE

## 2025-05-06 PROCEDURE — G8427 DOCREV CUR MEDS BY ELIG CLIN: HCPCS | Performed by: INTERNAL MEDICINE

## 2025-05-06 RX ORDER — TIOTROPIUM BROMIDE INHALATION SPRAY 1.56 UG/1
2 SPRAY, METERED RESPIRATORY (INHALATION) DAILY
COMMUNITY
Start: 2025-04-21

## 2025-05-06 NOTE — PROGRESS NOTES
Patient was seen today and a 14 day monitor was placed. Monitor was ordered by Dr. Lee. The monitor was applied, instructions were given to the patient. Patient stated understanding and gave verbalize readback.   Monitor company: [email or web address]  Serial number: [id number]    I had to cancel the first monitor Serial # [id number]  turned green then red before she was leaving, so put on the new monitor serial # [id number].  It's working.  Called [employer] and they canceled the other that didn't work.

## 2025-05-06 NOTE — PROGRESS NOTES
Out PATIENT New CARDIOLOGY CONSULT    Name: Maci Thomas    Age: 54 y.o.    Date of Admission: No admission date for patient encounter.    Date of Service: 5/7/2025    Reason for Consultation:   Chief Complaint   Patient presents with    Follow-Up from Hospital          Referring Physician: No admitting provider for patient encounter.    History of Present Illness: 54-year-old female with below medical history who was recently hospitalized for SVT and palpitations as well as presyncope.  She underwent stress test and echocardiogram that were fine.  Zio patch heart monitor was also arranged.  She present for follow-up visit today and reports still having significant palpitations and unable to take calcium channel blocker or beta-blocker and subsequently I am repeating Zio patch heart monitor as well as referring the patient to EP for further evaluation and management.  Patient is advised to proceed with sleep apnea evaluation.        Past Medical History:  Past Medical History:   Diagnosis Date    Asthma     DR PARK    Atypical ductal hyperplasia, breast     right breast 5/2013    Back injury     Chronic back pain     PAIN RADIATES TO NECK AND LEGS    Chronic tension-type headache, not intractable 11/09/2018    DDD (degenerative disc disease), cervical 11/09/2018    DVT (deep venous thrombosis) ([facility])     Right Arm    GERD (gastroesophageal reflux disease)     Herniated disc, cervical     also lumbar spine    Hx of screening mammography 3/2015 & 10/2015    BIRADS 2 BENIGN    HX OTHER MEDICAL     PINCHED NERVE BACK OF NECK    Hypertension     Migraine     Migraine     DR MARCOS    MVP (mitral valve prolapse)     Numbness and tingling     LEGS AND FEET    Obesity     Sinus infection     Thyroid disease     no med    Torn ACL     RT KNEE    Ulcer     Vitamin B 12 deficiency     NON ANEMIC       Past Surgical History:  Past Surgical History:   Procedure Laterality Date    BREAST LUMPECTOMY Right 6/21/2013

## 2025-05-06 NOTE — PROGRESS NOTES
Patient was seen today for a heart monitor placement ordered by Dr. WEBBER    Monitor type:ZIO XT   Duration: 14 DAYS   Serial number: [id number]    The monitor was applied and instructions were given.    HOOKED UP BY RUTH ANN LLANES

## 2025-05-08 DIAGNOSIS — I47.10 PAROXYSMAL SUPRAVENTRICULAR TACHYCARDIA: ICD-10-CM

## 2025-05-09 NOTE — PROGRESS NOTES
[facility] Sleep Medicine    Patient Name: Maci Thomas  Age: 54 y.o.   : 1970  Date of Visit: 25    Assessment and Plan:     1. AMA (obstructive sleep apnea)  high pre-test probability of AMA.We will pursue home sleep testing for further evaluation given symptoms listed below.    2. Obesity (BMI 30-39.9)  Rec'd 10-20% weight loss of total body weight (if feasible). Patient instructed on proper nutrition and estimated total daily caloric expenditure for their height and weight. Discussed that AMA may improve with weight loss, but there is no guarantee of reversal.       History:    HPI   Maci Thomas is a 54 y.o. female with  has a past medical history of Asthma, Atypical ductal hyperplasia, breast, Back injury, Chronic back pain, Chronic tension-type headache, not intractable (2018), DDD (degenerative disc disease), cervical (2018), DVT (deep venous thrombosis) ([facility]), GERD (gastroesophageal reflux disease), Herniated disc, cervical, screening mammography (3/2015 & 10/2015), OTHER MEDICAL, Hypertension, Migraine, Migraine, MVP (mitral valve prolapse), Numbness and tingling, Obesity, Sinus infection, Thyroid disease, Torn ACL, Ulcer, and Vitamin B 12 deficiency. who presents as a new patient to Sleep Clinic, referred by Dr. Lee, for Sleep Apnea    - Intermittent palpitations, has had several episodes, one being SVT  - Feels that they are worsening at times  - Followed by cardiology  - Zio patch evaluation was unremarkable  - Has an appointment with EP on 25  - Intermittent snoring, worsens with congestion  - Does have a history of asthma and chronic allergies  - No witnessed apneas or choking/gasping for air  - Does not have bruxism or jaw clenching  - Suffers from migraines    PMH:  Past Medical History:   Diagnosis Date    Asthma     DR PARK    Atypical ductal hyperplasia, breast     right breast 2013    Back injury     Chronic back pain     PAIN RADIATES TO NECK

## 2025-05-11 ENCOUNTER — RESULTS FOLLOW-UP (OUTPATIENT)
Dept: CARDIOLOGY | Age: 55
End: 2025-05-11

## 2025-05-11 NOTE — RESULT ENCOUNTER NOTE
Please notify pt that there was No dangerous abnormal heart rhythm or arrhythmias noted.  Details of heart monitor will be discussed during follow up visit. Call if there are symptoms for earlier visit.

## 2025-05-12 ENCOUNTER — OFFICE VISIT (OUTPATIENT)
Dept: ENT CLINIC | Age: 55
End: 2025-05-12
Payer: COMMERCIAL

## 2025-05-12 ENCOUNTER — PROCEDURE VISIT (OUTPATIENT)
Dept: AUDIOLOGY | Age: 55
End: 2025-05-12
Payer: COMMERCIAL

## 2025-05-12 ENCOUNTER — OFFICE VISIT (OUTPATIENT)
Dept: SLEEP MEDICINE | Age: 55
End: 2025-05-12
Payer: COMMERCIAL

## 2025-05-12 VITALS
TEMPERATURE: 97 F | SYSTOLIC BLOOD PRESSURE: 110 MMHG | HEIGHT: 65 IN | BODY MASS INDEX: 31.49 KG/M2 | WEIGHT: 189 LBS | OXYGEN SATURATION: 97 % | HEART RATE: 75 BPM | DIASTOLIC BLOOD PRESSURE: 76 MMHG

## 2025-05-12 VITALS
BODY MASS INDEX: 31.77 KG/M2 | DIASTOLIC BLOOD PRESSURE: 57 MMHG | TEMPERATURE: 98 F | RESPIRATION RATE: 14 BRPM | SYSTOLIC BLOOD PRESSURE: 105 MMHG | HEART RATE: 82 BPM | WEIGHT: 190.7 LBS | HEIGHT: 65 IN | OXYGEN SATURATION: 99 %

## 2025-05-12 DIAGNOSIS — E66.9 OBESITY (BMI 30-39.9): ICD-10-CM

## 2025-05-12 DIAGNOSIS — J30.9 CHRONIC ALLERGIC RHINITIS: ICD-10-CM

## 2025-05-12 DIAGNOSIS — R42 VERTIGO: Primary | ICD-10-CM

## 2025-05-12 DIAGNOSIS — G47.33 OSA (OBSTRUCTIVE SLEEP APNEA): Primary | ICD-10-CM

## 2025-05-12 PROCEDURE — 3017F COLORECTAL CA SCREEN DOC REV: CPT | Performed by: STUDENT IN AN ORGANIZED HEALTH CARE EDUCATION/TRAINING PROGRAM

## 2025-05-12 PROCEDURE — 92557 COMPREHENSIVE HEARING TEST: CPT | Performed by: AUDIOLOGIST

## 2025-05-12 PROCEDURE — G8427 DOCREV CUR MEDS BY ELIG CLIN: HCPCS | Performed by: STUDENT IN AN ORGANIZED HEALTH CARE EDUCATION/TRAINING PROGRAM

## 2025-05-12 PROCEDURE — 99204 OFFICE O/P NEW MOD 45 MIN: CPT | Performed by: STUDENT IN AN ORGANIZED HEALTH CARE EDUCATION/TRAINING PROGRAM

## 2025-05-12 PROCEDURE — 3017F COLORECTAL CA SCREEN DOC REV: CPT

## 2025-05-12 PROCEDURE — 1036F TOBACCO NON-USER: CPT | Performed by: STUDENT IN AN ORGANIZED HEALTH CARE EDUCATION/TRAINING PROGRAM

## 2025-05-12 PROCEDURE — 3074F SYST BP LT 130 MM HG: CPT

## 2025-05-12 PROCEDURE — 1036F TOBACCO NON-USER: CPT

## 2025-05-12 PROCEDURE — G8417 CALC BMI ABV UP PARAM F/U: HCPCS | Performed by: STUDENT IN AN ORGANIZED HEALTH CARE EDUCATION/TRAINING PROGRAM

## 2025-05-12 PROCEDURE — 3078F DIAST BP <80 MM HG: CPT | Performed by: STUDENT IN AN ORGANIZED HEALTH CARE EDUCATION/TRAINING PROGRAM

## 2025-05-12 PROCEDURE — 3078F DIAST BP <80 MM HG: CPT

## 2025-05-12 PROCEDURE — 92567 TYMPANOMETRY: CPT | Performed by: AUDIOLOGIST

## 2025-05-12 PROCEDURE — 3074F SYST BP LT 130 MM HG: CPT | Performed by: STUDENT IN AN ORGANIZED HEALTH CARE EDUCATION/TRAINING PROGRAM

## 2025-05-12 PROCEDURE — G8427 DOCREV CUR MEDS BY ELIG CLIN: HCPCS

## 2025-05-12 PROCEDURE — G8417 CALC BMI ABV UP PARAM F/U: HCPCS

## 2025-05-12 PROCEDURE — 99213 OFFICE O/P EST LOW 20 MIN: CPT

## 2025-05-12 RX ORDER — AMOXICILLIN 500 MG/1
CAPSULE ORAL
COMMUNITY
Start: 2025-05-08

## 2025-05-12 ASSESSMENT — SLEEP AND FATIGUE QUESTIONNAIRES
HOW LIKELY ARE YOU TO NOD OFF OR FALL ASLEEP WHILE SITTING QUIETLY AFTER LUNCH WITHOUT ALCOHOL: WOULD NEVER DOZE
HOW LIKELY ARE YOU TO NOD OFF OR FALL ASLEEP WHILE SITTING AND READING: WOULD NEVER DOZE
HOW LIKELY ARE YOU TO NOD OFF OR FALL ASLEEP WHILE SITTING INACTIVE IN A PUBLIC PLACE: WOULD NEVER DOZE
HOW LIKELY ARE YOU TO NOD OFF OR FALL ASLEEP WHILE LYING DOWN TO REST IN THE AFTERNOON WHEN CIRCUMSTANCES PERMIT: WOULD NEVER DOZE
HOW LIKELY ARE YOU TO NOD OFF OR FALL ASLEEP WHILE WATCHING TV: WOULD NEVER DOZE
HOW LIKELY ARE YOU TO NOD OFF OR FALL ASLEEP WHILE SITTING AND TALKING TO SOMEONE: WOULD NEVER DOZE
HOW LIKELY ARE YOU TO NOD OFF OR FALL ASLEEP WHEN YOU ARE A PASSENGER IN A CAR FOR AN HOUR WITHOUT A BREAK: WOULD NEVER DOZE
HOW LIKELY ARE YOU TO NOD OFF OR FALL ASLEEP IN A CAR, WHILE STOPPED FOR A FEW MINUTES IN TRAFFIC: WOULD NEVER DOZE
ESS TOTAL SCORE: 0

## 2025-05-12 NOTE — PROGRESS NOTES
Subjective:     Patient ID:  Maci Thomas is a 54 y.o. female.    HPI:  Vertigo - Dizziness  Patient presents with dizziness.  They have not been diagnosed with BPPV in the past. The dizziness has been present for 3 weeks. The patient describes the symptoms as vertigo and near syncope. Symptoms are exacerbated by nothing in particular   She has been treated with meclizine (Antivert) with good  improvement. Previous work up has been nothing.    History of Head trauma: no  History of surgery to the head/neck:no  History of cerumen impaction: no  History of noise exposure: no   Type: none  Spinning: yes   Length of time: day  Hearing loss: no    Fluctuating: no  Aural pressure: no  Tinnitus:yes \"my ears have been ringing for years\".  Otalgia:no    Has been flonase every day.   Had been on the allegra for \"a few months\" and switched to claritin   Using astelin PRN   Does have a history of SVT.     Past Medical History:   Diagnosis Date    Asthma     DR PARK    Atypical ductal hyperplasia, breast     right breast 5/2013    Back injury     Chronic back pain     PAIN RADIATES TO NECK AND LEGS    Chronic tension-type headache, not intractable 11/09/2018    DDD (degenerative disc disease), cervical 11/09/2018    DVT (deep venous thrombosis) ([facility])     Right Arm    GERD (gastroesophageal reflux disease)     Herniated disc, cervical     also lumbar spine    Hx of screening mammography 3/2015 & 10/2015    BIRADS 2 BENIGN    HX OTHER MEDICAL     PINCHED NERVE BACK OF NECK    Hypertension     Migraine     Migraine     DR MARCOS    MVP (mitral valve prolapse)     Numbness and tingling     LEGS AND FEET    Obesity     Sinus infection     Thyroid disease     no med    Torn ACL     RT KNEE    Ulcer     Vitamin B 12 deficiency     NON ANEMIC     Past Surgical History:   Procedure Laterality Date    BREAST LUMPECTOMY Right 6/21/2013    BREAST SURGERY Right 7/9/2013    re-excision, rt lumpectomyscar evacuation of hematoma.

## 2025-05-12 NOTE — TELEPHONE ENCOUNTER
----- Message from Dr. Nicholas Lee MD sent at 5/11/2025  1:30 PM EDT -----  Please notify pt that there was No dangerous abnormal heart rhythm or arrhythmias noted.  Details of heart monitor will be discussed during follow up visit. Call if there are symptoms for earlier visit.

## 2025-05-12 NOTE — PROGRESS NOTES
This patient was referred for audiometric/tympanometric testing by EDEL Voss due to vertigo      Audiometry using pure tone air and bone conduction revealed hearing sensitivity within normal limits bilaterally.   Reliability was good. Speech reception thresholds were in good agreement with the pure tone averages, bilaterally. Speech discrimination scores were excellent, bilaterally.    Tympanometry revealed normal middle ear peak pressure and compliance, bilaterally.          The results were reviewed with the patient and CNP.     Recommendations for follow up will be made pending ordering provider consult.    Reza William/CCC-A  [place] Lic # [id number]

## 2025-05-12 NOTE — TELEPHONE ENCOUNTER
Patient returning call, wanted office to know she did have Monitor on. Please reach patient at [phone]

## 2025-05-23 ENCOUNTER — TELEPHONE (OUTPATIENT)
Dept: CARDIOLOGY CLINIC | Age: 55
End: 2025-05-23

## 2025-05-23 NOTE — TELEPHONE ENCOUNTER
PER ESTEFANIA 1ST MONITOR HAD LESS THAN 8 HRS OF DATA. 2ND MONITOR DELIVERED 5-15-25 WAS NOT TURNED ON.      NELL CLAY

## 2025-05-27 DIAGNOSIS — G43.101 MIGRAINE WITH AURA AND WITH STATUS MIGRAINOSUS, NOT INTRACTABLE: Primary | ICD-10-CM

## 2025-05-27 RX ORDER — GALCANEZUMAB 120 MG/ML
120 INJECTION, SOLUTION SUBCUTANEOUS
Qty: 3 ADJUSTABLE DOSE PRE-FILLED PEN SYRINGE | Refills: 4 | Status: CANCELLED | OUTPATIENT
Start: 2025-05-27 | End: 2026-05-22

## 2025-05-27 RX ORDER — ATOGEPANT 60 MG/1
60 TABLET ORAL DAILY
Qty: 30 TABLET | Refills: 11 | Status: SHIPPED | OUTPATIENT
Start: 2025-05-27

## 2025-06-04 ENCOUNTER — HOSPITAL ENCOUNTER (OUTPATIENT)
Dept: SLEEP CENTER | Age: 55
Discharge: HOME | End: 2025-06-04
Payer: COMMERCIAL

## 2025-06-04 DIAGNOSIS — G47.33 OSA (OBSTRUCTIVE SLEEP APNEA): ICD-10-CM

## 2025-06-04 PROCEDURE — 95800 SLP STDY UNATTENDED: CPT

## 2025-06-09 ENCOUNTER — TELEPHONE (OUTPATIENT)
Dept: CARDIOLOGY CLINIC | Age: 55
End: 2025-06-09

## 2025-06-09 ENCOUNTER — OFFICE VISIT (OUTPATIENT)
Dept: NEUROLOGY | Age: 55
End: 2025-06-09
Payer: COMMERCIAL

## 2025-06-09 VITALS
SYSTOLIC BLOOD PRESSURE: 110 MMHG | BODY MASS INDEX: 31.45 KG/M2 | TEMPERATURE: 98.1 F | DIASTOLIC BLOOD PRESSURE: 70 MMHG | WEIGHT: 189 LBS | OXYGEN SATURATION: 99 %

## 2025-06-09 DIAGNOSIS — G43.101 MIGRAINE WITH AURA AND WITH STATUS MIGRAINOSUS, NOT INTRACTABLE: ICD-10-CM

## 2025-06-09 DIAGNOSIS — R22.0 LIP SWELLING: Primary | ICD-10-CM

## 2025-06-09 PROCEDURE — 1036F TOBACCO NON-USER: CPT | Performed by: PHYSICIAN ASSISTANT

## 2025-06-09 PROCEDURE — 3078F DIAST BP <80 MM HG: CPT | Performed by: PHYSICIAN ASSISTANT

## 2025-06-09 PROCEDURE — 99214 OFFICE O/P EST MOD 30 MIN: CPT | Performed by: PHYSICIAN ASSISTANT

## 2025-06-09 PROCEDURE — 3074F SYST BP LT 130 MM HG: CPT | Performed by: PHYSICIAN ASSISTANT

## 2025-06-09 PROCEDURE — G8417 CALC BMI ABV UP PARAM F/U: HCPCS | Performed by: PHYSICIAN ASSISTANT

## 2025-06-09 PROCEDURE — 3017F COLORECTAL CA SCREEN DOC REV: CPT | Performed by: PHYSICIAN ASSISTANT

## 2025-06-09 PROCEDURE — G8427 DOCREV CUR MEDS BY ELIG CLIN: HCPCS | Performed by: PHYSICIAN ASSISTANT

## 2025-06-09 RX ORDER — UBROGEPANT 100 MG/1
100 TABLET ORAL DAILY PRN
Qty: 6 TABLET | Refills: 0 | Status: SHIPPED | COMMUNITY
Start: 2025-06-09

## 2025-06-09 NOTE — PROGRESS NOTES
[facility]   Dolores Martin PA-C     [address] [place] [address]      [phone]         Office Follow up:                                  Maci Thomas is a 54 y.o. right handed female   Referred by Dr Darío Joseph for migraine with aura and without status migrainosus, not intractable    Past Medical History:     Past Medical History:   Diagnosis Date    Asthma     DR PARK    Atypical ductal hyperplasia, breast     right breast 2013    Back injury     Chronic back pain     PAIN RADIATES TO NECK AND LEGS    Chronic tension-type headache, not intractable 2018    DDD (degenerative disc disease), cervical 2018    DVT (deep venous thrombosis) ([facility])     Right Arm    GERD (gastroesophageal reflux disease)     Herniated disc, cervical     also lumbar spine    Hx of screening mammography 3/2015 & 10/2015    BIRADS 2 BENIGN    HX OTHER MEDICAL     PINCHED NERVE BACK OF NECK    Hypertension     Migraine     Migraine     DR MARCOS    MVP (mitral valve prolapse)     Numbness and tingling     LEGS AND FEET    Obesity     Sinus infection     Thyroid disease     no med    Torn ACL     RT KNEE    Ulcer     Vitamin B 12 deficiency     NON ANEMIC       Past Surgical History:       Past Surgical History:   Procedure Laterality Date    BREAST LUMPECTOMY Right 2013    BREAST SURGERY Right 2013    re-excision, rt lumpectomyscar evacuation of hematoma.    CARDIOVASCULAR STRESS TEST  2015    NORMAL     SECTION      COLONOSCOPY      12 YOSSEF HEMORRHOIDS, MINIMAL DIVERTICULA    COLONOSCOPY  2012    DR PEREZ GERD & GASTRITIS    DILATION AND CURETTAGE OF UTERUS      ECHO COMPL W DOP COLOR FLOW  2015         ECHOCARDIOGRAM COMPLETE 2D W DOPPLER W COLOR  10/24/2012         HARDWARE REMOVAL Right 2018    Foot    KNEE ARTHROSCOPY Right     Right knee arthroscopy.  Dr. Kan    KNEE ARTHROSCOPY Right     Right knee meniscal repair.

## 2025-06-09 NOTE — TELEPHONE ENCOUNTER
Pt called in stating she had another episode of the rapid heart beat on Thursday. The episode lasted about 10 min. Pt also felt dizzy/lightheaded. Pt was sitting at work on the phone when this happened. She has stated that these episodes feel more intense/frequent then in the past.     Patient wants answers as to what the problem.     Waiting on zio report to be uploaded on website.   Patient also wants to know if her EP appointment on 9/9/25 is to far out      Please advise

## 2025-06-13 ENCOUNTER — OFFICE VISIT (OUTPATIENT)
Dept: NEUROLOGY | Age: 55
End: 2025-06-13
Payer: COMMERCIAL

## 2025-06-13 VITALS — DIASTOLIC BLOOD PRESSURE: 76 MMHG | HEART RATE: 60 BPM | SYSTOLIC BLOOD PRESSURE: 110 MMHG | OXYGEN SATURATION: 99 %

## 2025-06-13 DIAGNOSIS — G43.101 MIGRAINE WITH AURA AND WITH STATUS MIGRAINOSUS, NOT INTRACTABLE: ICD-10-CM

## 2025-06-13 DIAGNOSIS — M62.838 CERVICAL PARASPINAL MUSCLE SPASM: Primary | ICD-10-CM

## 2025-06-13 DIAGNOSIS — R20.0 NUMBNESS AND TINGLING IN BOTH HANDS: ICD-10-CM

## 2025-06-13 DIAGNOSIS — R20.2 NUMBNESS AND TINGLING IN BOTH HANDS: ICD-10-CM

## 2025-06-13 PROCEDURE — 1036F TOBACCO NON-USER: CPT | Performed by: PHYSICIAN ASSISTANT

## 2025-06-13 PROCEDURE — 99215 OFFICE O/P EST HI 40 MIN: CPT | Performed by: PHYSICIAN ASSISTANT

## 2025-06-13 PROCEDURE — 3074F SYST BP LT 130 MM HG: CPT | Performed by: PHYSICIAN ASSISTANT

## 2025-06-13 PROCEDURE — G8417 CALC BMI ABV UP PARAM F/U: HCPCS | Performed by: PHYSICIAN ASSISTANT

## 2025-06-13 PROCEDURE — 3078F DIAST BP <80 MM HG: CPT | Performed by: PHYSICIAN ASSISTANT

## 2025-06-13 PROCEDURE — 3017F COLORECTAL CA SCREEN DOC REV: CPT | Performed by: PHYSICIAN ASSISTANT

## 2025-06-13 PROCEDURE — G8427 DOCREV CUR MEDS BY ELIG CLIN: HCPCS | Performed by: PHYSICIAN ASSISTANT

## 2025-06-13 RX ORDER — BACLOFEN 10 MG/1
10 TABLET ORAL 2 TIMES DAILY PRN
Qty: 60 TABLET | Refills: 5 | Status: SHIPPED | OUTPATIENT
Start: 2025-06-13 | End: 2025-06-13

## 2025-06-13 RX ORDER — BACLOFEN 10 MG/1
10 TABLET ORAL 2 TIMES DAILY PRN
Qty: 60 TABLET | Refills: 5 | Status: SHIPPED | OUTPATIENT
Start: 2025-06-13 | End: 2026-06-08

## 2025-06-13 RX ORDER — ATOGEPANT 60 MG/1
60 TABLET ORAL DAILY
Qty: 30 TABLET | Refills: 11 | Status: SHIPPED | OUTPATIENT
Start: 2025-06-13

## 2025-06-13 NOTE — PROGRESS NOTES
extremely bad and she needed to avoid any accidents because of her cervical spine  -- Will further evaluate with MRI of cervical and lumbar spine  --Labs added to previously ordered labs  -- Consider repeat  EMG in the future, prior EMG in the past from 2020 was normal      Plan:     Continue Ubrelvy     Trial Quilipta     Recommend ophthalmology    Labs     Referral to allergist placed for lip swelling    MRI cervical and lumbar spine    Baclofen 10mg BID    RTO in 3 months or sooner deacon Martin PA-C  8:47 AM  6/13/2025    I spent 45 minutes with this patient obtaining the HPI and discussing the exam with greater than 50% of the time providing counseling and education on medications and other treatment plans. All questions were answered prior to leaving my office.

## 2025-06-17 ENCOUNTER — HOSPITAL ENCOUNTER (OUTPATIENT)
Age: 55
Discharge: HOME | End: 2025-06-17
Payer: COMMERCIAL

## 2025-06-17 ENCOUNTER — OFFICE VISIT (OUTPATIENT)
Dept: CARDIOLOGY CLINIC | Age: 55
End: 2025-06-17
Payer: COMMERCIAL

## 2025-06-17 VITALS
BODY MASS INDEX: 31.72 KG/M2 | HEIGHT: 65 IN | OXYGEN SATURATION: 91 % | DIASTOLIC BLOOD PRESSURE: 70 MMHG | HEART RATE: 53 BPM | WEIGHT: 190.4 LBS | SYSTOLIC BLOOD PRESSURE: 110 MMHG | TEMPERATURE: 97.9 F | RESPIRATION RATE: 18 BRPM

## 2025-06-17 DIAGNOSIS — I47.10 PAROXYSMAL SUPRAVENTRICULAR TACHYCARDIA: ICD-10-CM

## 2025-06-17 DIAGNOSIS — R20.2 NUMBNESS AND TINGLING IN BOTH HANDS: ICD-10-CM

## 2025-06-17 DIAGNOSIS — I34.1 MVP (MITRAL VALVE PROLAPSE): ICD-10-CM

## 2025-06-17 DIAGNOSIS — K21.9 GASTROESOPHAGEAL REFLUX DISEASE WITHOUT ESOPHAGITIS: ICD-10-CM

## 2025-06-17 DIAGNOSIS — J45.20 MILD INTERMITTENT ASTHMA WITHOUT COMPLICATION: ICD-10-CM

## 2025-06-17 DIAGNOSIS — R20.0 NUMBNESS AND TINGLING IN BOTH HANDS: ICD-10-CM

## 2025-06-17 DIAGNOSIS — I10 HYPERTENSION, UNSPECIFIED TYPE: Primary | ICD-10-CM

## 2025-06-17 DIAGNOSIS — R00.2 PALPITATIONS: ICD-10-CM

## 2025-06-17 DIAGNOSIS — E11.42 DIABETIC PERIPHERAL NEUROPATHY (HCC): ICD-10-CM

## 2025-06-17 LAB
25(OH)D3 SERPL-MCNC: 29 NG/ML (ref 30–100)
FOLATE SERPL-MCNC: 7.5 NG/ML (ref 4.6–34.8)
TSH SERPL DL<=0.05 MIU/L-ACNC: 3.34 UIU/ML (ref 0.27–4.2)
VIT B12 SERPL-MCNC: 532 PG/ML (ref 232–1245)

## 2025-06-17 PROCEDURE — 3017F COLORECTAL CA SCREEN DOC REV: CPT | Performed by: INTERNAL MEDICINE

## 2025-06-17 PROCEDURE — 82306 VITAMIN D 25 HYDROXY: CPT

## 2025-06-17 PROCEDURE — 1036F TOBACCO NON-USER: CPT | Performed by: INTERNAL MEDICINE

## 2025-06-17 PROCEDURE — 3044F HG A1C LEVEL LT 7.0%: CPT | Performed by: INTERNAL MEDICINE

## 2025-06-17 PROCEDURE — 82607 VITAMIN B-12: CPT

## 2025-06-17 PROCEDURE — 36415 COLL VENOUS BLD VENIPUNCTURE: CPT

## 2025-06-17 PROCEDURE — 2022F DILAT RTA XM EVC RTNOPTHY: CPT | Performed by: INTERNAL MEDICINE

## 2025-06-17 PROCEDURE — 99214 OFFICE O/P EST MOD 30 MIN: CPT | Performed by: INTERNAL MEDICINE

## 2025-06-17 PROCEDURE — 3078F DIAST BP <80 MM HG: CPT | Performed by: INTERNAL MEDICINE

## 2025-06-17 PROCEDURE — 84443 ASSAY THYROID STIM HORMONE: CPT

## 2025-06-17 PROCEDURE — 82746 ASSAY OF FOLIC ACID SERUM: CPT

## 2025-06-17 PROCEDURE — 93000 ELECTROCARDIOGRAM COMPLETE: CPT | Performed by: INTERNAL MEDICINE

## 2025-06-17 PROCEDURE — G8417 CALC BMI ABV UP PARAM F/U: HCPCS | Performed by: INTERNAL MEDICINE

## 2025-06-17 PROCEDURE — G8427 DOCREV CUR MEDS BY ELIG CLIN: HCPCS | Performed by: INTERNAL MEDICINE

## 2025-06-17 PROCEDURE — 3074F SYST BP LT 130 MM HG: CPT | Performed by: INTERNAL MEDICINE

## 2025-06-17 NOTE — PROGRESS NOTES
Out PATIENT New CARDIOLOGY CONSULT    Name: Maci Thomas    Age: 54 y.o.    Date of Admission: No admission date for patient encounter.    Date of Service: 6/18/2025    Reason for Consultation:   Chief Complaint   Patient presents with    Follow-up          Referring Physician: No admitting provider for patient encounter.    History of Present Illness: 54-year-old female with below medical history who was recently hospitalized for SVT and palpitations as well as presyncope.  She underwent stress test and echocardiogram that were fine.  Zio patch heart monitor was also arranged which revealed no significant arrhythmias..  Due to bradycardia and low blood pressure is not able to be on beta-blocker or calcium channel blocker for her palpitation and paroxysmal SVT.  She is currently awaiting to see electrophysiology for further evaluation and management.     She present for follow-up visit today and reports still having significant palpitations and unable to take calcium channel blocker or beta-blocker and subsequently I am repeating Zio patch heart monitor as well as referring the patient to EP for further evaluation and management.  Patient is advised to proceed with sleep apnea evaluation.        Past Medical History:  Past Medical History:   Diagnosis Date    Asthma     DR PARK    Atypical ductal hyperplasia, breast     right breast 5/2013    Back injury     Chronic back pain     PAIN RADIATES TO NECK AND LEGS    Chronic tension-type headache, not intractable 11/09/2018    DDD (degenerative disc disease), cervical 11/09/2018    DVT (deep venous thrombosis) ([facility])     Right Arm    GERD (gastroesophageal reflux disease)     Herniated disc, cervical     also lumbar spine    Hx of screening mammography 3/2015 & 10/2015    BIRADS 2 BENIGN    HX OTHER MEDICAL     PINCHED NERVE BACK OF NECK    Hypertension     Migraine     Migraine     DR MARCOS    MVP (mitral valve prolapse)     Numbness and tingling

## 2025-06-18 ENCOUNTER — RESULTS FOLLOW-UP (OUTPATIENT)
Dept: NEUROLOGY | Age: 55
End: 2025-06-18

## 2025-06-19 DIAGNOSIS — R00.2 PALPITATIONS: ICD-10-CM

## 2025-06-30 ENCOUNTER — HOSPITAL ENCOUNTER (OUTPATIENT)
Dept: AUDIOLOGY | Age: 55
Discharge: HOME | End: 2025-06-30
Payer: COMMERCIAL

## 2025-06-30 PROCEDURE — 92537 CALORIC VSTBLR TEST W/REC: CPT

## 2025-06-30 PROCEDURE — 92540 BASIC VESTIBULAR EVALUATION: CPT

## 2025-06-30 NOTE — PROGRESS NOTES
VNG EVALUATION    REASON FOR REFERRAL:  This patient was referred for VNG testing by EDEL Voss due to vertigo.  Patient said it started around end of April, beginning of May. Symptoms are exacerbated by nothing in particular.      RESULTS:  Bithermal caloric irrigations revealed appropriate beating nystagmus with a left sided unilateral weakness. Patient demonstrated a caloric Reduced Vestibular Response (RVR) of 65%, in the left ear. This value exceeds the 20% normal limit for RVR. From the four irrigations, right beating nystagmus was 33% stronger than the left beating nystagmus. This exceeds the normal limit for Directional Preponderance.     Optokinetic testing revealed low gain.    No irregular eye movements were recorded during saccades or pendular tracking.    Positional testing revealed significant nystagmus in all conditions, eyes covered and eyes uncovered.     No significant nystagmus was recorded during gaze testing.        IMPRESSION:  Caloric testing revealed a left sided unilateral weakness, therefore peripheral vestibular system involvement cannot be ruled out.     Directional Preponderance was abnormal, therefore central nervous system or peripheral vestibular system involvement cannot be ruled out.     Optokinetic testing results were abnormal, therefore central nervous system involvement cannot be ruled out.     Positional testing was abnormal in eyes uncovered conditions, therefore central nervous system involvement cannot be ruled out. Positional testing was also abnormal in eyes covered conditions, therefore peripheral nervous system involvement cannot be ruled out.     Saccadic and pendular tracking test results within normal limits.     Gaze testing results within normal limits.      If I can be of further assistance or provide additional information, please do not hesitate to contact this office.    Thank you for the referral.      __________________________________  Sarah Saab  Protopic Counseling: Patient may experience a mild burning sensation during topical application. Protopic is not approved in children less than 2 years of age. There have been case reports of hematologic and skin malignancies in patients using topical calcineurin inhibitors although causality is questionable.

## 2025-07-02 ENCOUNTER — OFFICE VISIT (OUTPATIENT)
Dept: ENT CLINIC | Age: 55
End: 2025-07-02
Payer: COMMERCIAL

## 2025-07-02 DIAGNOSIS — Z71.2 ENCOUNTER TO DISCUSS TEST RESULTS: ICD-10-CM

## 2025-07-02 DIAGNOSIS — R42 VERTIGO: Primary | ICD-10-CM

## 2025-07-02 DIAGNOSIS — R94.118 ABNORMAL VIDEONYSTAGMOGRAM (VNG): ICD-10-CM

## 2025-07-02 PROCEDURE — 3074F SYST BP LT 130 MM HG: CPT

## 2025-07-02 PROCEDURE — 1036F TOBACCO NON-USER: CPT

## 2025-07-02 PROCEDURE — 3078F DIAST BP <80 MM HG: CPT

## 2025-07-02 PROCEDURE — 3017F COLORECTAL CA SCREEN DOC REV: CPT

## 2025-07-02 PROCEDURE — 99212 OFFICE O/P EST SF 10 MIN: CPT

## 2025-07-02 PROCEDURE — G8418 CALC BMI BLW LOW PARAM F/U: HCPCS

## 2025-07-02 PROCEDURE — G8427 DOCREV CUR MEDS BY ELIG CLIN: HCPCS

## 2025-07-02 ASSESSMENT — ENCOUNTER SYMPTOMS
RHINORRHEA: 0
COUGH: 0
SHORTNESS OF BREATH: 0
SINUS PRESSURE: 0
BACK PAIN: 0
VOMITING: 0
ALLERGIC/IMMUNOLOGIC NEGATIVE: 1
EYE PAIN: 0
SORE THROAT: 0
DIARRHEA: 0
EYE DISCHARGE: 0

## 2025-07-02 NOTE — PROGRESS NOTES
Subjective:      Patient ID:  Maci Thomas is a 54 y.o. female.    HPI:    Maci Thomas is here today for view of ENG results. The patient has had recurring episodes of dizziness.  She states she has been doing \"pretty good\".   States she has has\"slight dizziness on a few days\"   Denies any other new medical issues.     Past Medical History:   Diagnosis Date    Asthma     DR [facility]ARK    Atypical ductal hyperplasia, breast     right breast 2013    Back injury     Chronic back pain     PAIN RADIATES TO NECK AND LEGS    Chronic tension-type headache, not intractable 2018    DDD (degenerative disc disease), cervical 2018    DVT (deep venous thrombosis) ([facility])     Right Arm    GERD (gastroesophageal reflux disease)     Herniated disc, cervical     also lumbar spine    Hx of screening mammography 3/2015 & 10/2015    BIRADS 2 BENIGN    HX OTHER MEDICAL     PINCHED NERVE BACK OF NECK    Hypertension     Migraine     Migraine     DR MARCOS    MVP (mitral valve prolapse)     Numbness and tingling     LEGS AND FEET    Obesity     Sinus infection     Thyroid disease     no med    Torn ACL     RT KNEE    Ulcer     Vitamin B 12 deficiency     NON ANEMIC     Past Surgical History:   Procedure Laterality Date    BREAST LUMPECTOMY Right 2013    BREAST SURGERY Right 2013    re-excision, rt lumpectomyscar evacuation of hematoma.    CARDIOVASCULAR STRESS TEST  2015    NORMAL     SECTION      COLONOSCOPY      12 YOSSEF HEMORRHOIDS, MINIMAL DIVERTICULA    COLONOSCOPY  2012    DR PEREZ GERD & GASTRITIS    DILATION AND CURETTAGE OF UTERUS      ECHO COMPL W DOP COLOR FLOW  2015         ECHOCARDIOGRAM COMPLETE 2D W DOPPLER W COLOR  10/24/2012         HARDWARE REMOVAL Right 2018    Foot    KNEE ARTHROSCOPY Right     Right knee arthroscopy.  Dr. Kan    KNEE ARTHROSCOPY Right     Right knee meniscal repair.  Dr. Douglas.      MAMMO IMPLANT DIGITAL DIAG BI      3/24/15

## 2025-07-07 ENCOUNTER — RESULTS FOLLOW-UP (OUTPATIENT)
Dept: CARDIOLOGY | Age: 55
End: 2025-07-07

## 2025-07-07 NOTE — TELEPHONE ENCOUNTER
----- Message from Dr. Nicholas Lee MD sent at 7/7/2025  8:44 AM EDT -----  Please notify pt that there was No dangerous abnormal heart rhythm or arrhythmias noted.  Details of heart monitor will be discussed during follow up visit. Call if there are symptoms for earlier visit.

## 2025-07-13 PROBLEM — G47.33 OSA (OBSTRUCTIVE SLEEP APNEA): Status: ACTIVE | Noted: 2025-07-13

## 2025-07-17 ENCOUNTER — OFFICE VISIT (OUTPATIENT)
Dept: FAMILY MEDICINE CLINIC | Age: 55
End: 2025-07-17
Payer: COMMERCIAL

## 2025-07-17 ENCOUNTER — TELEPHONE (OUTPATIENT)
Dept: SLEEP CENTER | Age: 55
End: 2025-07-17

## 2025-07-17 VITALS
TEMPERATURE: 96.8 F | HEART RATE: 61 BPM | OXYGEN SATURATION: 99 % | SYSTOLIC BLOOD PRESSURE: 120 MMHG | BODY MASS INDEX: 31.24 KG/M2 | DIASTOLIC BLOOD PRESSURE: 70 MMHG | HEIGHT: 65 IN | RESPIRATION RATE: 18 BRPM | WEIGHT: 187.5 LBS

## 2025-07-17 DIAGNOSIS — G43.711 INTRACTABLE CHRONIC MIGRAINE WITHOUT AURA AND WITH STATUS MIGRAINOSUS: ICD-10-CM

## 2025-07-17 DIAGNOSIS — R73.03 PREDIABETES: ICD-10-CM

## 2025-07-17 DIAGNOSIS — E66.811 CLASS 1 OBESITY WITH BODY MASS INDEX (BMI) OF 31.0 TO 31.9 IN ADULT, UNSPECIFIED OBESITY TYPE, UNSPECIFIED WHETHER SERIOUS COMORBIDITY PRESENT: ICD-10-CM

## 2025-07-17 DIAGNOSIS — T75.3XXA MOTION SICKNESS, INITIAL ENCOUNTER: ICD-10-CM

## 2025-07-17 DIAGNOSIS — G47.33 OBSTRUCTIVE SLEEP APNEA SYNDROME: Primary | ICD-10-CM

## 2025-07-17 DIAGNOSIS — G47.33 OBSTRUCTIVE SLEEP APNEA SYNDROME: ICD-10-CM

## 2025-07-17 DIAGNOSIS — Z76.0 MEDICATION REFILL: ICD-10-CM

## 2025-07-17 DIAGNOSIS — I10 PRIMARY HYPERTENSION: ICD-10-CM

## 2025-07-17 PROCEDURE — 99214 OFFICE O/P EST MOD 30 MIN: CPT | Performed by: FAMILY MEDICINE

## 2025-07-17 PROCEDURE — 3074F SYST BP LT 130 MM HG: CPT | Performed by: FAMILY MEDICINE

## 2025-07-17 PROCEDURE — G8427 DOCREV CUR MEDS BY ELIG CLIN: HCPCS | Performed by: FAMILY MEDICINE

## 2025-07-17 PROCEDURE — 3017F COLORECTAL CA SCREEN DOC REV: CPT | Performed by: FAMILY MEDICINE

## 2025-07-17 PROCEDURE — 3078F DIAST BP <80 MM HG: CPT | Performed by: FAMILY MEDICINE

## 2025-07-17 PROCEDURE — 1036F TOBACCO NON-USER: CPT | Performed by: FAMILY MEDICINE

## 2025-07-17 PROCEDURE — G8417 CALC BMI ABV UP PARAM F/U: HCPCS | Performed by: FAMILY MEDICINE

## 2025-07-17 RX ORDER — HYDROCHLOROTHIAZIDE 12.5 MG/1
12.5 CAPSULE ORAL DAILY
Qty: 90 CAPSULE | Refills: 3 | Status: SHIPPED | OUTPATIENT
Start: 2025-07-17

## 2025-07-17 RX ORDER — SPIRONOLACTONE 50 MG/1
50 TABLET, FILM COATED ORAL 2 TIMES DAILY
Qty: 180 TABLET | Refills: 3 | Status: SHIPPED | OUTPATIENT
Start: 2025-07-17

## 2025-07-17 RX ORDER — RIZATRIPTAN BENZOATE 10 MG/1
TABLET ORAL
Qty: 27 TABLET | Refills: 3 | Status: SHIPPED | OUTPATIENT
Start: 2025-07-17

## 2025-07-17 RX ORDER — PROMETHAZINE HYDROCHLORIDE 25 MG/1
25 TABLET ORAL 2 TIMES DAILY PRN
Qty: 30 TABLET | Refills: 5 | Status: SHIPPED | OUTPATIENT
Start: 2025-07-17

## 2025-07-17 RX ORDER — MONTELUKAST SODIUM 10 MG/1
10 TABLET ORAL NIGHTLY
Qty: 90 TABLET | Refills: 3 | Status: SHIPPED | OUTPATIENT
Start: 2025-07-17

## 2025-07-17 RX ORDER — FAMOTIDINE 40 MG/1
40 TABLET, FILM COATED ORAL NIGHTLY
Qty: 90 TABLET | Refills: 3 | Status: SHIPPED | OUTPATIENT
Start: 2025-07-17

## 2025-07-17 RX ORDER — PANTOPRAZOLE SODIUM 40 MG/1
40 TABLET, DELAYED RELEASE ORAL DAILY
Qty: 90 TABLET | Refills: 3 | Status: CANCELLED | OUTPATIENT
Start: 2025-07-17

## 2025-07-17 RX ORDER — ESOMEPRAZOLE MAGNESIUM 40 MG/1
40 CAPSULE, DELAYED RELEASE ORAL NIGHTLY PRN
Qty: 90 CAPSULE | Refills: 3 | Status: SHIPPED | OUTPATIENT
Start: 2025-07-17

## 2025-07-17 RX ORDER — UBROGEPANT 100 MG/1
TABLET ORAL
Qty: 36 TABLET | Refills: 3 | Status: SHIPPED | OUTPATIENT
Start: 2025-07-17 | End: 2025-07-18 | Stop reason: SDUPTHER

## 2025-07-17 NOTE — PROGRESS NOTES
2025    Maci Thomas (:  1970) is a 54 y.o. female, is here for evaluation of the following chief complaint(s):  Hypertension and Diabetes      History of Present Illness  The patient presents for evaluation of obstructive sleep apnea, weight management, acid reflux, bilateral knee pain, and fluid retention in the calves.    She has been diagnosed with mild obstructive sleep apnea, as confirmed by a recent sleep study ordered by Dr. Hussein on 2025.    She reports a weight gain of 20 to 30 pounds since last year, despite not experiencing an increase in appetite or food intake. Her lowest recorded weight was 158 pounds. She has set a goal weight of 155 pounds and aims to maintain a toned physique. She expresses interest in trying injectable medications for weight loss but is concerned about potential side effects. She also mentions regular nausea and hair loss. She consumes cigars occasionally, a habit picked up from her relatives. Over the past 2 to 3 years, she has noticed an increase in sugar cravings. Today, she will be working for 12 to 13 hours and often has to remind herself to eat.    She experiences daily fluid retention in her calves, which she initially attributed to increased walking. She is unsure about her salt intake as she does not monitor it closely. She has been eating out more frequently over the past 4 months.    She has a torn meniscus in one knee and a torn ACL in the other, which has led to difficulty in moving and walking. She has been advised to undergo total knee replacement surgery but has been told it can only be performed when she is over 60 years old. She enjoys walking but has had to stop due to the pain, although she continues to walk at work. She has tried using topical Voltaren for relief.    She reports that her reflux symptoms are not severe, but she feels the need to change her medication due to persistent burning sensations. She alternates between

## 2025-07-18 DIAGNOSIS — G43.711 INTRACTABLE CHRONIC MIGRAINE WITHOUT AURA AND WITH STATUS MIGRAINOSUS: ICD-10-CM

## 2025-07-18 RX ORDER — UBROGEPANT 100 MG/1
TABLET ORAL
Refills: 3 | OUTPATIENT
Start: 2025-07-18

## 2025-07-18 RX ORDER — UBROGEPANT 100 MG/1
100 TABLET ORAL PRN
Qty: 36 TABLET | Refills: 3 | Status: SHIPPED | OUTPATIENT
Start: 2025-07-18

## 2025-07-22 ENCOUNTER — OFFICE VISIT (OUTPATIENT)
Dept: CARDIOLOGY CLINIC | Age: 55
End: 2025-07-22
Payer: COMMERCIAL

## 2025-07-22 VITALS
RESPIRATION RATE: 18 BRPM | WEIGHT: 185.2 LBS | SYSTOLIC BLOOD PRESSURE: 110 MMHG | BODY MASS INDEX: 30.86 KG/M2 | HEART RATE: 60 BPM | HEIGHT: 65 IN | TEMPERATURE: 96.9 F | OXYGEN SATURATION: 98 % | DIASTOLIC BLOOD PRESSURE: 68 MMHG

## 2025-07-22 DIAGNOSIS — G43.101 MIGRAINE WITH AURA AND WITH STATUS MIGRAINOSUS, NOT INTRACTABLE: ICD-10-CM

## 2025-07-22 DIAGNOSIS — G62.9 PERIPHERAL NERVE DYSFUNCTION: ICD-10-CM

## 2025-07-22 DIAGNOSIS — G47.33 OSA (OBSTRUCTIVE SLEEP APNEA): ICD-10-CM

## 2025-07-22 DIAGNOSIS — I47.10 PAROXYSMAL SUPRAVENTRICULAR TACHYCARDIA: ICD-10-CM

## 2025-07-22 DIAGNOSIS — I10 HYPERTENSION, UNSPECIFIED TYPE: Primary | ICD-10-CM

## 2025-07-22 DIAGNOSIS — I34.1 MITRAL VALVE PROLAPSE: Primary | ICD-10-CM

## 2025-07-22 DIAGNOSIS — M47.26 OSTEOARTHRITIS OF SPINE WITH RADICULOPATHY, LUMBAR REGION: ICD-10-CM

## 2025-07-22 DIAGNOSIS — K21.9 GASTROESOPHAGEAL REFLUX DISEASE WITHOUT ESOPHAGITIS: ICD-10-CM

## 2025-07-22 DIAGNOSIS — E78.00 HYPERCHOLESTEROLEMIA: ICD-10-CM

## 2025-07-22 DIAGNOSIS — I47.10 SVT (SUPRAVENTRICULAR TACHYCARDIA): ICD-10-CM

## 2025-07-22 DIAGNOSIS — R00.2 PALPITATIONS: ICD-10-CM

## 2025-07-22 DIAGNOSIS — E11.42 DIABETIC PERIPHERAL NEUROPATHY (HCC): ICD-10-CM

## 2025-07-22 DIAGNOSIS — R00.2 PALPITATION: ICD-10-CM

## 2025-07-22 DIAGNOSIS — M54.12 CERVICAL RADICULOPATHY: ICD-10-CM

## 2025-07-22 DIAGNOSIS — M51.16 INTERVERTEBRAL DISC DISORDERS WITH RADICULOPATHY, LUMBAR REGION: ICD-10-CM

## 2025-07-22 DIAGNOSIS — Q07.00 ARNOLD-CHIARI MALFORMATION (HCC): ICD-10-CM

## 2025-07-22 DIAGNOSIS — J45.20 MILD INTERMITTENT ASTHMA, UNSPECIFIED WHETHER COMPLICATED: ICD-10-CM

## 2025-07-22 DIAGNOSIS — I34.1 MVP (MITRAL VALVE PROLAPSE): ICD-10-CM

## 2025-07-22 PROCEDURE — 3017F COLORECTAL CA SCREEN DOC REV: CPT | Performed by: INTERNAL MEDICINE

## 2025-07-22 PROCEDURE — 99214 OFFICE O/P EST MOD 30 MIN: CPT | Performed by: INTERNAL MEDICINE

## 2025-07-22 PROCEDURE — 3074F SYST BP LT 130 MM HG: CPT | Performed by: INTERNAL MEDICINE

## 2025-07-22 PROCEDURE — 1036F TOBACCO NON-USER: CPT | Performed by: INTERNAL MEDICINE

## 2025-07-22 PROCEDURE — 93000 ELECTROCARDIOGRAM COMPLETE: CPT | Performed by: INTERNAL MEDICINE

## 2025-07-22 PROCEDURE — 2022F DILAT RTA XM EVC RTNOPTHY: CPT | Performed by: INTERNAL MEDICINE

## 2025-07-22 PROCEDURE — 3044F HG A1C LEVEL LT 7.0%: CPT | Performed by: INTERNAL MEDICINE

## 2025-07-22 PROCEDURE — G8427 DOCREV CUR MEDS BY ELIG CLIN: HCPCS | Performed by: INTERNAL MEDICINE

## 2025-07-22 PROCEDURE — G8417 CALC BMI ABV UP PARAM F/U: HCPCS | Performed by: INTERNAL MEDICINE

## 2025-07-22 PROCEDURE — 3078F DIAST BP <80 MM HG: CPT | Performed by: INTERNAL MEDICINE

## 2025-07-22 NOTE — PROGRESS NOTES
Out PATIENT New CARDIOLOGY CONSULT    Name: Maci Thomas    Age: 54 y.o.    Date of Admission: No admission date for patient encounter.    Date of Service: 7/22/2025    Reason for Consultation:   Chief Complaint   Patient presents with    Follow-up          Referring Physician: No admitting provider for patient encounter.    History of Present Illness: 54-year-old female with below medical history who was recently hospitalized for SVT and palpitations as well as presyncope.  She underwent stress test and echocardiogram that were fine.  Zio patch heart monitor was also arranged which revealed no significant arrhythmias or recurrent SVT..  Due to bradycardia and low blood pressure is not able to be on AV rupesh blocking agents.  During recent hospitalization a beta-blocker was given and patient developed a strong reaction to beta-blocker and subsequently currently not interested in beta-blocker even if heart rate to allow.  She is very concerned and want to discuss his recurrent SVT with the EP as he reports she has been having episodes of SVT at night but heart monitors are not able to catch the SVTs.  I have talked to Dr. Montes De Oca and will see the patient next week for further evaluation and management.              Past Medical History:  Past Medical History:   Diagnosis Date    Asthma     DR PARK    Atypical ductal hyperplasia, breast     right breast 5/2013    Back injury     Chronic back pain     PAIN RADIATES TO NECK AND LEGS    Chronic tension-type headache, not intractable 11/09/2018    DDD (degenerative disc disease), cervical 11/09/2018    DVT (deep venous thrombosis) ([facility])     Right Arm    GERD (gastroesophageal reflux disease)     Herniated disc, cervical     also lumbar spine    Hx of screening mammography 3/2015 & 10/2015    BIRADS 2 BENIGN    HX OTHER MEDICAL     PINCHED NERVE BACK OF NECK    Hypertension     Migraine     Migraine     DR MARCOS    MVP (mitral valve prolapse)     Numbness

## 2025-08-25 ENCOUNTER — TELEPHONE (OUTPATIENT)
Age: 55
End: 2025-08-25

## 2025-08-26 ENCOUNTER — TELEPHONE (OUTPATIENT)
Dept: NEUROLOGY | Age: 55
End: 2025-08-26

## 2025-08-26 ENCOUNTER — TELEPHONE (OUTPATIENT)
Dept: FAMILY MEDICINE CLINIC | Age: 55
End: 2025-08-26

## 2025-08-26 DIAGNOSIS — G43.101 MIGRAINE WITH AURA AND WITH STATUS MIGRAINOSUS, NOT INTRACTABLE: Primary | ICD-10-CM

## 2025-08-26 RX ORDER — PREDNISONE 20 MG/1
TABLET ORAL
Qty: 12 TABLET | Refills: 0 | Status: SHIPPED | OUTPATIENT
Start: 2025-08-26 | End: 2025-08-26

## 2025-08-26 RX ORDER — PREDNISONE 20 MG/1
TABLET ORAL
Qty: 12 TABLET | Refills: 0 | Status: SHIPPED | OUTPATIENT
Start: 2025-08-26 | End: 2025-09-01

## 2025-08-26 RX ORDER — PROMETHAZINE HYDROCHLORIDE 25 MG/1
25 TABLET ORAL DAILY
Qty: 6 TABLET | Refills: 0 | Status: SHIPPED | OUTPATIENT
Start: 2025-08-26 | End: 2025-09-01

## 2025-08-26 RX ORDER — PROMETHAZINE HYDROCHLORIDE 25 MG/1
25 TABLET ORAL DAILY
Qty: 6 TABLET | Refills: 0 | Status: SHIPPED | OUTPATIENT
Start: 2025-08-26 | End: 2025-08-26